# Patient Record
Sex: MALE | Race: WHITE | Employment: OTHER | URBAN - METROPOLITAN AREA
[De-identification: names, ages, dates, MRNs, and addresses within clinical notes are randomized per-mention and may not be internally consistent; named-entity substitution may affect disease eponyms.]

---

## 2019-02-05 ENCOUNTER — HOSPITAL ENCOUNTER (OUTPATIENT)
Dept: LAB | Age: 74
Discharge: HOME | End: 2019-02-05
Payer: MEDICARE

## 2019-02-05 DIAGNOSIS — Z95.1 S/P CABG (CORONARY ARTERY BYPASS GRAFT): ICD-10-CM

## 2019-02-05 LAB
ALBUMIN SERPL-MCNC: 3.3 G/DL (ref 3.2–4.6)
ALBUMIN/GLOB SERPL: 0.7 {RATIO}
ALP SERPL-CCNC: 86 U/L (ref 50–136)
ALT SERPL-CCNC: 23 U/L (ref 12–65)
ANION GAP SERPL CALC-SCNC: 8 MMOL/L
AST SERPL-CCNC: 22 U/L (ref 15–37)
BASOPHILS # BLD: 0 K/UL (ref 0–0.2)
BASOPHILS NFR BLD: 1 % (ref 0–2)
BILIRUB SERPL-MCNC: 0.7 MG/DL (ref 0.2–1.1)
BUN SERPL-MCNC: 26 MG/DL (ref 8–23)
CALCIUM SERPL-MCNC: 9.3 MG/DL (ref 8.3–10.4)
CHLORIDE SERPL-SCNC: 110 MMOL/L (ref 98–107)
CHOLEST SERPL-MCNC: 170 MG/DL
CO2 SERPL-SCNC: 22 MMOL/L (ref 21–32)
CREAT SERPL-MCNC: 1.8 MG/DL (ref 0.8–1.5)
DIFFERENTIAL METHOD BLD: ABNORMAL
EOSINOPHIL # BLD: 0.2 K/UL (ref 0–0.8)
EOSINOPHIL NFR BLD: 4 % (ref 0.5–7.8)
ERYTHROCYTE [DISTWIDTH] IN BLOOD BY AUTOMATED COUNT: 16.2 % (ref 11.9–14.6)
GLOBULIN SER CALC-MCNC: 4.9 G/DL
GLUCOSE SERPL-MCNC: 103 MG/DL (ref 65–100)
HCT VFR BLD AUTO: 43.9 % (ref 41.1–50.3)
HDLC SERPL-MCNC: 27 MG/DL (ref 40–60)
HDLC SERPL: 6.3 {RATIO}
HGB BLD-MCNC: 14.3 G/DL (ref 13.6–17.2)
IMM GRANULOCYTES # BLD AUTO: 0 K/UL (ref 0–0.5)
IMM GRANULOCYTES NFR BLD AUTO: 0 % (ref 0–5)
LDLC SERPL CALC-MCNC: 96 MG/DL
LIPID PROFILE,FLP: ABNORMAL
LYMPHOCYTES # BLD: 1.3 K/UL (ref 0.5–4.6)
LYMPHOCYTES NFR BLD: 26 % (ref 13–44)
MAGNESIUM SERPL-MCNC: 2.2 MG/DL (ref 1.8–2.4)
MCH RBC QN AUTO: 25.8 PG (ref 26.1–32.9)
MCHC RBC AUTO-ENTMCNC: 32.6 G/DL (ref 31.4–35)
MCV RBC AUTO: 79.1 FL (ref 79.6–97.8)
MONOCYTES # BLD: 0.7 K/UL (ref 0.1–1.3)
MONOCYTES NFR BLD: 13 % (ref 4–12)
NEUTS SEG # BLD: 2.9 K/UL (ref 1.7–8.2)
NEUTS SEG NFR BLD: 57 % (ref 43–78)
NRBC # BLD: 0 K/UL (ref 0–0.2)
PLATELET # BLD AUTO: 231 K/UL (ref 150–450)
PMV BLD AUTO: 9.3 FL (ref 9.4–12.3)
POTASSIUM SERPL-SCNC: 4.1 MMOL/L (ref 3.5–5.1)
PROT SERPL-MCNC: 8.2 G/DL (ref 6.3–8.2)
RBC # BLD AUTO: 5.55 M/UL (ref 4.23–5.6)
SODIUM SERPL-SCNC: 140 MMOL/L (ref 136–145)
TRIGL SERPL-MCNC: 235 MG/DL (ref 35–150)
TSH SERPL DL<=0.005 MIU/L-ACNC: 5.24 UIU/ML (ref 0.36–3.74)
VLDLC SERPL CALC-MCNC: 47 MG/DL (ref 6–23)
WBC # BLD AUTO: 5.1 K/UL (ref 4.3–11.1)

## 2019-02-05 PROCEDURE — 85025 COMPLETE CBC W/AUTO DIFF WBC: CPT

## 2019-02-05 PROCEDURE — 84443 ASSAY THYROID STIM HORMONE: CPT

## 2019-02-05 PROCEDURE — 83735 ASSAY OF MAGNESIUM: CPT

## 2019-02-05 PROCEDURE — 80053 COMPREHEN METABOLIC PANEL: CPT

## 2019-02-05 PROCEDURE — 36415 COLL VENOUS BLD VENIPUNCTURE: CPT

## 2019-02-05 PROCEDURE — 80061 LIPID PANEL: CPT

## 2019-02-13 ENCOUNTER — HOSPITAL ENCOUNTER (OUTPATIENT)
Dept: CARDIAC CATH/INVASIVE PROCEDURES | Age: 74
Discharge: HOME | End: 2019-02-14
Attending: INTERNAL MEDICINE | Admitting: INTERNAL MEDICINE
Payer: MEDICARE

## 2019-02-13 PROBLEM — I25.10 CAD (CORONARY ARTERY DISEASE): Status: ACTIVE | Noted: 2019-02-13

## 2019-02-13 LAB
ANION GAP SERPL CALC-SCNC: 9 MMOL/L (ref 7–16)
ATRIAL RATE: 55 BPM
BUN SERPL-MCNC: 30 MG/DL (ref 8–23)
CALCIUM SERPL-MCNC: 9.6 MG/DL (ref 8.3–10.4)
CALCULATED P AXIS, ECG09: 29 DEGREES
CALCULATED R AXIS, ECG10: 40 DEGREES
CALCULATED T AXIS, ECG11: 94 DEGREES
CHLORIDE SERPL-SCNC: 109 MMOL/L (ref 98–107)
CO2 SERPL-SCNC: 22 MMOL/L (ref 21–32)
CREAT SERPL-MCNC: 1.71 MG/DL (ref 0.8–1.5)
DIAGNOSIS, 93000: NORMAL
GLUCOSE BLD STRIP.AUTO-MCNC: 171 MG/DL (ref 65–100)
GLUCOSE SERPL-MCNC: 154 MG/DL (ref 65–100)
MAGNESIUM SERPL-MCNC: 2.1 MG/DL (ref 1.8–2.4)
P-R INTERVAL, ECG05: 174 MS
POTASSIUM SERPL-SCNC: 4.4 MMOL/L (ref 3.5–5.1)
Q-T INTERVAL, ECG07: 464 MS
QRS DURATION, ECG06: 128 MS
QTC CALCULATION (BEZET), ECG08: 443 MS
SODIUM SERPL-SCNC: 140 MMOL/L (ref 136–145)
VENTRICULAR RATE, ECG03: 55 BPM

## 2019-02-13 PROCEDURE — C1894 INTRO/SHEATH, NON-LASER: HCPCS

## 2019-02-13 PROCEDURE — 99153 MOD SED SAME PHYS/QHP EA: CPT

## 2019-02-13 PROCEDURE — 74011250637 HC RX REV CODE- 250/637: Performed by: NURSE PRACTITIONER

## 2019-02-13 PROCEDURE — 74011636320 HC RX REV CODE- 636/320: Performed by: INTERNAL MEDICINE

## 2019-02-13 PROCEDURE — 75710 ARTERY X-RAYS ARM/LEG: CPT

## 2019-02-13 PROCEDURE — C1769 GUIDE WIRE: HCPCS

## 2019-02-13 PROCEDURE — 77030015395 HC CATH ANGI DX SVU ANGI -C

## 2019-02-13 PROCEDURE — C1887 CATHETER, GUIDING: HCPCS

## 2019-02-13 PROCEDURE — 75716 ARTERY X-RAYS ARMS/LEGS: CPT

## 2019-02-13 PROCEDURE — 77030013794 HC KT TRNSDUC BLD EDWD -B

## 2019-02-13 PROCEDURE — 99152 MOD SED SAME PHYS/QHP 5/>YRS: CPT

## 2019-02-13 PROCEDURE — 77030002996 HC SUT SLK J&J -A

## 2019-02-13 PROCEDURE — 77030016715 HC CATH ANGI DX TMPO2 CARD -B

## 2019-02-13 PROCEDURE — 74011250636 HC RX REV CODE- 250/636

## 2019-02-13 PROCEDURE — 93005 ELECTROCARDIOGRAM TRACING: CPT | Performed by: INTERNAL MEDICINE

## 2019-02-13 PROCEDURE — 36247 INS CATH ABD/L-EXT ART 3RD: CPT

## 2019-02-13 PROCEDURE — 74011636637 HC RX REV CODE- 636/637: Performed by: INTERNAL MEDICINE

## 2019-02-13 PROCEDURE — 83735 ASSAY OF MAGNESIUM: CPT

## 2019-02-13 PROCEDURE — 82962 GLUCOSE BLOOD TEST: CPT

## 2019-02-13 PROCEDURE — 74011250636 HC RX REV CODE- 250/636: Performed by: INTERNAL MEDICINE

## 2019-02-13 PROCEDURE — 74011000258 HC RX REV CODE- 258: Performed by: INTERNAL MEDICINE

## 2019-02-13 PROCEDURE — 80048 BASIC METABOLIC PNL TOTAL CA: CPT

## 2019-02-13 RX ORDER — PREDNISONE 5 MG/1
5 TABLET ORAL DAILY
COMMUNITY
End: 2019-03-07

## 2019-02-13 RX ORDER — LIDOCAINE HYDROCHLORIDE 10 MG/ML
5-40 INJECTION INFILTRATION; PERINEURAL
Status: DISCONTINUED | OUTPATIENT
Start: 2019-02-13 | End: 2019-02-13 | Stop reason: HOSPADM

## 2019-02-13 RX ORDER — FENTANYL CITRATE 50 UG/ML
25-100 INJECTION, SOLUTION INTRAMUSCULAR; INTRAVENOUS
Status: DISCONTINUED | OUTPATIENT
Start: 2019-02-13 | End: 2019-02-13 | Stop reason: HOSPADM

## 2019-02-13 RX ORDER — LISINOPRIL 20 MG/1
40 TABLET ORAL DAILY
Status: DISCONTINUED | OUTPATIENT
Start: 2019-02-14 | End: 2019-02-14 | Stop reason: HOSPADM

## 2019-02-13 RX ORDER — LEVOTHYROXINE SODIUM 75 UG/1
75 TABLET ORAL
Status: DISCONTINUED | OUTPATIENT
Start: 2019-02-14 | End: 2019-02-14 | Stop reason: HOSPADM

## 2019-02-13 RX ORDER — NITROGLYCERIN 0.4 MG/1
0.4 TABLET SUBLINGUAL
Status: DISCONTINUED | OUTPATIENT
Start: 2019-02-13 | End: 2019-02-14 | Stop reason: HOSPADM

## 2019-02-13 RX ORDER — SODIUM CHLORIDE 0.9 % (FLUSH) 0.9 %
5-40 SYRINGE (ML) INJECTION EVERY 8 HOURS
Status: DISCONTINUED | OUTPATIENT
Start: 2019-02-13 | End: 2019-02-14 | Stop reason: HOSPADM

## 2019-02-13 RX ORDER — HYDRALAZINE HYDROCHLORIDE 50 MG/1
100 TABLET, FILM COATED ORAL DAILY
Status: DISCONTINUED | OUTPATIENT
Start: 2019-02-14 | End: 2019-02-14 | Stop reason: HOSPADM

## 2019-02-13 RX ORDER — TAMSULOSIN HYDROCHLORIDE 0.4 MG/1
0.4 CAPSULE ORAL DAILY
Status: DISCONTINUED | OUTPATIENT
Start: 2019-02-14 | End: 2019-02-14 | Stop reason: HOSPADM

## 2019-02-13 RX ORDER — SODIUM CHLORIDE 9 MG/ML
3 INJECTION, SOLUTION INTRAVENOUS ONCE
Status: COMPLETED | OUTPATIENT
Start: 2019-02-13 | End: 2019-02-13

## 2019-02-13 RX ORDER — ASPIRIN 81 MG/1
81 TABLET ORAL DAILY
Status: DISCONTINUED | OUTPATIENT
Start: 2019-02-14 | End: 2019-02-14 | Stop reason: HOSPADM

## 2019-02-13 RX ORDER — BIVALIRUDIN 250 MG/5ML
0.75 INJECTION, POWDER, LYOPHILIZED, FOR SOLUTION INTRAVENOUS ONCE
Status: COMPLETED | OUTPATIENT
Start: 2019-02-13 | End: 2019-02-13

## 2019-02-13 RX ORDER — SODIUM CHLORIDE 0.9 % (FLUSH) 0.9 %
5-40 SYRINGE (ML) INJECTION AS NEEDED
Status: DISCONTINUED | OUTPATIENT
Start: 2019-02-13 | End: 2019-02-14 | Stop reason: HOSPADM

## 2019-02-13 RX ORDER — INSULIN LISPRO 100 [IU]/ML
INJECTION, SOLUTION INTRAVENOUS; SUBCUTANEOUS
Status: DISCONTINUED | OUTPATIENT
Start: 2019-02-13 | End: 2019-02-14

## 2019-02-13 RX ORDER — GUAIFENESIN 100 MG/5ML
324 LIQUID (ML) ORAL ONCE
Status: ACTIVE | OUTPATIENT
Start: 2019-02-13 | End: 2019-02-13

## 2019-02-13 RX ORDER — HYDROMORPHONE HYDROCHLORIDE 2 MG/ML
.5-2 INJECTION, SOLUTION INTRAMUSCULAR; INTRAVENOUS; SUBCUTANEOUS ONCE
Status: COMPLETED | OUTPATIENT
Start: 2019-02-13 | End: 2019-02-13

## 2019-02-13 RX ORDER — INSULIN LISPRO 100 [IU]/ML
12 INJECTION, SOLUTION INTRAVENOUS; SUBCUTANEOUS
Status: DISCONTINUED | OUTPATIENT
Start: 2019-02-14 | End: 2019-02-14 | Stop reason: HOSPADM

## 2019-02-13 RX ORDER — ONDANSETRON 2 MG/ML
4 INJECTION INTRAMUSCULAR; INTRAVENOUS
Status: DISCONTINUED | OUTPATIENT
Start: 2019-02-13 | End: 2019-02-14 | Stop reason: HOSPADM

## 2019-02-13 RX ORDER — METOPROLOL TARTRATE 25 MG/1
25 TABLET, FILM COATED ORAL DAILY
Status: DISCONTINUED | OUTPATIENT
Start: 2019-02-14 | End: 2019-02-14 | Stop reason: HOSPADM

## 2019-02-13 RX ORDER — SODIUM CHLORIDE 9 MG/ML
1 INJECTION, SOLUTION INTRAVENOUS AS NEEDED
Status: DISPENSED | OUTPATIENT
Start: 2019-02-13 | End: 2019-02-14

## 2019-02-13 RX ORDER — MORPHINE SULFATE 2 MG/ML
2 INJECTION, SOLUTION INTRAMUSCULAR; INTRAVENOUS
Status: DISCONTINUED | OUTPATIENT
Start: 2019-02-13 | End: 2019-02-14 | Stop reason: HOSPADM

## 2019-02-13 RX ORDER — HYDRALAZINE HYDROCHLORIDE 20 MG/ML
10-20 INJECTION INTRAMUSCULAR; INTRAVENOUS
Status: COMPLETED | OUTPATIENT
Start: 2019-02-13 | End: 2019-02-13

## 2019-02-13 RX ORDER — IODIXANOL 320 MG/ML
50-300 INJECTION, SOLUTION INTRAVASCULAR ONCE
Status: COMPLETED | OUTPATIENT
Start: 2019-02-13 | End: 2019-02-13

## 2019-02-13 RX ORDER — HEPARIN SODIUM 200 [USP'U]/100ML
3 INJECTION, SOLUTION INTRAVENOUS CONTINUOUS
Status: DISCONTINUED | OUTPATIENT
Start: 2019-02-13 | End: 2019-02-13 | Stop reason: HOSPADM

## 2019-02-13 RX ORDER — MIDAZOLAM HYDROCHLORIDE 1 MG/ML
.5-5 INJECTION, SOLUTION INTRAMUSCULAR; INTRAVENOUS
Status: DISCONTINUED | OUTPATIENT
Start: 2019-02-13 | End: 2019-02-13 | Stop reason: HOSPADM

## 2019-02-13 RX ORDER — SODIUM CHLORIDE 9 MG/ML
75 INJECTION, SOLUTION INTRAVENOUS CONTINUOUS
Status: DISCONTINUED | OUTPATIENT
Start: 2019-02-13 | End: 2019-02-14 | Stop reason: HOSPADM

## 2019-02-13 RX ORDER — GUAIFENESIN 100 MG/5ML
324 LIQUID (ML) ORAL ONCE
Status: DISCONTINUED | OUTPATIENT
Start: 2019-02-13 | End: 2019-02-13 | Stop reason: HOSPADM

## 2019-02-13 RX ORDER — ROSUVASTATIN CALCIUM 5 MG/1
10 TABLET, COATED ORAL
Status: DISCONTINUED | OUTPATIENT
Start: 2019-02-13 | End: 2019-02-14 | Stop reason: HOSPADM

## 2019-02-13 RX ORDER — HYDRALAZINE HYDROCHLORIDE 20 MG/ML
20 INJECTION INTRAMUSCULAR; INTRAVENOUS
Status: DISCONTINUED | OUTPATIENT
Start: 2019-02-13 | End: 2019-02-14

## 2019-02-13 RX ADMIN — HYDRALAZINE HYDROCHLORIDE 20 MG: 20 INJECTION INTRAMUSCULAR; INTRAVENOUS at 20:17

## 2019-02-13 RX ADMIN — IODIXANOL 105 ML: 320 INJECTION, SOLUTION INTRAVASCULAR at 13:59

## 2019-02-13 RX ADMIN — HYDROMORPHONE HYDROCHLORIDE 1 MG: 2 INJECTION, SOLUTION INTRAMUSCULAR; INTRAVENOUS; SUBCUTANEOUS at 13:50

## 2019-02-13 RX ADMIN — BIVALIRUDIN 1.75 MG/KG/HR: 250 INJECTION, POWDER, LYOPHILIZED, FOR SOLUTION INTRAVENOUS at 13:02

## 2019-02-13 RX ADMIN — LIDOCAINE HYDROCHLORIDE 10 ML: 10 INJECTION, SOLUTION INFILTRATION; PERINEURAL at 12:50

## 2019-02-13 RX ADMIN — Medication 5 ML: at 23:11

## 2019-02-13 RX ADMIN — MIDAZOLAM HYDROCHLORIDE 2 MG: 1 INJECTION, SOLUTION INTRAMUSCULAR; INTRAVENOUS at 12:45

## 2019-02-13 RX ADMIN — SODIUM CHLORIDE 1 ML/KG/HR: 900 INJECTION, SOLUTION INTRAVENOUS at 10:31

## 2019-02-13 RX ADMIN — FENTANYL CITRATE 50 MCG: 50 INJECTION, SOLUTION INTRAMUSCULAR; INTRAVENOUS at 12:59

## 2019-02-13 RX ADMIN — BIVALIRUDIN 1.75 MG/KG/HR: 250 INJECTION, POWDER, LYOPHILIZED, FOR SOLUTION INTRAVENOUS at 13:37

## 2019-02-13 RX ADMIN — MIDAZOLAM HYDROCHLORIDE 2 MG: 1 INJECTION, SOLUTION INTRAMUSCULAR; INTRAVENOUS at 13:47

## 2019-02-13 RX ADMIN — ROSUVASTATIN CALCIUM 10 MG: 5 TABLET, FILM COATED ORAL at 23:09

## 2019-02-13 RX ADMIN — INSULIN LISPRO 2 UNITS: 100 INJECTION, SOLUTION INTRAVENOUS; SUBCUTANEOUS at 23:09

## 2019-02-13 RX ADMIN — HEPARIN SODIUM 3 ML/HR: 5000 INJECTION, SOLUTION INTRAVENOUS; SUBCUTANEOUS at 12:50

## 2019-02-13 RX ADMIN — SODIUM CHLORIDE 75 ML/HR: 900 INJECTION, SOLUTION INTRAVENOUS at 23:10

## 2019-02-13 RX ADMIN — SODIUM CHLORIDE 3 ML/KG/HR: 900 INJECTION, SOLUTION INTRAVENOUS at 10:31

## 2019-02-13 RX ADMIN — MIDAZOLAM HYDROCHLORIDE 2 MG: 1 INJECTION, SOLUTION INTRAMUSCULAR; INTRAVENOUS at 12:59

## 2019-02-13 RX ADMIN — BIVALIRUDIN 102 MG: 250 INJECTION, POWDER, LYOPHILIZED, FOR SOLUTION INTRAVENOUS at 13:02

## 2019-02-13 RX ADMIN — HYDROMORPHONE HYDROCHLORIDE 1 MG: 2 INJECTION, SOLUTION INTRAMUSCULAR; INTRAVENOUS; SUBCUTANEOUS at 13:26

## 2019-02-13 RX ADMIN — MIDAZOLAM HYDROCHLORIDE 2 MG: 1 INJECTION, SOLUTION INTRAMUSCULAR; INTRAVENOUS at 13:16

## 2019-02-13 RX ADMIN — FENTANYL CITRATE 50 MCG: 50 INJECTION, SOLUTION INTRAMUSCULAR; INTRAVENOUS at 13:20

## 2019-02-13 RX ADMIN — HYDRALAZINE HYDROCHLORIDE 10 MG: 20 INJECTION INTRAMUSCULAR; INTRAVENOUS at 13:04

## 2019-02-13 NOTE — PROGRESS NOTES
6FR sheath removed from right femoral artery by Miles Barreto RN at 8968. Manual pressure held for 20 minutes until hemostasis achieved. No bleeding or hematoma noted afterwards. Sterile dressing placed. Pt instructed to keep right straight and to remain flat. Pt verbalized understanding of post procedural instructions. Call bell within reach. Will continue to monitor. Hemostasis achieved at 1658.

## 2019-02-13 NOTE — PROGRESS NOTES
TRANSFER - OUT REPORT: 
 
Verbal report given to Marnie Lopez RN(name) on Ashu  being transferred to room 327(unit) for routine progression of care Report consisted of patients Situation, Background, Assessment and  
Recommendations(SBAR). Information from the following report(s) Procedure Summary was reviewed with the receiving nurse. Lines:  
Peripheral IV 02/13/19 Posterior;Right Hand (Active) Peripheral IV 02/13/19 Anterior; Left Forearm (Active) Opportunity for questions and clarification was provided. Patient transported with: 
 hospital transport

## 2019-02-13 NOTE — PROGRESS NOTES
Patient pre-assessment complete for JESSICA/LEI with Dr Jodie Velasco scheduled for 19 at 12:30pm, arrival time 10:30am. Patient verified using . Patient instructed to bring all home medications in labeled bottles on the day of procedure. NPO status reinforced. Patient informed to take a full dose aspirin 325mg  or 81 mg x 4 on the day of procedure. Patient instructed to HOLD insulin in am & take 1/2 dose tonight. Instructed they can take all other medications excluding vitamins & supplements. Patient verbalizes understanding of all instructions & denies any questions at this time.

## 2019-02-13 NOTE — PROGRESS NOTES
Patient received to [address] [place] [address] room # 9  Ambulatory from [facility]. Patient scheduled for JESSICA/LEI today with Dr Bradley Lima. Procedure reviewed & questions answered, voiced good understanding consent obtained & placed on chart. All medications and medical history reviewed. Will prep patient per orders. Patient & family updated on plan of care. The patient has a fraility score of 3-MANAGING WELL, based on ability to perform ADLs by self

## 2019-02-13 NOTE — PROCEDURES
Cath     third order angio of left SFA for CLI. Long total occlusion of the SFA. One vessel runoff. Unable to reenter at distal cap with wire and catheter.  Refer to vasc

## 2019-02-13 NOTE — PROGRESS NOTES
TRANSFER - OUT REPORT: 
 
Verbal report given to Jes Mendoza RN(name) on Ashu  being transferred to cpru(unit) for routine progression of care Report consisted of patients Situation, Background, Assessment and  
Recommendations(SBAR). Information from the following report(s) SBAR was reviewed with the receiving nurse. is allergic to gabapentin. Opportunity for questions and clarification was provided. Procedure Summary:Pt had LEI via R groin, 6 fr sheath left in place and connected to arterial line. Site covered with clear dsg. Med Administration Versed:  8 mg Fentanyl: 100 mcg Dilaudid: 2 mg Angiomax Stop Time: 3619 Visit Vitals /71 (BP 1 Location: Right arm, BP Patient Position: Supine) Pulse 61 Resp 17 Ht 6' 2\" (1.88 m) Wt 136.1 kg (300 lb) SpO2 95% BMI 38.52 kg/m² Past Medical History:  
Diagnosis Date  Arthritis  CAD (coronary artery disease)  Diabetes ([facility] [address]  Hypertension Peripheral IV 02/13/19 Posterior;Right Hand (Active) Peripheral IV 02/13/19 Anterior; Left Forearm (Active)

## 2019-02-14 VITALS
HEIGHT: 74 IN | WEIGHT: 296.7 LBS | TEMPERATURE: 97.6 F | RESPIRATION RATE: 18 BRPM | SYSTOLIC BLOOD PRESSURE: 136 MMHG | DIASTOLIC BLOOD PRESSURE: 63 MMHG | BODY MASS INDEX: 38.08 KG/M2 | OXYGEN SATURATION: 98 % | HEART RATE: 71 BPM

## 2019-02-14 PROBLEM — E78.5 DYSLIPIDEMIA: Status: ACTIVE | Noted: 2019-02-14

## 2019-02-14 PROBLEM — Z95.1 HX OF CABG: Status: ACTIVE | Noted: 2019-02-14

## 2019-02-14 PROBLEM — I73.9 PAD (PERIPHERAL ARTERY DISEASE) (HCC): Status: ACTIVE | Noted: 2019-02-14

## 2019-02-14 PROBLEM — I10 HTN (HYPERTENSION): Status: ACTIVE | Noted: 2019-02-14

## 2019-02-14 PROBLEM — E11.9 DM (DIABETES MELLITUS) (HCC): Status: ACTIVE | Noted: 2019-02-14

## 2019-02-14 LAB — GLUCOSE BLD STRIP.AUTO-MCNC: 168 MG/DL (ref 65–100)

## 2019-02-14 PROCEDURE — 74011250636 HC RX REV CODE- 250/636: Performed by: NURSE PRACTITIONER

## 2019-02-14 PROCEDURE — 82962 GLUCOSE BLOOD TEST: CPT

## 2019-02-14 PROCEDURE — 74011250637 HC RX REV CODE- 250/637: Performed by: NURSE PRACTITIONER

## 2019-02-14 RX ORDER — NITROGLYCERIN 0.4 MG/1
0.4 TABLET SUBLINGUAL
Qty: 1 BOTTLE | Refills: 5 | Status: SHIPPED | OUTPATIENT
Start: 2019-02-14

## 2019-02-14 RX ORDER — METOPROLOL TARTRATE 50 MG/1
25 TABLET ORAL DAILY
Qty: 1 TAB | Refills: 1 | Status: SHIPPED
Start: 2019-02-14 | End: 2019-03-07

## 2019-02-14 RX ORDER — HYDRALAZINE HYDROCHLORIDE 20 MG/ML
10 INJECTION INTRAMUSCULAR; INTRAVENOUS
Status: DISCONTINUED | OUTPATIENT
Start: 2019-02-14 | End: 2019-02-14 | Stop reason: HOSPADM

## 2019-02-14 RX ADMIN — HYDRALAZINE HYDROCHLORIDE 10 MG: 20 INJECTION INTRAMUSCULAR; INTRAVENOUS at 08:27

## 2019-02-14 RX ADMIN — LEVOTHYROXINE SODIUM 75 MCG: 75 TABLET ORAL at 08:21

## 2019-02-14 RX ADMIN — METOPROLOL TARTRATE 25 MG: 25 TABLET ORAL at 08:21

## 2019-02-14 RX ADMIN — ASPIRIN 81 MG: 81 TABLET, COATED ORAL at 08:21

## 2019-02-14 RX ADMIN — Medication 5 ML: at 06:14

## 2019-02-14 RX ADMIN — LISINOPRIL 40 MG: 20 TABLET ORAL at 08:21

## 2019-02-14 RX ADMIN — HYDRALAZINE HYDROCHLORIDE 100 MG: 50 TABLET, FILM COATED ORAL at 08:21

## 2019-02-14 NOTE — DISCHARGE INSTRUCTIONS
DISPOSITION: The patient is being discharged home in stable condition on a low saturated fat, low cholesterol and low salt diet. The patient is instructed to advance activities as tolerated. The patient is instructed to avoid all heavy lifting, straining, stooping or squatting for 3-5 days. The patient is instructed to watch the cath site for bleeding/oozing; if seen, the patient is instructed to apply firm pressure with a clean cloth and call [facility] at [phone]. The patient is instructed to watch for signs of infection which include: increasing area of redness, fever/hot to touch or purulent drainage at the catheterization site. The patient is instructed not to soak in a bathtub for 7-10 days, but is cleared to shower. The patient is instructed to call the office or return to the ER for immediate evaluation for any severe leg pain, color change, or temperature change in leg. Patient Education        Peripheral Arterial Disease of the Leg: Care Instructions  Your Care Instructions  Peripheral arterial disease (PAD) occurs when the blood vessels (arteries) that supply blood to the legs, belly, pelvis, arms, or neck get too narrow. This reduces blood flow to that area. The legs are affected most often. Fatty buildup (plaque) in the arteries usually is the cause of PAD. This buildup is also called \"hardening\" of the arteries. Your risk of PAD increases if you smoke or have high cholesterol, high blood pressure, diabetes, or a family history of PAD. Many people do not have symptoms. If you do have symptoms, you may have weak or tired legs, difficulty walking or balancing, or pain. If you have pain, you might feel a tight, aching, or squeezing pain in the calf, foot, thigh, or buttock that occurs during exercise.  The pain usually gets worse during exercise and goes away when you rest. If PAD gets worse, you may have symptoms of poor blood flow such as leg pain when you rest.  Medicines and lifestyle changes may help your symptoms and lower your risk of heart attack and stroke. In some cases, surgery or other treatment is needed. It is important that you follow up with your doctor. Follow-up care is a key part of your treatment and safety. Be sure to make and go to all appointments, and call your doctor if you are having problems. It's also a good idea to know your test results and keep a list of the medicines you take. How can you care for yourself at home? · Do not smoke. Smoking can make PAD worse. If you need help quitting, talk to your doctor about stop-smoking programs and medicines. These can increase your chances of quitting for good. · Take your medicines exactly as prescribed. Call your doctor if you think you are having a problem with your medicine. · If you take a blood thinner, such as aspirin, be sure to get instructions about how to take your medicine safely. Blood thinners can cause serious bleeding problems. · Ask your doctor if a cardiac rehab program is right for you. Cardiac rehab can help you make lifestyle changes. In cardiac rehab, a team of health professionals provides education and support to help you make new, healthy habits. · Eat heart-healthy foods such as fruits, vegetables, whole grains, fish, lean meats, and low-fat or nonfat dairy foods. Limit sodium, sugar, and alcohol. · If your doctor recommends it, get more exercise. Walking is a good choice. Bit by bit, increase the amount you walk every day. Try for at least 30 minutes on most days of the week. If you have symptoms when you exercise, ask your doctor about a special exercise program that may help relieve your symptoms. · Stay at a healthy weight. Lose weight if you need to. · Take good care of your feet. ? Treat cuts and scrapes on your legs right away. Poor blood flow prevents (or slows) quick healing of even small cuts or scrapes. This is even more important if you have diabetes. ?  Avoid shoes that are too tight or that rub your feet. Shoes should be comfortable and fit well. ? Avoid socks or stockings that are tight enough to leave elastic-band marks on your legs. Tight socks can make circulation problems worse. ? Keep your feet clean and moisturized to prevent drying and cracking. Place cotton or lamb's wool between your toes to prevent rubbing and to absorb moisture. ? If you have a sore on your leg or foot, keep it dry and cover it with a nonstick bandage until you see your doctor. When should you call for help? Call 911 anytime you think you may need emergency care. For example, call if:    · You have symptoms of a heart attack. These may include:  ? Chest pain or pressure, or a strange feeling in the chest.  ? Sweating. ? Shortness of breath. ? Nausea or vomiting. ? Pain, pressure, or a strange feeling in the back, neck, jaw, or upper belly or in one or both shoulders or arms. ? Lightheadedness or sudden weakness. ? A fast or irregular heartbeat.    After you call 911, the  may tell you to chew 1 adult-strength or 2 to 4 low-dose aspirin. Wait for an ambulance. Do not try to drive yourself.    · You have sudden, severe leg pain, and your leg is cool and pale.     · You have symptoms of a stroke. These may include:  ? Sudden numbness, tingling, weakness, or loss of movement in your face, arm, or leg, especially on only one side of your body. ? Sudden vision changes. ? Sudden trouble speaking. ? Sudden confusion or trouble understanding simple statements. ? Sudden problems with walking or balance. ? A sudden, severe headache that is different from past headaches.    Call your doctor now or seek immediate medical care if:    · You have leg pain that does not go away even if you rest.     · Your leg pain changes or gets worse.  For example, if you have more pain with normal activity or the same pain with decreased activity, you should call.     · You have cold or numb feet or toes.     · You have leg or foot sores that are slow to heal.     · The skin on your legs or feet changes color.     · You have an open sore on your leg or foot that is infected. Signs of infection include:  ? Increased pain, swelling, warmth, or redness. ? Red streaks leading from the sore. ? Pus draining from the sore. ? A fever.    Watch closely for changes in your health, and be sure to contact your doctor if you have any problems. Where can you learn more? Go to http://hollie-madeline.info/. Enter 056 390 63 [id number] in the search box to learn more about \"Peripheral Arterial Disease of the Leg: Care Instructions. \"  Current as of: July 22, 2018  Content Version: 11.9  © 5182-5727 [employer]. Care instructions adapted under license by [employer] (which disclaims liability or warranty for this information). If you have questions about a medical condition or this instruction, always ask your healthcare professional. [place] [address] any warranty or liability for your use of this information. Cardiac Catheterization/Angiography Discharge Instructions    *Check the puncture site frequently for swelling or bleeding. If you see any bleeding, lie down and apply pressure over the area with a clean town or washcloth. Notify your doctor for any redness, swelling, drainage or oozing from the puncture site. Notify your doctor for any fever or chills. *If the leg or arm with the puncture becomes cold, numb or painful, call [facility] at  [phone]. *Activity should be limited for the next 48 hours. Climb stairs as little as possible and avoid any stooping, bending or strenuous activity for 48 hours. No heavy lifting (anything over 10 pounds) for three days. *Do not drive for 48 hours. *You may resume your usual diet.  Drink more fluids than usual.    *Have a responsible person drive you home and stay with you for at least 24 hours after your heart catheterization/angiography. *You may remove the bandage from your Right Groin in 24 hours. You may shower in 24 hours. No tub baths, hot tubs or swimming for one week. Do not place any lotions, creams, powders, ointments over the puncture site for one week. You may place a clean band-aid over the puncture site each day for 5 days. Change this daily. Patient Education        Heart-Healthy Diet: Care Instructions  Your Care Instructions    A heart-healthy diet has lots of vegetables, fruits, nuts, beans, and whole grains, and is low in salt. It limits foods that are high in saturated fat, such as meats, cheeses, and fried foods. It may be hard to change your diet, but even small changes can lower your risk of heart attack and heart disease. Follow-up care is a key part of your treatment and safety. Be sure to make and go to all appointments, and call your doctor if you are having problems. It's also a good idea to know your test results and keep a list of the medicines you take. How can you care for yourself at home? Watch your portions  · Learn what a serving is. A \"serving\" and a \"portion\" are not always the same thing. Make sure that you are not eating larger portions than are recommended. For example, a serving of pasta is ½ cup. A serving size of meat is 2 to 3 ounces. A 3-ounce serving is about the size of a deck of cards. Measure serving sizes until you are good at [place]" them. Keep in mind that restaurants often serve portions that are 2 or 3 times the size of one serving. · To keep your energy level up and keep you from feeling hungry, eat often but in smaller portions. · Eat only the number of calories you need to stay at a healthy weight. If you need to lose weight, eat fewer calories than your body burns (through exercise and other physical activity). Eat more fruits and vegetables  · Eat a variety of fruit and vegetables every day.  Dark green, deep orange, red, or yellow fruits and vegetables are especially good for you. Examples include spinach, carrots, peaches, and berries. · Keep carrots, celery, and other veggies handy for snacks. Buy fruit that is in season and store it where you can see it so that you will be tempted to eat it. · Cook dishes that have a lot of veggies in them, such as stir-fries and soups. Limit saturated and trans fat  · Read food labels, and try to avoid saturated and trans fats. They increase your risk of heart disease. Trans fat is found in many processed foods such as cookies and crackers. · Use olive or canola oil when you cook. Try cholesterol-lowering spreads, such as Benecol or Take Control. · Bake, broil, grill, or steam foods instead of frying them. · Choose lean meats instead of high-fat meats such as hot dogs and sausages. Cut off all visible fat when you prepare meat. · Eat fish, skinless poultry, and meat alternatives such as soy products instead of high-fat meats. Soy products, such as tofu, may be especially good for your heart. · Choose low-fat or fat-free milk and dairy products. Eat fish  · Eat at least two servings of fish a week. Certain fish, such as salmon and tuna, contain omega-3 fatty acids, which may help reduce your risk of heart attack. Eat foods high in fiber  · Eat a variety of grain products every day. Include whole-grain foods that have lots of fiber and nutrients. Examples of whole-grain foods include oats, whole wheat bread, and brown rice. · Buy whole-grain breads and cereals, instead of white bread or pastries. Limit salt and sodium  · Limit how much salt and sodium you eat to help lower your blood pressure. · Taste food before you salt it. Add only a little salt when you think you need it. With time, your taste buds will adjust to less salt. · Eat fewer snack items, fast foods, and other high-salt, processed foods. Check food labels for the amount of sodium in packaged foods.   · Choose low-sodium versions of canned goods (such as soups, vegetables, and beans). Limit sugar  · Limit drinks and foods with added sugar. These include candy, desserts, and soda pop. Limit alcohol  · Limit alcohol to no more than 2 drinks a day for men and 1 drink a day for women. Too much alcohol can cause health problems. When should you call for help? Watch closely for changes in your health, and be sure to contact your doctor if:    · You would like help planning heart-healthy meals. Where can you learn more? Go to http://hollie-madeline.info/. Enter V137 in the search box to learn more about \"Heart-Healthy Diet: Care Instructions. \"  Current as of: July 22, 2018  Content Version: 11.9  © 7322-3618 [employer]. Care instructions adapted under license by [employer] (which disclaims liability or warranty for this information). If you have questions about a medical condition or this instruction, always ask your healthcare professional. [place] [address] any warranty or liability for your use of this information. DISCHARGE SUMMARY from Nurse    PATIENT INSTRUCTIONS:    After general anesthesia or intravenous sedation, for 24 hours or while taking prescription Narcotics:  · Limit your activities  · Do not drive and operate hazardous machinery  · Do not make important personal or business decisions  · Do  not drink alcoholic beverages  · If you have not urinated within 8 hours after discharge, please contact your surgeon on call.     Report the following to your surgeon:  · Excessive pain, swelling, redness or odor of or around the surgical area  · Temperature over 100.5  · Nausea and vomiting lasting longer than 4 hours or if unable to take medications  · Any signs of decreased circulation or nerve impairment to extremity: change in color, persistent  numbness, tingling, coldness or increase pain  · Any questions    What to do at Home:    *  Please give a list of your current medications to your Primary Care Provider. *  Please update this list whenever your medications are discontinued, doses are      changed, or new medications (including over-the-counter products) are added. *  Please carry medication information at all times in case of emergency situations. These are general instructions for a healthy lifestyle:    No smoking/ No tobacco products/ Avoid exposure to second hand smoke  Surgeon General's Warning:  Quitting smoking now greatly reduces serious risk to your health. Obesity, smoking, and sedentary lifestyle greatly increases your risk for illness    A healthy diet, regular physical exercise & weight monitoring are important for maintaining a healthy lifestyle    You may be retaining fluid if you have a history of heart failure or if you experience any of the following symptoms:  Weight gain of 3 pounds or more overnight or 5 pounds in a week, increased swelling in our hands or feet or shortness of breath while lying flat in bed. Please call your doctor as soon as you notice any of these symptoms; do not wait until your next office visit. Recognize signs and symptoms of STROKE:    F-face looks uneven    A-arms unable to move or move unevenly    S-speech slurred or non-existent    T-time-call 911 as soon as signs and symptoms begin-DO NOT go       Back to bed or wait to see if you get better-TIME IS BRAIN. Warning Signs of HEART ATTACK     Call 911 if you have these symptoms:   Chest discomfort. Most heart attacks involve discomfort in the center of the chest that lasts more than a few minutes, or that goes away and comes back. It can feel like uncomfortable pressure, squeezing, fullness, or pain.  Discomfort in other areas of the upper body. Symptoms can include pain or discomfort in one or both arms, the back, neck, jaw, or stomach.  Shortness of breath with or without chest discomfort.    Other signs may include breaking out in a cold sweat, nausea, or lightheadedness. Don't wait more than five minutes to call 911 - MINUTES MATTER! Fast action can save your life. Calling 911 is almost always the fastest way to get lifesaving treatment. Emergency Medical Services staff can begin treatment when they arrive -- up to an hour sooner than if someone gets to the hospital by car. The discharge information has been reviewed with the patient. The patient verbalized understanding. Discharge medications reviewed with the patient and appropriate educational materials and side effects teaching were provided.   ___________________________________________________________________________________________________________________________________

## 2019-02-14 NOTE — PROGRESS NOTES
Patient reported to this nurse during 0600 rounds that he fell out of recliner when trying to put the foot rest down. Patient states he slid out of chair to floor. No injury noted. Patient denies pain. VSS. Charge RN [employer] notified. Dr Jorge Martínez notified. Patient alert and oriented and does not want family contacted. Bed alarm in place. Will continue to monitor.

## 2019-02-14 NOTE — PROGRESS NOTES
Bedside and Verbal shift change report given to [address] [facility] [address] [facility] RN (oncoming nurse) by self Dolph Lamp nurse). Report included the following information SBAR, Kardex, MAR and Recent Results.

## 2019-02-14 NOTE — PROGRESS NOTES
Report received from [address]. Procedural findings communicated. Intra procedural  medication administration reviewed. Progression of care discussed. Patient received into [address] post JESSICA. 6FR sheath to right femoral artery connected to arterial line Routine post procedural vital signs initiated

## 2019-02-14 NOTE — PROGRESS NOTES
Discharge instructions were reviewed with patient. An opportunity was given for questions. All medications were reviewed, and information was given on the new medications - nitro. Patient verbalized understanding, and has no questions at this time. IV and telemetry monitor removed by primary RN.

## 2019-02-14 NOTE — DISCHARGE SUMMARY
[facility] Cardiology Discharge Summary     Patient ID:  Zahra Jacobs  [id number]  89 y.o.  1945    Admit date: 2/13/2019    Discharge date:  02/14/2019    Admitting Physician: Breanna Clement MD     Discharge Physician: Mildred López NP/Dr. Duane Maclachlan    Admission Diagnoses: PAD (peripheral artery disease) [facility]) [I73.9]  CAD (coronary artery disease) [I25.10]    Discharge Diagnoses:    Diagnosis    CAD (coronary artery disease)        PAD [facility]peripheral [facility]) ([facility]    HTN (hypertension)    [facility]    Dyslipidemia    Hx of CABG       Cardiology Procedures this admission:  Peripheral angiography  Consults: None    Hospital Course: Patient was seen at the office of [facility] Cardiology by Dr. Zahra Jacobs for complaints of severe claudication with non healing LLE ulcer. He was subsequently scheduled for peripheral angiography at [facility] [place] [place] on 02/13/19. Patient underwent peripheral angiography by Dr. Zahra Jacobs. Patient was found to have a long  of SFA unable to reenter at distal cap with wire and catheter. Patient will be referred to vascular surgery as an OP. The following morning patient was up feeling well without any complaints. Patient's right femoral cath site was clean, dry and intact without hematoma or bruit. Patient's labs were stable. Patient was seen and examined by Dr. Duane Maclachlan and determined stable and ready for discharge. Patient was instructed on the importance of medication compliance and outpatient follow up. The patient will follow up with [facility] Cardiology Dr. Zahra Jacobs in one week in the [facility] office. DISPOSITION: The patient is being discharged home in stable condition on a low saturated fat, low cholesterol and low salt diet. The patient is instructed to advance activities as tolerated. The patient is instructed to avoid all heavy lifting, straining, stooping or squatting for 3-5 days.  The patient is instructed to watch the cath site for bleeding/oozing; if seen, the patient is instructed to apply firm pressure with a clean cloth and call [facility] Cardiology at [phone]. The patient is instructed to watch for signs of infection which include: increasing area of redness, fever/hot to touch or purulent drainage at the catheterization site. The patient is instructed not to soak in a bathtub for 7-10 days, but is cleared to shower. The patient is instructed to call the office or return to the ER for immediate evaluation for any severe leg pain, color change, or temperature change in leg. Discharge Exam:   Visit Vitals  /80 (BP 1 Location: Right arm, BP Patient Position: Sitting)   Pulse 71   Temp 97.8 °F (36.6 °C)   Resp 20   Ht 6' 2\" (1.88 m)   Wt 134.6 kg (296 lb 11.2 oz)   SpO2 96%   BMI 38.09 kg/m²     Patient has been seen by Dr. Yasir Gtz: see his progress note for exam details.     Recent Results (from the past 24 hour(s))   METABOLIC PANEL, BASIC    Collection Time: 02/13/19 10:16 AM   Result Value Ref Range    Sodium 140 136 - 145 mmol/L    Potassium 4.4 3.5 - 5.1 mmol/L    Chloride 109 (H) 98 - 107 mmol/L    CO2 22 21 - 32 mmol/L    Anion gap 9 7 - 16 mmol/L    Glucose 154 (H) 65 - 100 mg/dL    BUN 30 (H) 8 - 23 MG/DL    Creatinine 1.71 (H) 0.8 - 1.5 MG/DL    GFR est AA 51 (L) >60 ml/min/1.73m2    GFR est non-AA 42 (L) >60 ml/min/1.73m2    Calcium 9.6 8.3 - 10.4 MG/DL   MAGNESIUM    Collection Time: 02/13/19 10:16 AM   Result Value Ref Range    Magnesium 2.1 1.8 - 2.4 mg/dL   EKG, 12 LEAD, INITIAL    Collection Time: 02/13/19 10:19 AM   Result Value Ref Range    Ventricular Rate 55 BPM    Atrial Rate 55 BPM    P-R Interval 174 ms    QRS Duration 128 ms    Q-T Interval 464 ms    QTC Calculation (Bezet) 443 ms    Calculated P Axis 29 degrees    Calculated R Axis 40 degrees    Calculated T Axis 94 degrees    Diagnosis       Sinus bradycardia  Right bundle branch block  Abnormal ECG  No previous ECGs available  Confirmed by Ibrahima Garcia ([id number]) on 2/13/2019 11:31:49 AM     GLUCOSE, POC    Collection Time: 02/13/19 10:16 PM   Result Value Ref Range    Glucose (POC) 171 (H) 65 - 100 mg/dL   GLUCOSE, POC    Collection Time: 02/14/19  7:27 AM   Result Value Ref Range    Glucose (POC) 168 (H) 65 - 100 mg/dL         Patient Instructions:     Current Discharge Medication List      START taking these medications    Details   nitroglycerin (NITROSTAT) 0.4 mg SL tablet 1 Tab by SubLINGual route every five (5) minutes as needed for Chest Pain. Up to 3 doses. Qty: 1 Bottle, Refills: 5         CONTINUE these medications which have NOT CHANGED    Details   metoprolol tartrate (LOPRESSOR) 50 mg tablet Take 0.5 Tabs by mouth daily. predniSONE (DELTASONE) 5 mg tablet Take 5 mg by mouth daily. aspirin delayed-release 81 mg tablet Take  by mouth daily. hydrALAZINE (APRESOLINE) 50 mg tablet Take 50 mg by mouth daily. ergocalciferol (ERGOCALCIFEROL) 50,000 unit capsule Take 50,000 Units by mouth every seven (7) days. levothyroxine (SYNTHROID) 75 mcg tablet Take  by mouth Daily (before breakfast). lisinopril (PRINIVIL, ZESTRIL) 40 mg tablet Take 40 mg by mouth daily. rosuvastatin (CRESTOR) 10 mg tablet Take 10 mg by mouth nightly. insulin NPH/insulin regular (NOVOLIN 70/30 U-100 INSULIN) 100 unit/mL (70-30) injection 60 Units by SubCUTAneous route two (2) times a day. ibuprofen (MOTRIN) 200 mg tablet Take  by mouth daily as needed for Pain.      tamsulosin (FLOMAX) 0.4 mg capsule Take 0.4 mg by mouth daily.              Signed:  AUGUSTINE Odom  2/14/2019  8:03 AM

## 2019-02-14 NOTE — PROGRESS NOTES
TRANSFER - IN REPORT: 
 
Verbal report received from Sheeba Rosa RN on Ирина Elise being received from [facility] for routine progression of care Report consisted of patients Situation, Background, Assessment and Recommendations(SBAR). Information from the following report(s) SBAR, Kardex, [facility] and Recent Results was reviewed with the receiving nurse. Opportunity for questions and clarification was provided. Assessment completed upon patients arrival to unit and care assumed.

## 2019-02-14 NOTE — PROCEDURES
[facility]  CARDIAC CATH    Name:  Lucho Man  MR#:  [id number]  :  1945  ACCOUNT #:  [de-identified]  DATE OF SERVICE:  2019    PROCEDURE PERFORMED:  Left lower extremity third-order angiography from right femoral  access and crossover technique. INDICATION:  Chronic severe claudication and CLI with known SFA disease. PROCEDURE IN DETAIL:  After obtaining informed consent and after failing medical  therapy, the patient was brought to the lab. Right groin was cleaned and prepped in  a sterile fashion. Access was obtained without difficulty. A 6-Nauruan sheath was  placed. Crossover access was obtained with an Omniflush catheter and a Glidewire  Advantage which allowed imaging. After imaging, a 6-Nauruan 45-cm sheath was placed,  and Glidewire Advantage along with _____ vertebral catheter, glide path angled tip  were all used to try to traverse the occluded SFA. FINDINGS:  Aortoiliac bifurcation is without any significant disease, common iliac  arteries are widely patent with no disease. Internal iliac arteries and external  iliac arteries are patent bilaterally. Left femoral artery is patent. Left profunda femoris artery is patent. The SFA is  totally occluded left proximally all the way down to [place].  Popliteal  artery is not imageable due to artificial knee. Below popliteal, there is one-vessel  runoff in what appears to be a posterior tibial.  The peroneal and anterior tibial  appear to be occluded with some faint collateral filling. Due to the patient's chronic limb ischemia, an attempt was made at crossing the CLI. We were able to traverse down just short of the distal cap, and at that point, we  were unable to advance wire or catheter any further and unable to cross the distal  cap. After multiple attempts, it was elected that the patient would be referred to  Vascular Surgery for potentially more invasive attempts at revascularization. Sheaths were removed. There were no complications.       Danny Faye MD      [email or web address]  D:  02/13/2019 14:13  T:  02/14/2019 14:35  JOB #:  [id number]

## 2019-02-14 NOTE — PROGRESS NOTES
02/14/19 0940 Oxygen Therapy O2 Sat (%) 98 % Pulse via Oximetry 67 beats per minute O2 Device Room air 98% on RA

## 2019-02-22 PROBLEM — L97.309 ANKLE ULCER (HCC): Status: ACTIVE | Noted: 2019-02-22

## 2019-03-07 ENCOUNTER — HOSPITAL ENCOUNTER (OUTPATIENT)
Dept: SURGERY | Age: 74
Discharge: HOME | End: 2019-03-07
Payer: MEDICARE

## 2019-03-07 VITALS
BODY MASS INDEX: 38.79 KG/M2 | OXYGEN SATURATION: 95 % | HEART RATE: 63 BPM | WEIGHT: 302.25 LBS | HEIGHT: 74 IN | DIASTOLIC BLOOD PRESSURE: 76 MMHG | TEMPERATURE: 97.5 F | RESPIRATION RATE: 16 BRPM | SYSTOLIC BLOOD PRESSURE: 167 MMHG

## 2019-03-07 LAB
ANION GAP SERPL CALC-SCNC: 10 MMOL/L (ref 7–16)
BUN SERPL-MCNC: 29 MG/DL (ref 8–23)
CALCIUM SERPL-MCNC: 9.6 MG/DL (ref 8.3–10.4)
CHLORIDE SERPL-SCNC: 105 MMOL/L (ref 98–107)
CO2 SERPL-SCNC: 24 MMOL/L (ref 21–32)
CREAT SERPL-MCNC: 1.65 MG/DL (ref 0.8–1.5)
ERYTHROCYTE [DISTWIDTH] IN BLOOD BY AUTOMATED COUNT: 17 % (ref 11.9–14.6)
GLUCOSE BLD STRIP.AUTO-MCNC: 277 MG/DL (ref 65–100)
GLUCOSE SERPL-MCNC: 295 MG/DL (ref 65–100)
HCT VFR BLD AUTO: 43.9 % (ref 41.1–50.3)
HGB BLD-MCNC: 13.8 G/DL (ref 13.6–17.2)
MCH RBC QN AUTO: 25.7 PG (ref 26.1–32.9)
MCHC RBC AUTO-ENTMCNC: 31.4 G/DL (ref 31.4–35)
MCV RBC AUTO: 81.6 FL (ref 79.6–97.8)
NRBC # BLD: 0 K/UL (ref 0–0.2)
PLATELET # BLD AUTO: 235 K/UL (ref 150–450)
PMV BLD AUTO: 10.1 FL (ref 9.4–12.3)
POTASSIUM SERPL-SCNC: 5 MMOL/L (ref 3.5–5.1)
RBC # BLD AUTO: 5.38 M/UL (ref 4.23–5.6)
SODIUM SERPL-SCNC: 139 MMOL/L (ref 136–145)
WBC # BLD AUTO: 6.3 K/UL (ref 4.3–11.1)

## 2019-03-07 PROCEDURE — 85027 COMPLETE CBC AUTOMATED: CPT

## 2019-03-07 PROCEDURE — 82962 GLUCOSE BLOOD TEST: CPT

## 2019-03-07 PROCEDURE — 80048 BASIC METABOLIC PNL TOTAL CA: CPT

## 2019-03-07 RX ORDER — METOPROLOL TARTRATE 25 MG/1
25 TABLET, FILM COATED ORAL 2 TIMES DAILY
COMMUNITY

## 2019-03-07 RX ORDER — PREDNISONE 10 MG/1
10 TABLET ORAL
COMMUNITY
End: 2019-04-19

## 2019-03-07 NOTE — PERIOP NOTES
Patient verified name and . Patient provided medical/health information and PTA medications to the best of their ability. TYPE  CASE: 2  Order for consent Orders found in EHR and matches case posting. Labs per surgeon:CBC,BMP, Creatinine,GFR. Results: pending  Labs per anesthesia protocol: POC glucose. Results Bs 277  EKG  :  EKG 19, cath .- records in [facility]    POC glucose 277  . Instructed Patient that if blood sugar 300 or > , surgery may be cancelled. Pt voice understanding. SN instruct pt to contact [phone] for any complications. Patient provided with and instructed on education handouts including Guide to Surgery, blood transfusions, pain management, and hand hygiene for the family and community, and [facility] brochure. Hibiclens and instructions given per hospital policy. Instructed patient to continue previous medications as prescribed prior to surgery unless otherwise directed and to take the following medications the day of surgery according to anesthesia guidelines : Aspirin,Apresoline,Flomax,Crestor,Prednisone,Lopressor, and Synthroid . Instructed patient to hold  the following medications: Ibuprofen-all other vitamins, supplements and NSAIDs. Original medication prescription bottles were visualized during patient appointment. Patient teach back successful and patient demonstrates knowledge of instruction.

## 2019-03-07 NOTE — PERIOP NOTES
Recent Results (from the past 12 hour(s))   CBC W/O DIFF    Collection Time: 03/07/19  3:10 PM   Result Value Ref Range    WBC 6.3 4.3 - 11.1 K/uL    RBC 5.38 4.23 - 5.6 M/uL    HGB 13.8 13.6 - 17.2 g/dL    HCT 43.9 41.1 - 50.3 %    MCV 81.6 79.6 - 97.8 FL    MCH 25.7 (L) 26.1 - 32.9 PG    MCHC 31.4 31.4 - 35.0 g/dL    RDW 17.0 (H) 11.9 - 14.6 %    PLATELET 505 094 - 577 K/uL    MPV 10.1 9.4 - 12.3 FL    ABSOLUTE NRBC 0.00 0.0 - 0.2 K/uL   GLUCOSE, POC    Collection Time: 03/07/19  3:15 PM   Result Value Ref Range    Glucose (POC) 277 (H) 65 - 526 mg/dL   METABOLIC PANEL, BASIC    Collection Time: 03/07/19  3:16 PM   Result Value Ref Range    Sodium 139 136 - 145 mmol/L    Potassium 5.0 3.5 - 5.1 mmol/L    Chloride 105 98 - 107 mmol/L    CO2 24 21 - 32 mmol/L    Anion gap 10 7 - 16 mmol/L    Glucose 295 (H) 65 - 100 mg/dL    BUN 29 (H) 8 - 23 MG/DL    Creatinine 1.65 (H) 0.8 - 1.5 MG/DL    GFR est AA 53 (L) >60 ml/min/1.73m2    GFR est non-AA 44 (L) >60 ml/min/1.73m2    Calcium 9.6 8.3 - 10.4 MG/DL

## 2019-03-07 NOTE — PERIOP NOTES
Dr Benigno Peterson:      Patient: Natanael Rodríguez, DOS 3.14.19    During a recent visit to the surgical preadmission testing center, the above mentioned patient was found to have a non-fasting blood glucose level of 277 mg/dL. This may indicate inadequate diabetic management and raises concerns that the patient is not medically optimized for surgery. It is our standard practice to postpone elective surgery for patients who present fasting blood glucose level >300 mg/dL on the day of their procedure. The patient has been advised of this policy and counseled on the importance of glucose control. We feel that this patient is at increased risk of cancellation; however, their blood glucose may be in acceptable range when they are NPO. Therefore, we will leave the decision to you whether to delay the surgery and refer the patient to their primary care provider or keep them as currently scheduled. Our goal is to prevent as many delays and cancellations as possible while ensuring patient safety.     Sincerely,    [facility]hel[facility]

## 2019-03-08 RX ORDER — SODIUM CHLORIDE 9 MG/ML
1 INJECTION, SOLUTION INTRAVENOUS CONTINUOUS
Status: CANCELLED | OUTPATIENT
Start: 2019-03-08 | End: 2019-03-09

## 2019-03-08 RX ORDER — SODIUM CHLORIDE 9 MG/ML
3 INJECTION, SOLUTION INTRAVENOUS ONCE
Status: CANCELLED | OUTPATIENT
Start: 2019-03-08 | End: 2019-03-08

## 2019-03-13 ENCOUNTER — ANESTHESIA EVENT (OUTPATIENT)
Dept: SURGERY | Age: 74
End: 2019-03-13
Payer: MEDICARE

## 2019-03-13 RX ORDER — CEFAZOLIN SODIUM/WATER 2 G/20 ML
2 SYRINGE (ML) INTRAVENOUS ONCE
Status: DISCONTINUED | OUTPATIENT
Start: 2019-03-13 | End: 2019-03-13 | Stop reason: DRUGHIGH

## 2019-03-14 ENCOUNTER — APPOINTMENT (OUTPATIENT)
Dept: INTERVENTIONAL RADIOLOGY/VASCULAR | Age: 74
End: 2019-03-14
Attending: SURGERY
Payer: MEDICARE

## 2019-03-14 ENCOUNTER — HOSPITAL ENCOUNTER (OUTPATIENT)
Age: 74
Setting detail: OUTPATIENT SURGERY
Discharge: HOME | End: 2019-03-14
Attending: SURGERY | Admitting: SURGERY
Payer: MEDICARE

## 2019-03-14 ENCOUNTER — ANESTHESIA (OUTPATIENT)
Dept: SURGERY | Age: 74
End: 2019-03-14
Payer: MEDICARE

## 2019-03-14 VITALS
DIASTOLIC BLOOD PRESSURE: 80 MMHG | SYSTOLIC BLOOD PRESSURE: 173 MMHG | TEMPERATURE: 97.7 F | BODY MASS INDEX: 38.8 KG/M2 | HEIGHT: 74 IN | OXYGEN SATURATION: 95 % | HEART RATE: 59 BPM | WEIGHT: 302.31 LBS | RESPIRATION RATE: 14 BRPM

## 2019-03-14 LAB
GLUCOSE BLD STRIP.AUTO-MCNC: 129 MG/DL (ref 65–100)
GLUCOSE BLD STRIP.AUTO-MCNC: 159 MG/DL (ref 65–100)

## 2019-03-14 PROCEDURE — 74011250636 HC RX REV CODE- 250/636

## 2019-03-14 PROCEDURE — 36246 INS CATH ABD/L-EXT ART 2ND: CPT

## 2019-03-14 PROCEDURE — C1760 CLOSURE DEV, VASC: HCPCS

## 2019-03-14 PROCEDURE — C1769 GUIDE WIRE: HCPCS

## 2019-03-14 PROCEDURE — 74011250636 HC RX REV CODE- 250/636: Performed by: ANESTHESIOLOGY

## 2019-03-14 PROCEDURE — 74011000250 HC RX REV CODE- 250

## 2019-03-14 PROCEDURE — C1894 INTRO/SHEATH, NON-LASER: HCPCS

## 2019-03-14 PROCEDURE — 76210000023 HC REC RM PH II 2 TO 2.5 HR: Performed by: SURGERY

## 2019-03-14 PROCEDURE — 36200 PLACE CATHETER IN AORTA: CPT

## 2019-03-14 PROCEDURE — 76060000033 HC ANESTHESIA 1 TO 1.5 HR: Performed by: SURGERY

## 2019-03-14 PROCEDURE — 77030020782 HC GWN BAIR PAWS FLX 3M -B: Performed by: NURSE ANESTHETIST, CERTIFIED REGISTERED

## 2019-03-14 PROCEDURE — C1887 CATHETER, GUIDING: HCPCS

## 2019-03-14 PROCEDURE — 77030039425 HC BLD LARYNG TRULITE DISP TELE -A: Performed by: NURSE ANESTHETIST, CERTIFIED REGISTERED

## 2019-03-14 PROCEDURE — 76010000149 HC OR TIME 1 TO 1.5 HR: Performed by: SURGERY

## 2019-03-14 PROCEDURE — 74011636320 HC RX REV CODE- 636/320: Performed by: SURGERY

## 2019-03-14 PROCEDURE — 74011250636 HC RX REV CODE- 250/636: Performed by: SURGERY

## 2019-03-14 PROCEDURE — 76210000016 HC OR PH I REC 1 TO 1.5 HR: Performed by: SURGERY

## 2019-03-14 PROCEDURE — 77030037088 HC TUBE ENDOTRACH ORAL NSL COVD-A: Performed by: NURSE ANESTHETIST, CERTIFIED REGISTERED

## 2019-03-14 PROCEDURE — 82962 GLUCOSE BLOOD TEST: CPT

## 2019-03-14 PROCEDURE — 76937 US GUIDE VASCULAR ACCESS: CPT

## 2019-03-14 RX ORDER — SODIUM CHLORIDE 0.9 % (FLUSH) 0.9 %
5-40 SYRINGE (ML) INJECTION EVERY 8 HOURS
Status: DISCONTINUED | OUTPATIENT
Start: 2019-03-14 | End: 2019-03-14 | Stop reason: HOSPADM

## 2019-03-14 RX ORDER — EPHEDRINE SULFATE 50 MG/ML
INJECTION, SOLUTION INTRAVENOUS AS NEEDED
Status: DISCONTINUED | OUTPATIENT
Start: 2019-03-14 | End: 2019-03-14 | Stop reason: HOSPADM

## 2019-03-14 RX ORDER — LIDOCAINE HYDROCHLORIDE 20 MG/ML
INJECTION, SOLUTION EPIDURAL; INFILTRATION; INTRACAUDAL; PERINEURAL AS NEEDED
Status: DISCONTINUED | OUTPATIENT
Start: 2019-03-14 | End: 2019-03-14 | Stop reason: HOSPADM

## 2019-03-14 RX ORDER — SODIUM CHLORIDE 9 MG/ML
75 INJECTION, SOLUTION INTRAVENOUS CONTINUOUS
Status: DISCONTINUED | OUTPATIENT
Start: 2019-03-14 | End: 2019-03-14 | Stop reason: HOSPADM

## 2019-03-14 RX ORDER — HYDROMORPHONE HYDROCHLORIDE 2 MG/ML
0.5 INJECTION, SOLUTION INTRAMUSCULAR; INTRAVENOUS; SUBCUTANEOUS
Status: DISCONTINUED | OUTPATIENT
Start: 2019-03-14 | End: 2019-03-14 | Stop reason: HOSPADM

## 2019-03-14 RX ORDER — LIDOCAINE HYDROCHLORIDE 10 MG/ML
INJECTION INFILTRATION; PERINEURAL AS NEEDED
Status: DISCONTINUED | OUTPATIENT
Start: 2019-03-14 | End: 2019-03-14 | Stop reason: HOSPADM

## 2019-03-14 RX ORDER — SODIUM CHLORIDE 0.9 % (FLUSH) 0.9 %
5-40 SYRINGE (ML) INJECTION AS NEEDED
Status: DISCONTINUED | OUTPATIENT
Start: 2019-03-14 | End: 2019-03-14 | Stop reason: HOSPADM

## 2019-03-14 RX ORDER — HEPARIN SODIUM 1000 [USP'U]/ML
INJECTION, SOLUTION INTRAVENOUS; SUBCUTANEOUS AS NEEDED
Status: DISCONTINUED | OUTPATIENT
Start: 2019-03-14 | End: 2019-03-14 | Stop reason: HOSPADM

## 2019-03-14 RX ORDER — OXYCODONE HYDROCHLORIDE 5 MG/1
5 TABLET ORAL
Status: DISCONTINUED | OUTPATIENT
Start: 2019-03-14 | End: 2019-03-14 | Stop reason: HOSPADM

## 2019-03-14 RX ORDER — GLYCOPYRROLATE 0.2 MG/ML
INJECTION INTRAMUSCULAR; INTRAVENOUS AS NEEDED
Status: DISCONTINUED | OUTPATIENT
Start: 2019-03-14 | End: 2019-03-14 | Stop reason: HOSPADM

## 2019-03-14 RX ORDER — ONDANSETRON 2 MG/ML
4 INJECTION INTRAMUSCULAR; INTRAVENOUS
Status: DISCONTINUED | OUTPATIENT
Start: 2019-03-14 | End: 2019-03-14 | Stop reason: HOSPADM

## 2019-03-14 RX ORDER — PROPOFOL 10 MG/ML
INJECTION, EMULSION INTRAVENOUS AS NEEDED
Status: DISCONTINUED | OUTPATIENT
Start: 2019-03-14 | End: 2019-03-14 | Stop reason: HOSPADM

## 2019-03-14 RX ORDER — ROCURONIUM BROMIDE 10 MG/ML
INJECTION, SOLUTION INTRAVENOUS AS NEEDED
Status: DISCONTINUED | OUTPATIENT
Start: 2019-03-14 | End: 2019-03-14 | Stop reason: HOSPADM

## 2019-03-14 RX ORDER — HEPARIN SODIUM 200 [USP'U]/100ML
INJECTION, SOLUTION INTRAVENOUS AS NEEDED
Status: DISCONTINUED | OUTPATIENT
Start: 2019-03-14 | End: 2019-03-14 | Stop reason: HOSPADM

## 2019-03-14 RX ORDER — SODIUM CHLORIDE 0.9 % (FLUSH) 0.9 %
5-40 SYRINGE (ML) INJECTION EVERY 8 HOURS
Status: CANCELLED | OUTPATIENT
Start: 2019-03-14

## 2019-03-14 RX ORDER — OXYCODONE AND ACETAMINOPHEN 5; 325 MG/1; MG/1
1 TABLET ORAL
Status: DISCONTINUED | OUTPATIENT
Start: 2019-03-14 | End: 2019-03-14 | Stop reason: HOSPADM

## 2019-03-14 RX ORDER — DIPHENHYDRAMINE HYDROCHLORIDE 50 MG/ML
12.5 INJECTION, SOLUTION INTRAMUSCULAR; INTRAVENOUS
Status: DISCONTINUED | OUTPATIENT
Start: 2019-03-14 | End: 2019-03-14 | Stop reason: HOSPADM

## 2019-03-14 RX ORDER — SODIUM CHLORIDE 0.9 % (FLUSH) 0.9 %
5-40 SYRINGE (ML) INJECTION AS NEEDED
Status: CANCELLED | OUTPATIENT
Start: 2019-03-14

## 2019-03-14 RX ORDER — NALOXONE HYDROCHLORIDE 0.4 MG/ML
0.1 INJECTION, SOLUTION INTRAMUSCULAR; INTRAVENOUS; SUBCUTANEOUS
Status: DISCONTINUED | OUTPATIENT
Start: 2019-03-14 | End: 2019-03-14 | Stop reason: HOSPADM

## 2019-03-14 RX ORDER — SODIUM CHLORIDE, SODIUM LACTATE, POTASSIUM CHLORIDE, CALCIUM CHLORIDE 600; 310; 30; 20 MG/100ML; MG/100ML; MG/100ML; MG/100ML
100 INJECTION, SOLUTION INTRAVENOUS CONTINUOUS
Status: DISCONTINUED | OUTPATIENT
Start: 2019-03-14 | End: 2019-03-14 | Stop reason: HOSPADM

## 2019-03-14 RX ORDER — SODIUM CHLORIDE 9 MG/ML
100 INJECTION, SOLUTION INTRAVENOUS CONTINUOUS
Status: DISCONTINUED | OUTPATIENT
Start: 2019-03-14 | End: 2019-03-14 | Stop reason: HOSPADM

## 2019-03-14 RX ORDER — SODIUM CHLORIDE, SODIUM LACTATE, POTASSIUM CHLORIDE, CALCIUM CHLORIDE 600; 310; 30; 20 MG/100ML; MG/100ML; MG/100ML; MG/100ML
75 INJECTION, SOLUTION INTRAVENOUS CONTINUOUS
Status: DISCONTINUED | OUTPATIENT
Start: 2019-03-14 | End: 2019-03-14 | Stop reason: HOSPADM

## 2019-03-14 RX ORDER — FLUMAZENIL 0.1 MG/ML
0.2 INJECTION INTRAVENOUS
Status: DISCONTINUED | OUTPATIENT
Start: 2019-03-14 | End: 2019-03-14 | Stop reason: HOSPADM

## 2019-03-14 RX ORDER — LIDOCAINE HYDROCHLORIDE 10 MG/ML
0.1 INJECTION INFILTRATION; PERINEURAL AS NEEDED
Status: DISCONTINUED | OUTPATIENT
Start: 2019-03-14 | End: 2019-03-14 | Stop reason: HOSPADM

## 2019-03-14 RX ORDER — SUCCINYLCHOLINE CHLORIDE 20 MG/ML
INJECTION INTRAMUSCULAR; INTRAVENOUS AS NEEDED
Status: DISCONTINUED | OUTPATIENT
Start: 2019-03-14 | End: 2019-03-14 | Stop reason: HOSPADM

## 2019-03-14 RX ORDER — ONDANSETRON 2 MG/ML
INJECTION INTRAMUSCULAR; INTRAVENOUS AS NEEDED
Status: DISCONTINUED | OUTPATIENT
Start: 2019-03-14 | End: 2019-03-14 | Stop reason: HOSPADM

## 2019-03-14 RX ORDER — MORPHINE SULFATE 2 MG/ML
1 INJECTION, SOLUTION INTRAMUSCULAR; INTRAVENOUS
Status: DISCONTINUED | OUTPATIENT
Start: 2019-03-14 | End: 2019-03-14 | Stop reason: HOSPADM

## 2019-03-14 RX ORDER — FENTANYL CITRATE 50 UG/ML
INJECTION, SOLUTION INTRAMUSCULAR; INTRAVENOUS AS NEEDED
Status: DISCONTINUED | OUTPATIENT
Start: 2019-03-14 | End: 2019-03-14 | Stop reason: HOSPADM

## 2019-03-14 RX ORDER — MIDAZOLAM HYDROCHLORIDE 1 MG/ML
2 INJECTION, SOLUTION INTRAMUSCULAR; INTRAVENOUS
Status: DISCONTINUED | OUTPATIENT
Start: 2019-03-14 | End: 2019-03-14 | Stop reason: HOSPADM

## 2019-03-14 RX ORDER — NEOSTIGMINE METHYLSULFATE 1 MG/ML
INJECTION INTRAVENOUS AS NEEDED
Status: DISCONTINUED | OUTPATIENT
Start: 2019-03-14 | End: 2019-03-14 | Stop reason: HOSPADM

## 2019-03-14 RX ADMIN — ROCURONIUM BROMIDE 10 MG: 10 INJECTION, SOLUTION INTRAVENOUS at 10:53

## 2019-03-14 RX ADMIN — EPHEDRINE SULFATE 5 MG: 50 INJECTION, SOLUTION INTRAVENOUS at 10:59

## 2019-03-14 RX ADMIN — EPHEDRINE SULFATE 10 MG: 50 INJECTION, SOLUTION INTRAVENOUS at 10:48

## 2019-03-14 RX ADMIN — EPHEDRINE SULFATE 10 MG: 50 INJECTION, SOLUTION INTRAVENOUS at 10:44

## 2019-03-14 RX ADMIN — EPHEDRINE SULFATE 10 MG: 50 INJECTION, SOLUTION INTRAVENOUS at 11:08

## 2019-03-14 RX ADMIN — SUCCINYLCHOLINE CHLORIDE 140 MG: 20 INJECTION INTRAMUSCULAR; INTRAVENOUS at 10:29

## 2019-03-14 RX ADMIN — NEOSTIGMINE METHYLSULFATE 3 MG: 1 INJECTION INTRAVENOUS at 11:17

## 2019-03-14 RX ADMIN — EPHEDRINE SULFATE 5 MG: 50 INJECTION, SOLUTION INTRAVENOUS at 10:43

## 2019-03-14 RX ADMIN — GLYCOPYRROLATE 0.4 MG: 0.2 INJECTION INTRAMUSCULAR; INTRAVENOUS at 11:17

## 2019-03-14 RX ADMIN — ROCURONIUM BROMIDE 5 MG: 10 INJECTION, SOLUTION INTRAVENOUS at 10:28

## 2019-03-14 RX ADMIN — EPHEDRINE SULFATE 10 MG: 50 INJECTION, SOLUTION INTRAVENOUS at 11:03

## 2019-03-14 RX ADMIN — FENTANYL CITRATE 100 MCG: 50 INJECTION, SOLUTION INTRAMUSCULAR; INTRAVENOUS at 10:28

## 2019-03-14 RX ADMIN — Medication 3 G: at 10:40

## 2019-03-14 RX ADMIN — PROPOFOL 30 MG: 10 INJECTION, EMULSION INTRAVENOUS at 10:53

## 2019-03-14 RX ADMIN — PROPOFOL 200 MG: 10 INJECTION, EMULSION INTRAVENOUS at 10:28

## 2019-03-14 RX ADMIN — EPHEDRINE SULFATE 10 MG: 50 INJECTION, SOLUTION INTRAVENOUS at 10:50

## 2019-03-14 RX ADMIN — LIDOCAINE HYDROCHLORIDE 100 MG: 20 INJECTION, SOLUTION EPIDURAL; INFILTRATION; INTRACAUDAL; PERINEURAL at 10:28

## 2019-03-14 RX ADMIN — EPHEDRINE SULFATE 10 MG: 50 INJECTION, SOLUTION INTRAVENOUS at 10:41

## 2019-03-14 RX ADMIN — SODIUM CHLORIDE, SODIUM LACTATE, POTASSIUM CHLORIDE, AND CALCIUM CHLORIDE: 600; 310; 30; 20 INJECTION, SOLUTION INTRAVENOUS at 10:19

## 2019-03-14 RX ADMIN — ONDANSETRON 4 MG: 2 INJECTION INTRAMUSCULAR; INTRAVENOUS at 10:45

## 2019-03-14 NOTE — BRIEF OP NOTE
[address]   [address] [address]  [phone] FAX: [phone]    Brief Op Note Template Note    Pre-Op Diagnosis: PAD (peripheral artery disease) ([address] [I73.9]    Post-Op Diagnosis:  PAD (peripheral artery disease) ([address] [I73.9]    Procedures: Procedure(s):  LEFT LOWER EXTREMITY ARTERIOGRAM WITH POSS INTERVENTION    Surgeon: Oliver Reddy MD    Assistants: Surgeon(s): Nasreen Lucero MD      Anesthesia:  General     Findings: Left SFA and popliteal artery occlusion posterior tibial dominant runoff to the foot    Tourniquet Time:  * No tourniquets in log *    Estimated Blood Loss:               Specimens:            Implants:  * No implants in log *    Complications: None               Signed: Oliver Reddy MD      Elements of this note have been dictated using speech recognition software. As a result, errors of speech recognition may have occurred.

## 2019-03-14 NOTE — ANESTHESIA PREPROCEDURE EVALUATION
Anesthetic History     PONV          Review of Systems / Medical History  Patient summary reviewed and pertinent labs reviewed    Pulmonary              Pertinent negatives: Smoker: Former smoker. 100 pack year.      Neuro/Psych   Within defined limits           Cardiovascular    Hypertension        Dysrhythmias (RBBB)   CAD, PAD, CABG (2003) and hyperlipidemia    Exercise tolerance: <4 METS  Comments: Denies recent CP, SOB or Palpitations    Echo 2018 showing EF 50-54%, mild AS, mild MR.   GI/Hepatic/Renal  Within defined limits              Endo/Other    Diabetes: well controlled, type 2  Hypothyroidism: well controlled  Obesity and arthritis     Other Findings            Physical Exam    Airway  Mallampati: II  TM Distance: 4 - 6 cm  Neck ROM: normal range of motion   Mouth opening: Normal     Cardiovascular          Murmur: Grade 2     Dental    Dentition: Lower partial plate and Full upper dentures     Pulmonary  Breath sounds clear to auscultation               Abdominal  GI exam deferred       Other Findings            Anesthetic Plan    ASA: 3  Anesthesia type: general          Induction: Intravenous  Anesthetic plan and risks discussed with: Patient and Spouse

## 2019-03-14 NOTE — PROGRESS NOTES
Spiritual Care visit. Initial Visit, Pre Surgery Consult. Visit and prayer before patient goes to surgery.     Visit by Flaquito Headley M.Ed., Th.B. ,Staff

## 2019-03-14 NOTE — ANESTHESIA POSTPROCEDURE EVALUATION
Procedure(s):  LEFT LOWER EXTREMITY ARTERIOGRAM WITH POSS INTERVENTION.     Anesthesia Post Evaluation      Multimodal analgesia: multimodal analgesia used between 6 hours prior to anesthesia start to PACU discharge  Patient location during evaluation: PACU  Patient participation: complete - patient participated  Level of consciousness: awake  Pain management: adequate  Airway patency: patent  Anesthetic complications: no  Cardiovascular status: acceptable  Respiratory status: spontaneous ventilation and acceptable  Hydration status: acceptable  Post anesthesia nausea and vomiting:  none      Visit Vitals  /73   Pulse 63   Temp 36.7 °C (98.1 °F)   Resp 14   Ht 6' 2\" (1.88 m)   Wt 137.1 kg (302 lb 5 oz)   SpO2 91%   BMI 38.81 kg/m²

## 2019-03-14 NOTE — OP NOTES
[facility]  OPERATIVE REPORT    Name:  Glo Bishop  MR#:  [id number]  :  1945  ACCOUNT #:  [de-identified]  DATE OF SERVICE:  2019    CLINICAL SERVICE:  Vascular Surgery. PREOPERATIVE DIAGNOSIS:  Left leg nonhealing wound. POSTOPERATIVE DIAGNOSIS:  Left leg nonhealing wound. PROCEDURES PERFORMED:  1. Ultrasound-guided access of the right common femoral artery with placement of the catheter for aortogram.  2.  Left lower extremity arteriogram.    SURGEON:  Opal Moeller. Kim Watkins MD    ASSISTANT:  None. ANESTHESIA:  .    COMPLICATIONS:  None. SPECIMENS REMOVED:  None. IMPLANTS:  .    ESTIMATED BLOOD LOSS:  None. OPERATIVE FINDINGS:  1. There was no evidence of aortoiliac disease. 2.  Bilateral hypogastric artery was tortuous but patent. 3.  Left lower extremity arteriogram showed a patent common femoral artery, profunda artery with a flush occlusion of the SFA which reconstitutes below the knee with heavily diseased popliteal artery with tibioperoneal trunk occlusion. The peroneal and anterior tibia being occluded with posterior tibia being the dominant runoff down to the ankle and plantar digits. PROCEDURE IN DETAIL:  After getting informed consent, the patient was brought to the operating room, general anesthesia was then induced. Preop antibiotics were given before skin incisions. The patient's bilateral groin was all prepped and draped in normal sterile fashion. With ultrasound-guided access, we accessed the right common femoral artery over the femoral head using micropuncture technique. We upsized to a 5-Korean sheath over 0.035 Starter wire. We did a digital traction arteriogram showing the aortoiliac system. We then pushed the catheter up and over using a Glidewire and glide catheter right to the common femoral artery. We did the digital traction. Please see the above finding. This showed a flush occlusion of the SFA.   On a lateral view was able to, as the patient had knee replacement,  above knee popliteal artery, which was patent but heavily diseased. Tibioperoneal trunk was heavily diseased with the posterior tibial artery being diseased proximally but then reconstituted with a normal size, probably about 3 mm down to the plantar digits. The peroneal was heavily diseased but patent. We then removed the catheter and with a Mynx device. The patient will be scheduled for left lower extremity bypass surgery.       MD ALVA [email or web address]  D:  03/14/2019 11:27  T:  03/14/2019 19:52  JOB #:  [id number]

## 2019-03-14 NOTE — DISCHARGE INSTRUCTIONS
Arteriogram: What to Expect at [address] [place] [address] Drive inserted a thin, flexible tube (catheter) into a blood vessel in your groin. In some cases, the catheter is placed in a blood vessel in the arm. After an arteriogram, your groin or arm may have a bruise and feel sore for a day or two. You can do light activities around the house but nothing strenuous for several days. Your doctor may give you specific instructions on when you can do your normal activities again, such as driving and going back to work. This care sheet gives you a general idea about how long it will take for you to recover. But each person recovers at a different pace. Follow the steps below to feel better as quickly as possible. How can you care for yourself at home? Activity  · Do not do strenuous exercise and do not lift, pull, or push anything heavy until your doctor says it is okay. This may be for a day or two. You can walk around the house and do light activity, such as cooking. · You may shower 24 to 48 hours after the procedure, if your doctor okays it. Pat the incision dry. Do not take a bath for 1 week, or until your doctor tells you it is okay. · If the catheter was placed in your groin, try not to walk up stairs for the first couple of days. · If your doctor recommends it, get more exercise. Walking is a good choice. Bit by bit, increase the amount you walk every day. Try for at least 30 minutes on most days of the week. Diet  · Drink plenty of fluids to help your body flush out the dye. If you have kidney, heart, or liver disease and have to limit fluids, talk with your doctor before you increase the amount of fluids you drink. · You can eat your normal diet. If your stomach is upset, try bland, low-fat foods like plain rice, broiled chicken, toast, and yogurt. Medicines  · Be safe with medicines. Read and follow all instructions on the label.   ¨ If the doctor gave you a prescription medicine for pain, take it as prescribed. ¨ If you are not taking a prescription pain medicine, ask your doctor if you can take an over-the-counter medicine. · If you take blood thinners, such as warfarin (Coumadin), clopidogrel (Plavix), or aspirin, be sure to talk to your doctor. He or she will tell you if and when to start taking those medicines again. Make sure that you understand exactly what your doctor wants you to do. · Your doctor will tell you if and when you can restart your medicines. He or she will also give you instructions about taking any new medicines. Care of the catheter site  · You will have a dressing over the cut (incision). A dressing helps the incision heal and protects it. Your doctor will tell you how to take care of this. · Put ice or a cold pack on the area for 10 to 20 minutes at a time to help with soreness or swelling. Put a thin cloth between the ice and your skin. Follow-up care is a key part of your treatment and safety. Be sure to make and go to all appointments, and call your doctor if you are having problems. It's also a good idea to know your test results and keep a list of the medicines you take. When should you call for help? Call 911 anytime you think you may need emergency care. For example, call if:  · You passed out (lost consciousness). · You have severe trouble breathing. · You have sudden chest pain and shortness of breath, or you cough up blood. Call your doctor now or seek immediate medical care if:  · You are bleeding from the area where the catheter was put in your artery. · You have a fast-growing, painful lump at the catheter site. · You have signs of infection, such as:  ¨ Increased pain, swelling, warmth, or redness. ¨ Red streaks leading from the incision. ¨ Pus draining from the incision. ¨ A fever. Watch closely for any changes in your health, and be sure to contact your doctor if:  · You don't get better as expected.   After general anesthesia or intravenous sedation, for 24 hours or while taking prescription Narcotics:  · Limit your activities  · Do not drive and operate hazardous machinery  · Do not make important personal or business decisions  · Do  not drink alcoholic beverages  · If you have not urinated within 8 hours after discharge, please contact your surgeon on call. *  Please give a list of your current medications to your Primary Care Provider. *  Please update this list whenever your medications are discontinued, doses are      changed, or new medications (including over-the-counter products) are added. *  Please carry medication information at all times in case of emergency situations. These are general instructions for a healthy lifestyle:  No smoking/ No tobacco products/ Avoid exposure to second hand smoke  Surgeon General's Warning:  Quitting smoking now greatly reduces serious risk to your health. Obesity, smoking, and sedentary lifestyle greatly increases your risk for illness  A healthy diet, regular physical exercise & weight monitoring are important for maintaining a healthy lifestyle    You may be retaining fluid if you have a history of heart failure or if you experience any of the following symptoms:  Weight gain of 3 pounds or more overnight or 5 pounds in a week, increased swelling in our hands or feet or shortness of breath while lying flat in bed. Please call your doctor as soon as you notice any of these symptoms; do not wait until your next office visit. Recognize signs and symptoms of STROKE:    F-face looks uneven    A-arms unable to move or move unevenly    S-speech slurred or non-existent    T-time-call 911 as soon as signs and symptoms begin-DO NOT go       Back to bed or wait to see if you get better-TIME IS BRAIN.

## 2019-03-21 ENCOUNTER — HOSPITAL ENCOUNTER (OUTPATIENT)
Dept: SURGERY | Age: 74
Discharge: HOME | End: 2019-03-21
Payer: MEDICARE

## 2019-03-21 ENCOUNTER — ANESTHESIA EVENT (OUTPATIENT)
Dept: SURGERY | Age: 74
DRG: 254 | End: 2019-03-21
Payer: MEDICARE

## 2019-03-21 VITALS
HEART RATE: 59 BPM | HEIGHT: 74 IN | BODY MASS INDEX: 38.31 KG/M2 | SYSTOLIC BLOOD PRESSURE: 153 MMHG | DIASTOLIC BLOOD PRESSURE: 78 MMHG | RESPIRATION RATE: 18 BRPM | TEMPERATURE: 98.3 F | OXYGEN SATURATION: 96 % | WEIGHT: 298.5 LBS

## 2019-03-21 LAB
ANION GAP SERPL CALC-SCNC: 7 MMOL/L (ref 7–16)
BASOPHILS # BLD: 0 K/UL (ref 0–0.2)
BASOPHILS NFR BLD: 1 % (ref 0–2)
BUN SERPL-MCNC: 27 MG/DL (ref 8–23)
CALCIUM SERPL-MCNC: 9.9 MG/DL (ref 8.3–10.4)
CHLORIDE SERPL-SCNC: 107 MMOL/L (ref 98–107)
CO2 SERPL-SCNC: 25 MMOL/L (ref 21–32)
CREAT SERPL-MCNC: 1.59 MG/DL (ref 0.8–1.5)
DIFFERENTIAL METHOD BLD: ABNORMAL
EOSINOPHIL # BLD: 0.1 K/UL (ref 0–0.8)
EOSINOPHIL NFR BLD: 1 % (ref 0.5–7.8)
ERYTHROCYTE [DISTWIDTH] IN BLOOD BY AUTOMATED COUNT: 16.8 % (ref 11.9–14.6)
GLUCOSE BLD STRIP.AUTO-MCNC: 97 MG/DL (ref 65–100)
GLUCOSE SERPL-MCNC: 100 MG/DL (ref 65–100)
HCT VFR BLD AUTO: 45.5 % (ref 41.1–50.3)
HGB BLD-MCNC: 14.4 G/DL (ref 13.6–17.2)
IMM GRANULOCYTES # BLD AUTO: 0 K/UL (ref 0–0.5)
IMM GRANULOCYTES NFR BLD AUTO: 0 % (ref 0–5)
LYMPHOCYTES # BLD: 1.4 K/UL (ref 0.5–4.6)
LYMPHOCYTES NFR BLD: 17 % (ref 13–44)
MCH RBC QN AUTO: 26 PG (ref 26.1–32.9)
MCHC RBC AUTO-ENTMCNC: 31.6 G/DL (ref 31.4–35)
MCV RBC AUTO: 82.3 FL (ref 79.6–97.8)
MONOCYTES # BLD: 0.7 K/UL (ref 0.1–1.3)
MONOCYTES NFR BLD: 8 % (ref 4–12)
NEUTS SEG # BLD: 5.9 K/UL (ref 1.7–8.2)
NEUTS SEG NFR BLD: 73 % (ref 43–78)
NRBC # BLD: 0 K/UL (ref 0–0.2)
PLATELET # BLD AUTO: 215 K/UL (ref 150–450)
PMV BLD AUTO: 9.7 FL (ref 9.4–12.3)
POTASSIUM SERPL-SCNC: 4.2 MMOL/L (ref 3.5–5.1)
RBC # BLD AUTO: 5.53 M/UL (ref 4.23–5.6)
SODIUM SERPL-SCNC: 139 MMOL/L (ref 136–145)
WBC # BLD AUTO: 8.2 K/UL (ref 4.3–11.1)

## 2019-03-21 PROCEDURE — 85025 COMPLETE CBC W/AUTO DIFF WBC: CPT

## 2019-03-21 PROCEDURE — 80048 BASIC METABOLIC PNL TOTAL CA: CPT

## 2019-03-21 PROCEDURE — 82962 GLUCOSE BLOOD TEST: CPT

## 2019-03-21 NOTE — PERIOP NOTES
Patient verified name and . Patient provided medical/health information and PTA medications to the best of their ability. TYPE  CASE:3 
Order for consent not found in EHR Labs per surgeon:none. Labs per anesthesia protocol: cbc,bmp,T/S DOS, SQBS 97. Results pending EKG  :  19 Dr Sharri Obregon in to see pt. Chart reviewed and pt approved for surgery. Patient provided with and instructed on education handouts including Guide to Surgery, blood transfusions, pain management, and hand hygiene for the family and community, and [facility] brochure. Hibiclens and instructions given per hospital policy. Instructed patient to continue previous medications as prescribed prior to surgery unless otherwise directed and to take the following medications the day of surgery according to anesthesia guidelines : ASA 81 mg, Levothyroxine, Metoprolol, prednisone, tamsulosin . Instructed patient to hold  the following medications: Vitamin D. Original medication prescription bottles not visualized during patient appointment. Patient teach back successful and patient demonstrates knowledge of instruction.

## 2019-03-21 NOTE — ANESTHESIA PREPROCEDURE EVALUATION
Anesthetic History PONV Review of Systems / Medical History Patient summary reviewed and pertinent labs reviewed Pulmonary Pertinent negatives: Smoker: Former smoker. 100 pack year. Neuro/Psych Within defined limits Cardiovascular Hypertension Dysrhythmias (RBBB) CAD, PAD, CABG (2003) and hyperlipidemia Exercise tolerance: <4 METS Comments: Denies recent CP, SOB or Palpitations Echo 2018 showing EF 50-54%, mild AS, mild MR.  
GI/Hepatic/Renal 
Within defined limits Endo/Other Diabetes: well controlled, type 2, using insulin Hypothyroidism: well controlled Obesity and arthritis Other Findings Physical Exam 
 
Airway Mallampati: II 
TM Distance: 4 - 6 cm Neck ROM: normal range of motion Mouth opening: Normal 
 
 Cardiovascular Murmur: Grade 2 Dental 
 
Dentition: Lower partial plate and Full upper dentures Pulmonary Breath sounds clear to auscultation Abdominal 
GI exam deferred Other Findings Anesthetic Plan ASA: 3 Anesthesia type: general 
 
Monitoring Plan: Arterial line Induction: Intravenous Anesthetic plan and risks discussed with: Patient and Spouse Long term Prednisone use - will give Hydrocortisone 100 mg

## 2019-03-21 NOTE — PERIOP NOTES
Recent Results (from the past 12 hour(s)) GLUCOSE, POC Collection Time: 03/21/19 10:53 AM  
Result Value Ref Range Glucose (POC) 97 65 - 100 mg/dL CBC WITH AUTOMATED DIFF Collection Time: 03/21/19 10:57 AM  
Result Value Ref Range WBC 8.2 4.3 - 11.1 K/uL  
 RBC 5.53 4.23 - 5.6 M/uL  
 HGB 14.4 13.6 - 17.2 g/dL HCT 45.5 41.1 - 50.3 % MCV 82.3 79.6 - 97.8 FL  
 MCH 26.0 (L) 26.1 - 32.9 PG  
 MCHC 31.6 31.4 - 35.0 g/dL  
 RDW 16.8 (H) 11.9 - 14.6 % PLATELET 838 872 - 857 K/uL MPV 9.7 9.4 - 12.3 FL ABSOLUTE NRBC 0.00 0.0 - 0.2 K/uL  
 DF AUTOMATED NEUTROPHILS 73 43 - 78 % LYMPHOCYTES 17 13 - 44 % MONOCYTES 8 4.0 - 12.0 % EOSINOPHILS 1 0.5 - 7.8 % BASOPHILS 1 0.0 - 2.0 % IMMATURE GRANULOCYTES 0 0.0 - 5.0 %  
 ABS. NEUTROPHILS 5.9 1.7 - 8.2 K/UL  
 ABS. LYMPHOCYTES 1.4 0.5 - 4.6 K/UL  
 ABS. MONOCYTES 0.7 0.1 - 1.3 K/UL  
 ABS. EOSINOPHILS 0.1 0.0 - 0.8 K/UL  
 ABS. BASOPHILS 0.0 0.0 - 0.2 K/UL  
 ABS. IMM. GRANS. 0.0 0.0 - 0.5 K/UL METABOLIC PANEL, BASIC Collection Time: 03/21/19 10:57 AM  
Result Value Ref Range Sodium 139 136 - 145 mmol/L Potassium 4.2 3.5 - 5.1 mmol/L Chloride 107 98 - 107 mmol/L  
 CO2 25 21 - 32 mmol/L Anion gap 7 7 - 16 mmol/L Glucose 100 65 - 100 mg/dL BUN 27 (H) 8 - 23 MG/DL Creatinine 1.59 (H) 0.8 - 1.5 MG/DL  
 GFR est AA 55 (L) >60 ml/min/1.73m2 GFR est non-AA 46 (L) >60 ml/min/1.73m2 Calcium 9.9 8.3 - 10.4 MG/DL Reviewed

## 2019-03-26 RX ORDER — SODIUM CHLORIDE 0.9 % (FLUSH) 0.9 %
5-40 SYRINGE (ML) INJECTION EVERY 8 HOURS
Status: CANCELLED | OUTPATIENT
Start: 2019-03-26

## 2019-03-26 RX ORDER — SODIUM CHLORIDE 0.9 % (FLUSH) 0.9 %
5-40 SYRINGE (ML) INJECTION AS NEEDED
Status: CANCELLED | OUTPATIENT
Start: 2019-03-26

## 2019-03-26 RX ORDER — CEFAZOLIN SODIUM/WATER 2 G/20 ML
2 SYRINGE (ML) INTRAVENOUS ONCE
Status: CANCELLED | OUTPATIENT
Start: 2019-03-26 | End: 2019-03-26

## 2019-03-27 ENCOUNTER — HOSPITAL ENCOUNTER (INPATIENT)
Age: 74
LOS: 3 days | Discharge: HOME | DRG: 254 | End: 2019-03-30
Attending: SURGERY | Admitting: SURGERY
Payer: MEDICARE

## 2019-03-27 ENCOUNTER — ANESTHESIA (OUTPATIENT)
Dept: SURGERY | Age: 74
DRG: 254 | End: 2019-03-27
Payer: MEDICARE

## 2019-03-27 ENCOUNTER — APPOINTMENT (OUTPATIENT)
Dept: ULTRASOUND IMAGING | Age: 74
DRG: 254 | End: 2019-03-27
Attending: SURGERY
Payer: MEDICARE

## 2019-03-27 PROBLEM — I73.9 PERIPHERAL ARTERY DISEASE (HCC): Status: ACTIVE | Noted: 2019-03-27

## 2019-03-27 LAB
ABO + RH BLD: NORMAL
ACT BLD: 131 SECS (ref 70–128)
ACT BLD: 230 SECS (ref 70–128)
ANION GAP SERPL CALC-SCNC: 8 MMOL/L (ref 7–16)
ANION GAP SERPL CALC-SCNC: 8 MMOL/L (ref 7–16)
BLOOD GROUP ANTIBODIES SERPL: NORMAL
BUN SERPL-MCNC: 31 MG/DL (ref 8–23)
BUN SERPL-MCNC: 32 MG/DL (ref 8–23)
CALCIUM SERPL-MCNC: 8.9 MG/DL (ref 8.3–10.4)
CALCIUM SERPL-MCNC: 8.9 MG/DL (ref 8.3–10.4)
CHLORIDE SERPL-SCNC: 107 MMOL/L (ref 98–107)
CHLORIDE SERPL-SCNC: 108 MMOL/L (ref 98–107)
CO2 SERPL-SCNC: 22 MMOL/L (ref 21–32)
CO2 SERPL-SCNC: 22 MMOL/L (ref 21–32)
CREAT SERPL-MCNC: 1.63 MG/DL (ref 0.8–1.5)
CREAT SERPL-MCNC: 1.67 MG/DL (ref 0.8–1.5)
ERYTHROCYTE [DISTWIDTH] IN BLOOD BY AUTOMATED COUNT: 16.8 % (ref 11.9–14.6)
ERYTHROCYTE [DISTWIDTH] IN BLOOD BY AUTOMATED COUNT: 16.9 % (ref 11.9–14.6)
EST. AVERAGE GLUCOSE BLD GHB EST-MCNC: 160 MG/DL
GLUCOSE BLD STRIP.AUTO-MCNC: 170 MG/DL (ref 65–100)
GLUCOSE BLD STRIP.AUTO-MCNC: 95 MG/DL (ref 65–100)
GLUCOSE SERPL-MCNC: 184 MG/DL (ref 65–100)
GLUCOSE SERPL-MCNC: 187 MG/DL (ref 65–100)
HBA1C MFR BLD: 7.2 % (ref 4.8–6)
HCT VFR BLD AUTO: 39.1 % (ref 41.1–50.3)
HCT VFR BLD AUTO: 40.4 % (ref 41.1–50.3)
HGB BLD-MCNC: 12.3 G/DL (ref 13.6–17.2)
HGB BLD-MCNC: 12.3 G/DL (ref 13.6–17.2)
MAGNESIUM SERPL-MCNC: 1.9 MG/DL (ref 1.8–2.4)
MCH RBC QN AUTO: 25.5 PG (ref 26.1–32.9)
MCH RBC QN AUTO: 25.8 PG (ref 26.1–32.9)
MCHC RBC AUTO-ENTMCNC: 30.4 G/DL (ref 31.4–35)
MCHC RBC AUTO-ENTMCNC: 31.5 G/DL (ref 31.4–35)
MCV RBC AUTO: 82 FL (ref 79.6–97.8)
MCV RBC AUTO: 83.6 FL (ref 79.6–97.8)
NRBC # BLD: 0 K/UL (ref 0–0.2)
NRBC # BLD: 0 K/UL (ref 0–0.2)
PLATELET # BLD AUTO: 183 K/UL (ref 150–450)
PLATELET # BLD AUTO: 186 K/UL (ref 150–450)
PMV BLD AUTO: 10 FL (ref 9.4–12.3)
PMV BLD AUTO: 9.7 FL (ref 9.4–12.3)
POTASSIUM SERPL-SCNC: 4.4 MMOL/L (ref 3.5–5.1)
POTASSIUM SERPL-SCNC: 4.5 MMOL/L (ref 3.5–5.1)
RBC # BLD AUTO: 4.77 M/UL (ref 4.23–5.6)
RBC # BLD AUTO: 4.83 M/UL (ref 4.23–5.6)
SODIUM SERPL-SCNC: 137 MMOL/L (ref 136–145)
SODIUM SERPL-SCNC: 138 MMOL/L (ref 136–145)
SPECIMEN EXP DATE BLD: NORMAL
WBC # BLD AUTO: 6.9 K/UL (ref 4.3–11.1)
WBC # BLD AUTO: 8.8 K/UL (ref 4.3–11.1)

## 2019-03-27 PROCEDURE — C1755 CATHETER, INTRASPINAL: HCPCS | Performed by: SURGERY

## 2019-03-27 PROCEDURE — 83036 HEMOGLOBIN GLYCOSYLATED A1C: CPT

## 2019-03-27 PROCEDURE — 77030010512 HC APPL CLP LIG J&J -C: Performed by: SURGERY

## 2019-03-27 PROCEDURE — 77030008467 HC STPLR SKN COVD -B: Performed by: SURGERY

## 2019-03-27 PROCEDURE — 74011250636 HC RX REV CODE- 250/636: Performed by: ANESTHESIOLOGY

## 2019-03-27 PROCEDURE — 041L0JN BYPASS LEFT FEMORAL ARTERY TO POSTERIOR TIBIAL ARTERY WITH SYNTHETIC SUBSTITUTE, OPEN APPROACH: ICD-10-PCS | Performed by: SURGERY

## 2019-03-27 PROCEDURE — 77030031639: Performed by: SURGERY

## 2019-03-27 PROCEDURE — 74011250637 HC RX REV CODE- 250/637: Performed by: ANESTHESIOLOGY

## 2019-03-27 PROCEDURE — 77030014008 HC SPNG HEMSTAT J&J -C: Performed by: SURGERY

## 2019-03-27 PROCEDURE — C1768 GRAFT, VASCULAR: HCPCS | Performed by: SURGERY

## 2019-03-27 PROCEDURE — 77030019952 HC CANSTR VAC ASST KCON -B: Performed by: SURGERY

## 2019-03-27 PROCEDURE — 74011000250 HC RX REV CODE- 250

## 2019-03-27 PROCEDURE — 77030002987 HC SUT PROL J&J -B: Performed by: SURGERY

## 2019-03-27 PROCEDURE — 82962 GLUCOSE BLOOD TEST: CPT

## 2019-03-27 PROCEDURE — 77030020788: Performed by: SURGERY

## 2019-03-27 PROCEDURE — 76060000038 HC ANESTHESIA 3.5 TO 4 HR: Performed by: SURGERY

## 2019-03-27 PROCEDURE — 77030034888 HC SUT PROL 2 J&J -B: Performed by: SURGERY

## 2019-03-27 PROCEDURE — 77030034850: Performed by: SURGERY

## 2019-03-27 PROCEDURE — 65620000000 HC RM CCU GENERAL

## 2019-03-27 PROCEDURE — 85027 COMPLETE CBC AUTOMATED: CPT

## 2019-03-27 PROCEDURE — 77030029354 HC BLD MINI RND RBSI -A: Performed by: SURGERY

## 2019-03-27 PROCEDURE — 77030010514 HC APPL CLP LIG COVD -B: Performed by: SURGERY

## 2019-03-27 PROCEDURE — 77030002996 HC SUT SLK J&J -A: Performed by: SURGERY

## 2019-03-27 PROCEDURE — 74011250636 HC RX REV CODE- 250/636

## 2019-03-27 PROCEDURE — 77030018717 HC DRSG GRNUFM KCON -B: Performed by: SURGERY

## 2019-03-27 PROCEDURE — 77030018836 HC SOL IRR NACL ICUM -A: Performed by: SURGERY

## 2019-03-27 PROCEDURE — 77010033678 HC OXYGEN DAILY

## 2019-03-27 PROCEDURE — 85347 COAGULATION TIME ACTIVATED: CPT

## 2019-03-27 PROCEDURE — 74011250636 HC RX REV CODE- 250/636: Performed by: NURSE PRACTITIONER

## 2019-03-27 PROCEDURE — 77030002933 HC SUT MCRYL J&J -A: Performed by: SURGERY

## 2019-03-27 PROCEDURE — 77030020263 HC SOL INJ SOD CL0.9% LFCR 1000ML

## 2019-03-27 PROCEDURE — 74011636637 HC RX REV CODE- 636/637: Performed by: NURSE PRACTITIONER

## 2019-03-27 PROCEDURE — 80048 BASIC METABOLIC PNL TOTAL CA: CPT

## 2019-03-27 PROCEDURE — 77030031139 HC SUT VCRL2 J&J -A: Performed by: SURGERY

## 2019-03-27 PROCEDURE — 74011250636 HC RX REV CODE- 250/636: Performed by: SURGERY

## 2019-03-27 PROCEDURE — 83735 ASSAY OF MAGNESIUM: CPT

## 2019-03-27 PROCEDURE — 76010000174 HC OR TIME 3.5 TO 4 HR INTENSV-TIER 1: Performed by: SURGERY

## 2019-03-27 PROCEDURE — 76210000016 HC OR PH I REC 1 TO 1.5 HR: Performed by: SURGERY

## 2019-03-27 PROCEDURE — 86900 BLOOD TYPING SEROLOGIC ABO: CPT

## 2019-03-27 PROCEDURE — 74011250637 HC RX REV CODE- 250/637: Performed by: NURSE PRACTITIONER

## 2019-03-27 PROCEDURE — 77030032490 HC SLV COMPR SCD KNE COVD -B

## 2019-03-27 DEVICE — GRAFT TW STRTCH RR 6MMX60X80CM -- PROPATEN: Type: IMPLANTABLE DEVICE | Site: LEG | Status: FUNCTIONAL

## 2019-03-27 RX ORDER — POTASSIUM CHLORIDE 1.5 G/1.77G
40 POWDER, FOR SOLUTION ORAL 2 TIMES DAILY
Status: COMPLETED | OUTPATIENT
Start: 2019-03-27 | End: 2019-03-28

## 2019-03-27 RX ORDER — LIDOCAINE HYDROCHLORIDE 20 MG/ML
INJECTION, SOLUTION EPIDURAL; INFILTRATION; INTRACAUDAL; PERINEURAL AS NEEDED
Status: DISCONTINUED | OUTPATIENT
Start: 2019-03-27 | End: 2019-03-27 | Stop reason: HOSPADM

## 2019-03-27 RX ORDER — HEPARIN SODIUM 5000 [USP'U]/ML
INJECTION, SOLUTION INTRAVENOUS; SUBCUTANEOUS AS NEEDED
Status: DISCONTINUED | OUTPATIENT
Start: 2019-03-27 | End: 2019-03-27 | Stop reason: HOSPADM

## 2019-03-27 RX ORDER — SODIUM CHLORIDE 0.9 % (FLUSH) 0.9 %
5-40 SYRINGE (ML) INJECTION EVERY 8 HOURS
Status: DISCONTINUED | OUTPATIENT
Start: 2019-03-27 | End: 2019-03-27 | Stop reason: HOSPADM

## 2019-03-27 RX ORDER — ONDANSETRON 2 MG/ML
INJECTION INTRAMUSCULAR; INTRAVENOUS AS NEEDED
Status: DISCONTINUED | OUTPATIENT
Start: 2019-03-27 | End: 2019-03-27 | Stop reason: HOSPADM

## 2019-03-27 RX ORDER — DEXTROSE MONOHYDRATE 25 G/50ML
25-50 INJECTION, SOLUTION INTRAVENOUS AS NEEDED
Status: DISCONTINUED | OUTPATIENT
Start: 2019-03-27 | End: 2019-03-30 | Stop reason: HOSPADM

## 2019-03-27 RX ORDER — HYDROCORTISONE SODIUM SUCCINATE 100 MG/2ML
100 INJECTION, POWDER, FOR SOLUTION INTRAMUSCULAR; INTRAVENOUS ONCE
Status: COMPLETED | OUTPATIENT
Start: 2019-03-27 | End: 2019-03-27

## 2019-03-27 RX ORDER — TAMSULOSIN HYDROCHLORIDE 0.4 MG/1
0.4 CAPSULE ORAL DAILY
Status: DISCONTINUED | OUTPATIENT
Start: 2019-03-28 | End: 2019-03-30 | Stop reason: HOSPADM

## 2019-03-27 RX ORDER — SODIUM CHLORIDE, SODIUM LACTATE, POTASSIUM CHLORIDE, CALCIUM CHLORIDE 600; 310; 30; 20 MG/100ML; MG/100ML; MG/100ML; MG/100ML
75 INJECTION, SOLUTION INTRAVENOUS CONTINUOUS
Status: DISCONTINUED | OUTPATIENT
Start: 2019-03-27 | End: 2019-03-27 | Stop reason: HOSPADM

## 2019-03-27 RX ORDER — MORPHINE SULFATE 2 MG/ML
2 INJECTION, SOLUTION INTRAMUSCULAR; INTRAVENOUS
Status: DISCONTINUED | OUTPATIENT
Start: 2019-03-27 | End: 2019-03-30 | Stop reason: HOSPADM

## 2019-03-27 RX ORDER — SODIUM CHLORIDE 0.9 % (FLUSH) 0.9 %
5-40 SYRINGE (ML) INJECTION AS NEEDED
Status: DISCONTINUED | OUTPATIENT
Start: 2019-03-27 | End: 2019-03-30 | Stop reason: HOSPADM

## 2019-03-27 RX ORDER — NEOSTIGMINE METHYLSULFATE 1 MG/ML
INJECTION INTRAVENOUS AS NEEDED
Status: DISCONTINUED | OUTPATIENT
Start: 2019-03-27 | End: 2019-03-27 | Stop reason: HOSPADM

## 2019-03-27 RX ORDER — MAGNESIUM SULFATE HEPTAHYDRATE 40 MG/ML
2 INJECTION, SOLUTION INTRAVENOUS ONCE
Status: COMPLETED | OUTPATIENT
Start: 2019-03-27 | End: 2019-03-27

## 2019-03-27 RX ORDER — HYDROMORPHONE HYDROCHLORIDE 2 MG/ML
0.5 INJECTION, SOLUTION INTRAMUSCULAR; INTRAVENOUS; SUBCUTANEOUS
Status: DISCONTINUED | OUTPATIENT
Start: 2019-03-27 | End: 2019-03-27 | Stop reason: HOSPADM

## 2019-03-27 RX ORDER — HYDRALAZINE HYDROCHLORIDE 50 MG/1
50 TABLET, FILM COATED ORAL DAILY
Status: DISCONTINUED | OUTPATIENT
Start: 2019-03-28 | End: 2019-03-30 | Stop reason: HOSPADM

## 2019-03-27 RX ORDER — PROTAMINE SULFATE 10 MG/ML
INJECTION, SOLUTION INTRAVENOUS AS NEEDED
Status: DISCONTINUED | OUTPATIENT
Start: 2019-03-27 | End: 2019-03-27 | Stop reason: HOSPADM

## 2019-03-27 RX ORDER — ROCURONIUM BROMIDE 10 MG/ML
INJECTION, SOLUTION INTRAVENOUS AS NEEDED
Status: DISCONTINUED | OUTPATIENT
Start: 2019-03-27 | End: 2019-03-27 | Stop reason: HOSPADM

## 2019-03-27 RX ORDER — ONDANSETRON 2 MG/ML
4 INJECTION INTRAMUSCULAR; INTRAVENOUS
Status: DISCONTINUED | OUTPATIENT
Start: 2019-03-27 | End: 2019-03-30 | Stop reason: HOSPADM

## 2019-03-27 RX ORDER — ASPIRIN 81 MG/1
81 TABLET ORAL DAILY
Status: DISCONTINUED | OUTPATIENT
Start: 2019-03-28 | End: 2019-03-30 | Stop reason: HOSPADM

## 2019-03-27 RX ORDER — SODIUM CHLORIDE, SODIUM LACTATE, POTASSIUM CHLORIDE, CALCIUM CHLORIDE 600; 310; 30; 20 MG/100ML; MG/100ML; MG/100ML; MG/100ML
INJECTION, SOLUTION INTRAVENOUS
Status: DISCONTINUED | OUTPATIENT
Start: 2019-03-27 | End: 2019-03-27 | Stop reason: HOSPADM

## 2019-03-27 RX ORDER — DIPHENHYDRAMINE HYDROCHLORIDE 50 MG/ML
12.5 INJECTION, SOLUTION INTRAMUSCULAR; INTRAVENOUS
Status: DISCONTINUED | OUTPATIENT
Start: 2019-03-27 | End: 2019-03-27 | Stop reason: HOSPADM

## 2019-03-27 RX ORDER — CEFAZOLIN SODIUM/WATER 2 G/20 ML
2 SYRINGE (ML) INTRAVENOUS EVERY 8 HOURS
Status: COMPLETED | OUTPATIENT
Start: 2019-03-27 | End: 2019-03-28

## 2019-03-27 RX ORDER — DEXTROSE 40 %
15 GEL (GRAM) ORAL AS NEEDED
Status: DISCONTINUED | OUTPATIENT
Start: 2019-03-27 | End: 2019-03-30 | Stop reason: HOSPADM

## 2019-03-27 RX ORDER — SODIUM CHLORIDE 0.9 % (FLUSH) 0.9 %
5-40 SYRINGE (ML) INJECTION AS NEEDED
Status: DISCONTINUED | OUTPATIENT
Start: 2019-03-27 | End: 2019-03-27 | Stop reason: HOSPADM

## 2019-03-27 RX ORDER — INSULIN LISPRO 100 [IU]/ML
INJECTION, SOLUTION INTRAVENOUS; SUBCUTANEOUS
Status: DISCONTINUED | OUTPATIENT
Start: 2019-03-27 | End: 2019-03-30 | Stop reason: HOSPADM

## 2019-03-27 RX ORDER — VECURONIUM BROMIDE FOR INJECTION 1 MG/ML
INJECTION, POWDER, LYOPHILIZED, FOR SOLUTION INTRAVENOUS AS NEEDED
Status: DISCONTINUED | OUTPATIENT
Start: 2019-03-27 | End: 2019-03-27 | Stop reason: HOSPADM

## 2019-03-27 RX ORDER — SODIUM CHLORIDE 0.9 % (FLUSH) 0.9 %
5-40 SYRINGE (ML) INJECTION EVERY 8 HOURS
Status: DISCONTINUED | OUTPATIENT
Start: 2019-03-27 | End: 2019-03-30 | Stop reason: HOSPADM

## 2019-03-27 RX ORDER — EPHEDRINE SULFATE 50 MG/ML
INJECTION, SOLUTION INTRAVENOUS AS NEEDED
Status: DISCONTINUED | OUTPATIENT
Start: 2019-03-27 | End: 2019-03-27 | Stop reason: HOSPADM

## 2019-03-27 RX ORDER — NALOXONE HYDROCHLORIDE 0.4 MG/ML
0.1 INJECTION, SOLUTION INTRAMUSCULAR; INTRAVENOUS; SUBCUTANEOUS
Status: DISCONTINUED | OUTPATIENT
Start: 2019-03-27 | End: 2019-03-27 | Stop reason: HOSPADM

## 2019-03-27 RX ORDER — FENTANYL CITRATE 50 UG/ML
INJECTION, SOLUTION INTRAMUSCULAR; INTRAVENOUS AS NEEDED
Status: DISCONTINUED | OUTPATIENT
Start: 2019-03-27 | End: 2019-03-27 | Stop reason: HOSPADM

## 2019-03-27 RX ORDER — SODIUM CHLORIDE 9 MG/ML
125 INJECTION, SOLUTION INTRAVENOUS CONTINUOUS
Status: DISCONTINUED | OUTPATIENT
Start: 2019-03-27 | End: 2019-03-28

## 2019-03-27 RX ORDER — GLYCOPYRROLATE 0.2 MG/ML
INJECTION INTRAMUSCULAR; INTRAVENOUS AS NEEDED
Status: DISCONTINUED | OUTPATIENT
Start: 2019-03-27 | End: 2019-03-27 | Stop reason: HOSPADM

## 2019-03-27 RX ORDER — OXYCODONE HYDROCHLORIDE 5 MG/1
10 TABLET ORAL
Status: DISCONTINUED | OUTPATIENT
Start: 2019-03-27 | End: 2019-03-27 | Stop reason: HOSPADM

## 2019-03-27 RX ORDER — FLUMAZENIL 0.1 MG/ML
0.2 INJECTION INTRAVENOUS AS NEEDED
Status: DISCONTINUED | OUTPATIENT
Start: 2019-03-27 | End: 2019-03-27 | Stop reason: HOSPADM

## 2019-03-27 RX ORDER — HYDROCODONE BITARTRATE AND ACETAMINOPHEN 5; 325 MG/1; MG/1
1 TABLET ORAL
Status: DISCONTINUED | OUTPATIENT
Start: 2019-03-27 | End: 2019-03-30 | Stop reason: HOSPADM

## 2019-03-27 RX ORDER — LISINOPRIL 20 MG/1
40 TABLET ORAL DAILY
Status: DISCONTINUED | OUTPATIENT
Start: 2019-03-28 | End: 2019-03-30 | Stop reason: HOSPADM

## 2019-03-27 RX ORDER — LIDOCAINE HYDROCHLORIDE 10 MG/ML
0.1 INJECTION INFILTRATION; PERINEURAL AS NEEDED
Status: DISCONTINUED | OUTPATIENT
Start: 2019-03-27 | End: 2019-03-27 | Stop reason: HOSPADM

## 2019-03-27 RX ORDER — LEVOTHYROXINE SODIUM 75 UG/1
75 TABLET ORAL
Status: DISCONTINUED | OUTPATIENT
Start: 2019-03-28 | End: 2019-03-30 | Stop reason: HOSPADM

## 2019-03-27 RX ORDER — OXYCODONE HYDROCHLORIDE 5 MG/1
5 TABLET ORAL
Status: COMPLETED | OUTPATIENT
Start: 2019-03-27 | End: 2019-03-27

## 2019-03-27 RX ORDER — HYDROCORTISONE SODIUM SUCCINATE 100 MG/2ML
50 INJECTION, POWDER, FOR SOLUTION INTRAMUSCULAR; INTRAVENOUS EVERY 8 HOURS
Status: COMPLETED | OUTPATIENT
Start: 2019-03-27 | End: 2019-03-28

## 2019-03-27 RX ORDER — PROPOFOL 10 MG/ML
INJECTION, EMULSION INTRAVENOUS AS NEEDED
Status: DISCONTINUED | OUTPATIENT
Start: 2019-03-27 | End: 2019-03-27 | Stop reason: HOSPADM

## 2019-03-27 RX ORDER — NITROGLYCERIN 0.4 MG/1
0.4 TABLET SUBLINGUAL
Status: DISCONTINUED | OUTPATIENT
Start: 2019-03-27 | End: 2019-03-30 | Stop reason: HOSPADM

## 2019-03-27 RX ORDER — METOPROLOL TARTRATE 25 MG/1
25 TABLET, FILM COATED ORAL DAILY
Status: DISCONTINUED | OUTPATIENT
Start: 2019-03-28 | End: 2019-03-30 | Stop reason: HOSPADM

## 2019-03-27 RX ORDER — HEPARIN SODIUM 1000 [USP'U]/ML
INJECTION, SOLUTION INTRAVENOUS; SUBCUTANEOUS AS NEEDED
Status: DISCONTINUED | OUTPATIENT
Start: 2019-03-27 | End: 2019-03-27 | Stop reason: HOSPADM

## 2019-03-27 RX ADMIN — VECURONIUM BROMIDE FOR INJECTION 3 MG: 1 INJECTION, POWDER, LYOPHILIZED, FOR SOLUTION INTRAVENOUS at 13:47

## 2019-03-27 RX ADMIN — PROTAMINE SULFATE 20 MG: 10 INJECTION, SOLUTION INTRAVENOUS at 14:58

## 2019-03-27 RX ADMIN — VECURONIUM BROMIDE FOR INJECTION 2 MG: 1 INJECTION, POWDER, LYOPHILIZED, FOR SOLUTION INTRAVENOUS at 13:17

## 2019-03-27 RX ADMIN — GLYCOPYRROLATE 0.4 MG: 0.2 INJECTION INTRAMUSCULAR; INTRAVENOUS at 13:02

## 2019-03-27 RX ADMIN — PROPOFOL 30 MG: 10 INJECTION, EMULSION INTRAVENOUS at 13:18

## 2019-03-27 RX ADMIN — LIDOCAINE HYDROCHLORIDE 60 MG: 20 INJECTION, SOLUTION EPIDURAL; INFILTRATION; INTRACAUDAL; PERINEURAL at 12:02

## 2019-03-27 RX ADMIN — FENTANYL CITRATE 50 MCG: 50 INJECTION, SOLUTION INTRAMUSCULAR; INTRAVENOUS at 13:18

## 2019-03-27 RX ADMIN — POTASSIUM CHLORIDE 40 MEQ: 1.5 POWDER, FOR SOLUTION ORAL at 18:37

## 2019-03-27 RX ADMIN — INSULIN LISPRO 2 UNITS: 100 INJECTION, SOLUTION INTRAVENOUS; SUBCUTANEOUS at 21:46

## 2019-03-27 RX ADMIN — EPHEDRINE SULFATE 15 MG: 50 INJECTION, SOLUTION INTRAVENOUS at 12:39

## 2019-03-27 RX ADMIN — EPHEDRINE SULFATE 15 MG: 50 INJECTION, SOLUTION INTRAVENOUS at 13:36

## 2019-03-27 RX ADMIN — PROPOFOL 20 MG: 10 INJECTION, EMULSION INTRAVENOUS at 15:27

## 2019-03-27 RX ADMIN — EPHEDRINE SULFATE 15 MG: 50 INJECTION, SOLUTION INTRAVENOUS at 12:18

## 2019-03-27 RX ADMIN — EPHEDRINE SULFATE 10 MG: 50 INJECTION, SOLUTION INTRAVENOUS at 14:22

## 2019-03-27 RX ADMIN — EPHEDRINE SULFATE 15 MG: 50 INJECTION, SOLUTION INTRAVENOUS at 12:46

## 2019-03-27 RX ADMIN — ONDANSETRON 4 MG: 2 INJECTION INTRAMUSCULAR; INTRAVENOUS at 14:59

## 2019-03-27 RX ADMIN — Medication 3 G: at 12:40

## 2019-03-27 RX ADMIN — SODIUM CHLORIDE, SODIUM LACTATE, POTASSIUM CHLORIDE, CALCIUM CHLORIDE: 600; 310; 30; 20 INJECTION, SOLUTION INTRAVENOUS at 14:16

## 2019-03-27 RX ADMIN — HYDROCORTISONE SODIUM SUCCINATE 100 MG: 100 INJECTION, POWDER, FOR SOLUTION INTRAMUSCULAR; INTRAVENOUS at 11:52

## 2019-03-27 RX ADMIN — SODIUM CHLORIDE, SODIUM LACTATE, POTASSIUM CHLORIDE, AND CALCIUM CHLORIDE 75 ML/HR: 600; 310; 30; 20 INJECTION, SOLUTION INTRAVENOUS at 11:24

## 2019-03-27 RX ADMIN — GLYCOPYRROLATE 0.3 MG: 0.2 INJECTION INTRAMUSCULAR; INTRAVENOUS at 15:35

## 2019-03-27 RX ADMIN — PROPOFOL 10 MG: 10 INJECTION, EMULSION INTRAVENOUS at 15:38

## 2019-03-27 RX ADMIN — EPHEDRINE SULFATE 10 MG: 50 INJECTION, SOLUTION INTRAVENOUS at 13:50

## 2019-03-27 RX ADMIN — PROPOFOL 30 MG: 10 INJECTION, EMULSION INTRAVENOUS at 12:03

## 2019-03-27 RX ADMIN — HEPARIN SODIUM 5000 UNITS: 1000 INJECTION, SOLUTION INTRAVENOUS; SUBCUTANEOUS at 14:31

## 2019-03-27 RX ADMIN — NEOSTIGMINE METHYLSULFATE 3 MG: 1 INJECTION INTRAVENOUS at 15:35

## 2019-03-27 RX ADMIN — EPHEDRINE SULFATE 10 MG: 50 INJECTION, SOLUTION INTRAVENOUS at 12:23

## 2019-03-27 RX ADMIN — INSULIN LISPRO 2 UNITS: 100 INJECTION, SOLUTION INTRAVENOUS; SUBCUTANEOUS at 18:36

## 2019-03-27 RX ADMIN — HEPARIN SODIUM 10000 UNITS: 1000 INJECTION, SOLUTION INTRAVENOUS; SUBCUTANEOUS at 13:45

## 2019-03-27 RX ADMIN — FENTANYL CITRATE 25 MCG: 50 INJECTION, SOLUTION INTRAMUSCULAR; INTRAVENOUS at 15:07

## 2019-03-27 RX ADMIN — OXYCODONE HYDROCHLORIDE 5 MG: 5 TABLET ORAL at 17:47

## 2019-03-27 RX ADMIN — PROPOFOL 20 MG: 10 INJECTION, EMULSION INTRAVENOUS at 15:37

## 2019-03-27 RX ADMIN — FENTANYL CITRATE 25 MCG: 50 INJECTION, SOLUTION INTRAMUSCULAR; INTRAVENOUS at 13:42

## 2019-03-27 RX ADMIN — EPHEDRINE SULFATE 5 MG: 50 INJECTION, SOLUTION INTRAVENOUS at 14:25

## 2019-03-27 RX ADMIN — Medication 2 G: at 19:28

## 2019-03-27 RX ADMIN — SODIUM CHLORIDE, SODIUM LACTATE, POTASSIUM CHLORIDE, CALCIUM CHLORIDE: 600; 310; 30; 20 INJECTION, SOLUTION INTRAVENOUS at 12:20

## 2019-03-27 RX ADMIN — HYDROCORTISONE SODIUM SUCCINATE 50 MG: 100 INJECTION, POWDER, FOR SOLUTION INTRAMUSCULAR; INTRAVENOUS at 21:46

## 2019-03-27 RX ADMIN — VECURONIUM BROMIDE FOR INJECTION 1 MG: 1 INJECTION, POWDER, LYOPHILIZED, FOR SOLUTION INTRAVENOUS at 13:18

## 2019-03-27 RX ADMIN — SODIUM CHLORIDE 125 ML/HR: 900 INJECTION, SOLUTION INTRAVENOUS at 18:34

## 2019-03-27 RX ADMIN — ROCURONIUM BROMIDE 50 MG: 10 INJECTION, SOLUTION INTRAVENOUS at 12:02

## 2019-03-27 RX ADMIN — EPHEDRINE SULFATE 10 MG: 50 INJECTION, SOLUTION INTRAVENOUS at 13:54

## 2019-03-27 RX ADMIN — VECURONIUM BROMIDE FOR INJECTION 3 MG: 1 INJECTION, POWDER, LYOPHILIZED, FOR SOLUTION INTRAVENOUS at 12:38

## 2019-03-27 RX ADMIN — Medication 10 ML: at 21:46

## 2019-03-27 RX ADMIN — PROPOFOL 140 MG: 10 INJECTION, EMULSION INTRAVENOUS at 12:02

## 2019-03-27 RX ADMIN — PROTAMINE SULFATE 30 MG: 10 INJECTION, SOLUTION INTRAVENOUS at 14:56

## 2019-03-27 RX ADMIN — EPHEDRINE SULFATE 10 MG: 50 INJECTION, SOLUTION INTRAVENOUS at 12:34

## 2019-03-27 RX ADMIN — EPHEDRINE SULFATE 15 MG: 50 INJECTION, SOLUTION INTRAVENOUS at 12:12

## 2019-03-27 RX ADMIN — FENTANYL CITRATE 100 MCG: 50 INJECTION, SOLUTION INTRAMUSCULAR; INTRAVENOUS at 12:01

## 2019-03-27 RX ADMIN — VECURONIUM BROMIDE FOR INJECTION 1 MG: 1 INJECTION, POWDER, LYOPHILIZED, FOR SOLUTION INTRAVENOUS at 14:18

## 2019-03-27 RX ADMIN — EPHEDRINE SULFATE 10 MG: 50 INJECTION, SOLUTION INTRAVENOUS at 13:21

## 2019-03-27 RX ADMIN — ONDANSETRON 4 MG: 2 INJECTION INTRAMUSCULAR; INTRAVENOUS at 19:43

## 2019-03-27 RX ADMIN — MAGNESIUM SULFATE HEPTAHYDRATE 2 G: 40 INJECTION, SOLUTION INTRAVENOUS at 18:37

## 2019-03-27 RX ADMIN — HEPARIN SODIUM 2000 UNITS: 1000 INJECTION, SOLUTION INTRAVENOUS; SUBCUTANEOUS at 13:57

## 2019-03-27 NOTE — PERIOP NOTES
TRANSFER - OUT REPORT: 
 
Verbal report given to gayathri duron  on Waqar Pastor  being transferred to [phone] for routine post - op Report consisted of patients Situation, Background, Assessment and  
Recommendations(SBAR). Information from the following report(s) SBAR, OR Summary, Intake/Output and MAR was reviewed with the receiving nurse. Lines:  
Peripheral IV 03/27/19 Right Hand (Active) Site Assessment Clean, dry, & intact 3/27/2019  4:38 PM  
Phlebitis Assessment 0 3/27/2019  4:38 PM  
Infiltration Assessment 0 3/27/2019  4:38 PM  
Dressing Status Clean, dry, & intact 3/27/2019  4:38 PM  
Dressing Type Transparent 3/27/2019  4:38 PM  
Hub Color/Line Status Green 3/27/2019  4:38 PM  
   
Peripheral IV 03/27/19 Right Wrist (Active) Site Assessment Clean, dry, & intact 3/27/2019  4:38 PM  
Phlebitis Assessment 0 3/27/2019  4:38 PM  
Infiltration Assessment 0 3/27/2019  4:38 PM  
Dressing Status Clean, dry, & intact 3/27/2019  4:38 PM  
Dressing Type Transparent 3/27/2019  4:38 PM  
Hub Color/Line Status Pink 3/27/2019  4:38 PM  
Action Taken Open ports on tubing capped 3/27/2019  4:38 PM  
   
Arterial Line 03/27/19 Left Radial artery (Active) Site Assessment Clean, dry, & intact 3/27/2019  4:38 PM  
Dressing Status Clean, dry, & intact 3/27/2019  4:38 PM  
Dressing Type Transparent 3/27/2019  4:38 PM  
Line Status Intact and in place 3/27/2019  4:38 PM  
  
 
Opportunity for questions and clarification was provided. Patient transported with: 
 O2 @ 3 liters Lara Wound vac

## 2019-03-27 NOTE — PROGRESS NOTES
Pt. Received from PACU with nurses on monitor and oxygen. Pt. Transferred to bed with sliding board. CHG bath given, gown changed, reese care competed. Left radial arterial line in place correlating with cuff pressure. Pt. Alert and oriented X 4. Wound vac to left groin. Dressing over lower left ankle. Doppler pulses to both feet, right foot cooler than left. Family at bedside. CBC and BMP drawn from art line and sent to lab. Magnesium bolus in progress.

## 2019-03-27 NOTE — ANESTHESIA PROCEDURE NOTES
Arterial Line Placement Start time: 3/27/2019 12:17 PM 
End time: 3/27/2019 12:22 PM 
Performed by: Vitor Ruffin MD 
Authorized by: Vitor Ruffin MD  
 
Pre-Procedure Indications:  Arterial pressure monitoring Preanesthetic Checklist: patient identified, risks and benefits discussed, anesthesia consent, site marked, patient being monitored, timeout performed and patient being monitored Timeout Time: 12:16 Procedure:  
Prep:  Chlorhexidine Seldinger Technique?: Yes Orientation:  Left Location:  Radial artery Catheter size:  20 G Number of attempts:  1 Cont Cardiac Output Sensor: No   
 
Assessment:  
Post-procedure:  Line secured and sterile dressing applied Patient Tolerance:  Patient tolerated the procedure well with no immediate complications

## 2019-03-27 NOTE — BRIEF OP NOTE
[address] FAX: [phone] Op Note Template Note Pre-Op Diagnosis: PAD (peripheral artery disease) ([facility] [I73.9] Post-Op Diagnosis:  PAD (peripheral artery disease) ([facility] [address] [I73.9] Procedures: Procedure(s): LEFT COMMON FEMORAL-POSTERIOR TIBIAL BYPASS Surgeon: Osmani Platt MD 
 
Assistants: Surgeon(s): Shay Bagley MD   
 
Anesthesia:  General  
 
Findings: Heavily diseased left common femoral artery, left posterior tibial artery patent by 3 mm PTFE graft bypass performed with a 6 x 80 graft palpable pedal pulses post procedure Tourniquet Time:  * No tourniquets in log * Estimated Blood Loss:       
      
Specimens:  
        
Implants:   
Implant Name Type Inv. Item Serial No.  Lot No. LRB No. Used Action GRAFT TW STRTCH RR [id number] -- PROPATEN - [id number]  GRAFT  [address] [id number] -- PROPATEN [id number]  [employer]  Left 1 Implanted Complications: None Signed: Osmani Platt MD 
   
Elements of this note have been dictated using speech recognition software. As a result, errors of speech recognition may have occurred.

## 2019-03-27 NOTE — PROGRESS NOTES
TRANSFER - IN REPORT: 
 
Verbal report received from [facility]) on Chase Lorenzo  being received from [employer]) for routine progression of care Report consisted of patients Situation, Background, Assessment and  
Recommendations(SBAR). Information from the following report(s) Procedure Summary, Intake/Output, Recent Results and Cardiac Rhythm SB was reviewed with the receiving nurse. Opportunity for questions and clarification was provided. Assessment completed upon patients arrival to unit and care assumed.

## 2019-03-27 NOTE — PROGRESS NOTES
[address], [address] [place] [address]. [address] [place] [address] FAX: [phone] VASCULAR SURGERY FLOOR PROGRESS NOTE Admit Date: 3/27/2019 POD: Day of Surgery Procedure:  Procedure(s): LEFT COMMON FEMORAL-POSTERIOR TIBIAL BYPASS Subjective:  
 
Patient has no new complaints. Objective:  
 
Vitals: 
Blood pressure 148/70, pulse (!) 50, resp. rate 18, height 6' 2\" (1.88 m), weight 297 lb (134.7 kg), SpO2 97 %. No data recorded. Intake / Output: 
No intake or output data in the 24 hours ending 03/27/19 1108 Physical Exam:   
Constitutional: he appears well-developed. No distress. HENT:  
Head: Atraumatic. Eyes: Pupils are equal, round, and reactive to light. Neck: Normal range of motion. Cardiovascular: Regular rhythm. Pulmonary/Chest: Effort normal and breath sounds normal. No respiratory distress. Abdominal: Soft. Bowel sounds are normal. he exhibits no distension. There is no tenderness. There is no guarding. No hernia. Musculoskeletal: Normal range of motion. Neurological: He is alert. CN II- XII grossly intact Vascular: left leg ulcer, no palpable pedal pulses Labs: No results for input(s): HGB, WBC, K, GLU, HGBEXT in the last 72 hours. No lab exists for component:  CREA Data Review Assessment:  
 
Patient Active Problem List  
 Diagnosis Date Noted  CAD (coronary artery disease) 02/13/2019 Priority: 1 - One  Ankle ulcer ([facility] [address] 02/22/2019  PAD (peripheral artery disease) ([facility] [address] 02/14/2019  
 HTN (hypertension) 02/14/2019  DM (diabetes mellitus) ([facility] [address] 02/14/2019  Dyslipidemia 02/14/2019  Hx of CABG 02/14/2019 Plan/Recommendations/Medical Decision Making:  
 
Plan for left common femoral to PT bypass with PTFE graft. Repeated  vein mapping today right greater saphenous vein less than 3 in thigh, left vein has been previous harvest. 
 
Pt is marked and consented Elements of this note have been dictated using speech recognition software. As a result, errors of speech recognition may have occurred.

## 2019-03-27 NOTE — ANESTHESIA POSTPROCEDURE EVALUATION
Procedure(s): LEFT COMMON FEMORAL-POSTERIOR TIBIAL BYPASS. general 
 
Anesthesia Post Evaluation Multimodal analgesia: multimodal analgesia used between 6 hours prior to anesthesia start to PACU discharge Patient location during evaluation: PACU Patient participation: complete - patient participated Level of consciousness: awake Pain management: adequate Airway patency: patent Anesthetic complications: no 
Cardiovascular status: acceptable and hemodynamically stable Respiratory status: acceptable Hydration status: acceptable Comments: Acceptable for discharge from PACU. Post anesthesia nausea and vomiting:  none Vitals Value Taken Time /62 3/27/2019  4:23 PM  
Temp 36.7 °C (98 °F) 3/27/2019  3:57 PM  
Pulse 55 3/27/2019  4:28 PM  
Resp 14 3/27/2019  3:57 PM  
SpO2 93 % 3/27/2019  4:28 PM  
Vitals shown include unvalidated device data.

## 2019-03-28 LAB
ANION GAP SERPL CALC-SCNC: 8 MMOL/L (ref 7–16)
BUN SERPL-MCNC: 31 MG/DL (ref 8–23)
CALCIUM SERPL-MCNC: 8.7 MG/DL (ref 8.3–10.4)
CHLORIDE SERPL-SCNC: 108 MMOL/L (ref 98–107)
CO2 SERPL-SCNC: 22 MMOL/L (ref 21–32)
CREAT SERPL-MCNC: 1.5 MG/DL (ref 0.8–1.5)
ERYTHROCYTE [DISTWIDTH] IN BLOOD BY AUTOMATED COUNT: 17 % (ref 11.9–14.6)
GLUCOSE BLD STRIP.AUTO-MCNC: 163 MG/DL (ref 65–100)
GLUCOSE BLD STRIP.AUTO-MCNC: 201 MG/DL (ref 65–100)
GLUCOSE BLD STRIP.AUTO-MCNC: 262 MG/DL (ref 65–100)
GLUCOSE BLD STRIP.AUTO-MCNC: 266 MG/DL (ref 65–100)
GLUCOSE BLD STRIP.AUTO-MCNC: 275 MG/DL (ref 65–100)
GLUCOSE SERPL-MCNC: 170 MG/DL (ref 65–100)
HCT VFR BLD AUTO: 38.9 % (ref 41.1–50.3)
HGB BLD-MCNC: 12.3 G/DL (ref 13.6–17.2)
MCH RBC QN AUTO: 25.7 PG (ref 26.1–32.9)
MCHC RBC AUTO-ENTMCNC: 31.6 G/DL (ref 31.4–35)
MCV RBC AUTO: 81.4 FL (ref 79.6–97.8)
NRBC # BLD: 0 K/UL (ref 0–0.2)
PLATELET # BLD AUTO: 182 K/UL (ref 150–450)
PMV BLD AUTO: 9.5 FL (ref 9.4–12.3)
POTASSIUM SERPL-SCNC: 5 MMOL/L (ref 3.5–5.1)
RBC # BLD AUTO: 4.78 M/UL (ref 4.23–5.6)
SODIUM SERPL-SCNC: 138 MMOL/L (ref 136–145)
WBC # BLD AUTO: 6.8 K/UL (ref 4.3–11.1)

## 2019-03-28 PROCEDURE — 82962 GLUCOSE BLOOD TEST: CPT

## 2019-03-28 PROCEDURE — 77030011254 HC DRSG HYDRGEL S&N -A

## 2019-03-28 PROCEDURE — 80048 BASIC METABOLIC PNL TOTAL CA: CPT

## 2019-03-28 PROCEDURE — 74011636637 HC RX REV CODE- 636/637: Performed by: SURGERY

## 2019-03-28 PROCEDURE — 65270000029 HC RM PRIVATE

## 2019-03-28 PROCEDURE — 74011250637 HC RX REV CODE- 250/637: Performed by: SURGERY

## 2019-03-28 PROCEDURE — 74011250637 HC RX REV CODE- 250/637: Performed by: NURSE PRACTITIONER

## 2019-03-28 PROCEDURE — 77030027138 HC INCENT SPIROMETER -A

## 2019-03-28 PROCEDURE — 74750000023 HC WOUND THERAPY

## 2019-03-28 PROCEDURE — 74011636637 HC RX REV CODE- 636/637: Performed by: NURSE PRACTITIONER

## 2019-03-28 PROCEDURE — 85027 COMPLETE CBC AUTOMATED: CPT

## 2019-03-28 PROCEDURE — 77010033678 HC OXYGEN DAILY

## 2019-03-28 PROCEDURE — 74011250636 HC RX REV CODE- 250/636: Performed by: SURGERY

## 2019-03-28 RX ORDER — PREDNISONE 10 MG/1
5 TABLET ORAL
Status: DISCONTINUED | OUTPATIENT
Start: 2019-03-28 | End: 2019-03-30 | Stop reason: HOSPADM

## 2019-03-28 RX ADMIN — Medication 10 ML: at 21:50

## 2019-03-28 RX ADMIN — INSULIN LISPRO 6 UNITS: 100 INJECTION, SOLUTION INTRAVENOUS; SUBCUTANEOUS at 16:30

## 2019-03-28 RX ADMIN — HYDRALAZINE HYDROCHLORIDE 50 MG: 50 TABLET, FILM COATED ORAL at 10:34

## 2019-03-28 RX ADMIN — HUMAN INSULIN 60 UNITS: 100 INJECTION, SUSPENSION SUBCUTANEOUS at 21:45

## 2019-03-28 RX ADMIN — ASPIRIN 81 MG: 81 TABLET, COATED ORAL at 10:35

## 2019-03-28 RX ADMIN — Medication 10 ML: at 14:10

## 2019-03-28 RX ADMIN — SODIUM CHLORIDE 125 ML/HR: 900 INJECTION, SOLUTION INTRAVENOUS at 02:49

## 2019-03-28 RX ADMIN — TAMSULOSIN HYDROCHLORIDE 0.4 MG: 0.4 CAPSULE ORAL at 10:34

## 2019-03-28 RX ADMIN — INSULIN LISPRO 2 UNITS: 100 INJECTION, SOLUTION INTRAVENOUS; SUBCUTANEOUS at 07:45

## 2019-03-28 RX ADMIN — LISINOPRIL 40 MG: 20 TABLET ORAL at 10:34

## 2019-03-28 RX ADMIN — POTASSIUM CHLORIDE 40 MEQ: 1.5 POWDER, FOR SOLUTION ORAL at 10:35

## 2019-03-28 RX ADMIN — LEVOTHYROXINE SODIUM 75 MCG: 75 TABLET ORAL at 07:40

## 2019-03-28 RX ADMIN — HYDROCORTISONE SODIUM SUCCINATE 50 MG: 100 INJECTION, POWDER, FOR SOLUTION INTRAMUSCULAR; INTRAVENOUS at 14:10

## 2019-03-28 RX ADMIN — HYDROCORTISONE SODIUM SUCCINATE 50 MG: 100 INJECTION, POWDER, FOR SOLUTION INTRAMUSCULAR; INTRAVENOUS at 05:22

## 2019-03-28 RX ADMIN — INSULIN LISPRO 4 UNITS: 100 INJECTION, SOLUTION INTRAVENOUS; SUBCUTANEOUS at 11:30

## 2019-03-28 RX ADMIN — Medication 10 ML: at 05:21

## 2019-03-28 RX ADMIN — Medication 2 G: at 04:29

## 2019-03-28 RX ADMIN — PREDNISONE 5 MG: 10 TABLET ORAL at 10:35

## 2019-03-28 RX ADMIN — INSULIN LISPRO 6 UNITS: 100 INJECTION, SOLUTION INTRAVENOUS; SUBCUTANEOUS at 21:45

## 2019-03-28 RX ADMIN — METOPROLOL TARTRATE 25 MG: 25 TABLET ORAL at 10:37

## 2019-03-28 NOTE — PROGRESS NOTES
TRANSFER - IN REPORT: 
 
Verbal report received from Gale Almanzar RN on [place]  being received from ICU for routine post - op Report consisted of patients Situation, Background, Assessment and  
Recommendations(SBAR). Information from the following report(s) SBAR, Kardex, OR Summary, Procedure Summary, Intake/Output, MAR and Recent Results was reviewed with the receiving nurse. Opportunity for questions and clarification was provided. Assessment completed upon patients arrival to unit and care assumed.

## 2019-03-28 NOTE — WOUND CARE
Spoke with Dr Kathy Salguero and he requested wound evaluation of left ankle wound. Patient has 2.2x2. 4x0.4 cm wound with clean pink and white base. Slight sara wound redness noted. Tender to touch per patient. He has incisional VAC to left groin from vascular surgery and surgeon reports he will remove tomorrow with possible discharge home. Solosite gel and adaptic placed. Discussed wound healing and blood flow, glucose control and outpatient wound clinic. Patient was going to [facility] clinic for prior wound that is healed on right foot. Answered all current questions. Supplies in room. Will follow up next week if he does not get discharged.

## 2019-03-28 NOTE — PROGRESS NOTES
[address], [address] [place] [address]. [address] [place] [address] FAX: [phone] VASCULAR SURGERY FLOOR PROGRESS NOTE Admit Date: 3/27/2019 POD: 1 Day Post-Op Procedure:  Procedure(s): LEFT COMMON FEMORAL-POSTERIOR TIBIAL BYPASS Subjective:  
 
Patient has no new complaints. Objective:  
 
Vitals: 
Blood pressure 155/72, pulse 62, temperature 97.6 °F (36.4 °C), resp. rate 15, height 6' 2\" (1.88 m), weight 310 lb 6.5 oz (140.8 kg), SpO2 96 %. Temp (24hrs), Av.4 °F (36.3 °C), Min:96.7 °F (35.9 °C), Max:98 °F (36.7 °C) Intake / Output: 
 
Intake/Output Summary (Last 24 hours) at 3/28/2019 0856 Last data filed at 3/28/2019 2927 Gross per 24 hour Intake 3780 ml Output 1530 ml Net 2250 ml Physical Exam:   
Constitutional: he appears well-developed. No distress. HENT:  
Head: Atraumatic. Eyes: Pupils are equal, round, and reactive to light. Neck: Normal range of motion. Cardiovascular: Regular rhythm. Pulmonary/Chest: Effort normal and breath sounds normal. No respiratory distress. Abdominal: Soft. Bowel sounds are normal. he exhibits no distension. There is no tenderness. There is no guarding. No hernia. Musculoskeletal: Normal range of motion. Neurological: He is alert. CN II- XII grossly intact Vascular: Palpable left  posterior tibial pulse Labs:  
Recent Labs  
  19 
0257 19 
1836 HGB 12.3* 12.3* WBC 6.8 8.8  
K 5.0 4.4 * 187* Data Review Assessment:  
 
Patient Active Problem List  
 Diagnosis Date Noted  CAD (coronary artery disease) 2019 Priority: 1 - One  Peripheral artery disease ([address] 2019  Ankle ulcer ([facility] [address] 2019  PAD (peripheral artery disease) ([facility] [address] 2019  
 HTN (hypertension) 2019  DM (diabetes mellitus) ([facility] [address] 2019  Dyslipidemia 2019  Hx of CABG 2019 Plan/Recommendations/Medical Decision Making: Patient status post bypass left leg well perfused 
-Transfer to the floor DC art line and Lara catheter Physical therapy up and out of bed Restart p.o. steroids Elements of this note have been dictated using speech recognition software. As a result, errors of speech recognition may have occurred.

## 2019-03-28 NOTE — PROGRESS NOTES
Doppler pulses not found in the R foot after several attempts. Germain Ackerman MD notified. No new orders received. Will continue to monitor pt closely.

## 2019-03-28 NOTE — PROGRESS NOTES
TRANSFER - OUT REPORT: 
 
Verbal report given to Jason Shah RN(name) on Michael Armendariz  being transferred to 7th floor(unit) for routine progression of care Report consisted of patients Situation, Background, Assessment and  
Recommendations(SBAR). Information from the following report(s) SBAR, Intake/Output, MAR, Recent Results, Med Rec Status and Cardiac Rhythm SB was reviewed with the receiving nurse. Lines:  
Peripheral IV 03/27/19 Right Hand (Active) Site Assessment Clean, dry, & intact 3/28/2019  7:01 AM  
Phlebitis Assessment 0 3/28/2019  7:01 AM  
Infiltration Assessment 0 3/28/2019  7:01 AM  
Dressing Status Clean, dry, & intact 3/28/2019  7:01 AM  
Dressing Type Tape;Transparent 3/28/2019  7:01 AM  
Hub Color/Line Status Green;Flushed 3/28/2019  7:01 AM  
Alcohol Cap Used No 3/28/2019  7:01 AM  
   
Peripheral IV 03/27/19 Right Wrist (Active) Site Assessment Clean, dry, & intact 3/28/2019  7:01 AM  
Phlebitis Assessment 0 3/28/2019  7:01 AM  
Infiltration Assessment 0 3/28/2019  7:01 AM  
Dressing Status Clean, dry, & intact 3/28/2019  7:01 AM  
Dressing Type Transparent;Tape 3/28/2019  7:01 AM  
Hub Color/Line Status Pink;Patent; Flushed 3/28/2019  7:01 AM  
Action Taken Open ports on tubing capped 3/27/2019  4:38 PM  
Alcohol Cap Used No 3/28/2019  7:01 AM  
  
 
Opportunity for questions and clarification was provided. Patient transported with: 
 O2 @ 3 liters

## 2019-03-28 NOTE — INTERDISCIPLINARY ROUNDS
Interdisciplinary team rounds were held 3/28/2019 with the following team members:Care Management, Nursing, Nurse Practitioner, Nutrition, Palliative Care, Pastoral Care, Pharmacy, Physician, Respiratory Therapy and Clinical Coordinator and the patient. Plan of care discussed. See clinical pathway and/or care plan for interventions and desired outcomes.

## 2019-03-28 NOTE — PROGRESS NOTES
03/28/19 1132 Dual Skin Pressure Injury Assessment Dual Skin Pressure Injury Assessment X Second Care Provider (Based on [address] [place] [address]) [place] [facility], [facility] Buttocks/Ishium  Bilateral 
(blanchable redness) Skin Integumentary Skin Integumentary (WDL) X Skin Condition/Temp Warm;Dry Skin Color Appropriate for ethnicity Skin Integrity Incision (comment); Scars (comment); Wound (add Wound LDA) (wound to LLE, Left groin, scar to Left knee) Hair Growth Present Varicosities Absent Turgor Non-tenting Pressure  Injury Documentation No Pressure Injury Noted-Pressure Ulcer Prevention Initiated Wound Prevention and Protection Methods Orientation of Wound Prevention Posterior Location of Wound Prevention Buttocks Dressing Present  Yes Dressing Status Intact Wound Offloading (Prevention Methods) Bed, pressure reduction mattress;Pillows;Repositioning;Turning

## 2019-03-28 NOTE — CONSULTS
LEAPFROG PROTOCOL NOTE    Brunilda French Sharon  3/28/2019    The patient is currently in the critical care setting managed by Dr. Dong Reyes  with PAD. The patient's chart is reviewed and the patient is discussed with the staff. Patient is currently hemodynamically stable. Patient has no needs identified for Intensivist management in the critical care setting at this time. Please notify us if can be of assistance. No charge billed to the patient. Thank you.     Angelita Villeda, NP

## 2019-03-28 NOTE — PROGRESS NOTES
Patient education on calling for assistance to get up out of the bed or to reach for things out of reach. Gripper socks, yellow arm band and deidre fall updated. Diabetes management education about medication administration, and ways to help control blood glucose.

## 2019-03-28 NOTE — OP NOTES
[facility]  OPERATIVE REPORT    Name:  Denise Toney  MR#:  [id number]  :  1945  ACCOUNT #:  [de-identified]  DATE OF SERVICE:  2019    CLINICAL SERVICE:  Vascular Surgery. PREOPERATIVE DIAGNOSIS:  Left leg nonhealing left lateral leg wound. POSTOPERATIVE DIAGNOSIS:  Left leg nonhealing left lateral leg wound. PROCEDURE PERFORMED:  Left common femoral to posterior tibial bypass with PTFE graft. SURGEON:  Milton Mercado MD    ASSISTANT:  None. ANESTHESIA:  General.    COMPLICATIONS:  None. SPECIMENS REMOVED:  None. IMPLANTS:      ESTIMATED BLOOD LOSS:  100 mL. INDICATION FOR PROCEDURE:  This is a 77-year-old male with history of peripheral vascular disease and coronary artery disease who presented to my office with worsening rest pain centered to the left lateral malleolus wound. He underwent arteriogram which showed he had a left SFA and popliteal artery occlusion with a posterior tibial artery being the dominant run off to the foot. His left saphenous vein was previously harvested for his CABG. His right saphenous vein was less than 3 mm throughout. Secondary to rest pain and nonhealing wound, he presented for a bypass. PROCEDURE IN DETAIL:  After getting informed consent, the patient was brought to the operating room and general anesthesia was induced. Preop antibiotics were given before skin incisions. The patient's left leg was then prepped and draped in normal sterile fashion. [place] was then placed. In the left lateral malleolus, wound was then prepped and then covered with an [place]oban and a Tegaderm. We started our incision in the distal leg about 5 cm above the malleolus. We dissected down through the skin, and the subcutaneous tissue and the fascia. We retracted the muscle laterally. We then evaluated the fascia and the posterior tibial artery and vein. It seemed to be measured about 3.5 mm.   We got control of the vein for approximately about 6 cm with vessel loops. We then made our incision in the left groin. Of note, the patient had significant obesity. We dissected down and we had to use self-retaining retractors for visualization of the common femoral artery. We dissected the inguinal ligament and got control of the common femoral artery as it bifurcated a little bit higher. The SFA was chronically occluded. We did he common femoral artery for about 4 cm just above the inguinal ligament. We dissected and got control with vessel loops. We made a counterincision in the leg to help with tunneling. We brought into the field a 6 x 80 PTFE graft, will be tunneled subfascial throughout the incision. Then we heparinized the patient with 10,000 units of heparin. We allowed it to circulate for 2 minutes. We got proximal control of the common femoral artery. We did 11 blade to do arteriotomy for about 3 cm. We spatulated the graft in an end-to-side anastomosis using 6-0 Prolene. Prior to tying up, we forward flushed and back flushed the artery. There was good backbleeding from the profunda. The graft had pulsatile flow to the distal end of the graft. It did require one fixed stitch in the apex of the graft, anastomosis. We then packed that area. We then turned our attention into the posterior tibial artery. We had dilated up with papaverine. Once there was good control with vessels, we used a [place] blade to do an arteriotomy on the anterior medial side for about 4 cm. We extended with Abreu scissors. We then spatulated the end of the graft into an end-to-side anastomosis using 6-0 Prolene. Prior to that, we back flushed retrograde and forward flushed antegrade. Both procedures had a good Doppler signal distal to the posterior tibia which augmented with occlusion and palpable posterior tibial signal.  We then reversed the patient with 50 mg of protamine. We held pressure for 10 minutes.   We closed the distal incision with 3-0 Prolene for the fascia and 3-0 for the subcutaneous tissue, and 4-0 Monocryl. The counter incision in the left leg was closed with 3-0 Vicryl and 4-0 Monocryl. The groin was closed with multiple 2-0 Vicryls and running 3-0 Vicryls and a staple and incisional VAC. The patient was then extubated and transferred to the PACU in stable condition.       MD ALVA Grider/[email or web address]  D:  03/27/2019 16:05  T:  03/28/2019 1:26  JOB #:  [id number]

## 2019-03-28 NOTE — PROGRESS NOTES
LEAPFROG PROTOCOL NOTE Mak Herrera 3/28/2019 The patient is currently in the critical care setting managed by Dr. Charles Hairston with L common fem-tib bypass. The patient's chart is reviewed and the patient is discussed with the staff. Patient is currently hemodynamically stable. Patient has no needs identified for Intensivist management in the critical care setting at this time. Please notify us if can be of assistance. No charge billed to the patient. Thank you.  
 
Victor Hugo Charles MD

## 2019-03-28 NOTE — PROGRESS NOTES
Received bedside report from [place], [address]. Pt in bed resting quietly - oriented to self and situation only. Dual skin assessment complete with allevyn in place. Groin incision with vacuum dressing present - c/d/i. Incisions present on L knee - dressing c/d/i. Ulcer present LLE ankle area - dressing c/d/i. Pulses palpable and present in BUE's. Doppler pulses present in LLE. Unable to palpate or doppler pulses in the right foot - MD aware with no new orders received. O2 sats in the 90s via room air. Lung sounds clear and diminished bilaterally. Abdomen intact with active bowel sounds. Lara patent and draining clear yellow urine. HR = bradycardic. All other VS stable at this time. Will continue to monitor pt.

## 2019-03-28 NOTE — PROGRESS NOTES
Physical Therapy Note: 
 
Participated in interdisciplinary rounds in ICU/CCU and chart reviewed. Patient is experiencing decrease in function from baseline. Patient would benefit from skilled acute therapy to increase independence with self care/ADLs, strength, endurance, and functional mobility. Recommend PT/OT consult when medically stable and MD agrees. Thank you for your consideration, Trish Cabrera DPT

## 2019-03-29 LAB
ANION GAP SERPL CALC-SCNC: 6 MMOL/L (ref 7–16)
BUN SERPL-MCNC: 28 MG/DL (ref 8–23)
CALCIUM SERPL-MCNC: 9.2 MG/DL (ref 8.3–10.4)
CHLORIDE SERPL-SCNC: 103 MMOL/L (ref 98–107)
CO2 SERPL-SCNC: 26 MMOL/L (ref 21–32)
CREAT SERPL-MCNC: 1.46 MG/DL (ref 0.8–1.5)
ERYTHROCYTE [DISTWIDTH] IN BLOOD BY AUTOMATED COUNT: 16.9 % (ref 11.9–14.6)
GLUCOSE BLD STRIP.AUTO-MCNC: 127 MG/DL (ref 65–100)
GLUCOSE BLD STRIP.AUTO-MCNC: 172 MG/DL (ref 65–100)
GLUCOSE BLD STRIP.AUTO-MCNC: 189 MG/DL (ref 65–100)
GLUCOSE BLD STRIP.AUTO-MCNC: 218 MG/DL (ref 65–100)
GLUCOSE SERPL-MCNC: 187 MG/DL (ref 65–100)
HCT VFR BLD AUTO: 37.1 % (ref 41.1–50.3)
HGB BLD-MCNC: 11.9 G/DL (ref 13.6–17.2)
MCH RBC QN AUTO: 26.2 PG (ref 26.1–32.9)
MCHC RBC AUTO-ENTMCNC: 32.1 G/DL (ref 31.4–35)
MCV RBC AUTO: 81.7 FL (ref 79.6–97.8)
NRBC # BLD: 0 K/UL (ref 0–0.2)
PLATELET # BLD AUTO: 188 K/UL (ref 150–450)
PMV BLD AUTO: 10.1 FL (ref 9.4–12.3)
POTASSIUM SERPL-SCNC: 4.4 MMOL/L (ref 3.5–5.1)
RBC # BLD AUTO: 4.54 M/UL (ref 4.23–5.6)
SODIUM SERPL-SCNC: 135 MMOL/L (ref 136–145)
WBC # BLD AUTO: 6.4 K/UL (ref 4.3–11.1)

## 2019-03-29 PROCEDURE — 94760 N-INVAS EAR/PLS OXIMETRY 1: CPT

## 2019-03-29 PROCEDURE — 74011250636 HC RX REV CODE- 250/636: Performed by: NURSE PRACTITIONER

## 2019-03-29 PROCEDURE — 65270000029 HC RM PRIVATE

## 2019-03-29 PROCEDURE — 82962 GLUCOSE BLOOD TEST: CPT

## 2019-03-29 PROCEDURE — 97530 THERAPEUTIC ACTIVITIES: CPT

## 2019-03-29 PROCEDURE — 80048 BASIC METABOLIC PNL TOTAL CA: CPT

## 2019-03-29 PROCEDURE — 74011636637 HC RX REV CODE- 636/637: Performed by: SURGERY

## 2019-03-29 PROCEDURE — 36415 COLL VENOUS BLD VENIPUNCTURE: CPT

## 2019-03-29 PROCEDURE — 77030020256 HC SOL INJ NACL 0.9%  500ML

## 2019-03-29 PROCEDURE — 97161 PT EVAL LOW COMPLEX 20 MIN: CPT

## 2019-03-29 PROCEDURE — 77010033678 HC OXYGEN DAILY

## 2019-03-29 PROCEDURE — 85027 COMPLETE CBC AUTOMATED: CPT

## 2019-03-29 PROCEDURE — 74011000258 HC RX REV CODE- 258: Performed by: NURSE PRACTITIONER

## 2019-03-29 PROCEDURE — 74750000023 HC WOUND THERAPY

## 2019-03-29 PROCEDURE — 74011250637 HC RX REV CODE- 250/637: Performed by: SURGERY

## 2019-03-29 PROCEDURE — 74011636637 HC RX REV CODE- 636/637: Performed by: NURSE PRACTITIONER

## 2019-03-29 RX ADMIN — ASPIRIN 81 MG: 81 TABLET, COATED ORAL at 10:10

## 2019-03-29 RX ADMIN — PIPERACILLIN AND TAZOBACTAM 3.38 G: 3; .375 INJECTION, POWDER, FOR SOLUTION INTRAVENOUS at 23:24

## 2019-03-29 RX ADMIN — PIPERACILLIN AND TAZOBACTAM 3.38 G: 3; .375 INJECTION, POWDER, FOR SOLUTION INTRAVENOUS at 16:22

## 2019-03-29 RX ADMIN — INSULIN LISPRO 2 UNITS: 100 INJECTION, SOLUTION INTRAVENOUS; SUBCUTANEOUS at 16:30

## 2019-03-29 RX ADMIN — TAMSULOSIN HYDROCHLORIDE 0.4 MG: 0.4 CAPSULE ORAL at 10:10

## 2019-03-29 RX ADMIN — INSULIN LISPRO 2 UNITS: 100 INJECTION, SOLUTION INTRAVENOUS; SUBCUTANEOUS at 12:55

## 2019-03-29 RX ADMIN — HUMAN INSULIN 60 UNITS: 100 INJECTION, SUSPENSION SUBCUTANEOUS at 21:16

## 2019-03-29 RX ADMIN — Medication 5 ML: at 05:53

## 2019-03-29 RX ADMIN — HYDROCODONE BITARTRATE AND ACETAMINOPHEN 1 TABLET: 5; 325 TABLET ORAL at 16:21

## 2019-03-29 RX ADMIN — PREDNISONE 5 MG: 10 TABLET ORAL at 10:10

## 2019-03-29 RX ADMIN — HUMAN INSULIN 60 UNITS: 100 INJECTION, SUSPENSION SUBCUTANEOUS at 10:10

## 2019-03-29 RX ADMIN — LISINOPRIL 40 MG: 20 TABLET ORAL at 10:10

## 2019-03-29 RX ADMIN — INSULIN LISPRO 4 UNITS: 100 INJECTION, SOLUTION INTRAVENOUS; SUBCUTANEOUS at 21:16

## 2019-03-29 RX ADMIN — Medication 5 ML: at 21:16

## 2019-03-29 RX ADMIN — LEVOTHYROXINE SODIUM 75 MCG: 75 TABLET ORAL at 05:53

## 2019-03-29 RX ADMIN — METOPROLOL TARTRATE 25 MG: 25 TABLET ORAL at 10:10

## 2019-03-29 RX ADMIN — HYDRALAZINE HYDROCHLORIDE 50 MG: 50 TABLET, FILM COATED ORAL at 10:10

## 2019-03-29 RX ADMIN — Medication 10 ML: at 13:45

## 2019-03-29 NOTE — PROGRESS NOTES
Critical Care Outreach Nurse Progress Report: 
 
Subjective: In to assess pt secondary to transfer from CCU 
 
MEWS Score: 1 (03/28/19 1507) Vitals:  
 03/28/19 1037 03/28/19 1131 03/28/19 1507 03/28/19 2021 BP:  163/81 165/77 166/79 Pulse: 64 64 (!) 55 60 Resp:  22 20 22 Temp:  97.6 °F (36.4 °C) 98.1 °F (36.7 °C) 97.6 °F (36.4 °C) SpO2:  93% 95% 94% Weight:      
Height:      
  
 
LAB DATA: 
 
Recent Labs  
  03/28/19 
0257 03/27/19 
1836 03/27/19 
1634  137 138  
K 5.0 4.4 4.5  
* 107 108* CO2 22 22 22 AGAP 8 8 8 * 187* 184* BUN 31* 31* 32* CREA 1.50 1.67* 1.63* GFRAA 59* 52* 54* GFRNA 49* 43* 45*  
CA 8.7 8.9 8.9 MG  --  1.9  --   
  
 
Recent Labs  
  03/28/19 
0257 03/27/19 
1836 03/27/19 
1634 WBC 6.8 8.8 6.9 HGB 12.3* 12.3* 12.3*  
HCT 38.9* 40.4* 39.1*  
 183 186 Objective: Patient alert in bed watching TV Pain Intensity 1: 0 (03/28/19 1132) Pain Location 1: Groin Pain Intervention(s) 1: Medication (see MAR) Patient Stated Pain Goal: 0 Assessment: Patient post op L fem/tib bypass. Wound vac to L groin. + doppler pulses BLE. Patient concerned about rising BGL. Patient states he has taken 60 units BID of NPH for years. Patient currently on SS Humalog. Last . MD Fannie Moise notified and orders received to change diet to Diabetic CC and add his home insulin regimen in addition to the SS dose. Patient notifed of changes and primary nurse aware of changes. Will monitor Plan: Follow per outreach protocol

## 2019-03-29 NOTE — PROGRESS NOTES
Date of Outreach Update: 
Radha Pineda was seen and assessed. MEWS Score: 1 (03/29/19 5404) Vitals:  
 03/28/19 2021 03/29/19 0001 03/29/19 0420 03/29/19 6745 BP: 166/79 164/76 152/87 160/79 Pulse: 60 61 68 60 Resp: 22 20 18 18 Temp: 97.6 °F (36.4 °C) 97.9 °F (36.6 °C) 98.3 °F (36.8 °C) 98 °F (36.7 °C) SpO2: 94% 93% 92% 93% Weight:      
Height:      
  
 
 Pain Assessment Pain Intensity 1: 0 (03/28/19 1132) Pain Location 1: Groin Pain Intervention(s) 1: Medication (see MAR) Patient Stated Pain Goal: 0 Previous Outreach assessment has been reviewed. There have been no significant clinical changes since the completion of the last dated Outreach assessment. Patient laying in bed, NP with Vascular at bedside. L foot is warm with palpable posterior tibial pulse. All dressings on L leg C/D/I, wound vac in place on left groin. Plan is for patient to work with PT today and try ambulating. Patient on room air with O2 sat of 95% and HR 64. Possible D/C later today or tomorrow. No concerns from patient at this time. Will continue to follow up per outreach protocol.  
 
Signed By:   Jack Gallagher 
  March 29, 2019 9:55 AM

## 2019-03-29 NOTE — PROGRESS NOTES
Problem: Mobility Impaired (Adult and Pediatric) Goal: *Acute Goals and Plan of Care (Insert Text) Description Discharge Goals: 1. Patient will perform bed mobility with INDEPENDENCE within 7 days. 2. Patient will transfer bed to chair with MODIFIED INDEPENDENCE within 7 days. 3. Patient will demonstrate FAIR+ DYNAMIC STANDING balance within 7 day(s). 4. Patient will ambulate 150+ using least restrictive assistive device and MODIFIED INDEPENDENCE within 7 days. 5. Patient will tolerate 25+ minutes of therapeutic activity/exercise and/or neuromuscular re-education while maintaining stable vitals to improve functional strength and activity tolerance within 7 days. 6. Patient will ascend and descend 3 steps with SUPERVISION within 7 treatment days. PHYSICAL THERAPY: Initial Assessment, Daily Note and PM 3/29/2019 INPATIENT: PT Visit Days : 1 [place] / [place] B / Product Type: Medicare /   
  
NAME/AGE/GENDER: Zaria Alva is a 72 y.o. male PRIMARY DIAGNOSIS: PAD (peripheral artery disease) ([facility] [address] [I73.9] PAD (peripheral artery disease) ([facility] [address] [I73.9] Peripheral artery disease ([facility] [address] [I73.9] <principal problem not specified> 
 <principal problem not specified> Procedure(s) (LRB): LEFT COMMON FEMORAL-POSTERIOR TIBIAL BYPASS (Left) 2 Days Post-Op ICD-10: Treatment Diagnosis:  
 Generalized Muscle Weakness (M62.81) Difficulty in walking, Not elsewhere classified (R26.2) Precaution/Allergies: 
Gabapentin ASSESSMENT:  
 
Mr. La Nena Hess is a 72year old male admitted with PAD and 2 days s/p left femoral-posterior tibial bypass. Patient seen this AM for initial physical therapy evaluation: presents supine in bed, oriented x4, and endorses 0/10 pain. Patient lives with his spouse in a single story residence with 3 steps to enter.  At baseline, patient is independent-modified independent with ADLs and ambulates without utilizing an assistive device. Today, B UE strength/ROM WFL, L LE ROM limited proximally with wound vac in place, B LE generalized weakness appreciated and 5/5 distal power, and sensation is intact to light touch L3-S1 B. Patient performed bed mobility with supervision, transfers with CGA, and ambulation x40ft with RW and CGA. Demonstrated a slow, step-through, reciprocal gait pattern with fair dynamic standing balance and mild trunk flexion. Educated patient on activity pacing, assistive device utilization, and safety. Educated patient on LE elevation. Mr. Layla Reagan presents with decreased functional mobility and balance/gait status from baseline. Recommend continued skilled PT services to address stated deficits. Will follow and progress toward stated goals during acute stay. Patient will also need a RW for safety with ambulation (medically necessary). This section established at most recent assessment PROBLEM LIST (Impairments causing functional limitations): 
Decreased Strength Decreased ADL/Functional Activities Decreased Transfer Abilities Decreased Ambulation Ability/Technique Decreased Balance INTERVENTIONS PLANNED: (Benefits and precautions of physical therapy have been discussed with the patient.) Balance Exercise Bed Mobility Family Education Gait Training Group Therapy Home Exercise Program (HEP) Neuromuscular Re-education/Strengthening Range of Motion (ROM) Therapeutic Activites Therapeutic Exercise/Strengthening Transfer Training TREATMENT PLAN: Frequency/Duration: 3 times a week for duration of hospital stay Rehabilitation Potential For Stated Goals: Good RECOMMENDED REHABILITATION/EQUIPMENT: (at time of discharge pending progress): Due to the probability of continued deficits (see above) this patient will likely need continued skilled physical therapy after discharge. Equipment:  
None at this time HISTORY:  
History of Present Injury/Illness (Reason for Referral): 
 S/p left L LE bypass Past Medical History/Comorbidities:  
Mr. Jaquan Rios  has a past medical history of Arthritis, CAD (coronary artery disease), Enlarged prostate, Former cigarette smoker, History of hepatitis (age 13), CABG, Hypercholesteremia, Hypertension, Hypothyroidism, Left leg pain, Long term (current) use of systemic steroids, PAD (peripheral artery disease) ([facility] [address] RBBB, Type 2 diabetes mellitus ([facility] [address] and Vitamin D deficiency. He also has no past medical history of Difficult intubation, Malignant hyperthermia due to anesthesia, Nausea & vomiting, Pseudocholinesterase deficiency, or Thyroid disease. Mr. Jaquan Rios  has a past surgical history that includes pr cardiac surg procedure unlist; hx heart catheterization; hx colonoscopy; hx hemorrhoidectomy; and hx knee replacement (Bilateral). Social History/Living Environment:  
Home Environment: Private residence # Steps to Enter: 3 One/Two Story Residence: One story Living Alone: No 
Support Systems: Spouse/Significant Other/Partner Patient Expects to be Discharged to[de-identified] Private residence Current DME Used/Available at Home: Other (comment) Prior Level of Function/Work/Activity: 
Independent, lives with spouse Number of Personal Factors/Comorbidities that affect the Plan of Care: 0: LOW COMPLEXITY EXAMINATION:  
Most Recent Physical Functioning:  
Gross Assessment: 
AROM: Generally decreased, functional(L LE) Strength: Generally decreased, functional 
Coordination: Within functional limits Sensation: Intact(Light touch B LE) Posture: 
Posture (WDL): Exceptions to [facility] Posture Assessment: Trunk flexion Balance: 
Sitting: Intact Standing: Impaired Standing - Static: Fair Standing - Dynamic : Fair Bed Mobility: 
Supine to Sit: Stand-by assistance Scooting: Stand-by assistance Wheelchair Mobility: 
  
Transfers: 
Sit to Stand: Contact guard assistance Stand to Sit: Contact guard assistance Bed to Chair: Contact guard assistance Interventions: Safety awareness training; Tactile cues; Verbal cues Gait: 
  
Base of Support: Center of gravity altered Speed/Genna: Slow Step Length: Right shortened;Left shortened Distance (ft): 40 Feet (ft) Assistive Device: Walker, rolling Ambulation - Level of Assistance: Contact guard assistance;Stand-by assistance Interventions: Safety awareness training; Tactile cues; Verbal cues Body Structures Involved: Muscles Body Functions Affected: 
Neuromusculoskeletal 
Movement Related Activities and Participation Affected: Mobility Self Care Number of elements that affect the Plan of Care: 4+: HIGH COMPLEXITY CLINICAL PRESENTATION:  
Presentation: Stable and uncomplicated: LOW COMPLEXITY CLINICAL DECISION MAKIN[address] [place] [address] AM-PAC? ?6 Clicks? Basic Mobility Inpatient Short Form How much difficulty does the patient currently have. .. Unable A Lot A Little None 1. Turning over in bed (including adjusting bedclothes, sheets and blankets)? ? 1   ? 2   ? 3   ? 4  
2. Sitting down on and standing up from a chair with arms ( e.g., wheelchair, bedside commode, etc.)   ? 1   ? 2   ? 3   ? 4  
3. Moving from lying on back to sitting on the side of the bed?   ? 1   ? 2   ? 3   ? 4 How much help from another person does the patient currently need. .. Total A Lot A Little None 4. Moving to and from a bed to a chair (including a wheelchair)? ? 1   ? 2   ? 3   ? 4  
5. Need to walk in hospital room? ? 1   ? 2   ? 3   ? 4  
6. Climbing 3-5 steps with a railing? ? 1   ? 2   ? 3   ? 4  
© , Trustees of [address] [place] [address], under license to [employer]. All rights reserved Score:  Initial: 20 Most Recent: 20 (Date: 3/29/19) Interpretation of Tool:  Represents activities that are increasingly more difficult (i.e. Bed mobility, Transfers, Gait). Medical Necessity:    
Patient demonstrates good 
 rehab potential due to higher previous functional level. Reason for Services/Other Comments: 
Patient continues to require skilled intervention due to decreased functional mobility and balance/gait status from baseline. .  
Use of outcome tool(s) and clinical judgement create a POC that gives a: Clear prediction of patient's progress: LOW COMPLEXITY  
  
 
 
 
TREATMENT:  
(In addition to Assessment/Re-Assessment sessions the following treatments were rendered) Pre-treatment Symptoms/Complaints:   
Pain: Initial:  
Pain Intensity 1: 0  Post Session:  0/10 Initial Evaluation (15 Minutes) Therapeutic Activity: (    25 Minutes): Therapeutic activities including bed mobility, transfer training, balance training, safety awareness training, ambulation on level ground x40ft, and patient education  to improve mobility and balance. Required minimal Safety awareness training; Tactile cues; Verbal cues to promote static and dynamic balance in standing. Braces/Orthotics/Lines/Etc: Wound Vac L LE  
O2 Device: Nasal cannula Treatment/Session Assessment:   
Response to Treatment:  See above. Interdisciplinary Collaboration:  
Physical Therapist 
Registered Nurse Certified Nursing Assistant/Patient Care Technician After treatment position/precautions:  
Up in chair Bed alarm/tab alert on Bed/Chair-wheels locked Bed in low position Call light within reach RN notified Call light within reach; chair alarm activated; educated patient on falls precautions and to call for assistance. Compliance with Program/Exercises: Will assess as treatment progresses Recommendations/Intent for next treatment session: \"Next visit will focus on advancements to more challenging activities and reduction in assistance provided\". Total Treatment Duration: PT Patient Time In/Time Out Time In: 1310 Time Out: 1350 Romina Lorenzo DPT

## 2019-03-29 NOTE — PROGRESS NOTES
Spoke with patient regarding discharge planning. Alert and oriented x 4 Lives with wife in own home with 2/4 step entry Son lives next door and provides support and assistance if needed. Prior to admission independent with ADL's and driving. Ambulates without device. PCP- Dr. Grisel Lloyd and last seen 3 months ago DME- walker, cane, walk in shower with built in shower bench Denies any problems affording Rx Has had previous [facility] services with [facility] but is open to any agency referral 
[facility] care. Dr. Lina Perez. Patient is a Vet and does not currently use any of the benefits. PT eval pending. Patient discussed with CM his wish to have [facility] services at time of discharge for PT and RN wound care. Cm will continue to follow. Care Management Interventions PCP Verified by CM: Yes Mode of Transport at Discharge: Other (see comment)(family) Transition of Care Consult (CM Consult): Discharge Planning Physical Therapy Consult: Yes Current Support Network: Lives with Spouse, Own Home Confirm Follow Up Transport: Family Plan discussed with Pt/Family/Caregiver: Yes Freedom of Choice Offered: Yes Discharge Location Discharge Placement: Home with home health

## 2019-03-29 NOTE — PROGRESS NOTES
Norco and Augmentin prescription left in patient's chart along with Xarelto samples. Discharge nurse aware.

## 2019-03-29 NOTE — PROGRESS NOTES
Physical Therapy Note: PT evaluation attempted; student RNs at bedside. Will re-attempt. Thank you, Rakesh Jeffries, PT, DPT 
3/29/19

## 2019-03-29 NOTE — PROGRESS NOTES
Date of Outreach Update: 
Paulino Gamboa was seen and assessed. MEWS Score: 1 (03/29/19 3039) Vitals:  
 03/29/19 0001 03/29/19 0420 03/29/19 0655 03/29/19 1144 BP: 164/76 152/87 160/79 150/77 Pulse: 61 68 60 (!) 59 Resp: 20 18 18 18 Temp: 97.9 °F (36.6 °C) 98.3 °F (36.8 °C) 98 °F (36.7 °C) 98.4 °F (36.9 °C) SpO2: 93% 92% 93% 94% Weight:      
Height:      
  
 
 Pain Assessment Pain Intensity 1: 0 (03/29/19 1310) Pain Location 1: Groin Pain Intervention(s) 1: Medication (see MAR) Patient Stated Pain Goal: 0 Previous Outreach assessment has been reviewed. There have been no significant clinical changes since the completion of the last dated Outreach assessment. Patient worked with PT and is now up to chair. All VSS. No concerns at this time. Will continue to follow up per outreach protocol.  
 
Signed By:   Silvano Higginbotham 
  March 29, 2019 2:57 PM

## 2019-03-29 NOTE — PROGRESS NOTES
[place] [address] [address], [address] [facility]. [address] [place] [address] FAX: [phone] VASCULAR SURGERY FLOOR PROGRESS NOTE Admit Date: 3/27/2019 POD: 2 Day Post-Op Procedure:  Procedure(s): LEFT COMMON FEMORAL-POSTERIOR TIBIAL BYPASS Subjective:  
 
Patient has no new complaints. Sitting up in bed. Would like to get up and ambulate to the bathroom Objective:  
 
Vitals: 
Blood pressure 160/79, pulse 60, temperature 98 °F (36.7 °C), resp. rate 18, height 6' 2\" (1.88 m), weight 310 lb 6.5 oz (140.8 kg), SpO2 93 %. Temp (24hrs), Av.9 °F (36.6 °C), Min:97.6 °F (36.4 °C), Max:98.3 °F (36.8 °C) Intake / Output: 
 
Intake/Output Summary (Last 24 hours) at 3/29/2019 9603 Last data filed at 3/29/2019 8168 Gross per 24 hour Intake 945 ml Output 1900 ml Net -955 ml Physical Exam:   
Constitutional: he appears well-developed. No distress. HENT:  
Head: Atraumatic. Eyes: Pupils are equal, round, and reactive to light. Neck: Normal range of motion. Cardiovascular: Regular rhythm. Pulmonary/Chest: Effort normal and breath sounds normal. No respiratory distress. Abdominal: Soft. Bowel sounds are normal. he exhibits no distension. There is no tenderness. There is no guarding. No hernia. Musculoskeletal: Normal range of motion. Neurological: He is alert. CN II- XII grossly intact Vascular: Palpable left posterior tibial pulse. Ulcer to left lateral ankle with granulation tissue. Minimal amount of slough. Wound vac to left groin incision. Labs:  
Recent Labs  
  19 
0310 19 
0257 HGB 11.9* 12.3* WBC 6.4 6.8 K 4.4 5.0  
* 170* Data Review Assessment:  
 
Patient Active Problem List  
 Diagnosis Date Noted  CAD (coronary artery disease) 2019 Priority: 1 - One  Peripheral artery disease ([facility] [address] 2019  Ankle ulcer ([address] 2019  PAD (peripheral artery disease) ([address] 2019  HTN (hypertension) 02/14/2019  DM (diabetes mellitus) ([facility] [address] 02/14/2019  Dyslipidemia 02/14/2019  Hx of CABG 02/14/2019 Plan/Recommendations/Medical Decision Making:  
 
Up and out of bed- participate in PT. Potential d/c later this afternoon after PT eval.  
Continue wound care to left ankle. IF pt does d/c today will likely need HH and home PT. Elements of this note have been dictated using speech recognition software. As a result, errors of speech recognition may have occurred.

## 2019-03-29 NOTE — PROGRESS NOTES
Patient education on falls risk and calling for help if needing assistance, educated on blood sugar and blood sugar checks and medications given, also educated on risk of pressure injury and turning properly.

## 2019-03-29 NOTE — PROGRESS NOTES
Order and patient information faxed to [facility] for RN/PT disciplines upon discharge home. Order and patient information faxed to [facility]  for bariatric RW to be delivered to room prior to discharge. CM will continue to follow. Care Management Interventions PCP Verified by CM: Yes Mode of Transport at Discharge: Other (see comment)(family) Transition of Care Consult (CM Consult): Discharge Planning Physical Therapy Consult: Yes Current Support Network: Lives with Spouse, Own Home Confirm Follow Up Transport: Family Plan discussed with Pt/Family/Caregiver: Yes Freedom of Choice Offered: Yes Discharge Location Discharge Placement: Home with home health

## 2019-03-30 VITALS
WEIGHT: 310.41 LBS | HEIGHT: 74 IN | DIASTOLIC BLOOD PRESSURE: 79 MMHG | SYSTOLIC BLOOD PRESSURE: 152 MMHG | OXYGEN SATURATION: 93 % | HEART RATE: 58 BPM | BODY MASS INDEX: 39.84 KG/M2 | TEMPERATURE: 97.8 F | RESPIRATION RATE: 18 BRPM

## 2019-03-30 LAB
GLUCOSE BLD STRIP.AUTO-MCNC: 148 MG/DL (ref 65–100)
GLUCOSE BLD STRIP.AUTO-MCNC: 192 MG/DL (ref 65–100)

## 2019-03-30 PROCEDURE — 74011000258 HC RX REV CODE- 258: Performed by: NURSE PRACTITIONER

## 2019-03-30 PROCEDURE — 74011636637 HC RX REV CODE- 636/637: Performed by: NURSE PRACTITIONER

## 2019-03-30 PROCEDURE — 74011250636 HC RX REV CODE- 250/636: Performed by: NURSE PRACTITIONER

## 2019-03-30 PROCEDURE — 74011250637 HC RX REV CODE- 250/637: Performed by: SURGERY

## 2019-03-30 PROCEDURE — 82962 GLUCOSE BLOOD TEST: CPT

## 2019-03-30 PROCEDURE — 74011636637 HC RX REV CODE- 636/637: Performed by: SURGERY

## 2019-03-30 RX ADMIN — TAMSULOSIN HYDROCHLORIDE 0.4 MG: 0.4 CAPSULE ORAL at 08:31

## 2019-03-30 RX ADMIN — HYDRALAZINE HYDROCHLORIDE 50 MG: 50 TABLET, FILM COATED ORAL at 08:31

## 2019-03-30 RX ADMIN — HUMAN INSULIN 60 UNITS: 100 INJECTION, SUSPENSION SUBCUTANEOUS at 08:32

## 2019-03-30 RX ADMIN — PIPERACILLIN AND TAZOBACTAM 3.38 G: 3; .375 INJECTION, POWDER, FOR SOLUTION INTRAVENOUS at 05:06

## 2019-03-30 RX ADMIN — INSULIN LISPRO 2 UNITS: 100 INJECTION, SOLUTION INTRAVENOUS; SUBCUTANEOUS at 12:31

## 2019-03-30 RX ADMIN — ASPIRIN 81 MG: 81 TABLET, COATED ORAL at 08:31

## 2019-03-30 RX ADMIN — METOPROLOL TARTRATE 25 MG: 25 TABLET ORAL at 08:31

## 2019-03-30 RX ADMIN — LISINOPRIL 40 MG: 20 TABLET ORAL at 08:31

## 2019-03-30 RX ADMIN — PREDNISONE 5 MG: 10 TABLET ORAL at 08:31

## 2019-03-30 RX ADMIN — LEVOTHYROXINE SODIUM 75 MCG: 75 TABLET ORAL at 05:06

## 2019-03-30 NOTE — PROGRESS NOTES
[address], [address] [place] [address]. [address] [place] [address] FAX: [phone] VASCULAR SURGERY FLOOR PROGRESS NOTE Admit Date: 3/27/2019 POD: 3 Days Post-Op Procedure:  Procedure(s): LEFT COMMON FEMORAL-POSTERIOR TIBIAL BYPASS Subjective:  
 
Patient has no new complaints. Objective:  
 
Vitals: 
Blood pressure (!) 176/95, pulse 63, temperature 98.2 °F (36.8 °C), resp. rate 18, height 6' 2\" (1.88 m), weight 310 lb 6.5 oz (140.8 kg), SpO2 92 %. Temp (24hrs), Av.3 °F (36.8 °C), Min:97.8 °F (36.6 °C), Max:99 °F (37.2 °C) Intake / Output: 
 
Intake/Output Summary (Last 24 hours) at 3/30/2019 9115 Last data filed at 3/30/2019 8224 Gross per 24 hour Intake  Output 1250 ml Net -1250 ml Physical Exam:   
Constitutional: he appears well-developed. No distress. HENT:  
Head: Atraumatic. Eyes: Pupils are equal, round, and reactive to light. Neck: Normal range of motion. Cardiovascular: Regular rhythm. Pulmonary/Chest: Effort normal and breath sounds normal. No respiratory distress. Abdominal: Soft. Bowel sounds are normal. he exhibits no distension. There is no tenderness. There is no guarding. No hernia. Musculoskeletal: Normal range of motion. Neurological: He is alert. CN II- XII grossly intact Vascular: Left groin staples intact, palpable pedal pulses Labs:  
Recent Labs  
  19 
0310 19 
0257 HGB 11.9* 12.3* WBC 6.4 6.8 K 4.4 5.0  
* 170* Data Review Assessment:  
 
Patient Active Problem List  
 Diagnosis Date Noted  CAD (coronary artery disease) 2019 Priority: 1 - One  Peripheral artery disease ([address] 2019  Ankle ulcer ([facility] [address] 2019  PAD (peripheral artery disease) ([facility] [address] 2019  
 HTN (hypertension) 2019  DM (diabetes mellitus) ([address] 2019  Dyslipidemia 2019  Hx of CABG 2019 Plan/Recommendations/Medical Decision Making: Plan to discharge home today follow-up in 2 weeks 
-Patient instructed to keep dry dressing in left groin at all times 
-With history of left heel ulcer will continue twice a day wound care and will start patient on p.o. antibiotics just for prophylactic as he has a prosthetic graft in place 
-We will also start patient on Xarelto anticoagulation to increase the patency of the distal bypass graft Elements of this note have been dictated using speech recognition software. As a result, errors of speech recognition may have occurred.

## 2019-03-30 NOTE — DISCHARGE INSTRUCTIONS
Discharge home follow-up in 2 weeks continue twice a day dressing changes to the left ulcer area. Instructed to not get the distal leg incision wet until Monday. Patient has no activity limitations. Continue to keep left groin dressing and at all times. Femoral-Tibial Bypass Surgery: What to Expect at Home  Your Recovery    Femoral-tibial bypass is surgery to bypass diseased blood vessels in the lower leg or foot. You will have some pain from the cuts (incisions) the doctor made. The pain usually gets better after about 1 week. Your doctor will give you pain medicine. You can expect your leg to be swollen at first. This is a normal part of recovery and may last 2 or 3 months. You may need to stay in the hospital for 3 to 5 days. You will need to take it easy for 2 to 6 weeks at home. It may take 6 to 12 weeks to fully recover. You will need to have regular checkups with your doctor to make sure the graft is working. This care sheet gives you a general idea about how long it will take for you to recover. But each person recovers at a different pace. Follow the steps below to get better as quickly as possible. How can you care for yourself at home? Activity    · Rest when you feel tired. Getting enough sleep will help you recover.     · Try to walk each day or as often as your doctor tells you. Start by walking a little more than you did the day before. Bit by bit, increase the amount you walk. Walking boosts blood flow and helps prevent pneumonia and constipation.     · Avoid strenuous activities, such as bicycle riding, jogging, weight lifting, or aerobic exercise. Your doctor will tell you when it's okay to do strenuous activity.     · Ask your doctor when you can drive again.     · You will probably need to take 2 to 6 weeks off from work. It depends on the type of work you do and how you feel.     · You may shower, if your doctor okays it.  Do not take a bath for the first 2 weeks, or until your doctor tells you it is okay. Diet    · You can eat your normal diet. If your stomach is upset, try bland, low-fat foods like plain rice, broiled chicken, toast, and yogurt.     · Drink plenty of fluids (unless your doctor tells you not to).     · You may notice that your bowel movements are not regular right after your surgery. This is common. You may want to take a fiber supplement every day. If you have not had a bowel movement after a couple of days, ask your doctor about taking a mild laxative. Medicines    · Your doctor will tell you if and when you can restart your medicines. He or she will also give you instructions about taking any new medicines.     · If you take blood thinners, such as warfarin (Coumadin), clopidogrel (Plavix), or aspirin, be sure to talk to your doctor. He or she will tell you if and when to start taking those medicines again. Make sure that you understand exactly what your doctor wants you to do.     · Take your medicines exactly as prescribed. Call your doctor if you think you are having a problem with your medicine.     · Your doctor may prescribe a blood thinner, such as aspirin, when you go home. This helps prevent blood clots.     · Be safe with medicines. Take pain medicines exactly as directed. ? If the doctor gave you a prescription medicine for pain, take it as prescribed. ? If you are not taking a prescription pain medicine, ask your doctor if you can take an over-the-counter medicine.     · If you think your pain medicine is making you sick to your stomach:  ? Take your medicine after meals (unless your doctor has told you not to). ? Ask your doctor for a different pain medicine.     · If your doctor prescribed antibiotics, take them as directed. Do not stop taking them just because you feel better. You need to take the full course of antibiotics.    Incision care    · If you have bandages on the incisions, follow your doctor's instructions about changing them.     · If you have strips of tape on the cut (incision) the doctor made, leave the tape on for a week or until it falls off.     · Wash the area daily with water and pat it dry. Don't use hydrogen peroxide or alcohol, which can slow healing. You may cover the area with a gauze bandage if it weeps or rubs against clothing. Change the bandage every day.    Elevation    · Prop up your leg on a pillow anytime you sit or lie down during the next 3 days. Try to keep it above the level of your heart. This will help reduce swelling. Follow-up care is a key part of your treatment and safety. Be sure to make and go to all appointments, and call your doctor if you are having problems. It's also a good idea to know your test results and keep a list of the medicines you take. When should you call for help? Call 911 anytime you think you may need emergency care. For example, call if:    · You passed out (lost consciousness).     · You have trouble breathing.    Call your doctor now or seek immediate medical care if:    · You have severe pain in your leg, or it becomes cold, pale, blue, tingly, or numb.     · You have pain that does not get better after you take pain medicine.     · You have loose stitches, or your incisions come open.     · You are bleeding a lot from the incisions.     · You have signs of infection, such as:  ? Increased pain, swelling, warmth, or redness. ? Red streaks leading from the incision. ? Pus draining from the incision. ? A fever.     · You are sick to your stomach or cannot keep fluids down.    Watch closely for any changes in your health, and be sure to contact your doctor if:    · You do not get better as expected. Where can you learn more? Go to http://hollie-madeline.info/. Enter L935 in the search box to learn more about \"Femoral-Tibial Bypass Surgery: What to Expect at Home. \"  Current as of: July 22, 2018  Content Version: 11.9  © 8088-5533 [employer]. Care instructions adapted under license by [employer] (which disclaims liability or warranty for this information). If you have questions about a medical condition or this instruction, always ask your healthcare professional. [address] any warranty or liability for your use of this information. DISCHARGE SUMMARY from Nurse    PATIENT INSTRUCTIONS:    After general anesthesia or intravenous sedation, for 24 hours or while taking prescription Narcotics:  · Limit your activities  · Do not drive and operate hazardous machinery  · Do not make important personal or business decisions  · Do  not drink alcoholic beverages  · If you have not urinated within 8 hours after discharge, please contact your surgeon on call. Report the following to your surgeon:  · Excessive pain, swelling, redness or odor of or around the surgical area  · Temperature over 100.5  · Nausea and vomiting lasting longer than 4 hours or if unable to take medications  · Any signs of decreased circulation or nerve impairment to extremity: change in color, persistent  numbness, tingling, coldness or increase pain  · Any questions    What to do at Home:  Recommended activity: Activity as tolerated,     If you experience any of the following symptoms fever greater then 100.5, pain unrelieved by medication, increase in shortness of breath, please follow up with primary care doctor. *  Please give a list of your current medications to your Primary Care Provider. *  Please update this list whenever your medications are discontinued, doses are      changed, or new medications (including over-the-counter products) are added. *  Please carry medication information at all times in case of emergency situations.     These are general instructions for a healthy lifestyle:    No smoking/ No tobacco products/ Avoid exposure to second hand smoke  Surgeon General's Warning:  Quitting smoking now greatly reduces serious risk to your health. Obesity, smoking, and sedentary lifestyle greatly increases your risk for illness    A healthy diet, regular physical exercise & weight monitoring are important for maintaining a healthy lifestyle    You may be retaining fluid if you have a history of heart failure or if you experience any of the following symptoms:  Weight gain of 3 pounds or more overnight or 5 pounds in a week, increased swelling in our hands or feet or shortness of breath while lying flat in bed. Please call your doctor as soon as you notice any of these symptoms; do not wait until your next office visit. Recognize signs and symptoms of STROKE:    F-face looks uneven    A-arms unable to move or move unevenly    S-speech slurred or non-existent    T-time-call 911 as soon as signs and symptoms begin-DO NOT go       Back to bed or wait to see if you get better-TIME IS BRAIN. Warning Signs of HEART ATTACK     Call 911 if you have these symptoms:   Chest discomfort. Most heart attacks involve discomfort in the center of the chest that lasts more than a few minutes, or that goes away and comes back. It can feel like uncomfortable pressure, squeezing, fullness, or pain.  Discomfort in other areas of the upper body. Symptoms can include pain or discomfort in one or both arms, the back, neck, jaw, or stomach.  Shortness of breath with or without chest discomfort.  Other signs may include breaking out in a cold sweat, nausea, or lightheadedness. Don't wait more than five minutes to call 911 - MINUTES MATTER! Fast action can save your life. Calling 911 is almost always the fastest way to get lifesaving treatment. Emergency Medical Services staff can begin treatment when they arrive -- up to an hour sooner than if someone gets to the hospital by car. The discharge information has been reviewed with the patient. The patient verbalized understanding.   Discharge medications reviewed with the patient and appropriate educational materials and side effects teaching were provided.   ___________________________________________________________________________________________________________________________________

## 2019-03-30 NOTE — PROGRESS NOTES
Gave discharge instructions to the pt. The pt voices a clear understanding, IV's removed x 2 pt voices a clear understanding. Pt awaiting on wheelchair to be transported to the  area. [facility] desk notified.

## 2019-03-30 NOTE — DISCHARGE SUMMARY
[facility]   [address]  [phone] FAX: [phone]          Physician Discharge Summary     Patient: Inés Otero MRN: [id number]  SSN: [id number]    YOB: 1945  Age: 72 y.o. Sex: male       Admit Date: 3/27/2019    Discharge Date: 3/30/2019      Admitting Physician: Naomi Cifuentes MD     Discharge Physician: Naomi Cifuentes MD    Admission Diagnoses: PAD (peripheral artery disease) ([facility].) [I73.9];[facility]peripheral artery disease) ([facility] [I73.9]; Peripheral artery disease ([facility] [I73.9]    Discharge Diagnoses:   Problem List as of 3/30/2019 Date Reviewed: 2/26/2019          Codes Class Noted - Resolved    CAD (coronary artery disease) ICD-10-CM: I25.10  ICD-9-CM: 414.00  2/13/2019 - Present        Peripheral artery disease ([facility] ICD-10-CM: I73.9  ICD-9-CM: 443.9  3/27/2019 - Present        Ankle ulcer ([facility] ICD-10-CM: L97.309  ICD-9-CM: 707.13  2/22/2019 - Present        PAD (peripheral artery disease) ([facility] ICD-10-CM: I73.9  ICD-9-CM: 443.9  2/14/2019 - Present        HTN (hypertension) ICD-10-CM: I10  ICD-9-CM: 401.9  2/14/2019 - Present        DM (diabetes mellitus) ([facility] ICD-10-CM: E11.9  ICD-9-CM: 250.00  2/14/2019 - Present        Dyslipidemia ICD-10-CM: E78.5  ICD-9-CM: 272.4  2/14/2019 - Present        Hx of CABG ICD-10-CM: Z95.1  ICD-9-CM: V45.81  2/14/2019 - Present               Procedures for this admission: Procedure(s):  LEFT COMMON [address] [place]    Discharged Condition: stable    Hospital Course: Uncomplicated    Consults:     Significant Diagnostic Studies:     Treatments: Left common femoral to posterior tibial bypass    Discharge Exam: Palpable pedal pulses    Disposition: Discharge home follow-up in 2 weeks continue twice a day dressing changes to the left ulcer area. Instructed to not get the distal leg incision wet until Monday. Patient has no activity limitations.   Continue to keep left groin dressing and at all times. Patient Instructions:   Current Discharge Medication List      START taking these medications    Details   amoxicillin-clavulanate (AUGMENTIN) 875-125 mg per tablet Take 1 Tab by mouth every twelve (12) hours for 14 days. Indications: skin infection  Qty: 28 Tab, Refills: 0      HYDROcodone-acetaminophen (NORCO) 5-325 mg per tablet Take 1 Tab by mouth every six (6) hours as needed for Pain for up to 3 days. Max Daily Amount: 4 Tabs. Indications: Pain  Qty: 30 Tab, Refills: 0    Associated Diagnoses: Pain of left lower extremity         CONTINUE these medications which have NOT CHANGED    Details   metoprolol tartrate (LOPRESSOR) 25 mg tablet Take 25 mg by mouth daily. predniSONE (DELTASONE) 10 mg tablet Take 10 mg by mouth daily (with breakfast). aspirin delayed-release 81 mg tablet Take  by mouth daily. tamsulosin (FLOMAX) 0.4 mg capsule Take 0.4 mg by mouth daily. levothyroxine (SYNTHROID) 75 mcg tablet Take  by mouth Daily (before breakfast). insulin NPH/insulin regular (NOVOLIN 70/30 U-100 INSULIN) 100 unit/mL (70-30) injection 60 Units by SubCUTAneous route two (2) times a day. rivaroxaban (XARELTO) 2.5 mg tablet Take 2.5 mg by mouth two (2) times daily (with meals). Indications: samples given for 4 weeks lot#[id number] exp 9/21      nitroglycerin (NITROSTAT) 0.4 mg SL tablet 1 Tab by SubLINGual route every five (5) minutes as needed for Chest Pain. Up to 3 doses. Qty: 1 Bottle, Refills: 5      hydrALAZINE (APRESOLINE) 50 mg tablet Take 50 mg by mouth daily. ergocalciferol (ERGOCALCIFEROL) 50,000 unit capsule Take 50,000 Units by mouth every seven (7) days. Pt takes on Saturday      lisinopril (PRINIVIL, ZESTRIL) 40 mg tablet Take 40 mg by mouth daily. rosuvastatin (CRESTOR) 10 mg tablet Take 10 mg by mouth daily.              Reference discharge instructions as provided by nursing for diet, labs, medications, activity, wound care and any outpatient referrals. No orders of the defined types were placed in this encounter.        Signed:  Jn Hansen MD  3/30/2019  8:08 AM

## 2019-04-12 PROBLEM — E66.01 SEVERE OBESITY (HCC): Status: ACTIVE | Noted: 2019-04-12

## 2019-04-19 PROBLEM — Z95.828 S/P BYPASS GRAFT OF EXTREMITY: Status: ACTIVE | Noted: 2019-04-19

## 2019-05-06 ENCOUNTER — HOME HEALTH ADMISSION (OUTPATIENT)
Dept: HOME HEALTH SERVICES | Facility: HOME HEALTH | Age: 74
End: 2019-05-06

## 2019-06-05 PROBLEM — B37.9 YEAST INFECTION: Status: ACTIVE | Noted: 2019-06-05

## 2019-10-15 PROBLEM — T82.858A BYPASS GRAFT STENOSIS (HCC): Status: ACTIVE | Noted: 2019-10-15

## 2019-10-17 ENCOUNTER — HOSPITAL ENCOUNTER (OUTPATIENT)
Dept: SURGERY | Age: 74
Discharge: HOME | End: 2019-10-17
Payer: MEDICARE

## 2019-10-17 VITALS
HEIGHT: 74 IN | SYSTOLIC BLOOD PRESSURE: 186 MMHG | BODY MASS INDEX: 39.06 KG/M2 | HEART RATE: 65 BPM | RESPIRATION RATE: 18 BRPM | OXYGEN SATURATION: 98 % | TEMPERATURE: 97.7 F | WEIGHT: 304.4 LBS | DIASTOLIC BLOOD PRESSURE: 91 MMHG

## 2019-10-17 LAB
ANION GAP SERPL CALC-SCNC: 7 MMOL/L (ref 7–16)
BASOPHILS # BLD: 0 K/UL (ref 0–0.2)
BASOPHILS NFR BLD: 1 % (ref 0–2)
BUN SERPL-MCNC: 22 MG/DL (ref 8–23)
CALCIUM SERPL-MCNC: 9.5 MG/DL (ref 8.3–10.4)
CHLORIDE SERPL-SCNC: 109 MMOL/L (ref 98–107)
CO2 SERPL-SCNC: 22 MMOL/L (ref 21–32)
CREAT SERPL-MCNC: 1.52 MG/DL (ref 0.8–1.5)
DIFFERENTIAL METHOD BLD: ABNORMAL
EOSINOPHIL # BLD: 0.1 K/UL (ref 0–0.8)
EOSINOPHIL NFR BLD: 3 % (ref 0.5–7.8)
ERYTHROCYTE [DISTWIDTH] IN BLOOD BY AUTOMATED COUNT: 18.7 % (ref 11.9–14.6)
GLUCOSE BLD STRIP.AUTO-MCNC: 86 MG/DL (ref 65–100)
GLUCOSE SERPL-MCNC: 85 MG/DL (ref 65–100)
HCT VFR BLD AUTO: 45.8 % (ref 41.1–50.3)
HGB BLD-MCNC: 14.6 G/DL (ref 13.6–17.2)
IMM GRANULOCYTES # BLD AUTO: 0 K/UL (ref 0–0.5)
IMM GRANULOCYTES NFR BLD AUTO: 0 % (ref 0–5)
LYMPHOCYTES # BLD: 1.5 K/UL (ref 0.5–4.6)
LYMPHOCYTES NFR BLD: 32 % (ref 13–44)
MCH RBC QN AUTO: 25.3 PG (ref 26.1–32.9)
MCHC RBC AUTO-ENTMCNC: 31.9 G/DL (ref 31.4–35)
MCV RBC AUTO: 79.4 FL (ref 79.6–97.8)
MONOCYTES # BLD: 0.7 K/UL (ref 0.1–1.3)
MONOCYTES NFR BLD: 15 % (ref 4–12)
NEUTS SEG # BLD: 2.3 K/UL (ref 1.7–8.2)
NEUTS SEG NFR BLD: 49 % (ref 43–78)
NRBC # BLD: 0 K/UL (ref 0–0.2)
PLATELET # BLD AUTO: 209 K/UL (ref 150–450)
PMV BLD AUTO: 9.9 FL (ref 9.4–12.3)
POTASSIUM SERPL-SCNC: 4.2 MMOL/L (ref 3.5–5.1)
RBC # BLD AUTO: 5.77 M/UL (ref 4.23–5.6)
SODIUM SERPL-SCNC: 138 MMOL/L (ref 136–145)
WBC # BLD AUTO: 4.6 K/UL (ref 4.3–11.1)

## 2019-10-17 PROCEDURE — 85025 COMPLETE CBC W/AUTO DIFF WBC: CPT

## 2019-10-17 PROCEDURE — 82962 GLUCOSE BLOOD TEST: CPT

## 2019-10-17 PROCEDURE — 80048 BASIC METABOLIC PNL TOTAL CA: CPT

## 2019-10-17 RX ORDER — IBUPROFEN 200 MG
TABLET ORAL
COMMUNITY
End: 2019-11-05

## 2019-10-17 NOTE — PERIOP NOTES
Recent Results (from the past 12 hour(s))   CBC WITH AUTOMATED DIFF    Collection Time: 10/17/19 11:03 AM   Result Value Ref Range    WBC 4.6 4.3 - 11.1 K/uL    RBC 5.77 (H) 4.23 - 5.6 M/uL    HGB 14.6 13.6 - 17.2 g/dL    HCT 45.8 41.1 - 50.3 %    MCV 79.4 (L) 79.6 - 97.8 FL    MCH 25.3 (L) 26.1 - 32.9 PG    MCHC 31.9 31.4 - 35.0 g/dL    RDW 18.7 (H) 11.9 - 14.6 %    PLATELET 610 800 - 448 K/uL    MPV 9.9 9.4 - 12.3 FL    ABSOLUTE NRBC 0.00 0.0 - 0.2 K/uL    DF AUTOMATED      NEUTROPHILS 49 43 - 78 %    LYMPHOCYTES 32 13 - 44 %    MONOCYTES 15 (H) 4.0 - 12.0 %    EOSINOPHILS 3 0.5 - 7.8 %    BASOPHILS 1 0.0 - 2.0 %    IMMATURE GRANULOCYTES 0 0.0 - 5.0 %    ABS. NEUTROPHILS 2.3 1.7 - 8.2 K/UL    ABS. LYMPHOCYTES 1.5 0.5 - 4.6 K/UL    ABS. MONOCYTES 0.7 0.1 - 1.3 K/UL    ABS. EOSINOPHILS 0.1 0.0 - 0.8 K/UL    ABS. BASOPHILS 0.0 0.0 - 0.2 K/UL    ABS. IMM. GRANS. 0.0 0.0 - 0.5 K/UL   METABOLIC PANEL, BASIC    Collection Time: 10/17/19 11:03 AM   Result Value Ref Range    Sodium 138 136 - 145 mmol/L    Potassium 4.2 3.5 - 5.1 mmol/L    Chloride 109 (H) 98 - 107 mmol/L    CO2 22 21 - 32 mmol/L    Anion gap 7 7 - 16 mmol/L    Glucose 85 65 - 100 mg/dL    BUN 22 8 - 23 MG/DL    Creatinine 1.52 (H) 0.8 - 1.5 MG/DL    GFR est AA 58 (L) >60 ml/min/1.73m2    GFR est non-AA 48 (L) >60 ml/min/1.73m2    Calcium 9.5 8.3 - 10.4 MG/DL   GLUCOSE, POC    Collection Time: 10/17/19 11:09 AM   Result Value Ref Range    Glucose (POC) 86 65 - 100 mg/dL     Labs reviewed and WDL of anesthesia protocol. No further action required.

## 2019-10-17 NOTE — PERIOP NOTES
Called patient to tell him to add hydralazine to medications to take morning of procedure. Patient and wife both on phone call and verbalize understanding that he should take this morning of procedure and disregard previous instructions to not take this medicine morning of procedure. PAT phone number has been given and they are encouraged to call PAT with any questions that arise. They both verbalize they understand the medication instructions and have no questions at this time.

## 2019-10-17 NOTE — PERIOP NOTES
Patient confirms name and . Order to obtain consent  found in EHR and  matches case posting. Type 2 surgery, PAT walk-in assessment complete. Labs per surgeon: CBC and BMP  Labs per anesthesia protocol: none required   EK2019 [facility] WDL  Glucose: 86    Rx bottles visualized today. Hibiclens and instructions given per hospital policy. Patient provided with and instructed on educational handouts including Guide to Surgery, Pain Management, Hand Hygiene, Blood Transfusion Education, and [place] [facility]est[facility] Brochure. Patient answered medical/surgical history questions at their best of ability. All prior to admission medications documented in [facility]. Patient instructed to continue previous medications as prescribed prior to surgery and to take the following medications the day of surgery according to anesthesia guidelines with a small sip of water: aspirin 81mg, Synthroid, metoprolol, hydralazine, Flomax  Insulin Instructions:   Morning and evening of 10/23: Take 48 units (80% of usual dose)   Morning of procedure 10/24: Take 30 units (50% of usual dose) if blood glucose is >120. Hold if <120. Patient instructed to hold all vitamins 7 days prior to surgery and NSAIDS 5 days prior to surgery, patient verbalized understanding. Medications to be held: Xarelto- 2 days per surgeon (Dr. Edu Rankin is prescriber of this medication per MD note 3/30/2019). Dr. Fiona Solis notified patient was instructed to hold Xarelto only 2 days by surgeon. He states,in this case, this is acceptable for anesthesia. No further instruction given. Patient is instructed to hold Ibuprofen- 5 days and Ergocalciferol- 7 days    Patient verbalizes understanding the following medications should not be taken morning of surgery: lisinopril, nystatin powder, and that failure to follow these instructions may result in surgery being cancelled/ rescheduled. He is instructed to being NTG with him to hospital DOS. He denies any CP and states he has not had to take NTG since before his open heart surgery. Patient instructed on the following:  Arrive at [facility], time of arrival to be called the day before by 1700  NPO after midnight including gum, mints, and ice chips. Responsible adult must drive patient to the hospital, stay during surgery, and patient will require supervision 24 hours after anesthesia. Use antibacterial soap and hibiclens in shower the night before surgery and on the morning of surgery. Leave all valuables (money and jewelry) at home but bring insurance card and ID on       DOS. Do not wear make-up, nail polish, lotions, cologne, perfumes, powders, or oil on skin. Patient teach back successful and patient demonstrates knowledge of instruction.

## 2019-10-23 ENCOUNTER — ANESTHESIA EVENT (OUTPATIENT)
Dept: SURGERY | Age: 74
End: 2019-10-23
Payer: MEDICARE

## 2019-10-23 RX ORDER — CEFAZOLIN SODIUM/WATER 2 G/20 ML
2 SYRINGE (ML) INTRAVENOUS ONCE
Status: DISCONTINUED | OUTPATIENT
Start: 2019-10-23 | End: 2019-10-23 | Stop reason: DRUGHIGH

## 2019-10-24 ENCOUNTER — ANESTHESIA (OUTPATIENT)
Dept: SURGERY | Age: 74
End: 2019-10-24
Payer: MEDICARE

## 2019-10-24 ENCOUNTER — HOSPITAL ENCOUNTER (OUTPATIENT)
Age: 74
Setting detail: OUTPATIENT SURGERY
Discharge: HOME | End: 2019-10-24
Attending: SURGERY | Admitting: SURGERY
Payer: MEDICARE

## 2019-10-24 ENCOUNTER — APPOINTMENT (OUTPATIENT)
Dept: INTERVENTIONAL RADIOLOGY/VASCULAR | Age: 74
End: 2019-10-24
Attending: SURGERY
Payer: MEDICARE

## 2019-10-24 VITALS
RESPIRATION RATE: 16 BRPM | BODY MASS INDEX: 38.45 KG/M2 | OXYGEN SATURATION: 97 % | SYSTOLIC BLOOD PRESSURE: 167 MMHG | HEART RATE: 58 BPM | DIASTOLIC BLOOD PRESSURE: 72 MMHG | WEIGHT: 299.6 LBS | TEMPERATURE: 97.9 F | HEIGHT: 74 IN

## 2019-10-24 LAB
ATRIAL RATE: 53 BPM
CALCULATED P AXIS, ECG09: 57 DEGREES
CALCULATED R AXIS, ECG10: 24 DEGREES
CALCULATED T AXIS, ECG11: 77 DEGREES
CREAT BLD-MCNC: 1.7 MG/DL (ref 0.8–1.5)
DIAGNOSIS, 93000: NORMAL
GLUCOSE BLD STRIP.AUTO-MCNC: 132 MG/DL (ref 65–100)
P-R INTERVAL, ECG05: 180 MS
Q-T INTERVAL, ECG07: 518 MS
QRS DURATION, ECG06: 162 MS
QTC CALCULATION (BEZET), ECG08: 486 MS
VENTRICULAR RATE, ECG03: 53 BPM

## 2019-10-24 PROCEDURE — 74011250636 HC RX REV CODE- 250/636: Performed by: NURSE ANESTHETIST, CERTIFIED REGISTERED

## 2019-10-24 PROCEDURE — 74011250636 HC RX REV CODE- 250/636: Performed by: ANESTHESIOLOGY

## 2019-10-24 PROCEDURE — 74011250636 HC RX REV CODE- 250/636: Performed by: SURGERY

## 2019-10-24 PROCEDURE — 76060000034 HC ANESTHESIA 1.5 TO 2 HR: Performed by: SURGERY

## 2019-10-24 PROCEDURE — C1760 CLOSURE DEV, VASC: HCPCS

## 2019-10-24 PROCEDURE — C1887 CATHETER, GUIDING: HCPCS

## 2019-10-24 PROCEDURE — 74011000250 HC RX REV CODE- 250: Performed by: SURGERY

## 2019-10-24 PROCEDURE — C1894 INTRO/SHEATH, NON-LASER: HCPCS

## 2019-10-24 PROCEDURE — C1769 GUIDE WIRE: HCPCS

## 2019-10-24 PROCEDURE — 77030010509 HC AIRWY LMA MSK TELE -A: Performed by: ANESTHESIOLOGY

## 2019-10-24 PROCEDURE — 82565 ASSAY OF CREATININE: CPT

## 2019-10-24 PROCEDURE — 76210000022 HC REC RM PH II 1.5 TO 2 HR: Performed by: SURGERY

## 2019-10-24 PROCEDURE — 93005 ELECTROCARDIOGRAM TRACING: CPT | Performed by: ANESTHESIOLOGY

## 2019-10-24 PROCEDURE — 82962 GLUCOSE BLOOD TEST: CPT

## 2019-10-24 PROCEDURE — C1874 STENT, COATED/COV W/DEL SYS: HCPCS

## 2019-10-24 PROCEDURE — 74011000250 HC RX REV CODE- 250: Performed by: NURSE ANESTHETIST, CERTIFIED REGISTERED

## 2019-10-24 PROCEDURE — 76937 US GUIDE VASCULAR ACCESS: CPT

## 2019-10-24 PROCEDURE — C1725 CATH, TRANSLUMIN NON-LASER: HCPCS

## 2019-10-24 PROCEDURE — 74011636320 HC RX REV CODE- 636/320: Performed by: SURGERY

## 2019-10-24 PROCEDURE — 77030013519 HC DEV INFL BASIX MRTM -B

## 2019-10-24 PROCEDURE — 76210000006 HC OR PH I REC 0.5 TO 1 HR: Performed by: SURGERY

## 2019-10-24 PROCEDURE — 74011000250 HC RX REV CODE- 250: Performed by: ANESTHESIOLOGY

## 2019-10-24 PROCEDURE — 76010000153 HC OR TIME 1.5 TO 2 HR: Performed by: SURGERY

## 2019-10-24 DEVICE — XIENCE ALPINE EVEROLIMUS ELUTING CORONARY STENT SYSTEM 4.00 MM X 33 MM / OVER-THE-WIRE
Type: IMPLANTABLE DEVICE | Site: LEG | Status: FUNCTIONAL
Brand: XIENCE ALPINE

## 2019-10-24 RX ORDER — PROPOFOL 10 MG/ML
INJECTION, EMULSION INTRAVENOUS AS NEEDED
Status: DISCONTINUED | OUTPATIENT
Start: 2019-10-24 | End: 2019-10-24 | Stop reason: HOSPADM

## 2019-10-24 RX ORDER — LIDOCAINE HYDROCHLORIDE 20 MG/ML
INJECTION, SOLUTION EPIDURAL; INFILTRATION; INTRACAUDAL; PERINEURAL AS NEEDED
Status: DISCONTINUED | OUTPATIENT
Start: 2019-10-24 | End: 2019-10-24 | Stop reason: HOSPADM

## 2019-10-24 RX ORDER — OXYCODONE HYDROCHLORIDE 5 MG/1
5 TABLET ORAL
Status: DISCONTINUED | OUTPATIENT
Start: 2019-10-24 | End: 2019-10-24 | Stop reason: HOSPADM

## 2019-10-24 RX ORDER — EPHEDRINE SULFATE/0.9% NACL/PF 50 MG/5 ML
SYRINGE (ML) INTRAVENOUS AS NEEDED
Status: DISCONTINUED | OUTPATIENT
Start: 2019-10-24 | End: 2019-10-24 | Stop reason: HOSPADM

## 2019-10-24 RX ORDER — SODIUM CHLORIDE 9 MG/ML
3 INJECTION, SOLUTION INTRAVENOUS ONCE
Status: DISCONTINUED | OUTPATIENT
Start: 2019-10-24 | End: 2019-10-24 | Stop reason: HOSPADM

## 2019-10-24 RX ORDER — SODIUM CHLORIDE 0.9 % (FLUSH) 0.9 %
5-40 SYRINGE (ML) INJECTION EVERY 8 HOURS
Status: DISCONTINUED | OUTPATIENT
Start: 2019-10-24 | End: 2019-10-24 | Stop reason: HOSPADM

## 2019-10-24 RX ORDER — LIDOCAINE HYDROCHLORIDE 10 MG/ML
INJECTION INFILTRATION; PERINEURAL AS NEEDED
Status: DISCONTINUED | OUTPATIENT
Start: 2019-10-24 | End: 2019-10-24 | Stop reason: HOSPADM

## 2019-10-24 RX ORDER — MIDAZOLAM HYDROCHLORIDE 1 MG/ML
2 INJECTION, SOLUTION INTRAMUSCULAR; INTRAVENOUS
Status: DISCONTINUED | OUTPATIENT
Start: 2019-10-24 | End: 2019-10-24 | Stop reason: HOSPADM

## 2019-10-24 RX ORDER — SODIUM CHLORIDE 9 MG/ML
1 INJECTION, SOLUTION INTRAVENOUS AS NEEDED
Status: DISCONTINUED | OUTPATIENT
Start: 2019-10-24 | End: 2019-10-24 | Stop reason: HOSPADM

## 2019-10-24 RX ORDER — HYDROCORTISONE SODIUM SUCCINATE 100 MG/2ML
100 INJECTION, POWDER, FOR SOLUTION INTRAMUSCULAR; INTRAVENOUS ONCE
Status: DISCONTINUED | OUTPATIENT
Start: 2019-10-24 | End: 2019-10-24 | Stop reason: HOSPADM

## 2019-10-24 RX ORDER — OXYCODONE AND ACETAMINOPHEN 5; 325 MG/1; MG/1
1 TABLET ORAL
Status: DISCONTINUED | OUTPATIENT
Start: 2019-10-24 | End: 2019-10-24 | Stop reason: HOSPADM

## 2019-10-24 RX ORDER — HYDROMORPHONE HYDROCHLORIDE 2 MG/ML
0.5 INJECTION, SOLUTION INTRAMUSCULAR; INTRAVENOUS; SUBCUTANEOUS
Status: DISCONTINUED | OUTPATIENT
Start: 2019-10-24 | End: 2019-10-24 | Stop reason: SDUPTHER

## 2019-10-24 RX ORDER — HYDROCORTISONE SODIUM SUCCINATE 100 MG/2ML
100 INJECTION, POWDER, FOR SOLUTION INTRAMUSCULAR; INTRAVENOUS ONCE
Status: COMPLETED | OUTPATIENT
Start: 2019-10-24 | End: 2019-10-24

## 2019-10-24 RX ORDER — SODIUM CHLORIDE, SODIUM LACTATE, POTASSIUM CHLORIDE, CALCIUM CHLORIDE 600; 310; 30; 20 MG/100ML; MG/100ML; MG/100ML; MG/100ML
100 INJECTION, SOLUTION INTRAVENOUS CONTINUOUS
Status: DISCONTINUED | OUTPATIENT
Start: 2019-10-24 | End: 2019-10-24 | Stop reason: HOSPADM

## 2019-10-24 RX ORDER — NALOXONE HYDROCHLORIDE 0.4 MG/ML
0.1 INJECTION, SOLUTION INTRAMUSCULAR; INTRAVENOUS; SUBCUTANEOUS AS NEEDED
Status: DISCONTINUED | OUTPATIENT
Start: 2019-10-24 | End: 2019-10-24 | Stop reason: HOSPADM

## 2019-10-24 RX ORDER — ONDANSETRON 2 MG/ML
INJECTION INTRAMUSCULAR; INTRAVENOUS AS NEEDED
Status: DISCONTINUED | OUTPATIENT
Start: 2019-10-24 | End: 2019-10-24 | Stop reason: HOSPADM

## 2019-10-24 RX ORDER — FENTANYL CITRATE 50 UG/ML
INJECTION, SOLUTION INTRAMUSCULAR; INTRAVENOUS AS NEEDED
Status: DISCONTINUED | OUTPATIENT
Start: 2019-10-24 | End: 2019-10-24 | Stop reason: HOSPADM

## 2019-10-24 RX ORDER — HEPARIN SODIUM 10000 [USP'U]/ML
INJECTION, SOLUTION INTRAVENOUS; SUBCUTANEOUS AS NEEDED
Status: DISCONTINUED | OUTPATIENT
Start: 2019-10-24 | End: 2019-10-24 | Stop reason: HOSPADM

## 2019-10-24 RX ORDER — SODIUM CHLORIDE, SODIUM LACTATE, POTASSIUM CHLORIDE, CALCIUM CHLORIDE 600; 310; 30; 20 MG/100ML; MG/100ML; MG/100ML; MG/100ML
INJECTION, SOLUTION INTRAVENOUS
Status: DISCONTINUED | OUTPATIENT
Start: 2019-10-24 | End: 2019-10-24 | Stop reason: HOSPADM

## 2019-10-24 RX ORDER — ONDANSETRON 2 MG/ML
4 INJECTION INTRAMUSCULAR; INTRAVENOUS
Status: DISCONTINUED | OUTPATIENT
Start: 2019-10-24 | End: 2019-10-24 | Stop reason: HOSPADM

## 2019-10-24 RX ORDER — OXYCODONE HYDROCHLORIDE 5 MG/1
10 TABLET ORAL
Status: DISCONTINUED | OUTPATIENT
Start: 2019-10-24 | End: 2019-10-24 | Stop reason: SDUPTHER

## 2019-10-24 RX ORDER — HEPARIN SODIUM 200 [USP'U]/100ML
INJECTION, SOLUTION INTRAVENOUS
Status: COMPLETED | OUTPATIENT
Start: 2019-10-24 | End: 2019-10-24

## 2019-10-24 RX ORDER — FENTANYL CITRATE 50 UG/ML
100 INJECTION, SOLUTION INTRAMUSCULAR; INTRAVENOUS ONCE
Status: DISCONTINUED | OUTPATIENT
Start: 2019-10-24 | End: 2019-10-24 | Stop reason: HOSPADM

## 2019-10-24 RX ORDER — ONDANSETRON 2 MG/ML
4 INJECTION INTRAMUSCULAR; INTRAVENOUS ONCE
Status: DISCONTINUED | OUTPATIENT
Start: 2019-10-24 | End: 2019-10-24 | Stop reason: HOSPADM

## 2019-10-24 RX ORDER — DIPHENHYDRAMINE HYDROCHLORIDE 50 MG/ML
12.5 INJECTION, SOLUTION INTRAMUSCULAR; INTRAVENOUS
Status: DISCONTINUED | OUTPATIENT
Start: 2019-10-24 | End: 2019-10-24 | Stop reason: HOSPADM

## 2019-10-24 RX ORDER — MORPHINE SULFATE 10 MG/ML
1 INJECTION, SOLUTION INTRAMUSCULAR; INTRAVENOUS
Status: DISCONTINUED | OUTPATIENT
Start: 2019-10-24 | End: 2019-10-24 | Stop reason: HOSPADM

## 2019-10-24 RX ORDER — MIDAZOLAM HYDROCHLORIDE 1 MG/ML
2 INJECTION, SOLUTION INTRAMUSCULAR; INTRAVENOUS ONCE
Status: DISCONTINUED | OUTPATIENT
Start: 2019-10-24 | End: 2019-10-24 | Stop reason: HOSPADM

## 2019-10-24 RX ORDER — ALBUTEROL SULFATE 0.83 MG/ML
2.5 SOLUTION RESPIRATORY (INHALATION) AS NEEDED
Status: DISCONTINUED | OUTPATIENT
Start: 2019-10-24 | End: 2019-10-24 | Stop reason: HOSPADM

## 2019-10-24 RX ORDER — SODIUM CHLORIDE 0.9 % (FLUSH) 0.9 %
5-40 SYRINGE (ML) INJECTION AS NEEDED
Status: DISCONTINUED | OUTPATIENT
Start: 2019-10-24 | End: 2019-10-24 | Stop reason: HOSPADM

## 2019-10-24 RX ORDER — EPHEDRINE SULFATE 50 MG/ML
INJECTION, SOLUTION INTRAVENOUS AS NEEDED
Status: DISCONTINUED | OUTPATIENT
Start: 2019-10-24 | End: 2019-10-24 | Stop reason: HOSPADM

## 2019-10-24 RX ORDER — PROTAMINE SULFATE 10 MG/ML
INJECTION, SOLUTION INTRAVENOUS AS NEEDED
Status: DISCONTINUED | OUTPATIENT
Start: 2019-10-24 | End: 2019-10-24 | Stop reason: HOSPADM

## 2019-10-24 RX ORDER — LIDOCAINE HYDROCHLORIDE 10 MG/ML
0.1 INJECTION INFILTRATION; PERINEURAL AS NEEDED
Status: DISCONTINUED | OUTPATIENT
Start: 2019-10-24 | End: 2019-10-24 | Stop reason: HOSPADM

## 2019-10-24 RX ADMIN — PROPOFOL 180 MG: 10 INJECTION, EMULSION INTRAVENOUS at 11:14

## 2019-10-24 RX ADMIN — Medication 5 MG: at 11:38

## 2019-10-24 RX ADMIN — EPHEDRINE SULFATE 5 MG: 50 INJECTION, SOLUTION INTRAVENOUS at 11:45

## 2019-10-24 RX ADMIN — FENTANYL CITRATE 12.5 MCG: 50 INJECTION INTRAMUSCULAR; INTRAVENOUS at 11:24

## 2019-10-24 RX ADMIN — FENTANYL CITRATE 25 MCG: 50 INJECTION INTRAMUSCULAR; INTRAVENOUS at 12:24

## 2019-10-24 RX ADMIN — HEPARIN SODIUM 7000 UNITS: 10000 INJECTION, SOLUTION INTRAVENOUS; SUBCUTANEOUS at 11:45

## 2019-10-24 RX ADMIN — Medication 5 MG: at 11:26

## 2019-10-24 RX ADMIN — FENTANYL CITRATE 25 MCG: 50 INJECTION INTRAMUSCULAR; INTRAVENOUS at 11:20

## 2019-10-24 RX ADMIN — LIDOCAINE HYDROCHLORIDE 60 MG: 20 INJECTION, SOLUTION EPIDURAL; INFILTRATION; INTRACAUDAL; PERINEURAL at 11:14

## 2019-10-24 RX ADMIN — CEFAZOLIN 3 G: 1 INJECTION, POWDER, FOR SOLUTION INTRAVENOUS at 11:24

## 2019-10-24 RX ADMIN — PROTAMINE SULFATE 5 MG: 10 INJECTION, SOLUTION INTRAVENOUS at 12:39

## 2019-10-24 RX ADMIN — ALBUTEROL SULFATE 2.5 MG: 2.5 SOLUTION RESPIRATORY (INHALATION) at 13:35

## 2019-10-24 RX ADMIN — HYDROCORTISONE SODIUM SUCCINATE 100 MG: 100 INJECTION, POWDER, FOR SOLUTION INTRAMUSCULAR; INTRAVENOUS at 09:59

## 2019-10-24 RX ADMIN — FENTANYL CITRATE 25 MCG: 50 INJECTION INTRAMUSCULAR; INTRAVENOUS at 12:06

## 2019-10-24 RX ADMIN — SODIUM CHLORIDE 1 ML/KG/HR: 900 INJECTION, SOLUTION INTRAVENOUS at 09:50

## 2019-10-24 RX ADMIN — SODIUM CHLORIDE, SODIUM LACTATE, POTASSIUM CHLORIDE, AND CALCIUM CHLORIDE: 600; 310; 30; 20 INJECTION, SOLUTION INTRAVENOUS at 11:09

## 2019-10-24 RX ADMIN — FENTANYL CITRATE 12.5 MCG: 50 INJECTION INTRAMUSCULAR; INTRAVENOUS at 12:04

## 2019-10-24 RX ADMIN — PROTAMINE SULFATE 25 MG: 10 INJECTION, SOLUTION INTRAVENOUS at 12:40

## 2019-10-24 RX ADMIN — ONDANSETRON 4 MG: 2 INJECTION INTRAMUSCULAR; INTRAVENOUS at 11:24

## 2019-10-24 NOTE — PERIOP NOTES
Pt rubbing the left side of his chest, asked if he was itching pt stated \"my heart hurts\" I asked are you having chest pain, he again stated \"no my heart hurts\". I asked if it hurt prior to the procedure and he said no. Called Dr. Quintin Nissen who ordered and EKG and called EKG tech for STAT ekg.

## 2019-10-24 NOTE — ANESTHESIA POSTPROCEDURE EVALUATION
Procedure(s):  LEFT LOWER EXTREMITY ARTERIOGRAM / TIBIAL STENT WITH PTA , FEMORAL PTA HYDRATION 3 HRS PRIOR AND POST.    general    Anesthesia Post Evaluation      Multimodal analgesia: multimodal analgesia used between 6 hours prior to anesthesia start to PACU discharge  Patient location during evaluation: PACU  Patient participation: complete - patient participated  Level of consciousness: awake and awake and alert  Pain management: adequate  Airway patency: patent  Anesthetic complications: no  Cardiovascular status: acceptable  Respiratory status: acceptable  Hydration status: acceptable  Post anesthesia nausea and vomiting:  controlled      Vitals Value Taken Time   /85 10/24/2019  1:57 PM   Temp 36.1 °C (96.9 °F) 10/24/2019 12:59 PM   Pulse 56 10/24/2019  2:09 PM   Resp 26 10/24/2019  2:09 PM   SpO2 96 % 10/24/2019  2:09 PM   Vitals shown include unvalidated device data.

## 2019-10-24 NOTE — ANESTHESIA PREPROCEDURE EVALUATION
Anesthetic History     PONV          Review of Systems / Medical History  Patient summary reviewed and pertinent labs reviewed    Pulmonary              Pertinent negatives: Smoker: Former smoker. 100 pack year.      Neuro/Psych   Within defined limits           Cardiovascular    Hypertension        Dysrhythmias (RBBB)   CAD, PAD, CABG (2003) and hyperlipidemia    Exercise tolerance: <4 METS  Comments: Denies recent CP, SOB or Palpitations    Echo 2018 showing EF 50-54%, mild AS, mild MR.   GI/Hepatic/Renal  Within defined limits              Endo/Other    Diabetes: well controlled, type 2, using insulin  Hypothyroidism: well controlled  Obesity and arthritis     Other Findings              Physical Exam    Airway  Mallampati: II  TM Distance: 4 - 6 cm  Neck ROM: normal range of motion   Mouth opening: Normal     Cardiovascular          Murmur: Grade 2     Dental    Dentition: Lower partial plate and Full upper dentures     Pulmonary  Breath sounds clear to auscultation               Abdominal  GI exam deferred       Other Findings            Anesthetic Plan    ASA: 3  Anesthesia type: general          Induction: Intravenous  Anesthetic plan and risks discussed with: Patient and Spouse      Long term Prednisone use - will give Hydrocortisone 100 mg.  lma

## 2019-10-24 NOTE — BRIEF OP NOTE
[facility]   [address] [facility] FAX: [phone]    Brief Op Note Template Note    Pre-Op Diagnosis: PVD (peripheral vascular disease) ([facility] [I73.9    Post-Op Diagnosis:  PVD (peripheral vascular disease) ([facility] [I73.9    Procedures: Procedure(s):  LEFT LOWER EXTREMITY ARTERIOGRAM / TIBIAL STENT WITH PTA , FEMORAL PTA HYDRATION 3 HRS PRIOR AND POST    Surgeon: Sara Newton MD    Assistants: Surgeon(s): Andria Bernheim, MD      Anesthesia:  General     Findings: Proximal distal stenosis, distal stenosis balloon angioplasty ruptured a complicated repair with 4 x 33 stent PT patent distally    Tourniquet Time:  * No tourniquets in log *    Estimated Blood Loss:               Specimens:            Implants:    Implant Name Type Inv. Item Serial No.  Lot No. LRB No. Used Action   STENT COR FLACA [id number] OTW -- Alva Filippo - [id number] Stent STENT [employer] FLACA [id number] OTW -- XIENCE ALPINE  [employer] [id number] Left 1 Implanted       Complications: None               Signed: Sara Newton MD      Elements of this note have been dictated using speech recognition software. As a result, errors of speech recognition may have occurred.

## 2019-10-24 NOTE — DISCHARGE INSTRUCTIONS
Arteriogram: What to Expect at [address] [place] [address] Drive inserted a thin, flexible tube (catheter) into a blood vessel in your groin. In some cases, the catheter is placed in a blood vessel in the arm. After an arteriogram, your groin or arm may have a bruise and feel sore for a day or two. You can do light activities around the house but nothing strenuous for several days. Your doctor may give you specific instructions on when you can do your normal activities again, such as driving and going back to work. This care sheet gives you a general idea about how long it will take for you to recover. But each person recovers at a different pace. Follow the steps below to feel better as quickly as possible. How can you care for yourself at home? Activity  · Do not do strenuous exercise and do not lift, pull, or push anything heavy until your doctor says it is okay. This may be for a day or two. You can walk around the house and do light activity, such as cooking. · You may shower 24 to 48 hours after the procedure, if your doctor okays it. Pat the incision dry. Do not take a bath for 1 week, or until your doctor tells you it is okay. · If the catheter was placed in your groin, try not to walk up stairs for the first couple of days. · If your doctor recommends it, get more exercise. Walking is a good choice. Bit by bit, increase the amount you walk every day. Try for at least 30 minutes on most days of the week. Diet  · Drink plenty of fluids to help your body flush out the dye. If you have kidney, heart, or liver disease and have to limit fluids, talk with your doctor before you increase the amount of fluids you drink. · You can eat your normal diet. If your stomach is upset, try bland, low-fat foods like plain rice, broiled chicken, toast, and yogurt. Medicines  · Be safe with medicines. Read and follow all instructions on the label.   ¨ If the doctor gave you a prescription medicine for pain, take it as prescribed. ¨ If you are not taking a prescription pain medicine, ask your doctor if you can take an over-the-counter medicine. · If you take blood thinners, such as warfarin (Coumadin), clopidogrel (Plavix), or aspirin, be sure to talk to your doctor. He or she will tell you if and when to start taking those medicines again. Make sure that you understand exactly what your doctor wants you to do. · Your doctor will tell you if and when you can restart your medicines. He or she will also give you instructions about taking any new medicines. Care of the catheter site  · You will have a dressing over the cut (incision). A dressing helps the incision heal and protects it. Your doctor will tell you how to take care of this. · Put ice or a cold pack on the area for 10 to 20 minutes at a time to help with soreness or swelling. Put a thin cloth between the ice and your skin. Follow-up care is a key part of your treatment and safety. Be sure to make and go to all appointments, and call your doctor if you are having problems. It's also a good idea to know your test results and keep a list of the medicines you take. When should you call for help? Call 911 anytime you think you may need emergency care. For example, call if:  · You passed out (lost consciousness). · You have severe trouble breathing. · You have sudden chest pain and shortness of breath, or you cough up blood. Call your doctor now or seek immediate medical care if:  · You are bleeding from the area where the catheter was put in your artery. · You have a fast-growing, painful lump at the catheter site. · You have signs of infection, such as:  ¨ Increased pain, swelling, warmth, or redness. ¨ Red streaks leading from the incision. ¨ Pus draining from the incision. ¨ A fever. Watch closely for any changes in your health, and be sure to contact your doctor if:  · You don't get better as expected.     After general anesthesia or intravenous sedation, for 24 hours or while taking prescription Narcotics:  · Limit your activities  · A responsible adult needs to be with you for the next 24 hours  · Do not drive and operate hazardous machinery  · Do not make important personal or business decisions  · Do  not drink alcoholic beverages  · If you have not urinated within 8 hours after discharge, please contact your surgeon on call. · If you have sleep apnea and have a CPAP machine, please use it for all naps and sleeping. · Please use caution when taking narcotics and any of your home medications that may cause drowsiness. *  Please give a list of your current medications to your Primary Care Provider. *  Please update this list whenever your medications are discontinued, doses are      changed, or new medications (including over-the-counter products) are added. *  Please carry medication information at all times in case of emergency situations. These are general instructions for a healthy lifestyle:  No smoking/ No tobacco products/ Avoid exposure to second hand smoke  Surgeon General's Warning:  Quitting smoking now greatly reduces serious risk to your health. Obesity, smoking, and sedentary lifestyle greatly increases your risk for illness  A healthy diet, regular physical exercise & weight monitoring are important for maintaining a healthy lifestyle    You may be retaining fluid if you have a history of heart failure or if you experience any of the following symptoms:  Weight gain of 3 pounds or more overnight or 5 pounds in a week, increased swelling in our hands or feet or shortness of breath while lying flat in bed. Please call your doctor as soon as you notice any of these symptoms; do not wait until your next office visit.

## 2019-10-25 NOTE — OP NOTES
[facility]  OPERATIVE REPORT    Name:  Ishan Berry  MR#:  [id number]  :  1945  ACCOUNT #:  [de-identified]  DATE OF SERVICE:  10/24/2019    CLINICAL SERVICE:  Vascular Surgery. PREOPERATIVE DIAGNOSIS:  Proximal and distal anastomosis stenosis. POSTOPERATIVE DIAGNOSIS:  Proximal and distal anastomosis stenosis. PROCEDURES PERFORMED:  1. Ultrasound-guided access of the right common femoral artery, placement of catheter to the aorta for aortogram.  2.  Left lower extremity arteriogram with balloon angioplasty and stenting of the distal outflow with a 4 x 33 balloon. SURGEON:  Carol Griffith. Romie Ovalles MD    ASSISTANT:      ANESTHESIA:      COMPLICATIONS:  Distal anastomosis dissected and repaired with a balloon stent. SPECIMENS REMOVED:  .    IMPLANTS:      ESTIMATED BLOOD LOSS:      INDICATIONS FOR PROCEDURE:  This is a 45-year-old male with history of peripheral vascular disease status post left common femoral to posterior tibial bypass with PTFE graft placement for nonhealing wound. He presented to my office with ultrasound findings of a high-grade stenosis. He was consented for a balloon procedure as the distal outflow and proximal was greater than 80%. PROCEDURE IN DETAIL:  After getting informed consent, the patient was brought to the operating room. Anesthesia was then induced. Preop antibiotics were given before skin incision. The patient's bilateral groins were all prepped and draped in normal sterile fashion. Ultrasound-guided access then accessed the right common femoral artery over the femoral head using micropuncture technique. We upsized to a 5-Syriac sheath over a 0.035 starter wire. We then put the Glide catheter, was placed up-and-over to contralateral limb.   digital subtraction arteriogram which showed the graft proximally had high-grade stenosis. The distal graft of the posterior tibial artery was high-grade with a small vessel measuring about 3-mm.   We elected to proceed endovascularly. We then upsized to a 6-Czech sheath over an inflow wire. We then got our catheter and did digital subtraction of the distal anastomosis. We were then able to get our catheter down to the distal part of the graft. On magnum view, we saw the high-grade stenosis. We then with an 0.018 wire and a Quick-Cross catheter, was able to get through the heavily-diseased area down to distal PT. We confirmed we were intraluminal with the catheter injection. The patient was then heparinized with 7,000 units of heparin. We then first ballooned with a 4 x 2 cutting balloon to a nominal pressure which was 8 mmHg. We removed  and we feared there was some extravasation. We then kept the wire in place and then brought in a 4 x 50 balloon. We balloonplastied that area to nominal pressure. We held it back but could still see there was some dissection that was not filling distally. We then brought into the field a 4 x 33 balloon expandable stent which was the only balloon that we had in either in the IR or the cath lab and then placed it distal over the distal anastomosis. We did digital subtraction showing that the stent did fill and the PT artery did fill. We then turned our attention to the proximal anastomosis. We did a steep Palauan projection. We ballooned it with 5 x 2 balloon. Completion arteriogram showed both stent proximally and distally were open and PT fill. The patient had good Doppler signal at the posterior tibia after the procedure. We then removed the sheath and wire and closed with a Mynx device. The patient was extubated and returned to the PACU in a stable condition.       MD ALVA [email or web address]  D:  10/24/2019 13:11  T:  10/24/2019 22:10  JOB #:  [id number]

## 2019-10-31 ENCOUNTER — HOSPITAL ENCOUNTER (INPATIENT)
Age: 74
LOS: 4 days | Discharge: NURSING, REHAB OR LONG-TERM CARE | DRG: 241 | End: 2019-11-05
Attending: EMERGENCY MEDICINE | Admitting: HOSPITALIST
Payer: MEDICARE

## 2019-10-31 DIAGNOSIS — I99.8 ISCHEMIC LEG PAIN: ICD-10-CM

## 2019-10-31 DIAGNOSIS — I70.322 REST PAIN OF LEFT LOWER EXTREMITY CONCURRENT WITH AND DUE TO ATHEROSCLEROSIS OF BYPASS GRAFT (HCC): Primary | ICD-10-CM

## 2019-10-31 DIAGNOSIS — M79.606 ISCHEMIC LEG PAIN: ICD-10-CM

## 2019-10-31 LAB
ALBUMIN SERPL-MCNC: 2.6 G/DL (ref 3.2–4.6)
ALBUMIN/GLOB SERPL: 0.4 {RATIO} (ref 1.2–3.5)
ALP SERPL-CCNC: 71 U/L (ref 50–136)
ALT SERPL-CCNC: 23 U/L (ref 12–65)
ANION GAP SERPL CALC-SCNC: 7 MMOL/L (ref 7–16)
APTT PPP: 37.5 SEC (ref 24.7–39.8)
AST SERPL-CCNC: 91 U/L (ref 15–37)
BASOPHILS # BLD: 0 K/UL (ref 0–0.2)
BASOPHILS NFR BLD: 0 % (ref 0–2)
BILIRUB SERPL-MCNC: 1.6 MG/DL (ref 0.2–1.1)
BUN SERPL-MCNC: 30 MG/DL (ref 8–23)
CALCIUM SERPL-MCNC: 9.3 MG/DL (ref 8.3–10.4)
CHLORIDE SERPL-SCNC: 103 MMOL/L (ref 98–107)
CO2 SERPL-SCNC: 22 MMOL/L (ref 21–32)
CREAT SERPL-MCNC: 1.79 MG/DL (ref 0.8–1.5)
DIFFERENTIAL METHOD BLD: ABNORMAL
EOSINOPHIL # BLD: 0 K/UL (ref 0–0.8)
EOSINOPHIL NFR BLD: 0 % (ref 0.5–7.8)
ERYTHROCYTE [DISTWIDTH] IN BLOOD BY AUTOMATED COUNT: 17.4 % (ref 11.9–14.6)
GLOBULIN SER CALC-MCNC: 6.1 G/DL (ref 2.3–3.5)
GLUCOSE SERPL-MCNC: 176 MG/DL (ref 65–100)
HCT VFR BLD AUTO: 39.3 % (ref 41.1–50.3)
HGB BLD-MCNC: 12.8 G/DL (ref 13.6–17.2)
IMM GRANULOCYTES # BLD AUTO: 0.1 K/UL (ref 0–0.5)
IMM GRANULOCYTES NFR BLD AUTO: 1 % (ref 0–5)
INR PPP: 1.3
LACTATE BLD-SCNC: 1.54 MMOL/L (ref 0.5–1.9)
LYMPHOCYTES # BLD: 1.1 K/UL (ref 0.5–4.6)
LYMPHOCYTES NFR BLD: 10 % (ref 13–44)
MCH RBC QN AUTO: 25.9 PG (ref 26.1–32.9)
MCHC RBC AUTO-ENTMCNC: 32.6 G/DL (ref 31.4–35)
MCV RBC AUTO: 79.6 FL (ref 79.6–97.8)
MONOCYTES # BLD: 1.3 K/UL (ref 0.1–1.3)
MONOCYTES NFR BLD: 12 % (ref 4–12)
NEUTS SEG # BLD: 8.1 K/UL (ref 1.7–8.2)
NEUTS SEG NFR BLD: 77 % (ref 43–78)
NRBC # BLD: 0 K/UL (ref 0–0.2)
PLATELET # BLD AUTO: 266 K/UL (ref 150–450)
PLATELET COMMENTS,PCOM: ADEQUATE
PMV BLD AUTO: 9.9 FL (ref 9.4–12.3)
POTASSIUM SERPL-SCNC: 4 MMOL/L (ref 3.5–5.1)
POTASSIUM SERPL-SCNC: 6.8 MMOL/L (ref 3.5–5.1)
PROT SERPL-MCNC: 8.7 G/DL (ref 6.3–8.2)
PROTHROMBIN TIME: 16.6 SEC (ref 11.7–14.5)
RBC # BLD AUTO: 4.94 M/UL (ref 4.23–5.6)
RBC MORPH BLD: ABNORMAL
SODIUM SERPL-SCNC: 132 MMOL/L (ref 136–145)
WBC # BLD AUTO: 10.6 K/UL (ref 4.3–11.1)
WBC MORPH BLD: ABNORMAL

## 2019-10-31 PROCEDURE — 86900 BLOOD TYPING SEROLOGIC ABO: CPT

## 2019-10-31 PROCEDURE — 96374 THER/PROPH/DIAG INJ IV PUSH: CPT | Performed by: EMERGENCY MEDICINE

## 2019-10-31 PROCEDURE — 85025 COMPLETE CBC W/AUTO DIFF WBC: CPT

## 2019-10-31 PROCEDURE — 85610 PROTHROMBIN TIME: CPT

## 2019-10-31 PROCEDURE — 96361 HYDRATE IV INFUSION ADD-ON: CPT | Performed by: EMERGENCY MEDICINE

## 2019-10-31 PROCEDURE — 74011250636 HC RX REV CODE- 250/636: Performed by: EMERGENCY MEDICINE

## 2019-10-31 PROCEDURE — 96375 TX/PRO/DX INJ NEW DRUG ADDON: CPT | Performed by: EMERGENCY MEDICINE

## 2019-10-31 PROCEDURE — 99284 EMERGENCY DEPT VISIT MOD MDM: CPT | Performed by: EMERGENCY MEDICINE

## 2019-10-31 PROCEDURE — 83605 ASSAY OF LACTIC ACID: CPT

## 2019-10-31 PROCEDURE — 80053 COMPREHEN METABOLIC PANEL: CPT

## 2019-10-31 PROCEDURE — 84132 ASSAY OF SERUM POTASSIUM: CPT

## 2019-10-31 PROCEDURE — 85730 THROMBOPLASTIN TIME PARTIAL: CPT

## 2019-10-31 RX ORDER — ONDANSETRON 2 MG/ML
4 INJECTION INTRAMUSCULAR; INTRAVENOUS
Status: COMPLETED | OUTPATIENT
Start: 2019-10-31 | End: 2019-10-31

## 2019-10-31 RX ORDER — SODIUM CHLORIDE 9 MG/ML
75 INJECTION, SOLUTION INTRAVENOUS CONTINUOUS
Status: DISCONTINUED | OUTPATIENT
Start: 2019-10-31 | End: 2019-11-03

## 2019-10-31 RX ORDER — HYDROMORPHONE HYDROCHLORIDE 1 MG/ML
1 INJECTION, SOLUTION INTRAMUSCULAR; INTRAVENOUS; SUBCUTANEOUS ONCE
Status: COMPLETED | OUTPATIENT
Start: 2019-10-31 | End: 2019-10-31

## 2019-10-31 RX ADMIN — HYDROMORPHONE HYDROCHLORIDE 1 MG: 1 INJECTION, SOLUTION INTRAMUSCULAR; INTRAVENOUS; SUBCUTANEOUS at 22:57

## 2019-10-31 RX ADMIN — SODIUM CHLORIDE 150 ML/HR: 900 INJECTION, SOLUTION INTRAVENOUS at 22:57

## 2019-10-31 RX ADMIN — ONDANSETRON 4 MG: 2 INJECTION INTRAMUSCULAR; INTRAVENOUS at 22:57

## 2019-11-01 ENCOUNTER — APPOINTMENT (OUTPATIENT)
Dept: GENERAL RADIOLOGY | Age: 74
DRG: 241 | End: 2019-11-01
Attending: SURGERY
Payer: MEDICARE

## 2019-11-01 ENCOUNTER — ANESTHESIA (OUTPATIENT)
Dept: SURGERY | Age: 74
DRG: 241 | End: 2019-11-01
Payer: MEDICARE

## 2019-11-01 ENCOUNTER — ANESTHESIA EVENT (OUTPATIENT)
Dept: SURGERY | Age: 74
DRG: 241 | End: 2019-11-01
Payer: MEDICARE

## 2019-11-01 PROBLEM — I99.8 ISCHEMIC LEG PAIN: Status: ACTIVE | Noted: 2019-11-01

## 2019-11-01 PROBLEM — M79.606 ISCHEMIC LEG PAIN: Status: ACTIVE | Noted: 2019-11-01

## 2019-11-01 PROBLEM — E03.9 HYPOTHYROIDISM: Status: ACTIVE | Noted: 2019-11-01

## 2019-11-01 LAB
ABO + RH BLD: NORMAL
ANION GAP SERPL CALC-SCNC: 7 MMOL/L (ref 7–16)
BASOPHILS # BLD: 0 K/UL (ref 0–0.2)
BASOPHILS NFR BLD: 0 % (ref 0–2)
BLOOD GROUP ANTIBODIES SERPL: NORMAL
BUN SERPL-MCNC: 30 MG/DL (ref 8–23)
CALCIUM SERPL-MCNC: 9.4 MG/DL (ref 8.3–10.4)
CHLORIDE SERPL-SCNC: 105 MMOL/L (ref 98–107)
CO2 SERPL-SCNC: 26 MMOL/L (ref 21–32)
CREAT SERPL-MCNC: 1.6 MG/DL (ref 0.8–1.5)
DIFFERENTIAL METHOD BLD: ABNORMAL
EOSINOPHIL # BLD: 0 K/UL (ref 0–0.8)
EOSINOPHIL NFR BLD: 0 % (ref 0.5–7.8)
ERYTHROCYTE [DISTWIDTH] IN BLOOD BY AUTOMATED COUNT: 17 % (ref 11.9–14.6)
GLUCOSE BLD STRIP.AUTO-MCNC: 143 MG/DL (ref 65–100)
GLUCOSE BLD STRIP.AUTO-MCNC: 151 MG/DL (ref 65–100)
GLUCOSE BLD STRIP.AUTO-MCNC: 173 MG/DL (ref 65–100)
GLUCOSE BLD STRIP.AUTO-MCNC: 205 MG/DL (ref 65–100)
GLUCOSE BLD STRIP.AUTO-MCNC: 263 MG/DL (ref 65–100)
GLUCOSE BLD STRIP.AUTO-MCNC: 300 MG/DL (ref 65–100)
GLUCOSE SERPL-MCNC: 143 MG/DL (ref 65–100)
HCT VFR BLD AUTO: 38.6 % (ref 41.1–50.3)
HGB BLD-MCNC: 12.3 G/DL (ref 13.6–17.2)
IMM GRANULOCYTES # BLD AUTO: 0.1 K/UL (ref 0–0.5)
IMM GRANULOCYTES NFR BLD AUTO: 1 % (ref 0–5)
LYMPHOCYTES # BLD: 0.9 K/UL (ref 0.5–4.6)
LYMPHOCYTES NFR BLD: 11 % (ref 13–44)
MCH RBC QN AUTO: 25.9 PG (ref 26.1–32.9)
MCHC RBC AUTO-ENTMCNC: 31.9 G/DL (ref 31.4–35)
MCV RBC AUTO: 81.4 FL (ref 79.6–97.8)
MONOCYTES # BLD: 1.2 K/UL (ref 0.1–1.3)
MONOCYTES NFR BLD: 14 % (ref 4–12)
NEUTS SEG # BLD: 6.3 K/UL (ref 1.7–8.2)
NEUTS SEG NFR BLD: 74 % (ref 43–78)
NRBC # BLD: 0 K/UL (ref 0–0.2)
PLATELET # BLD AUTO: 243 K/UL (ref 150–450)
PMV BLD AUTO: 9.9 FL (ref 9.4–12.3)
POTASSIUM SERPL-SCNC: 4 MMOL/L (ref 3.5–5.1)
RBC # BLD AUTO: 4.74 M/UL (ref 4.23–5.6)
SODIUM SERPL-SCNC: 138 MMOL/L (ref 136–145)
SPECIMEN EXP DATE BLD: NORMAL
WBC # BLD AUTO: 8.6 K/UL (ref 4.3–11.1)

## 2019-11-01 PROCEDURE — 74011000302 HC RX REV CODE- 302: Performed by: HOSPITALIST

## 2019-11-01 PROCEDURE — 0Y6D0Z3 DETACHMENT AT LEFT UPPER LEG, LOW, OPEN APPROACH: ICD-10-PCS | Performed by: SURGERY

## 2019-11-01 PROCEDURE — 77030037088 HC TUBE ENDOTRACH ORAL NSL COVD-A: Performed by: ANESTHESIOLOGY

## 2019-11-01 PROCEDURE — 77030003028 HC SUT VCRL J&J -A: Performed by: SURGERY

## 2019-11-01 PROCEDURE — 77030003602 HC NDL NRV BLK BBMI -B: Performed by: ANESTHESIOLOGY

## 2019-11-01 PROCEDURE — 74011250636 HC RX REV CODE- 250/636: Performed by: ANESTHESIOLOGY

## 2019-11-01 PROCEDURE — 74011250636 HC RX REV CODE- 250/636: Performed by: HOSPITALIST

## 2019-11-01 PROCEDURE — 77030006835 HC BLD SAW SAG STRY -B: Performed by: SURGERY

## 2019-11-01 PROCEDURE — 74011250637 HC RX REV CODE- 250/637: Performed by: HOSPITALIST

## 2019-11-01 PROCEDURE — 76010010054 HC POST OP PAIN BLOCK: Performed by: SURGERY

## 2019-11-01 PROCEDURE — 74011000250 HC RX REV CODE- 250: Performed by: NURSE ANESTHETIST, CERTIFIED REGISTERED

## 2019-11-01 PROCEDURE — 77010033678 HC OXYGEN DAILY

## 2019-11-01 PROCEDURE — 76942 ECHO GUIDE FOR BIOPSY: CPT | Performed by: SURGERY

## 2019-11-01 PROCEDURE — 76210000006 HC OR PH I REC 0.5 TO 1 HR: Performed by: SURGERY

## 2019-11-01 PROCEDURE — 77030002996 HC SUT SLK J&J -A: Performed by: SURGERY

## 2019-11-01 PROCEDURE — 77030008467 HC STPLR SKN COVD -B: Performed by: SURGERY

## 2019-11-01 PROCEDURE — 73552 X-RAY EXAM OF FEMUR 2/>: CPT

## 2019-11-01 PROCEDURE — 88307 TISSUE EXAM BY PATHOLOGIST: CPT

## 2019-11-01 PROCEDURE — 77030031139 HC SUT VCRL2 J&J -A: Performed by: SURGERY

## 2019-11-01 PROCEDURE — 80048 BASIC METABOLIC PNL TOTAL CA: CPT

## 2019-11-01 PROCEDURE — 65270000029 HC RM PRIVATE

## 2019-11-01 PROCEDURE — 77030039425 HC BLD LARYNG TRULITE DISP TELE -A: Performed by: ANESTHESIOLOGY

## 2019-11-01 PROCEDURE — 85025 COMPLETE CBC W/AUTO DIFF WBC: CPT

## 2019-11-01 PROCEDURE — 74011250636 HC RX REV CODE- 250/636: Performed by: NURSE ANESTHETIST, CERTIFIED REGISTERED

## 2019-11-01 PROCEDURE — 74011636637 HC RX REV CODE- 636/637: Performed by: HOSPITALIST

## 2019-11-01 PROCEDURE — 76060000033 HC ANESTHESIA 1 TO 1.5 HR: Performed by: SURGERY

## 2019-11-01 PROCEDURE — 82962 GLUCOSE BLOOD TEST: CPT

## 2019-11-01 PROCEDURE — 94760 N-INVAS EAR/PLS OXIMETRY 1: CPT

## 2019-11-01 PROCEDURE — 76010000161 HC OR TIME 1 TO 1.5 HR INTENSV-TIER 1: Performed by: SURGERY

## 2019-11-01 PROCEDURE — 74011250636 HC RX REV CODE- 250/636: Performed by: SURGERY

## 2019-11-01 PROCEDURE — 86580 TB INTRADERMAL TEST: CPT | Performed by: HOSPITALIST

## 2019-11-01 RX ORDER — ASPIRIN 81 MG/1
81 TABLET ORAL DAILY
Status: DISCONTINUED | OUTPATIENT
Start: 2019-11-01 | End: 2019-11-05 | Stop reason: HOSPADM

## 2019-11-01 RX ORDER — OXYCODONE HYDROCHLORIDE 5 MG/1
5 TABLET ORAL
Status: CANCELLED | OUTPATIENT
Start: 2019-11-01 | End: 2019-11-02

## 2019-11-01 RX ORDER — FLUMAZENIL 0.1 MG/ML
0.2 INJECTION INTRAVENOUS AS NEEDED
Status: CANCELLED | OUTPATIENT
Start: 2019-11-01

## 2019-11-01 RX ORDER — NEOSTIGMINE METHYLSULFATE 1 MG/ML
INJECTION, SOLUTION INTRAVENOUS AS NEEDED
Status: DISCONTINUED | OUTPATIENT
Start: 2019-11-01 | End: 2019-11-01 | Stop reason: HOSPADM

## 2019-11-01 RX ORDER — LIDOCAINE HYDROCHLORIDE 20 MG/ML
INJECTION, SOLUTION EPIDURAL; INFILTRATION; INTRACAUDAL; PERINEURAL AS NEEDED
Status: DISCONTINUED | OUTPATIENT
Start: 2019-11-01 | End: 2019-11-01 | Stop reason: HOSPADM

## 2019-11-01 RX ORDER — LEVOTHYROXINE SODIUM 75 UG/1
75 TABLET ORAL
Status: DISCONTINUED | OUTPATIENT
Start: 2019-11-01 | End: 2019-11-05 | Stop reason: HOSPADM

## 2019-11-01 RX ORDER — HYDROMORPHONE HYDROCHLORIDE 2 MG/ML
0.5 INJECTION, SOLUTION INTRAMUSCULAR; INTRAVENOUS; SUBCUTANEOUS
Status: CANCELLED | OUTPATIENT
Start: 2019-11-01

## 2019-11-01 RX ORDER — SODIUM CHLORIDE 0.9 % (FLUSH) 0.9 %
5-40 SYRINGE (ML) INJECTION EVERY 8 HOURS
Status: DISCONTINUED | OUTPATIENT
Start: 2019-11-01 | End: 2019-11-05 | Stop reason: HOSPADM

## 2019-11-01 RX ORDER — SODIUM CHLORIDE, SODIUM LACTATE, POTASSIUM CHLORIDE, CALCIUM CHLORIDE 600; 310; 30; 20 MG/100ML; MG/100ML; MG/100ML; MG/100ML
75 INJECTION, SOLUTION INTRAVENOUS CONTINUOUS
Status: DISCONTINUED | OUTPATIENT
Start: 2019-11-01 | End: 2019-11-01 | Stop reason: HOSPADM

## 2019-11-01 RX ORDER — NALOXONE HYDROCHLORIDE 0.4 MG/ML
0.4 INJECTION, SOLUTION INTRAMUSCULAR; INTRAVENOUS; SUBCUTANEOUS AS NEEDED
Status: DISCONTINUED | OUTPATIENT
Start: 2019-11-01 | End: 2019-11-05 | Stop reason: HOSPADM

## 2019-11-01 RX ORDER — SODIUM CHLORIDE, SODIUM LACTATE, POTASSIUM CHLORIDE, CALCIUM CHLORIDE 600; 310; 30; 20 MG/100ML; MG/100ML; MG/100ML; MG/100ML
75 INJECTION, SOLUTION INTRAVENOUS CONTINUOUS
Status: CANCELLED | OUTPATIENT
Start: 2019-11-01

## 2019-11-01 RX ORDER — ACETAMINOPHEN 325 MG/1
650 TABLET ORAL
Status: DISCONTINUED | OUTPATIENT
Start: 2019-11-01 | End: 2019-11-05 | Stop reason: HOSPADM

## 2019-11-01 RX ORDER — FENTANYL CITRATE 50 UG/ML
INJECTION, SOLUTION INTRAMUSCULAR; INTRAVENOUS AS NEEDED
Status: DISCONTINUED | OUTPATIENT
Start: 2019-11-01 | End: 2019-11-01 | Stop reason: HOSPADM

## 2019-11-01 RX ORDER — SODIUM CHLORIDE 0.9 % (FLUSH) 0.9 %
5-40 SYRINGE (ML) INJECTION AS NEEDED
Status: DISCONTINUED | OUTPATIENT
Start: 2019-11-01 | End: 2019-11-05 | Stop reason: HOSPADM

## 2019-11-01 RX ORDER — CEFAZOLIN SODIUM/WATER 2 G/20 ML
2 SYRINGE (ML) INTRAVENOUS EVERY 8 HOURS
Status: DISCONTINUED | OUTPATIENT
Start: 2019-11-01 | End: 2019-11-01 | Stop reason: DRUGHIGH

## 2019-11-01 RX ORDER — BISACODYL 5 MG
5 TABLET, DELAYED RELEASE (ENTERIC COATED) ORAL DAILY PRN
Status: DISCONTINUED | OUTPATIENT
Start: 2019-11-01 | End: 2019-11-05 | Stop reason: HOSPADM

## 2019-11-01 RX ORDER — PROPOFOL 10 MG/ML
INJECTION, EMULSION INTRAVENOUS AS NEEDED
Status: DISCONTINUED | OUTPATIENT
Start: 2019-11-01 | End: 2019-11-01 | Stop reason: HOSPADM

## 2019-11-01 RX ORDER — NITROGLYCERIN 0.4 MG/1
0.4 TABLET SUBLINGUAL
Status: DISCONTINUED | OUTPATIENT
Start: 2019-11-01 | End: 2019-11-05 | Stop reason: HOSPADM

## 2019-11-01 RX ORDER — LISINOPRIL 20 MG/1
40 TABLET ORAL DAILY
Status: DISCONTINUED | OUTPATIENT
Start: 2019-11-01 | End: 2019-11-05 | Stop reason: HOSPADM

## 2019-11-01 RX ORDER — GLYCOPYRROLATE 0.2 MG/ML
INJECTION INTRAMUSCULAR; INTRAVENOUS AS NEEDED
Status: DISCONTINUED | OUTPATIENT
Start: 2019-11-01 | End: 2019-11-01 | Stop reason: HOSPADM

## 2019-11-01 RX ORDER — EPHEDRINE SULFATE/0.9% NACL/PF 50 MG/5 ML
SYRINGE (ML) INTRAVENOUS AS NEEDED
Status: DISCONTINUED | OUTPATIENT
Start: 2019-11-01 | End: 2019-11-01 | Stop reason: HOSPADM

## 2019-11-01 RX ORDER — LIDOCAINE HYDROCHLORIDE 10 MG/ML
0.1 INJECTION INFILTRATION; PERINEURAL AS NEEDED
Status: DISCONTINUED | OUTPATIENT
Start: 2019-11-01 | End: 2019-11-01 | Stop reason: HOSPADM

## 2019-11-01 RX ORDER — FENTANYL CITRATE 50 UG/ML
100 INJECTION, SOLUTION INTRAMUSCULAR; INTRAVENOUS ONCE
Status: COMPLETED | OUTPATIENT
Start: 2019-11-01 | End: 2019-11-01

## 2019-11-01 RX ORDER — HEPARIN SODIUM 5000 [USP'U]/ML
5000 INJECTION, SOLUTION INTRAVENOUS; SUBCUTANEOUS EVERY 8 HOURS
Status: DISCONTINUED | OUTPATIENT
Start: 2019-11-01 | End: 2019-11-05 | Stop reason: HOSPADM

## 2019-11-01 RX ORDER — ONDANSETRON 2 MG/ML
4 INJECTION INTRAMUSCULAR; INTRAVENOUS
Status: DISCONTINUED | OUTPATIENT
Start: 2019-11-01 | End: 2019-11-05 | Stop reason: HOSPADM

## 2019-11-01 RX ORDER — TAMSULOSIN HYDROCHLORIDE 0.4 MG/1
0.4 CAPSULE ORAL DAILY
Status: DISCONTINUED | OUTPATIENT
Start: 2019-11-01 | End: 2019-11-05 | Stop reason: HOSPADM

## 2019-11-01 RX ORDER — DEXTROSE 50 % IN WATER (D50W) INTRAVENOUS SYRINGE
25-50 AS NEEDED
Status: DISCONTINUED | OUTPATIENT
Start: 2019-11-01 | End: 2019-11-05 | Stop reason: HOSPADM

## 2019-11-01 RX ORDER — DEXTROSE 40 %
15 GEL (GRAM) ORAL AS NEEDED
Status: DISCONTINUED | OUTPATIENT
Start: 2019-11-01 | End: 2019-11-05 | Stop reason: HOSPADM

## 2019-11-01 RX ORDER — ONDANSETRON 2 MG/ML
INJECTION INTRAMUSCULAR; INTRAVENOUS AS NEEDED
Status: DISCONTINUED | OUTPATIENT
Start: 2019-11-01 | End: 2019-11-01 | Stop reason: HOSPADM

## 2019-11-01 RX ORDER — HYDRALAZINE HYDROCHLORIDE 50 MG/1
50 TABLET, FILM COATED ORAL 2 TIMES DAILY
Status: DISCONTINUED | OUTPATIENT
Start: 2019-11-01 | End: 2019-11-05

## 2019-11-01 RX ORDER — MIDAZOLAM HYDROCHLORIDE 1 MG/ML
2 INJECTION, SOLUTION INTRAMUSCULAR; INTRAVENOUS ONCE
Status: DISCONTINUED | OUTPATIENT
Start: 2019-11-01 | End: 2019-11-01 | Stop reason: HOSPADM

## 2019-11-01 RX ORDER — OXYCODONE HYDROCHLORIDE 5 MG/1
10 TABLET ORAL
Status: CANCELLED | OUTPATIENT
Start: 2019-11-01 | End: 2019-11-02

## 2019-11-01 RX ORDER — METOPROLOL TARTRATE 25 MG/1
25 TABLET, FILM COATED ORAL 2 TIMES DAILY
Status: DISCONTINUED | OUTPATIENT
Start: 2019-11-01 | End: 2019-11-05 | Stop reason: HOSPADM

## 2019-11-01 RX ORDER — HYDROMORPHONE HYDROCHLORIDE 1 MG/ML
1 INJECTION, SOLUTION INTRAMUSCULAR; INTRAVENOUS; SUBCUTANEOUS
Status: DISCONTINUED | OUTPATIENT
Start: 2019-11-01 | End: 2019-11-05 | Stop reason: HOSPADM

## 2019-11-01 RX ORDER — NYSTATIN 100000 [USP'U]/G
POWDER TOPICAL 2 TIMES DAILY
Status: DISCONTINUED | OUTPATIENT
Start: 2019-11-01 | End: 2019-11-05 | Stop reason: HOSPADM

## 2019-11-01 RX ORDER — DIPHENHYDRAMINE HYDROCHLORIDE 50 MG/ML
12.5 INJECTION, SOLUTION INTRAMUSCULAR; INTRAVENOUS
Status: DISCONTINUED | OUTPATIENT
Start: 2019-11-01 | End: 2019-11-05 | Stop reason: HOSPADM

## 2019-11-01 RX ORDER — ROCURONIUM BROMIDE 10 MG/ML
INJECTION, SOLUTION INTRAVENOUS AS NEEDED
Status: DISCONTINUED | OUTPATIENT
Start: 2019-11-01 | End: 2019-11-01 | Stop reason: HOSPADM

## 2019-11-01 RX ORDER — MIDAZOLAM HYDROCHLORIDE 1 MG/ML
2 INJECTION, SOLUTION INTRAMUSCULAR; INTRAVENOUS
Status: COMPLETED | OUTPATIENT
Start: 2019-11-01 | End: 2019-11-01

## 2019-11-01 RX ORDER — NALOXONE HYDROCHLORIDE 0.4 MG/ML
0.1 INJECTION, SOLUTION INTRAMUSCULAR; INTRAVENOUS; SUBCUTANEOUS
Status: CANCELLED | OUTPATIENT
Start: 2019-11-01

## 2019-11-01 RX ORDER — INSULIN LISPRO 100 [IU]/ML
INJECTION, SOLUTION INTRAVENOUS; SUBCUTANEOUS
Status: DISCONTINUED | OUTPATIENT
Start: 2019-11-01 | End: 2019-11-05 | Stop reason: HOSPADM

## 2019-11-01 RX ORDER — DIPHENHYDRAMINE HYDROCHLORIDE 50 MG/ML
12.5 INJECTION, SOLUTION INTRAMUSCULAR; INTRAVENOUS
Status: CANCELLED | OUTPATIENT
Start: 2019-11-01

## 2019-11-01 RX ORDER — OXYCODONE AND ACETAMINOPHEN 5; 325 MG/1; MG/1
2 TABLET ORAL
Status: DISCONTINUED | OUTPATIENT
Start: 2019-11-01 | End: 2019-11-05 | Stop reason: HOSPADM

## 2019-11-01 RX ORDER — DEXAMETHASONE SODIUM PHOSPHATE 4 MG/ML
INJECTION, SOLUTION INTRA-ARTICULAR; INTRALESIONAL; INTRAMUSCULAR; INTRAVENOUS; SOFT TISSUE
Status: COMPLETED | OUTPATIENT
Start: 2019-11-01 | End: 2019-11-01

## 2019-11-01 RX ADMIN — DEXAMETHASONE SODIUM PHOSPHATE 4 MG: 4 INJECTION, SOLUTION INTRAMUSCULAR; INTRAVENOUS at 08:53

## 2019-11-01 RX ADMIN — FENTANYL CITRATE 100 MCG: 50 INJECTION INTRAMUSCULAR; INTRAVENOUS at 10:51

## 2019-11-01 RX ADMIN — ROPIVACAINE HYDROCHLORIDE 30 ML: 5 INJECTION, SOLUTION EPIDURAL; INFILTRATION; PERINEURAL at 08:53

## 2019-11-01 RX ADMIN — Medication 10 MG: at 11:13

## 2019-11-01 RX ADMIN — INSULIN LISPRO 2 UNITS: 100 INJECTION, SOLUTION INTRAVENOUS; SUBCUTANEOUS at 12:41

## 2019-11-01 RX ADMIN — INSULIN LISPRO 8 UNITS: 100 INJECTION, SOLUTION INTRAVENOUS; SUBCUTANEOUS at 23:03

## 2019-11-01 RX ADMIN — ASPIRIN 81 MG: 81 TABLET, COATED ORAL at 07:22

## 2019-11-01 RX ADMIN — HYDRALAZINE HYDROCHLORIDE 50 MG: 50 TABLET, FILM COATED ORAL at 07:22

## 2019-11-01 RX ADMIN — Medication 10 ML: at 16:26

## 2019-11-01 RX ADMIN — ROCURONIUM BROMIDE 45 MG: 10 INJECTION, SOLUTION INTRAVENOUS at 10:51

## 2019-11-01 RX ADMIN — LIDOCAINE HYDROCHLORIDE 80 MG: 20 INJECTION, SOLUTION EPIDURAL; INFILTRATION; INTRACAUDAL; PERINEURAL at 10:51

## 2019-11-01 RX ADMIN — HEPARIN SODIUM 5000 UNITS: 5000 INJECTION INTRAVENOUS; SUBCUTANEOUS at 21:21

## 2019-11-01 RX ADMIN — FENTANYL CITRATE 50 MCG: 50 INJECTION, SOLUTION INTRAMUSCULAR; INTRAVENOUS at 08:50

## 2019-11-01 RX ADMIN — HYDROMORPHONE HYDROCHLORIDE 1 MG: 1 INJECTION, SOLUTION INTRAMUSCULAR; INTRAVENOUS; SUBCUTANEOUS at 19:51

## 2019-11-01 RX ADMIN — HEPARIN SODIUM 5000 UNITS: 5000 INJECTION INTRAVENOUS; SUBCUTANEOUS at 04:56

## 2019-11-01 RX ADMIN — METOPROLOL TARTRATE 25 MG: 25 TABLET ORAL at 16:22

## 2019-11-01 RX ADMIN — CEFAZOLIN 3 G: 1 INJECTION, POWDER, FOR SOLUTION INTRAVENOUS at 10:56

## 2019-11-01 RX ADMIN — Medication 5 ML: at 06:05

## 2019-11-01 RX ADMIN — Medication 10 MG: at 11:30

## 2019-11-01 RX ADMIN — Medication 10 ML: at 21:22

## 2019-11-01 RX ADMIN — HYDRALAZINE HYDROCHLORIDE 50 MG: 50 TABLET, FILM COATED ORAL at 16:22

## 2019-11-01 RX ADMIN — LEVOTHYROXINE SODIUM 75 MCG: 75 TABLET ORAL at 06:06

## 2019-11-01 RX ADMIN — Medication 4 MG: at 11:46

## 2019-11-01 RX ADMIN — NYSTATIN: 100000 POWDER TOPICAL at 16:22

## 2019-11-01 RX ADMIN — PROPOFOL 150 MG: 10 INJECTION, EMULSION INTRAVENOUS at 10:51

## 2019-11-01 RX ADMIN — ONDANSETRON 4 MG: 2 INJECTION INTRAMUSCULAR; INTRAVENOUS at 10:58

## 2019-11-01 RX ADMIN — HYDROMORPHONE HYDROCHLORIDE 1 MG: 1 INJECTION, SOLUTION INTRAMUSCULAR; INTRAVENOUS; SUBCUTANEOUS at 23:04

## 2019-11-01 RX ADMIN — SODIUM CHLORIDE 150 ML/HR: 900 INJECTION, SOLUTION INTRAVENOUS at 02:54

## 2019-11-01 RX ADMIN — INSULIN LISPRO 4 UNITS: 100 INJECTION, SOLUTION INTRAVENOUS; SUBCUTANEOUS at 16:25

## 2019-11-01 RX ADMIN — Medication 10 MG: at 10:57

## 2019-11-01 RX ADMIN — SODIUM CHLORIDE 150 ML/HR: 900 INJECTION, SOLUTION INTRAVENOUS at 23:04

## 2019-11-01 RX ADMIN — SODIUM CHLORIDE, SODIUM LACTATE, POTASSIUM CHLORIDE, AND CALCIUM CHLORIDE 75 ML/HR: 600; 310; 30; 20 INJECTION, SOLUTION INTRAVENOUS at 08:51

## 2019-11-01 RX ADMIN — METOPROLOL TARTRATE 25 MG: 25 TABLET ORAL at 07:21

## 2019-11-01 RX ADMIN — TUBERCULIN PURIFIED PROTEIN DERIVATIVE 5 UNITS: 5 INJECTION, SOLUTION INTRADERMAL at 02:40

## 2019-11-01 RX ADMIN — TAMSULOSIN HYDROCHLORIDE 0.4 MG: 0.4 CAPSULE ORAL at 07:21

## 2019-11-01 RX ADMIN — MIDAZOLAM 1 MG: 1 INJECTION INTRAMUSCULAR; INTRAVENOUS at 08:49

## 2019-11-01 RX ADMIN — HYDROMORPHONE HYDROCHLORIDE 1 MG: 1 INJECTION, SOLUTION INTRAMUSCULAR; INTRAVENOUS; SUBCUTANEOUS at 02:53

## 2019-11-01 RX ADMIN — GLYCOPYRROLATE 0.6 MG: 0.2 INJECTION, SOLUTION INTRAMUSCULAR; INTRAVENOUS at 11:46

## 2019-11-01 RX ADMIN — INSULIN HUMAN 42 UNITS: 100 INJECTION, SUSPENSION SUBCUTANEOUS at 16:25

## 2019-11-01 RX ADMIN — HYDROMORPHONE HYDROCHLORIDE 1 MG: 1 INJECTION, SOLUTION INTRAMUSCULAR; INTRAVENOUS; SUBCUTANEOUS at 16:49

## 2019-11-01 NOTE — H&P
Hospitalist H&P/Consult Note     Admit Date:  10/31/2019 10:34 PM   Name:  Elsa Monahan   Age:  69 y.o.  :  1945   MRN:  [id number]   PCP:  Kavitha Melissa MD  Treatment Team: Attending Provider: Brisa Coreas MD; Primary Nurse: Anila Sierra    HPI:   Patient is a 68 y/o male with severe LLE ishemia due to PAD. Pmh positive for PAD, diabetes, CAD, CABG,  HTN, obesity. He recently underwent a LLE arteriogram with tibial stent with PTA and femoral PTA on  by Vascular Surgery Dr Dick Hamilton. He was seen in follow-up on 10/29 for severe LLE pain. It was the opinion that the only options would be to have a left above knee amputation. He now comes to the ED for intractable, 10/10 pain with evidence of ischemia. He and his wife had been thinking whether or not to proceed with the procedure. Hospitalist Service called for admission, provide adequate pain relief. Vascular Surgery to see patient in am.     10 systems reviewed and negative except as noted in HPI.   Past Medical History:   Diagnosis Date    Arthritis     hands, knees, elbows, shoulders    CAD (coronary artery disease)     [facility]- [facility]G ~ after [facility])    Enlarged prostate     Former cigarette smoker     History of hepatitis age 13    treated with no futher complications    Hx of CABG     Hypercholesteremia     Hypertension     managed well with meds    Hypothyroidism     managed well with Synthroid    Hypothyroidism 2019    Left leg pain     Long term (current) use of systemic steroids     Prednisone    PAD (peripheral artery disease) (HCC)     RBBB     Type 2 diabetes mellitus (HCC)     checks QD, insulin 70/30- normal , hyposymptoms at 70; A1C 7.2 (3-27-19 [facility])    Vitamin D deficiency     replace with PO vitamin D      Past Surgical History:   Procedure Laterality Date    CARDIAC SURG PROCEDURE UNLIST      cabg boaz    HX COLONOSCOPY  HX HEART CATHETERIZATION      HX HEMORRHOIDECTOMY      HX KNEE REPLACEMENT Bilateral     VASCULAR SURGERY PROCEDURE UNLIST  2019    left common femoral to posterior tibial bypass with PTFE graft      Prior to Admission Medications   Prescriptions Last Dose Informant Patient Reported? Taking?   aspirin delayed-release 81 mg tablet   Yes No   Sig: Take 81 mg by mouth daily. Take morning of procedure. ergocalciferol (ERGOCALCIFEROL) 50,000 unit capsule   Yes No   Sig: Take 50,000 Units by mouth every seven (7) days. Pt takes on Saturday  Hold seven days for procedure. hydrALAZINE (APRESOLINE) 50 mg tablet   Yes No   Sig: Take 50 mg by mouth two (2) times a day. Take morning of procedure. ibuprofen (MOTRIN) 200 mg tablet   Yes No   Sig: Take  by mouth every six (6) hours as needed for Pain. Hold 5 days for procedure. insulin NPH/insulin regular (NOVOLIN 70/30 U-100 INSULIN) 100 unit/mL (70-30) injection   Yes No   Si Units by SubCUTAneous route two (2) times a day. Morning and evening of 10/23: Take 48 units (80% of usual dose)  Morning of procedure 10/24: Take 30 units (50% of usual dose) if blood glucose is >120. Hold if <120. levothyroxine (SYNTHROID) 75 mcg tablet   Yes No   Sig: Take 75 mcg by mouth Daily (before breakfast). Take morning of procedure. lisinopril (PRINIVIL, ZESTRIL) 40 mg tablet   Yes No   Sig: Take 40 mg by mouth daily. Do not take morning of procedure. metoprolol tartrate (LOPRESSOR) 25 mg tablet   Yes No   Sig: Take 25 mg by mouth two (2) times a day. Take morning of procedure. nitroglycerin (NITROSTAT) 0.4 mg SL tablet   No No   Si Tab by SubLINGual route every five (5) minutes as needed for Chest Pain. Up to 3 doses. Patient taking differently: 0.4 mg by SubLINGual route every five (5) minutes as needed for Chest Pain. Up to 3 doses.   Bring to hospital DOS   oxyCODONE-acetaminophen (PERCOCET) 5-325 mg per tablet   No No   Sig: Take 2 Tabs by mouth every four (4) hours as needed for Pain for up to 5 days. Max Daily Amount: 12 Tabs. rivaroxaban (XARELTO) 2.5 mg tablet   Yes No   Sig: Take 2.5 mg by mouth two (2) times daily (with meals). Per surgeon to hold 2 days PTS  Indications: 10/15/19 4wks samples given lot#[id number] EXP 8/2021   tamsulosin (FLOMAX) 0.4 mg capsule   Yes No   Sig: Take 0.4 mg by mouth daily. Take morning of procedure. Facility-Administered Medications: None     Allergies   Allergen Reactions    Gabapentin Other (comments)     Severe pain in legs    Rosuvastatin Rash      Social History     Tobacco Use    Smoking status: Former Smoker     Packs/day: 2.00     Years: 50.00     Pack years: 100.00     Last attempt to quit: 12/26/2008     Years since quitting: 10.8    Smokeless tobacco: Never Used    Tobacco comment: cigar   Substance Use Topics    Alcohol use: No     Frequency: Never      Family History   Problem Relation Age of Onset    No Known Problems Mother     Cancer Father         Lung CA    No Known Problems Sister         There is no immunization history on file for this patient. Objective:     Patient Vitals for the past 24 hrs:   Temp Pulse Resp BP SpO2   10/31/19 2227 98.8 °F (37.1 °C) 94 18 136/79 93 %     Oxygen Therapy  O2 Sat (%): 93 % (10/31/19 2227)  O2 Device: Room air (10/31/19 2227)  No intake or output data in the 24 hours ending 11/01/19 0024    Physical Exam:  General:    Morbidly obese. Alert. Eyes:   Normal sclera. Extraocular movements intact. ENT:  Normocephalic, atraumatic. Moist mucous membranes  CV:   RRR. No murmur, rub, or gallop. Lungs:  CTAB. No wheezing, rhonchi, or rales. Abdomen: Soft, nontender, nondistended. Bowel sounds normal.   Extremities: Edema LLE, cold no palpable foot pulses  Toes blue and ischemic  Neurologic: CN II-XII grossly intact. Sensation intact. Skin:     No rashes or jaundice. No wounds. Psych:  Normal mood and affect.     I reviewed the labs, imaging, EKGs, telemetry, and other studies done this admission. Data Review:   Recent Results (from the past 24 hour(s))   CBC WITH AUTOMATED DIFF    Collection Time: 10/31/19 10:30 PM   Result Value Ref Range    WBC 10.6 4.3 - 11.1 K/uL    RBC 4.94 4.23 - 5.6 M/uL    HGB 12.8 (L) 13.6 - 17.2 g/dL    HCT 39.3 (L) 41.1 - 50.3 %    MCV 79.6 79.6 - 97.8 FL    MCH 25.9 (L) 26.1 - 32.9 PG    MCHC 32.6 31.4 - 35.0 g/dL    RDW 17.4 (H) 11.9 - 14.6 %    PLATELET 353 426 - 096 K/uL    MPV 9.9 9.4 - 12.3 FL    ABSOLUTE NRBC 0.00 0.0 - 0.2 K/uL    NEUTROPHILS 77 43 - 78 %    LYMPHOCYTES 10 (L) 13 - 44 %    MONOCYTES 12 4.0 - 12.0 %    EOSINOPHILS 0 (L) 0.5 - 7.8 %    BASOPHILS 0 0.0 - 2.0 %    IMMATURE GRANULOCYTES 1 0.0 - 5.0 %    ABS. NEUTROPHILS 8.1 1.7 - 8.2 K/UL    ABS. LYMPHOCYTES 1.1 0.5 - 4.6 K/UL    ABS. MONOCYTES 1.3 0.1 - 1.3 K/UL    ABS. EOSINOPHILS 0.0 0.0 - 0.8 K/UL    ABS. BASOPHILS 0.0 0.0 - 0.2 K/UL    ABS. IMM. GRANS. 0.1 0.0 - 0.5 K/UL    RBC COMMENTS NORMOCYTIC/NORMOCHROMIC      WBC COMMENTS Result Confirmed By Smear      PLATELET COMMENTS ADEQUATE      DF AUTOMATED     METABOLIC PANEL, COMPREHENSIVE    Collection Time: 10/31/19 10:30 PM   Result Value Ref Range    Sodium 132 (L) 136 - 145 mmol/L    Potassium 6.8 (HH) 3.5 - 5.1 mmol/L    Chloride 103 98 - 107 mmol/L    CO2 22 21 - 32 mmol/L    Anion gap 7 7 - 16 mmol/L    Glucose 176 (H) 65 - 100 mg/dL    BUN 30 (H) 8 - 23 MG/DL    Creatinine 1.79 (H) 0.8 - 1.5 MG/DL    GFR est AA 48 (L) >60 ml/min/1.73m2    GFR est non-AA 40 (L) >60 ml/min/1.73m2    Calcium 9.3 8.3 - 10.4 MG/DL    Bilirubin, total 1.6 (H) 0.2 - 1.1 MG/DL    ALT (SGPT) 23 12 - 65 U/L    AST (SGOT) 91 (H) 15 - 37 U/L    Alk.  phosphatase 71 50 - 136 U/L    Protein, total 8.7 (H) 6.3 - 8.2 g/dL    Albumin 2.6 (L) 3.2 - 4.6 g/dL    Globulin 6.1 (H) 2.3 - 3.5 g/dL    A-G Ratio 0.4 (L) 1.2 - 3.5     PROTHROMBIN TIME + INR    Collection Time: 10/31/19 10:30 PM   Result Value Ref Range    Prothrombin time 16.6 (H) 11.7 - 14.5 sec    INR 1.3     PTT    Collection Time: 10/31/19 10:30 PM   Result Value Ref Range    aPTT 37.5 24.7 - 39.8 SEC   POC LACTIC ACID    Collection Time: 10/31/19 10:52 PM   Result Value Ref Range    Lactic Acid (POC) 1.54 0.5 - 1.9 mmol/L   TYPE & SCREEN    Collection Time: 10/31/19 11:11 PM   Result Value Ref Range    Crossmatch Expiration 11/03/2019     ABO/Rh(D) [place] POSITIVE     Antibody screen NEG    POTASSIUM    Collection Time: 10/31/19 11:11 PM   Result Value Ref Range    Potassium 4.0 3.5 - 5.1 mmol/L       Imaging Studies:  CXR Results  (Last 48 hours)    None        CT Results  (Last 48 hours)    None          Assessment and Plan:     Hospital Problems as of 11/1/2019 Date Reviewed: 10/15/2019          Codes Class Noted - Resolved POA    * (Principal) Ischemic leg pain ICD-10-CM: I99.8  ICD-9-CM: 459.9  11/1/2019 - Present Yes        CAD (coronary artery disease) ICD-10-CM: I25.10  ICD-9-CM: 414.00  2/13/2019 - Present Yes        Peripheral artery disease ([facility] ICD-10-CM: I73.9  ICD-9-CM: 443.9  3/27/2019 - Present Yes        Hypothyroidism ICD-10-CM: E03.9  ICD-9-CM: 244.9  11/1/2019 - Present Yes    Overview Signed 11/1/2019 12:10 AM by Osvaldo Gayle MD     managed well with Synthroid             S/P bypass graft of extremity ICD-10-CM: Z95.828  ICD-9-CM: V45.89  4/19/2019 - Present Yes    Overview Signed 4/19/2019  9:38 AM by Zane Kathleen NP     3/27/19 (Dr. Morton Monday) Left common femoral to posterior tibial bypass with PTFE graft                 Severe obesity ([facility] ICD-10-CM: E66.01  ICD-9-CM: 278.01  4/12/2019 - Present Yes        HTN (hypertension) ICD-10-CM: I10  ICD-9-CM: 401.9  2/14/2019 - Present Yes        DM (diabetes mellitus) ([facility] ICD-10-CM: E11.9  ICD-9-CM: 250.00  2/14/2019 - Present Yes        Hx of CABG ICD-10-CM: Z95.1  ICD-9-CM: V45.81  2/14/2019 - Present Yes              PLAN:  · Admit to surgical floor  · He has intractable pain from an ischemic LLE  · NPO after midnight in case amputation planned  · Provide IV pain management  · Sliding scale insulin coverage  · Hold his Xarelto  · Incentive spirometry  · Place PPD in case he will need STR  · Consult Vascular Surgery ( Dr Chirag Tripp) in am        Estimated LOS:  Greater than 2 midnights    Signed:  Lelo Shen MD

## 2019-11-01 NOTE — ANESTHESIA PREPROCEDURE EVALUATION
Anesthetic History     PONV          Review of Systems / Medical History  Patient summary reviewed and pertinent labs reviewed    Pulmonary              Pertinent negatives: Smoker: Former smoker. 100 pack year. Neuro/Psych   Within defined limits           Cardiovascular    Hypertension        Dysrhythmias (RBBB)   CAD, PAD, CABG (2003) and hyperlipidemia    Exercise tolerance: <4 METS  Comments: Denies recent CP, SOB or Palpitations    Echo 2018 showing EF 50-54%, mild AS, mild MR.   GI/Hepatic/Renal         Renal disease (Cr 1.8 (increase from baseline 1.5)):  CRI       Endo/Other    Diabetes: well controlled, type 2, using insulin  Hypothyroidism: well controlled  Obesity and arthritis     Other Findings            Physical Exam    Airway  Mallampati: II  TM Distance: 4 - 6 cm  Neck ROM: normal range of motion   Mouth opening: Normal     Cardiovascular          Murmur: Grade 2     Dental    Dentition: Lower partial plate and Full upper dentures     Pulmonary  Breath sounds clear to auscultation               Abdominal  GI exam deferred       Other Findings            Anesthetic Plan    ASA: 3  Anesthesia type: general      Post-op pain plan if not by surgeon: peripheral nerve block single    Induction: Intravenous  Anesthetic plan and risks discussed with: Patient      Xarelto until 10/31

## 2019-11-01 NOTE — ANESTHESIA PROCEDURE NOTES
Peripheral Block    Start time: 11/1/2019 8:49 AM  End time: 11/1/2019 8:53 AM  Performed by: Mary Castellanos MD  Authorized by: Mary Castellanos MD       Pre-procedure:    Indications: at surgeon's request and post-op pain management    Preanesthetic Checklist: patient identified, risks and benefits discussed, site marked, timeout performed, anesthesia consent given and patient being monitored    Timeout Time: 08:48          Block Type:   Block Type:  Femoral single shot  Laterality:  Left  Monitoring:  Standard ASA monitoring, continuous pulse ox, frequent vital sign checks, heart rate, responsive to questions and oxygen  Injection Technique:  Single shot  Procedures: ultrasound guided and nerve stimulator    Patient Position: supine  Location:  Upper thigh  Needle Type:  Stimuplex  Needle Gauge:  22 G  Needle Localization:  Nerve stimulator and ultrasound guidance  Motor Response: minimal motor response >0.4 mA      Assessment:  Number of attempts:  1  Injection Assessment:  Incremental injection every 5 mL, no paresthesia, ultrasound image on chart, local visualized surrounding nerve on ultrasound, negative aspiration for blood and no intravascular symptoms  Patient tolerance:  Patient tolerated the procedure well with no immediate complications

## 2019-11-01 NOTE — BRIEF OP NOTE
[address]   [address] FAX: [phone]    Brief Op Note Template Note    Pre-Op Diagnosis: LEFT ISCHEMIC LEG    Post-Op Diagnosis:  LEFT ISCHEMIC LEG    Procedures: Procedure(s):  LEFT AMPUTATION KNEE(AKA)    Surgeon: Charanjit Hendrix MD    Assistants: Surgeon(s): Koko Chinchilla MD      Anesthesia:  General     Findings: Healthy tissue occluded graft    Tourniquet Time:  * No tourniquets in log *    Estimated Blood Loss:               Specimens:          Implants:  * No implants in log *    Complications: None               Signed: Charanjit Hendrix MD      Elements of this note have been dictated using speech recognition software. As a result, errors of speech recognition may have occurred.

## 2019-11-01 NOTE — PROGRESS NOTES
[address]   [address] [facility] FAX: [phone]         VASCULAR SURGERY FLOOR PROGRESS NOTE    Admit Date: 10/31/2019  POD: * No surgery date entered *    Procedure:  Procedure(s):  LEFT AMPUTATION KNEE(AKA)    Subjective:     Patient has no new complaints. Objective:     Vitals:  Blood pressure 148/80, pulse 66, temperature 98.2 °F (36.8 °C), resp. rate 18, height 6' 2\" (1.88 m), weight 300 lb (136.1 kg), SpO2 95 %. Temp (24hrs), Av.4 °F (36.9 °C), Min:98.1 °F (36.7 °C), Max:98.8 °F (37.1 °C)      Intake / Output:    Intake/Output Summary (Last 24 hours) at 2019 0733  Last data filed at 2019 0724  Gross per 24 hour   Intake --   Output 420 ml   Net -420 ml       Physical Exam:    Constitutional: he appears well-developed. No distress. HENT:   Head: Atraumatic. Eyes: Pupils are equal, round, and reactive to light. Neck: Normal range of motion. Cardiovascular: Regular rhythm. Pulmonary/Chest: Effort normal and breath sounds normal. No respiratory distress. Abdominal: Soft. Bowel sounds are normal. he exhibits no distension. There is no tenderness. There is no guarding. No hernia. Musculoskeletal: Normal range of motion. Neurological: He is alert.  CN II- XII grossly intact  Vascular: Ischemic left leg    Labs:   Recent Labs     10/31/19  2311 10/31/19  2230   HGB  --  12.8*   WBC  --  10.6   K 4.0 6.8*   GLU  --  176*       Data Review     Assessment:     Patient Active Problem List    Diagnosis Date Noted    CAD (coronary artery disease) 2019     Priority: 1 - One    Ischemic leg pain 2019    Hypothyroidism 2019    Bypass graft stenosis ([address] 10/15/2019    Yeast infection 2019    S/P bypass graft of extremity 2019    Severe obesity ([address] 2019    Peripheral artery disease ([address] 2019    Ankle ulcer ([address] 2019    PAD [facility]peripheral artery disease) ([facility] 2019    HTN (hypertension) 02/14/2019    DM (diabetes mellitus) ([facility] 02/14/2019    Dyslipidemia 02/14/2019    Hx of CABG 02/14/2019       Plan/Recommendations/Medical Decision Making:     History with lower extremity disease status post occluded left lower extremity bypass  -We will schedule today for left above-knee amputation patient been marked and consented  -We will get x-ray of left femur and preop area to rule out any rods    Elements of this note have been dictated using speech recognition software. As a result, errors of speech recognition may have occurred.

## 2019-11-01 NOTE — PERIOP NOTES
TRANSFER - OUT REPORT:    Verbal report given to Isai Swain RN on Melva Vasques  being transferred to [address] [place] [address] for routine post - op       Report consisted of patients Situation, Background, Assessment and   Recommendations(SBAR). Information from the following report(s) SBAR, Kardex, OR Summary, Intake/Output, MAR and Cardiac Rhythm NSR was reviewed with the receiving nurse. Lines:   Peripheral IV 11/01/19 Right Hand (Active)   Site Assessment Clean, dry, & intact 11/1/2019 12:37 AM   Phlebitis Assessment 0 11/1/2019 12:37 AM   Infiltration Assessment 0 11/1/2019 12:37 AM   Dressing Status Clean, dry, & intact 11/1/2019 12:37 AM   Dressing Type 4 X 4 11/1/2019 12:37 AM   Hub Color/Line Status Green 11/1/2019 12:37 AM   Alcohol Cap Used Yes 11/1/2019 12:37 AM       Peripheral IV 11/01/19 Left Hand (Active)   Site Assessment Clean, dry, & intact 11/1/2019 12:45 PM   Phlebitis Assessment 0 11/1/2019 12:45 PM   Infiltration Assessment 0 11/1/2019 12:45 PM   Dressing Status Clean, dry, & intact 11/1/2019 12:45 PM   Dressing Type Transparent 11/1/2019 12:45 PM   Hub Color/Line Status Green; Infusing;Patent 11/1/2019 12:45 PM        Opportunity for questions and clarification was provided. Patient transported with:   O2 @ 4 liters    VTE prophylaxis orders have been written for Melva Vasques. Patient and family given floor number and nurses name. Family updated re: pt status after security code verified.

## 2019-11-01 NOTE — ANESTHESIA POSTPROCEDURE EVALUATION
Procedure(s):  LEFT AMPUTATION KNEE(AKA). general    Anesthesia Post Evaluation      Multimodal analgesia: multimodal analgesia used between 6 hours prior to anesthesia start to PACU discharge  Patient location during evaluation: PACU  Patient participation: complete - patient participated  Level of consciousness: awake  Pain management: adequate  Airway patency: patent  Anesthetic complications: no  Cardiovascular status: acceptable and hemodynamically stable  Respiratory status: acceptable  Hydration status: acceptable  Comments: Acceptable for discharge from PACU. Post anesthesia nausea and vomiting:  none      Vitals Value Taken Time   /70 11/1/2019  1:14 PM   Temp 36.6 °C (97.8 °F) 11/1/2019 12:45 PM   Pulse 53 11/1/2019  1:17 PM   Resp 18 11/1/2019 12:45 PM   SpO2 99 % 11/1/2019  1:17 PM   Vitals shown include unvalidated device data.

## 2019-11-01 NOTE — PROGRESS NOTES
TRANSFER - IN REPORT:    Verbal report received from [address] [place] [address] Road, RN on [facility]  being received from [facility] for routine progression of care      Report consisted of patients Situation, Background, Assessment and   Recommendations(SBAR). Information from the following report(s) SBAR was reviewed with the receiving nurse. Opportunity for questions and clarification was provided. Assessment completed upon patients arrival to unit and care assumed.

## 2019-11-01 NOTE — ED PROVIDER NOTES
Patient presents with severe and intractable left lower extremity pain from ischemia. Patient has a long-standing history of peripheral vascular disease and had a balloon procedure and stenting attempted on the 24th of this month by Dr. El Bui. He was seen in the office 2 days ago and according to office notes was recommended for above-the-knee amputation. Patient has been using oxycodone for pain management but currently reports his pain is 10/10 in intensity and also has reported some chills and possible fever at home. The history is provided by the patient, the spouse and medical records. Leg Pain    This is a chronic problem. The current episode started more than 1 week ago. The problem occurs constantly. The problem has been rapidly worsening. The pain is present in the left lower leg. The pain is at a severity of 10/10. The pain is severe. Pertinent negatives include no numbness, full range of motion, no stiffness, no tingling, no itching, no back pain and no neck pain. The symptoms are aggravated by palpation, contact and movement. Treatments tried: narcotics.         Past Medical History:   Diagnosis Date    Arthritis     hands, knees, elbows, shoulders    CAD (coronary artery disease)     [facility]2007 after [facility])    Enlarged prostate     Former cigarette smoker     History of hepatitis age 13    treated with no futher complications    Hx of CABG     Hypercholesteremia     Hypertension     managed well with meds    Hypothyroidism     managed well with Synthroid    Left leg pain     Long term (current) use of systemic steroids     Prednisone    PAD (peripheral artery disease) (HCC)     RBBB     Type 2 diabetes mellitus (HCC)     checks QD, insulin 70/30- normal , hyposymptoms at 70; A1C 7.2 (3-27-19 [facility])    Vitamin D deficiency     replace with PO vitamin D       Past Surgical History:   Procedure Laterality Date    CARDIAC SURG PROCEDURE UNLIST      cabg boaz    HX COLONOSCOPY      HX HEART CATHETERIZATION      HX HEMORRHOIDECTOMY      HX KNEE REPLACEMENT Bilateral     VASCULAR SURGERY PROCEDURE UNLIST  03/27/2019    left common femoral to posterior tibial bypass with PTFE graft         Family History:   Problem Relation Age of Onset    No Known Problems Mother     Cancer Father         Lung CA    No Known Problems Sister        Social History     Socioeconomic History    Marital status:      Spouse name: Not on file    Number of children: Not on file    Years of education: Not on file    Highest education level: Not on file   Occupational History    Not on file   Social Needs    Financial resource strain: Not on file    Food insecurity:     Worry: Not on file     Inability: Not on file    Transportation needs:     Medical: Not on file     Non-medical: Not on file   Tobacco Use    Smoking status: Former Smoker     Packs/day: 2.00     Years: 50.00     Pack years: 100.00     Last attempt to quit: 12/26/2008     Years since quitting: 10.8    Smokeless tobacco: Never Used    Tobacco comment: cigar   Substance and Sexual Activity    Alcohol use: No     Frequency: Never    Drug use: No    Sexual activity: Not on file   Lifestyle    Physical activity:     Days per week: Not on file     Minutes per session: Not on file    Stress: Not on file   Relationships    Social connections:     Talks on phone: Not on file     Gets together: Not on file     Attends [employer] service: Not on file     Active member of club or organization: Not on file     Attends meetings of clubs or organizations: Not on file     Relationship status: Not on file    Intimate partner violence:     Fear of current or ex partner: Not on file     Emotionally abused: Not on file     Physically abused: Not on file     Forced sexual activity: Not on file   Other Topics Concern    Not on file   Social History Narrative    Not on file ALLERGIES: Gabapentin and Rosuvastatin    Review of Systems   Musculoskeletal: Negative for back pain, neck pain and stiffness. Skin: Negative for itching. Neurological: Negative for tingling and numbness. All other systems reviewed and are negative. Vitals:    10/31/19 2227   BP: 136/79   Pulse: 94   Resp: 18   Temp: 98.8 °F (37.1 °C)   SpO2: 93%   Weight: 136.1 kg (300 lb)   Height: 6' 2\" (1.88 m)            Physical Exam   Constitutional: He is oriented to person, place, and time. He appears well-developed and well-nourished. No distress. HENT:   Head: Normocephalic and atraumatic. Eyes: Pupils are equal, round, and reactive to light. EOM are normal.   Neck: Normal range of motion. Neck supple. Cardiovascular: Normal rate and regular rhythm. Pulmonary/Chest: Effort normal and breath sounds normal.   Abdominal: Soft. Bowel sounds are normal.   Musculoskeletal: Normal range of motion. He exhibits edema and tenderness. Left lower extremity is cold to the touch from the mid leg distally. There is discoloration with some mild blanching erythema as well as cyanosis to the left great toe. No pulses are obtained in the foot with Doppler there is a Doppler pulse in the left popliteal fossa. Neurological: He is alert and oriented to person, place, and time. Skin: Skin is warm and dry. Capillary refill takes less than 2 seconds. He is not diaphoretic. Psychiatric: He has a normal mood and affect. His behavior is normal.   Nursing note and vitals reviewed.        MDM  Number of Diagnoses or Management Options     Amount and/or Complexity of Data Reviewed  Clinical lab tests: ordered and reviewed  Independent visualization of images, tracings, or specimens: yes    Risk of Complications, Morbidity, and/or Mortality  Presenting problems: high  Diagnostic procedures: moderate  Management options: moderate    Patient Progress  Patient progress: stable         Procedures

## 2019-11-01 NOTE — PERIOP NOTES
TRANSFER - IN REPORT:    Verbal report received from Tracy Maloney RN on Ramy Oar  being received from [phone] for routine progression of care      Report consisted of patients Situation, Background, Assessment and   Recommendations(SBAR). Information from the following report(s) SBAR and Kardex was reviewed with the receiving nurse. Opportunity for questions and clarification was provided. Assessment to be completed upon patients arrival to unit and care to be assumed.

## 2019-11-01 NOTE — PROGRESS NOTES
Patient arrived from [facility] via transport. Patient is alert and orientated with no distress noted. IV intact and patent with no s/s of infection noted. Respirations even and unlabored with heart rate regular. Patient with left AKA. Dressing c/d/i. Patient has no c/o pain at this time. Family at bedside for support. Call light within reach and patient instructed to call if assistance is needed. Will continue to monitor.

## 2019-11-01 NOTE — ED TRIAGE NOTES
Pt states his left leg has been operated on numerous times the last time being on the 24th of this month. States he has had blockages in his veins and arteries. Pt states the pain has come back today. States he fell out of the bed today and 911 had to get him up. States he is having a hard time standing. Pt states he put in for a call for Dr. Homa Barrera today but has not received a call yet.

## 2019-11-01 NOTE — ED NOTES
TRANSFER - OUT REPORT:    Verbal report given to 8th floor nurse(name) on Sarah Devlin  being transferred to 817(unit) for routine progression of care       Report consisted of patients Situation, Background, Assessment and   Recommendations(SBAR). Information from the following report(s) SBAR and ED Summary was reviewed with the receiving nurse. Lines:   Peripheral IV 11/01/19 Right Hand (Active)   Site Assessment Clean, dry, & intact 11/1/2019 12:37 AM   Phlebitis Assessment 0 11/1/2019 12:37 AM   Infiltration Assessment 0 11/1/2019 12:37 AM   Dressing Status Clean, dry, & intact 11/1/2019 12:37 AM   Dressing Type 4 X 4 11/1/2019 12:37 AM   Hub Color/Line Status Green 11/1/2019 12:37 AM   Alcohol Cap Used Yes 11/1/2019 12:37 AM        Opportunity for questions and clarification was provided.       Patient transported with:   O2 @ 2 liters

## 2019-11-01 NOTE — PROGRESS NOTES
Hibiclens bath given and medications given per [facility]. TRANSFER - OUT REPORT:    Verbal report given to [facility] on Twyla Sunshine  being transferred to [facility] for routine progression of care       Report consisted of patients Situation, Background, Assessment and   Recommendations(SBAR). Information from the following report(s) SBAR was reviewed with the receiving nurse. Lines:   Peripheral IV 11/01/19 Right Hand (Active)   Site Assessment Clean, dry, & intact 11/1/2019 12:37 AM   Phlebitis Assessment 0 11/1/2019 12:37 AM   Infiltration Assessment 0 11/1/2019 12:37 AM   Dressing Status Clean, dry, & intact 11/1/2019 12:37 AM   Dressing Type 4 X 4 11/1/2019 12:37 AM   Hub Color/Line Status Green 11/1/2019 12:37 AM   Alcohol Cap Used Yes 11/1/2019 12:37 AM        Opportunity for questions and clarification was provided. Transport here to take patient. Will await return and any new orders.

## 2019-11-01 NOTE — PROGRESS NOTES
Hospitalist Note     Admit Date:  10/31/2019 10:34 PM   Name:  Larrie Prader   Age:  69 y.o.  :  1945   MRN:  [id number]   PCP:  Ivonne Coe MD  Treatment Team: Attending Provider: Lauralee Kussmaul, MD; Consulting Provider: Milagro Verdugo MD; Utilization Review: Yvonne Palacios RN    HPI/Subjective:       Mr. Eunice Forbes is a 70 yo male with PMH of PAD on xarelto, DM2, CAD s/p CABG, HTN, obesity admitted with painful LLE and need for AKA. He has been followed by vascular surgery and s/p 10-24-19 LLE arteriogram/ tibial stent/ femoral PTA. He has been seen by Dr. Brooklynn Martinez and agreeable for left AKA. He is currently on ancef.    Discharge plans pending       19 wife present, NPO for OR, no pain, no chest pain or dyspnea, some recent falls,       Objective:     Patient Vitals for the past 24 hrs:   Temp Pulse Resp BP SpO2   19 1559 98.6 °F (37 °C) 67 19 131/67 95 %   19 1314 -- 75 -- 167/70 93 %   19 1309 -- 78 -- 142/83 93 %   19 1305 -- 70 -- 138/65 93 %   19 1300 -- 70 -- 151/69 93 %   19 1255 -- 71 -- 145/65 95 %   19 1250 -- 75 -- 115/59 99 %   19 1245 97.8 °F (36.6 °C) 73 18 145/65 95 %   19 1239 -- 73 18 141/64 95 %   19 1234 -- 68 18 141/60 94 %   19 1229 -- 69 16 155/72 95 %   19 1224 -- 73 16 138/67 94 %   19 1219 -- 76 16 138/67 94 %   19 1214 -- 77 14 141/69 94 %   19 1209 -- 82 14 129/65 94 %   19 1204 -- 71 12 117/59 93 %   19 1201 98.5 °F (36.9 °C) 73 12 114/60 94 %   19 1200 -- 76 -- 114/60 90 %   19 1032 -- 62 20 122/67 94 %   19 1017 -- 61 17 128/59 93 %   19 0959 -- 70 18 114/84 95 %   19 0942 -- 63 15 130/66 96 %   19 0927 -- 65 15 138/65 95 %   19 0912 -- 66 22 142/64 94 %   19 0907 -- 66 15 148/66 95 %   19 0903 -- 69 14 159/74 96 %   19 0858 -- 77 28 120/56 95 %   19 0853 -- 65 14 120/59 97 %   19 0851 -- 67 16 123/61 94 %   11/01/19 0837 98.2 °F (36.8 °C) 71 16 163/77 95 %   11/01/19 0749 -- -- -- -- 94 %   11/01/19 0727 98.2 °F (36.8 °C) 66 18 148/80 95 %   11/01/19 0218 98.1 °F (36.7 °C) 97 19 151/90 96 %   11/01/19 0028 -- 77 -- 160/81 97 %   10/31/19 2227 98.8 °F (37.1 °C) 94 18 136/79 93 %     Oxygen Therapy  O2 Sat (%): 95 % (11/01/19 1559)  Pulse via Oximetry: 75 beats per minute (11/01/19 1314)  O2 Device: Nasal cannula (11/01/19 1245)  O2 Flow Rate (L/min): 4 l/min (11/01/19 1245)    Estimated body mass index is 38.52 kg/m² as calculated from the following:    Height as of this encounter: 6' 2\" (1.88 m). Weight as of this encounter: 136.1 kg (300 lb). Intake/Output Summary (Last 24 hours) at 11/1/2019 1607  Last data filed at 11/1/2019 1245  Gross per 24 hour   Intake 880 ml   Output 770 ml   Net 110 ml       *Note that automatically entered I/Os may not be accurate; dependent on patient compliance with collection and accurate  by techs. General:    Well nourished. Alert. No distress   CV:   RRR. III/VI CONRAD LUSB, no edema, absent DP  Lungs:   CTAB. No wheezing, rhonchi, or rales. anterior  Abdomen:   Soft, nontender, nondistended. Extremities: Warm and dry.     Skin:     Erythema to LLE anterior foot and shin  Neuro:  No gross focal deficits    Data Review:  I have reviewed all labs, meds, and studies from the last 24 hours:    Recent Results (from the past 24 hour(s))   CBC WITH AUTOMATED DIFF    Collection Time: 10/31/19 10:30 PM   Result Value Ref Range    WBC 10.6 4.3 - 11.1 K/uL    RBC 4.94 4.23 - 5.6 M/uL    HGB 12.8 (L) 13.6 - 17.2 g/dL    HCT 39.3 (L) 41.1 - 50.3 %    MCV 79.6 79.6 - 97.8 FL    MCH 25.9 (L) 26.1 - 32.9 PG    MCHC 32.6 31.4 - 35.0 g/dL    RDW 17.4 (H) 11.9 - 14.6 %    PLATELET 076 303 - 699 K/uL    MPV 9.9 9.4 - 12.3 FL    ABSOLUTE NRBC 0.00 0.0 - 0.2 K/uL    NEUTROPHILS 77 43 - 78 %    LYMPHOCYTES 10 (L) 13 - 44 %    MONOCYTES 12 4.0 - 12.0 % EOSINOPHILS 0 (L) 0.5 - 7.8 %    BASOPHILS 0 0.0 - 2.0 %    IMMATURE GRANULOCYTES 1 0.0 - 5.0 %    ABS. NEUTROPHILS 8.1 1.7 - 8.2 K/UL    ABS. LYMPHOCYTES 1.1 0.5 - 4.6 K/UL    ABS. MONOCYTES 1.3 0.1 - 1.3 K/UL    ABS. EOSINOPHILS 0.0 0.0 - 0.8 K/UL    ABS. BASOPHILS 0.0 0.0 - 0.2 K/UL    ABS. IMM. GRANS. 0.1 0.0 - 0.5 K/UL    RBC COMMENTS NORMOCYTIC/NORMOCHROMIC      WBC COMMENTS Result Confirmed By Smear      PLATELET COMMENTS ADEQUATE      DF AUTOMATED     METABOLIC PANEL, COMPREHENSIVE    Collection Time: 10/31/19 10:30 PM   Result Value Ref Range    Sodium 132 (L) 136 - 145 mmol/L    Potassium 6.8 (HH) 3.5 - 5.1 mmol/L    Chloride 103 98 - 107 mmol/L    CO2 22 21 - 32 mmol/L    Anion gap 7 7 - 16 mmol/L    Glucose 176 (H) 65 - 100 mg/dL    BUN 30 (H) 8 - 23 MG/DL    Creatinine 1.79 (H) 0.8 - 1.5 MG/DL    GFR est AA 48 (L) >60 ml/min/1.73m2    GFR est non-AA 40 (L) >60 ml/min/1.73m2    Calcium 9.3 8.3 - 10.4 MG/DL    Bilirubin, total 1.6 (H) 0.2 - 1.1 MG/DL    ALT (SGPT) 23 12 - 65 U/L    AST (SGOT) 91 (H) 15 - 37 U/L    Alk.  phosphatase 71 50 - 136 U/L    Protein, total 8.7 (H) 6.3 - 8.2 g/dL    Albumin 2.6 (L) 3.2 - 4.6 g/dL    Globulin 6.1 (H) 2.3 - 3.5 g/dL    A-G Ratio 0.4 (L) 1.2 - 3.5     PROTHROMBIN TIME + INR    Collection Time: 10/31/19 10:30 PM   Result Value Ref Range    Prothrombin time 16.6 (H) 11.7 - 14.5 sec    INR 1.3     PTT    Collection Time: 10/31/19 10:30 PM   Result Value Ref Range    aPTT 37.5 24.7 - 39.8 SEC   POC LACTIC ACID    Collection Time: 10/31/19 10:52 PM   Result Value Ref Range    Lactic Acid (POC) 1.54 0.5 - 1.9 mmol/L   TYPE & SCREEN    Collection Time: 10/31/19 11:11 PM   Result Value Ref Range    Crossmatch Expiration 11/03/2019     ABO/Rh(D) Christie Murillo POSITIVE     Antibody screen NEG    POTASSIUM    Collection Time: 10/31/19 11:11 PM   Result Value Ref Range    Potassium 4.0 3.5 - 5.1 mmol/L   GLUCOSE, POC    Collection Time: 11/01/19  2:49 AM   Result Value Ref Range    Glucose (POC) 205 (H) 65 - 100 mg/dL   GLUCOSE, POC    Collection Time: 11/01/19  5:43 AM   Result Value Ref Range    Glucose (POC) 151 (H) 65 - 100 mg/dL   GLUCOSE, POC    Collection Time: 11/01/19  8:35 AM   Result Value Ref Range    Glucose (POC) 143 (H) 65 - 100 mg/dL   CBC WITH AUTOMATED DIFF    Collection Time: 11/01/19  8:37 AM   Result Value Ref Range    WBC 8.6 4.3 - 11.1 K/uL    RBC 4.74 4.23 - 5.6 M/uL    HGB 12.3 (L) 13.6 - 17.2 g/dL    HCT 38.6 (L) 41.1 - 50.3 %    MCV 81.4 79.6 - 97.8 FL    MCH 25.9 (L) 26.1 - 32.9 PG    MCHC 31.9 31.4 - 35.0 g/dL    RDW 17.0 (H) 11.9 - 14.6 %    PLATELET 439 655 - 078 K/uL    MPV 9.9 9.4 - 12.3 FL    ABSOLUTE NRBC 0.00 0.0 - 0.2 K/uL    DF AUTOMATED      NEUTROPHILS 74 43 - 78 %    LYMPHOCYTES 11 (L) 13 - 44 %    MONOCYTES 14 (H) 4.0 - 12.0 %    EOSINOPHILS 0 (L) 0.5 - 7.8 %    BASOPHILS 0 0.0 - 2.0 %    IMMATURE GRANULOCYTES 1 0.0 - 5.0 %    ABS. NEUTROPHILS 6.3 1.7 - 8.2 K/UL    ABS. LYMPHOCYTES 0.9 0.5 - 4.6 K/UL    ABS. MONOCYTES 1.2 0.1 - 1.3 K/UL    ABS. EOSINOPHILS 0.0 0.0 - 0.8 K/UL    ABS. BASOPHILS 0.0 0.0 - 0.2 K/UL    ABS. IMM. GRANS. 0.1 0.0 - 0.5 K/UL   METABOLIC PANEL, BASIC    Collection Time: 11/01/19  8:37 AM   Result Value Ref Range    Sodium 138 136 - 145 mmol/L    Potassium 4.0 3.5 - 5.1 mmol/L    Chloride 105 98 - 107 mmol/L    CO2 26 21 - 32 mmol/L    Anion gap 7 7 - 16 mmol/L    Glucose 143 (H) 65 - 100 mg/dL    BUN 30 (H) 8 - 23 MG/DL    Creatinine 1.60 (H) 0.8 - 1.5 MG/DL    GFR est AA 55 (L) >60 ml/min/1.73m2    GFR est non-AA 45 (L) >60 ml/min/1.73m2    Calcium 9.4 8.3 - 10.4 MG/DL   GLUCOSE, POC    Collection Time: 11/01/19 12:35 PM   Result Value Ref Range    Glucose (POC) 173 (H) 65 - 100 mg/dL        All Micro Results     None          No results found for this visit on 10/31/19.     Current Meds:  Current Facility-Administered Medications   Medication Dose Route Frequency    insulin NPH (NOVOLIN N, HUMULIN N) injection 42 Units  42 Units SubCUTAneous BID    hydrALAZINE (APRESOLINE) tablet 50 mg  50 mg Oral BID    aspirin delayed-release tablet 81 mg  81 mg Oral DAILY    levothyroxine (SYNTHROID) tablet 75 mcg  75 mcg Oral ACB    lisinopril (PRINIVIL, ZESTRIL) tablet 40 mg  40 mg Oral DAILY    metoprolol tartrate (LOPRESSOR) tablet 25 mg  25 mg Oral BID    oxyCODONE-acetaminophen (PERCOCET) 5-325 mg per tablet 2 Tab  2 Tab Oral Q4H PRN    nitroglycerin (NITROSTAT) tablet 0.4 mg  0.4 mg SubLINGual Q5MIN PRN    tamsulosin (FLOMAX) capsule 0.4 mg  0.4 mg Oral DAILY    dextrose 40% (GLUTOSE) oral gel 1 Tube  15 g Oral PRN    glucagon (GLUCAGEN) injection 1 mg  1 mg IntraMUSCular PRN    dextrose (D50W) injection syrg 12.5-25 g  25-50 mL IntraVENous PRN    insulin lispro (HUMALOG) injection   SubCUTAneous AC&HS    sodium chloride (NS) flush 5-40 mL  5-40 mL IntraVENous Q8H    sodium chloride (NS) flush 5-40 mL  5-40 mL IntraVENous PRN    tuberculin injection 5 Units  5 Units IntraDERMal ONCE    acetaminophen (TYLENOL) tablet 650 mg  650 mg Oral Q4H PRN    HYDROmorphone (PF) (DILAUDID) injection 1 mg  1 mg IntraVENous Q3H PRN    naloxone (NARCAN) injection 0.4 mg  0.4 mg IntraVENous PRN    diphenhydrAMINE (BENADRYL) injection 12.5 mg  12.5 mg IntraVENous Q4H PRN    ondansetron (ZOFRAN) injection 4 mg  4 mg IntraVENous Q4H PRN    heparin (porcine) injection 5,000 Units  5,000 Units SubCUTAneous Q8H    bisacodyl (DULCOLAX) tablet 5 mg  5 mg Oral DAILY PRN    nystatin (MYCOSTATIN) 100,000 unit/gram powder   Topical BID    0.9% sodium chloride infusion  150 mL/hr IntraVENous CONTINUOUS       Other Studies (last 24 hours):  Xr Femur Lt 2 V    Result Date: 11/1/2019  LEFT FEMUR 2 view(s). HISTORY: Above-knee amputation, preop assessment. TECHNIQUE: AP and lateral views. COMPARISON: None. FINDINGS: There is a total knee prosthesis. The femoral component does not extend above the femoral condyles. There is no central medullary stem.  There is a small stem on the tibial component. There are vascular clips medially. Dense arterial calcifications. IMPRESSION: Status post knee arthroplasty. There is no femoral stem.        Assessment and Plan:     Hospital Problems as of 11/1/2019 Date Reviewed: 10/15/2019          Codes Class Noted - Resolved POA    CAD (coronary artery disease) ICD-10-CM: I25.10  ICD-9-CM: 414.00  2/13/2019 - Present Yes        * (Principal) Ischemic leg pain ICD-10-CM: I99.8  ICD-9-CM: 459.9  11/1/2019 - Present Yes        Hypothyroidism ICD-10-CM: E03.9  ICD-9-CM: 244.9  11/1/2019 - Present Yes    Overview Signed 11/1/2019 12:10 AM by Hero Fraire MD     managed well with Synthroid             S/P bypass graft of extremity ICD-10-CM: Z95.828  ICD-9-CM: V45.89  4/19/2019 - Present Yes    Overview Signed 4/19/2019  9:38 AM by Aubrey Mathis NP     3/27/19 (Dr. Homa Barrera) Left common femoral to posterior tibial bypass with PTFE graft                 Severe obesity ([facility] ICD-10-CM: E66.01  ICD-9-CM: 278.01  4/12/2019 - Present Yes        Peripheral artery disease ([facility] [address] ICD-10-CM: I73.9  ICD-9-CM: 443.9  3/27/2019 - Present Yes        HTN (hypertension) ICD-10-CM: I10  ICD-9-CM: 401.9  2/14/2019 - Present Yes        DM (diabetes mellitus) ([facility] [address] ICD-10-CM: E11.9  ICD-9-CM: 250.00  2/14/2019 - Present Yes        Hx of CABG ICD-10-CM: Z95.1  ICD-9-CM: V45.81  2/14/2019 - Present Yes              Plan:  · Severe ischemic LLE PAD: pending AKA todau, continued on ancef, xarelto held  · HTN: continued hydralazine, lisinopril, metoprolol  · DM2: continued SSI and NPH  · CAD: continued asa  · CKD3: stable, followup BMP  · Elevated LFTS: followup labs    DC planning/Dispo:  PPD      Diet:  DIET REGULAR  DIET REGULAR  DVT ppx:  heparin    Signed:  Avi Willson MD

## 2019-11-02 LAB
ALBUMIN SERPL-MCNC: 2.3 G/DL (ref 3.2–4.6)
ALBUMIN/GLOB SERPL: 0.5 {RATIO} (ref 1.2–3.5)
ALP SERPL-CCNC: 64 U/L (ref 50–136)
ALT SERPL-CCNC: 19 U/L (ref 12–65)
ANION GAP SERPL CALC-SCNC: 6 MMOL/L (ref 7–16)
AST SERPL-CCNC: 35 U/L (ref 15–37)
BASOPHILS # BLD: 0 K/UL (ref 0–0.2)
BASOPHILS NFR BLD: 0 % (ref 0–2)
BILIRUB DIRECT SERPL-MCNC: 0.2 MG/DL
BILIRUB DIRECT SERPL-MCNC: 0.2 MG/DL
BILIRUB INDIRECT SERPL-MCNC: 0.4 MG/DL (ref 0–1.1)
BILIRUB SERPL-MCNC: 0.6 MG/DL (ref 0.2–1.1)
BILIRUB SERPL-MCNC: 0.7 MG/DL (ref 0.2–1.1)
BUN SERPL-MCNC: 42 MG/DL (ref 8–23)
CALCIUM SERPL-MCNC: 9.1 MG/DL (ref 8.3–10.4)
CHLORIDE SERPL-SCNC: 106 MMOL/L (ref 98–107)
CO2 SERPL-SCNC: 26 MMOL/L (ref 21–32)
CREAT SERPL-MCNC: 1.65 MG/DL (ref 0.8–1.5)
DIFFERENTIAL METHOD BLD: ABNORMAL
EOSINOPHIL # BLD: 0 K/UL (ref 0–0.8)
EOSINOPHIL NFR BLD: 0 % (ref 0.5–7.8)
ERYTHROCYTE [DISTWIDTH] IN BLOOD BY AUTOMATED COUNT: 17.2 % (ref 11.9–14.6)
GLOBULIN SER CALC-MCNC: 4.9 G/DL (ref 2.3–3.5)
GLUCOSE BLD STRIP.AUTO-MCNC: 150 MG/DL (ref 65–100)
GLUCOSE BLD STRIP.AUTO-MCNC: 191 MG/DL (ref 65–100)
GLUCOSE BLD STRIP.AUTO-MCNC: 211 MG/DL (ref 65–100)
GLUCOSE BLD STRIP.AUTO-MCNC: 214 MG/DL (ref 65–100)
GLUCOSE SERPL-MCNC: 205 MG/DL (ref 65–100)
HCT VFR BLD AUTO: 34.9 % (ref 41.1–50.3)
HGB BLD-MCNC: 10.9 G/DL (ref 13.6–17.2)
IMM GRANULOCYTES # BLD AUTO: 0.1 K/UL (ref 0–0.5)
IMM GRANULOCYTES NFR BLD AUTO: 1 % (ref 0–5)
LYMPHOCYTES # BLD: 0.8 K/UL (ref 0.5–4.6)
LYMPHOCYTES NFR BLD: 12 % (ref 13–44)
MCH RBC QN AUTO: 25.9 PG (ref 26.1–32.9)
MCHC RBC AUTO-ENTMCNC: 31.2 G/DL (ref 31.4–35)
MCV RBC AUTO: 82.9 FL (ref 79.6–97.8)
MM INDURATION POC: 0 MM (ref 0–5)
MONOCYTES # BLD: 0.8 K/UL (ref 0.1–1.3)
MONOCYTES NFR BLD: 13 % (ref 4–12)
NEUTS SEG # BLD: 4.6 K/UL (ref 1.7–8.2)
NEUTS SEG NFR BLD: 74 % (ref 43–78)
NRBC # BLD: 0 K/UL (ref 0–0.2)
PLATELET # BLD AUTO: 226 K/UL (ref 150–450)
PMV BLD AUTO: 10.4 FL (ref 9.4–12.3)
POTASSIUM SERPL-SCNC: 4.7 MMOL/L (ref 3.5–5.1)
PPD POC: NEGATIVE NEGATIVE
PROT SERPL-MCNC: 7.2 G/DL (ref 6.3–8.2)
RBC # BLD AUTO: 4.21 M/UL (ref 4.23–5.6)
SODIUM SERPL-SCNC: 138 MMOL/L (ref 136–145)
WBC # BLD AUTO: 6.3 K/UL (ref 4.3–11.1)

## 2019-11-02 PROCEDURE — 74011250636 HC RX REV CODE- 250/636: Performed by: HOSPITALIST

## 2019-11-02 PROCEDURE — 80048 BASIC METABOLIC PNL TOTAL CA: CPT

## 2019-11-02 PROCEDURE — 77030019605

## 2019-11-02 PROCEDURE — 94760 N-INVAS EAR/PLS OXIMETRY 1: CPT

## 2019-11-02 PROCEDURE — 74011636637 HC RX REV CODE- 636/637: Performed by: INTERNAL MEDICINE

## 2019-11-02 PROCEDURE — 82962 GLUCOSE BLOOD TEST: CPT

## 2019-11-02 PROCEDURE — 80076 HEPATIC FUNCTION PANEL: CPT

## 2019-11-02 PROCEDURE — 74011250636 HC RX REV CODE- 250/636: Performed by: INTERNAL MEDICINE

## 2019-11-02 PROCEDURE — 74011250637 HC RX REV CODE- 250/637: Performed by: HOSPITALIST

## 2019-11-02 PROCEDURE — 36415 COLL VENOUS BLD VENIPUNCTURE: CPT

## 2019-11-02 PROCEDURE — 74011250637 HC RX REV CODE- 250/637: Performed by: INTERNAL MEDICINE

## 2019-11-02 PROCEDURE — 77030020263 HC SOL INJ SOD CL0.9% LFCR 1000ML

## 2019-11-02 PROCEDURE — 65270000029 HC RM PRIVATE

## 2019-11-02 PROCEDURE — 74011636637 HC RX REV CODE- 636/637: Performed by: HOSPITALIST

## 2019-11-02 PROCEDURE — 85025 COMPLETE CBC W/AUTO DIFF WBC: CPT

## 2019-11-02 PROCEDURE — 77010033678 HC OXYGEN DAILY

## 2019-11-02 PROCEDURE — 82248 BILIRUBIN DIRECT: CPT

## 2019-11-02 RX ORDER — DOCUSATE SODIUM 100 MG/1
100 CAPSULE, LIQUID FILLED ORAL 2 TIMES DAILY
Status: DISCONTINUED | OUTPATIENT
Start: 2019-11-02 | End: 2019-11-05 | Stop reason: HOSPADM

## 2019-11-02 RX ADMIN — HYDROMORPHONE HYDROCHLORIDE 1 MG: 1 INJECTION, SOLUTION INTRAMUSCULAR; INTRAVENOUS; SUBCUTANEOUS at 13:27

## 2019-11-02 RX ADMIN — DOCUSATE SODIUM 100 MG: 100 CAPSULE, LIQUID FILLED ORAL at 17:46

## 2019-11-02 RX ADMIN — HYDRALAZINE HYDROCHLORIDE 50 MG: 50 TABLET, FILM COATED ORAL at 17:46

## 2019-11-02 RX ADMIN — SODIUM CHLORIDE 75 ML/HR: 900 INJECTION, SOLUTION INTRAVENOUS at 16:46

## 2019-11-02 RX ADMIN — Medication 10 ML: at 21:53

## 2019-11-02 RX ADMIN — HEPARIN SODIUM 5000 UNITS: 5000 INJECTION INTRAVENOUS; SUBCUTANEOUS at 05:48

## 2019-11-02 RX ADMIN — HYDROMORPHONE HYDROCHLORIDE 1 MG: 1 INJECTION, SOLUTION INTRAMUSCULAR; INTRAVENOUS; SUBCUTANEOUS at 02:31

## 2019-11-02 RX ADMIN — HYDROMORPHONE HYDROCHLORIDE 1 MG: 1 INJECTION, SOLUTION INTRAMUSCULAR; INTRAVENOUS; SUBCUTANEOUS at 23:55

## 2019-11-02 RX ADMIN — HEPARIN SODIUM 5000 UNITS: 5000 INJECTION INTRAVENOUS; SUBCUTANEOUS at 21:51

## 2019-11-02 RX ADMIN — INSULIN HUMAN 45 UNITS: 100 INJECTION, SUSPENSION SUBCUTANEOUS at 10:06

## 2019-11-02 RX ADMIN — METOPROLOL TARTRATE 25 MG: 25 TABLET ORAL at 17:46

## 2019-11-02 RX ADMIN — INSULIN HUMAN 45 UNITS: 100 INJECTION, SUSPENSION SUBCUTANEOUS at 17:50

## 2019-11-02 RX ADMIN — INSULIN LISPRO 4 UNITS: 100 INJECTION, SOLUTION INTRAVENOUS; SUBCUTANEOUS at 17:45

## 2019-11-02 RX ADMIN — LEVOTHYROXINE SODIUM 75 MCG: 75 TABLET ORAL at 05:50

## 2019-11-02 RX ADMIN — SODIUM CHLORIDE 150 ML/HR: 900 INJECTION, SOLUTION INTRAVENOUS at 05:42

## 2019-11-02 RX ADMIN — HEPARIN SODIUM 5000 UNITS: 5000 INJECTION INTRAVENOUS; SUBCUTANEOUS at 13:26

## 2019-11-02 RX ADMIN — HYDROMORPHONE HYDROCHLORIDE 1 MG: 1 INJECTION, SOLUTION INTRAMUSCULAR; INTRAVENOUS; SUBCUTANEOUS at 16:42

## 2019-11-02 RX ADMIN — Medication 10 ML: at 05:12

## 2019-11-02 RX ADMIN — NYSTATIN: 100000 POWDER TOPICAL at 10:06

## 2019-11-02 RX ADMIN — DOCUSATE SODIUM 100 MG: 100 CAPSULE, LIQUID FILLED ORAL at 10:05

## 2019-11-02 RX ADMIN — ASPIRIN 81 MG: 81 TABLET, COATED ORAL at 09:00

## 2019-11-02 RX ADMIN — LISINOPRIL 40 MG: 20 TABLET ORAL at 10:06

## 2019-11-02 RX ADMIN — INSULIN LISPRO 2 UNITS: 100 INJECTION, SOLUTION INTRAVENOUS; SUBCUTANEOUS at 13:28

## 2019-11-02 RX ADMIN — Medication 5 ML: at 13:29

## 2019-11-02 RX ADMIN — INSULIN LISPRO 2 UNITS: 100 INJECTION, SOLUTION INTRAVENOUS; SUBCUTANEOUS at 21:49

## 2019-11-02 RX ADMIN — TAMSULOSIN HYDROCHLORIDE 0.4 MG: 0.4 CAPSULE ORAL at 10:02

## 2019-11-02 RX ADMIN — METOPROLOL TARTRATE 25 MG: 25 TABLET ORAL at 10:02

## 2019-11-02 RX ADMIN — HYDRALAZINE HYDROCHLORIDE 50 MG: 50 TABLET, FILM COATED ORAL at 10:05

## 2019-11-02 RX ADMIN — INSULIN LISPRO 4 UNITS: 100 INJECTION, SOLUTION INTRAVENOUS; SUBCUTANEOUS at 05:50

## 2019-11-02 RX ADMIN — HYDROMORPHONE HYDROCHLORIDE 1 MG: 1 INJECTION, SOLUTION INTRAMUSCULAR; INTRAVENOUS; SUBCUTANEOUS at 10:00

## 2019-11-02 RX ADMIN — HYDROMORPHONE HYDROCHLORIDE 1 MG: 1 INJECTION, SOLUTION INTRAMUSCULAR; INTRAVENOUS; SUBCUTANEOUS at 05:52

## 2019-11-02 NOTE — PROGRESS NOTES
CM met with patient / family at bedside to discuss d/c plan. Patient alert/orient to name, place and . Patient family verified demographic. Patient / spouse lives in a one level home with four steps to enter into the home. Patient was independent with his ADL's and has no DME's in the home. Patient / family states patient will need rehab before returning back home. Family interested in going thru [place] so patient would be able to stay longer to receive the therapy that is need since patient had a AKA on Friday. CM provided a provider list to family. Spouse will be bring in [place] contact information to make contact with the [place] on Monday. CM will continue to monitor. Care Management Interventions  PCP Verified by CM: Yes(Mayra Schumacher MD)  Mode of Transport at Discharge:  Other (see comment)([facility])  Transition of Care Consult (CM Consult): Discharge Planning, SNF  Discharge Durable Medical Equipment: No  Physical Therapy Consult: Yes  Occupational Therapy Consult: Yes  Speech Therapy Consult: No  Current Support Network: Own Home, Lives with Spouse  Confirm Follow Up Transport: Other (see comment)([facility] )  Plan discussed with Pt/Family/Caregiver: Yes  Freedom of Choice Offered: Yes  [employer] Provided?: No  Discharge Location  Discharge Placement: Skilled nursing facility

## 2019-11-02 NOTE — PROGRESS NOTES
Patient dozed off and awakened confused, took off dressing to left leg and spelled beverages. C/o severe pain, Diluadid 1 mg ivp given per prn order. Redressed leg, staples intact, approximated. Fall precautions in place. Will monitor.

## 2019-11-02 NOTE — OP NOTES
[address] [facility]  OPERATIVE REPORT    Name:  Angie Gilbert  MR#:  [id number]  :  1945  ACCOUNT #:  [de-identified]  DATE OF SERVICE:  2019      CLINICAL SERVICE:  Vascular Surgery. PREOPERATIVE DIAGNOSIS:  Ischemic left leg, nonreconstructible. POSTOPERATIVE DIAGNOSIS:  Ischemic left leg, nonreconstructible. PROCEDURE PERFORMED:  Left above-the-knee amputation. SURGEON:  Unruly Menendez. Brooklynn Martinez MD    ASSISTANT:      ANESTHESIA:  General.    COMPLICATIONS:  None. SPECIMENS REMOVED:  Left leg. IMPLANTS:      ESTIMATED BLOOD LOSS:      INDICATION FOR PROCEDURE:  This is a 28-year-old male with history of significant peripheral vascular disease who underwent a left common femoral to distal PT, anterior tibial artery several years ago. He presented to my office with high-grade stenosis distally. Of note, the patient had significant outflow disease with small vessels. We had a long discussion with the patient and potentially we elected to proceed. We underwent arteriogram.  Two weeks later, came back and the graft was occluded. The patient had now exhausted any other revascular options secondary to significant poor outflow, so we elected to proceed with left above-the-knee amputation. PROCEDURE IN DETAIL:  After getting informed consent, the patient was brought to the operating room, anesthesia was then induced. Preop antibiotics were given before skin incision. The patient's left leg was then prepped and draped in normal sterile fashion. A fishmouth incision was made just above the distal femur, we heike out. We used a 10-blade for a skin incision anterior and posterior. We dissected down with electrocautery through the anterior compartment and posterior compartment. The graft was tunneled subcutaneously which was occluded which we resected.   Once we circumferentially dissected around the femur, the vessels were identified on the medial aspect and suture ligated with 2-0 silk pop-off. We then resected the femur with anterior bevel. We completed our amputation with amputation knife. The wound was then irrigated out with antibiotic solution. Bleeding areas were then suture ligated with 2-0 silk pop-offs. The wound was then irrigated out with antibiotic solution. We approximated the fascia with 2-0 Vicryls and staples for the skin. The patient was extubated and transferred to the PACU in stable condition.       Clark OfficerMD [email or web address]  D:  11/01/2019 12:31  T:  11/01/2019 21:10  JOB #:  [id number]

## 2019-11-02 NOTE — PROGRESS NOTES
Patient awakened, requested medication for pain. Diluadid 1 mg ivp given per prn order. Dressing and ace wrap to left leg surgical site, dry and intact. Will monitor.

## 2019-11-02 NOTE — PROGRESS NOTES
[address]   [address] [facility] FAX: [phone]         VASCULAR SURGERY FLOOR PROGRESS NOTE    Admit Date: 10/31/2019  POD: 1 Day Post-Op    Procedure:  Procedure(s):  LEFT AMPUTATION KNEE(AKA)    Subjective:     Patient has no new complaints. Objective:     Vitals:  Blood pressure 131/71, pulse (!) 55, temperature 97.7 °F (36.5 °C), resp. rate 19, height 6' 2\" (1.88 m), weight 300 lb (136.1 kg), SpO2 93 %. Temp (24hrs), Av.2 °F (36.8 °C), Min:97.7 °F (36.5 °C), Max:98.6 °F (37 °C)      Intake / Output:    Intake/Output Summary (Last 24 hours) at 2019 1104  Last data filed at 2019 0704  Gross per 24 hour   Intake 2005 ml   Output 650 ml   Net 1355 ml       Physical Exam:    Constitutional: he appears well-developed. No distress. HENT:   Head: Atraumatic. Eyes: Pupils are equal, round, and reactive to light. Neck: Normal range of motion. Cardiovascular: Regular rhythm. Pulmonary/Chest: Effort normal and breath sounds normal. No respiratory distress. Abdominal: Soft. Bowel sounds are normal. he exhibits no distension. There is no tenderness. There is no guarding. No hernia. Musculoskeletal: Normal range of motion. Neurological: He is alert.  CN II- XII grossly intact  Vascular: Left above-knee amputation stump stable    Labs:   Recent Labs     19  0621 19  0837   HGB 10.9* 12.3*   WBC 6.3 8.6   K 4.7 4.0   * 143*       Data Review     Assessment:     Patient Active Problem List    Diagnosis Date Noted    CAD (coronary artery disease) 2019     Priority: 1 - One    Ischemic leg pain 2019    Hypothyroidism 2019    Bypass graft stenosis ([address] 10/15/2019    Yeast infection 2019    S/P bypass graft of extremity 2019    Severe obesity ([address] 2019    Peripheral artery disease ([address] 2019    Ankle ulcer ([address] 2019    PAD (peripheral artery disease) ([facility] 2019    HTN (hypertension) 02/14/2019    DM (diabetes mellitus) ([facility] [address] [address] 02/14/2019    Dyslipidemia 02/14/2019    Hx of CABG 02/14/2019       Plan/Recommendations/Medical Decision Making:     Can keep left amputation stump open to air  Consult physical therapy most likely will need outpatient rehab  -Can discontinue anticoagulation to Xarelto as indication was for to keep the patency of the lower extremity bypass      Elements of this note have been dictated using speech recognition software. As a result, errors of speech recognition may have occurred.

## 2019-11-02 NOTE — PROGRESS NOTES
Patient requested medication for pain at 0530. Dilaudid 1 mg ivp given per prn order. Resting quietly at this time.   Will give report to oncoming RN

## 2019-11-02 NOTE — PROGRESS NOTES
Patient requested medication for pain at 2300. Diluadid 1 mg ivp given per prn order. Has been resting quietly, eyes closed, respirations easy and unlabored. Will monitor.

## 2019-11-03 LAB
ANION GAP SERPL CALC-SCNC: 6 MMOL/L (ref 7–16)
BASOPHILS # BLD: 0 K/UL (ref 0–0.2)
BASOPHILS NFR BLD: 1 % (ref 0–2)
BUN SERPL-MCNC: 42 MG/DL (ref 8–23)
CALCIUM SERPL-MCNC: 8.6 MG/DL (ref 8.3–10.4)
CHLORIDE SERPL-SCNC: 109 MMOL/L (ref 98–107)
CO2 SERPL-SCNC: 25 MMOL/L (ref 21–32)
CREAT SERPL-MCNC: 1.46 MG/DL (ref 0.8–1.5)
DIFFERENTIAL METHOD BLD: ABNORMAL
EOSINOPHIL # BLD: 0.1 K/UL (ref 0–0.8)
EOSINOPHIL NFR BLD: 2 % (ref 0.5–7.8)
ERYTHROCYTE [DISTWIDTH] IN BLOOD BY AUTOMATED COUNT: 17.1 % (ref 11.9–14.6)
GLUCOSE BLD STRIP.AUTO-MCNC: 143 MG/DL (ref 65–100)
GLUCOSE BLD STRIP.AUTO-MCNC: 167 MG/DL (ref 65–100)
GLUCOSE BLD STRIP.AUTO-MCNC: 189 MG/DL (ref 65–100)
GLUCOSE BLD STRIP.AUTO-MCNC: 206 MG/DL (ref 65–100)
GLUCOSE BLD STRIP.AUTO-MCNC: 228 MG/DL (ref 65–100)
GLUCOSE SERPL-MCNC: 144 MG/DL (ref 65–100)
HCT VFR BLD AUTO: 34.8 % (ref 41.1–50.3)
HGB BLD-MCNC: 10.7 G/DL (ref 13.6–17.2)
IMM GRANULOCYTES # BLD AUTO: 0 K/UL (ref 0–0.5)
IMM GRANULOCYTES NFR BLD AUTO: 0 % (ref 0–5)
LYMPHOCYTES # BLD: 1.2 K/UL (ref 0.5–4.6)
LYMPHOCYTES NFR BLD: 25 % (ref 13–44)
MCH RBC QN AUTO: 25.7 PG (ref 26.1–32.9)
MCHC RBC AUTO-ENTMCNC: 30.7 G/DL (ref 31.4–35)
MCV RBC AUTO: 83.5 FL (ref 79.6–97.8)
MM INDURATION POC: 0 MM (ref 0–5)
MONOCYTES # BLD: 0.5 K/UL (ref 0.1–1.3)
MONOCYTES NFR BLD: 11 % (ref 4–12)
NEUTS SEG # BLD: 2.9 K/UL (ref 1.7–8.2)
NEUTS SEG NFR BLD: 61 % (ref 43–78)
NRBC # BLD: 0 K/UL (ref 0–0.2)
PLATELET # BLD AUTO: 225 K/UL (ref 150–450)
PMV BLD AUTO: 10.3 FL (ref 9.4–12.3)
POTASSIUM SERPL-SCNC: 4.4 MMOL/L (ref 3.5–5.1)
PPD POC: NEGATIVE NEGATIVE
RBC # BLD AUTO: 4.17 M/UL (ref 4.23–5.6)
SODIUM SERPL-SCNC: 140 MMOL/L (ref 136–145)
WBC # BLD AUTO: 4.8 K/UL (ref 4.3–11.1)

## 2019-11-03 PROCEDURE — 80048 BASIC METABOLIC PNL TOTAL CA: CPT

## 2019-11-03 PROCEDURE — 77030020255 HC SOL INJ LR 1000ML BG

## 2019-11-03 PROCEDURE — 74011636637 HC RX REV CODE- 636/637: Performed by: INTERNAL MEDICINE

## 2019-11-03 PROCEDURE — 97161 PT EVAL LOW COMPLEX 20 MIN: CPT

## 2019-11-03 PROCEDURE — 77030020263 HC SOL INJ SOD CL0.9% LFCR 1000ML

## 2019-11-03 PROCEDURE — 74011250637 HC RX REV CODE- 250/637: Performed by: HOSPITALIST

## 2019-11-03 PROCEDURE — 36415 COLL VENOUS BLD VENIPUNCTURE: CPT

## 2019-11-03 PROCEDURE — 65270000029 HC RM PRIVATE

## 2019-11-03 PROCEDURE — 97530 THERAPEUTIC ACTIVITIES: CPT

## 2019-11-03 PROCEDURE — 74011636637 HC RX REV CODE- 636/637: Performed by: HOSPITALIST

## 2019-11-03 PROCEDURE — 97535 SELF CARE MNGMENT TRAINING: CPT

## 2019-11-03 PROCEDURE — 85025 COMPLETE CBC W/AUTO DIFF WBC: CPT

## 2019-11-03 PROCEDURE — 74011250637 HC RX REV CODE- 250/637: Performed by: INTERNAL MEDICINE

## 2019-11-03 PROCEDURE — 74011250636 HC RX REV CODE- 250/636: Performed by: INTERNAL MEDICINE

## 2019-11-03 PROCEDURE — 97165 OT EVAL LOW COMPLEX 30 MIN: CPT

## 2019-11-03 PROCEDURE — 82962 GLUCOSE BLOOD TEST: CPT

## 2019-11-03 PROCEDURE — 74011250636 HC RX REV CODE- 250/636: Performed by: HOSPITALIST

## 2019-11-03 RX ORDER — FACIAL-BODY WIPES
10 EACH TOPICAL DAILY PRN
Status: DISCONTINUED | OUTPATIENT
Start: 2019-11-03 | End: 2019-11-05 | Stop reason: HOSPADM

## 2019-11-03 RX ADMIN — INSULIN HUMAN 45 UNITS: 100 INJECTION, SUSPENSION SUBCUTANEOUS at 17:55

## 2019-11-03 RX ADMIN — HEPARIN SODIUM 5000 UNITS: 5000 INJECTION INTRAVENOUS; SUBCUTANEOUS at 05:33

## 2019-11-03 RX ADMIN — Medication 5 ML: at 17:58

## 2019-11-03 RX ADMIN — DOCUSATE SODIUM 100 MG: 100 CAPSULE, LIQUID FILLED ORAL at 17:56

## 2019-11-03 RX ADMIN — HYDROMORPHONE HYDROCHLORIDE 1 MG: 1 INJECTION, SOLUTION INTRAMUSCULAR; INTRAVENOUS; SUBCUTANEOUS at 20:14

## 2019-11-03 RX ADMIN — LEVOTHYROXINE SODIUM 75 MCG: 75 TABLET ORAL at 05:31

## 2019-11-03 RX ADMIN — METOPROLOL TARTRATE 25 MG: 25 TABLET ORAL at 10:13

## 2019-11-03 RX ADMIN — TAMSULOSIN HYDROCHLORIDE 0.4 MG: 0.4 CAPSULE ORAL at 10:14

## 2019-11-03 RX ADMIN — INSULIN LISPRO 2 UNITS: 100 INJECTION, SOLUTION INTRAVENOUS; SUBCUTANEOUS at 12:37

## 2019-11-03 RX ADMIN — HEPARIN SODIUM 5000 UNITS: 5000 INJECTION INTRAVENOUS; SUBCUTANEOUS at 21:11

## 2019-11-03 RX ADMIN — HYDRALAZINE HYDROCHLORIDE 50 MG: 50 TABLET, FILM COATED ORAL at 17:56

## 2019-11-03 RX ADMIN — HYDRALAZINE HYDROCHLORIDE 50 MG: 50 TABLET, FILM COATED ORAL at 10:15

## 2019-11-03 RX ADMIN — HYDROMORPHONE HYDROCHLORIDE 1 MG: 1 INJECTION, SOLUTION INTRAMUSCULAR; INTRAVENOUS; SUBCUTANEOUS at 03:52

## 2019-11-03 RX ADMIN — ASPIRIN 81 MG: 81 TABLET, COATED ORAL at 10:15

## 2019-11-03 RX ADMIN — Medication 10 ML: at 21:13

## 2019-11-03 RX ADMIN — OXYCODONE HYDROCHLORIDE AND ACETAMINOPHEN 2 TABLET: 5; 325 TABLET ORAL at 10:13

## 2019-11-03 RX ADMIN — Medication 10 ML: at 05:33

## 2019-11-03 RX ADMIN — METOPROLOL TARTRATE 25 MG: 25 TABLET ORAL at 17:56

## 2019-11-03 RX ADMIN — LISINOPRIL 40 MG: 20 TABLET ORAL at 10:15

## 2019-11-03 RX ADMIN — INSULIN LISPRO 4 UNITS: 100 INJECTION, SOLUTION INTRAVENOUS; SUBCUTANEOUS at 21:12

## 2019-11-03 RX ADMIN — HEPARIN SODIUM 5000 UNITS: 5000 INJECTION INTRAVENOUS; SUBCUTANEOUS at 17:52

## 2019-11-03 RX ADMIN — INSULIN HUMAN 45 UNITS: 100 INJECTION, SUSPENSION SUBCUTANEOUS at 10:13

## 2019-11-03 RX ADMIN — SODIUM CHLORIDE 75 ML/HR: 900 INJECTION, SOLUTION INTRAVENOUS at 03:54

## 2019-11-03 RX ADMIN — DOCUSATE SODIUM 100 MG: 100 CAPSULE, LIQUID FILLED ORAL at 10:13

## 2019-11-03 RX ADMIN — INSULIN LISPRO 4 UNITS: 100 INJECTION, SOLUTION INTRAVENOUS; SUBCUTANEOUS at 17:55

## 2019-11-03 NOTE — PROGRESS NOTES
Initial visit to assess pt's spiritual needs. Medical staff in pt's room. Left a card.       Chaplain Willard Varghese MDiv,ThM,PhD

## 2019-11-03 NOTE — PROGRESS NOTES
Problem: Mobility Impaired (Adult and Pediatric)  Goal: *Acute Goals and Plan of Care (Insert Text)  Description  LTG:  (1.)Mr. Herrera will move from supine to sit and sit to supine , scoot up and down and roll side to side in bed with MINIMAL ASSIST within 7 treatment day(s). (2.)Mr. Herrera will transfer from bed to chair and chair to bed with MINIMAL ASSIST using the least restrictive device within 7 treatment day(s). (3.)Mr. Herrera will ambulate with MINIMAL ASSIST for >or=25 feet with the least restrictive device, fair dynamic standing balance within 7 treatment day(s). (4.)Mr. Herrera will perform B LE therapeutic exercises (per AKA protocol on L LE) x 20 reps with SUPERVISION within 7 days to increase strength for improved safety and independence in transfers and gait. (5.)Mr. Herrera will tolerate sitting upright x 4 hours to increase tolerance for physical activity within 7 treatment days. (6.)Mr. Herrera will propel manual wheelchair along flat surfaces 250' with B UE's and supervision within 7 treatment days for improved mobility in the community and home.   ________________________________________________________________________________________________     Outcome: Progressing Towards Goal      PHYSICAL THERAPY: Initial Assessment, Daily Note and AM 11/3/2019  INPATIENT: PT Visit Days : 1  [place] / [place] / Product Type: Medicare /       NAME/AGE/GENDER: Carmelo Aviles is a 69 y.o. male   PRIMARY DIAGNOSIS: Ischemic leg pain [I99.8] Ischemic leg pain   Ischemic leg pain    Procedure(s) (LRB):  LEFT AMPUTATION KNEE(AKA) (Left)  2 Days Post-Op  ICD-10: Treatment Diagnosis:    Generalized Muscle Weakness (M62.81)  Difficulty in walking, Not elsewhere classified (R26.2)   Precaution/Allergies:  Gabapentin and Rosuvastatin      ASSESSMENT:     Mr. Shreyas Juan presents supine in bed, alert and agreeable to treatment. Pt s/p L AKA secondary to ischemia.  Pt was independent with all functional mobility and gait without assistive device prior to surgery. Currently, pt is requiring mod assist for sit to supine and mod assist x 2 for sit to stand to walker. Pt's sitting balance is fair/good and standing balance is fair/poor with constant support. Pt practiced sit to stand x 5 and worked on standing balance while OT performed co-treatment and addressed self care activities. Pt performed SPT bed to chair with mod assist x 2. Assistance required for walker management during transfer. Pt is motivated and plans to get a prosthesis when cleared to do so. Pt positioned for comfort up in chair with LE's elevated. Discussed positioning techniques for residual limb to avoid contracture formation. Also discussed need for pressure relief to avoid skin breakdown. Pt demonstrated how to do a wheelchair push-up to give some pressure relief and verbalized understanding. RN agreed to assist pt back to be when ready. Call bell in reach and chair alarm on. Strongly recommend rehab. Skilled PT to con't to follow secondary to pt not functioning at baseline.      This section established at most recent assessment   PROBLEM LIST (Impairments causing functional limitations):  Decreased Strength  Decreased ADL/Functional Activities  Decreased Transfer Abilities  Decreased Ambulation Ability/Technique  Decreased Balance  Increased Pain  Decreased Activity Tolerance  Decreased Flexibility/Joint Mobility  Decreased Skin Integrity/Hygeine  Decreased Naches with Home Exercise Program   INTERVENTIONS PLANNED: (Benefits and precautions of physical therapy have been discussed with the patient.)  Balance Exercise  Bed Mobility  Gait Training  Home Exercise Program (HEP)  Range of Motion (ROM)  Therapeutic Activites  Therapeutic Exercise/Strengthening  Transfer Training     TREATMENT PLAN: Frequency/Duration: 3 times a week for duration of hospital stay  Rehabilitation Potential For Stated Goals: Good     REHAB RECOMMENDATIONS (at time of discharge pending progress):    Placement: It is my opinion, based on this patient's performance to date, that Mr. Herrera may benefit from intensive therapy at an [facility] after discharge due to potential to make ongoing and sustainable functional improvement that is of practical value. .  Equipment:   Will need to be assessed by rehab facility               HISTORY:   History of Present Injury/Illness (Reason for Referral):  Per chart: This is a 72-year-old male with history of significant peripheral vascular disease who underwent a left common femoral to distal PT, anterior tibial artery several years ago. He presented to my office with high-grade stenosis distally. Of note, the patient had significant outflow disease with small vessels. We had a long discussion with the patient and potentially we elected to proceed. We underwent arteriogram.  Two weeks later, came back and the graft was occluded. The patient had now exhausted any other revascular options secondary to significant poor outflow, so we elected to proceed with left above-the-knee amputation. Past Medical History/Comorbidities:   Mr. Regina Romo  has a past medical history of Arthritis, CAD (coronary artery disease), Enlarged prostate, Former cigarette smoker, History of hepatitis (age 13), CABG, Hypercholesteremia, Hypertension, Hypothyroidism, Hypothyroidism (11/1/2019), Left leg pain, Long term (current) use of systemic steroids, PAD (peripheral artery disease) ([address] RBBB, Type 2 diabetes mellitus ([facility] and Vitamin D deficiency. He also has no past medical history of Difficult intubation, Malignant hyperthermia due to anesthesia, Nausea & vomiting, or Pseudocholinesterase deficiency.   Mr. Regina Romo  has a past surgical history that includes pr cardiac surg procedure unlist; hx heart catheterization; hx colonoscopy; hx hemorrhoidectomy; hx knee replacement (Bilateral); and vascular surgery procedure aranza (03/27/2019). Social History/Living Environment:   Home Environment: Private residence  # Steps to Enter: 4  Rails to Enter: Yes  Hand Rails : Bilateral  Wheelchair Ramp: No  One/Two Story Residence: One story  Living Alone: No  Support Systems: Family member(s), Spouse/Significant Other/Partner  Patient Expects to be Discharged to[de-identified] Private residence  Current DME Used/Available at Home: None  Prior Level of Function/Work/Activity:  Pt was independent with all functional mobility and gait without assistive device. Lives with wife in one level home, 4 steps to enter. Was driving. No recent falls. Son lives next door and is a physical therapist per pt. Personal Factors:          Sex:  male        Age:  69 y.o. Number of Personal Factors/Comorbidities that affect the Plan of Care: 1-2: MODERATE COMPLEXITY   EXAMINATION:   Most Recent Physical Functioning:   Gross Assessment:  AROM: Generally decreased, functional  Strength: Generally decreased, functional  Coordination: Within functional limits  Sensation: Intact               Posture:  Posture (WDL): Exceptions to WDL  Posture Assessment: Forward head, Rounded shoulders  Balance:  Sitting: Impaired  Sitting - Static: Good (unsupported)  Sitting - Dynamic: Fair (occasional)  Standing: Impaired  Standing - Static: Fair;Constant support  Standing - Dynamic : Poor;Constant support Bed Mobility:  Supine to Sit: Moderate assistance  Scooting: Contact guard assistance  Interventions: Safety awareness training;Verbal cues  Wheelchair Mobility:     Transfers:  Sit to Stand:  Moderate assistance;Assist x2  Stand to Sit: Moderate assistance;Assist x2  Bed to Chair: Moderate assistance;Assist x2  Interventions: Safety awareness training;Verbal cues  Gait:            Body Structures Involved:  Nerves  Bones  Joints  Muscles  Ligaments Body Functions Affected:  Neuromusculoskeletal  Movement Related Activities and Participation Affected:  General Tasks and Demands  Mobility  Community, Social and [place]   Number of elements that affect the Plan of Care: 4+: HIGH COMPLEXITY   CLINICAL PRESENTATION:   Presentation: Stable and uncomplicated: LOW COMPLEXITY   CLINICAL DECISION MAKIN [facility] Mobility Inpatient Short Form  How much difficulty does the patient currently have. .. Unable A Lot A Little None   1. Turning over in bed (including adjusting bedclothes, sheets and blankets)? ? 1   ? 2   ? 3   ? 4   2. Sitting down on and standing up from a chair with arms ( e.g., wheelchair, bedside commode, etc.)   ? 1   ? 2   ? 3   ? 4   3. Moving from lying on back to sitting on the side of the bed?   ? 1   ? 2   ? 3   ? 4   How much help from another person does the patient currently need. .. Total A Lot A Little None   4. Moving to and from a bed to a chair (including a wheelchair)? ? 1   ? 2   ? 3   ? 4   5. Need to walk in hospital room? ? 1   ? 2   ? 3   ? 4   6. Climbing 3-5 steps with a railing? ? 1   ? 2   ? 3   ? 4   © , Trustees of [address] [place] [address], under license to [employer]. All rights reserved      Score:  Initial: 14 Most Recent: X (Date: -- )    Interpretation of Tool:  Represents activities that are increasingly more difficult (i.e. Bed mobility, Transfers, Gait). Medical Necessity:     Patient demonstrates good   rehab potential due to higher previous functional level. Reason for Services/Other Comments:  Patient continues to require present interventions due to patient's inability to function at baseline   .    Use of outcome tool(s) and clinical judgement create a POC that gives a: Clear prediction of patient's progress: LOW COMPLEXITY            TREATMENT:   (In addition to Assessment/Re-Assessment sessions the following treatments were rendered)   Pre-treatment Symptoms/Complaints:  requesting to change shorts to more comfortable ones (this was done during treatment)  Pain: Initial:     0/10 Post Session:  0/10     Therapeutic Activity: (    50 min): Therapeutic activities including Bed transfers, Chair transfers and sitting/standing balance activities, and education  to improve mobility, strength, balance and coordination. Required moderate verbal and tactile cues   to safety and independence . Braces/Orthotics/Lines/Etc:   IV  O2 Device: Nasal cannula  Treatment/Session Assessment:    Response to Treatment:  strong in spirt and body, motivated  Interdisciplinary Collaboration:   Physical Therapist  Occupational Therapist  Registered Nurse  After treatment position/precautions:   Up in chair  Bed alarm/tab alert on  Bed/Chair-wheels locked  Bed in low position  Call light within reach  RN notified   Compliance with Program/Exercises: Will assess as treatment progresses  Recommendations/Intent for next treatment session: \"Next visit will focus on advancements to more challenging activities and reduction in assistance provided\".   Total Treatment Duration:  PT Patient Time In/Time Out  Time In: 9378  Time Out: Ryan Soriano PT

## 2019-11-03 NOTE — PROGRESS NOTES
Patient resting in bed. Assessment completed. Respirations are even and unlabored. Continuous IV fluid infusing at 75ml/hr. No dis tress at this time. Bed is low, locked and call light within reach.

## 2019-11-03 NOTE — PROGRESS NOTES
CM met with patient / spouse /s on to discuss choices for rehab. Spouse / son interested in [address] / [employer]. Spouse / son also interested in making sure [facility] is [facility] so that patient may be in a [place] facility were he can stay as long as he need to stay. [facility] contact information [phone] ext. [phone] Kehinde Monte). Spouse will be at beside after lunch if needed before then please make contact with spouse at [phone]. CM will continue to monitor. Care Management Interventions  PCP Verified by CM: Yes(Drea Schumacher MD)  Mode of Transport at Discharge:  Other (see comment)([facility])  Transition of Care Consult (CM Consult): Discharge Planning, SNF  Discharge Durable Medical Equipment: No  Physical Therapy Consult: Yes  Occupational Therapy Consult: Yes  Speech Therapy Consult: No  Current Support Network: Own Home, Lives with Spouse  Confirm Follow Up Transport: Other (see comment)([facility] )  Plan discussed with Pt/Family/Caregiver: Yes  Freedom of Choice Offered: Yes  [address] Provided?: No  Discharge Location  Discharge Placement: Skilled nursing facility

## 2019-11-03 NOTE — PROGRESS NOTES
Hospitalist Note     Admit Date:  10/31/2019 10:34 PM   Name:  Saadia Guerrero   Age:  69 y.o.  :  1945   MRN:  [id number]   PCP:  Preet Lara MD  Treatment Team: Attending Provider: Otto Martinez MD; Consulting Provider: Brendon Pinzon MD; Utilization Review: Gabino Huffman RN; Physical Therapist: Koko Bowie PT    HPI/Subjective:       Mr. Tabatha Matthew is a 68 yo male with PMH of PAD on xarelto, DM2, CAD s/p CABG, HTN, obesity admitted with painful LLE and need for AKA. He has been followed by vascular surgery and s/p 10-24-19 LLE arteriogram/ tibial stent/ femoral PTA. He has been seen by Dr. Jordan Flores and agreeable for left AKA done 19. Discharge plans pending to rehab      11-3-19 wife not present, diet tolerant, has some phantom pains, constipated, no dyspnea or cough     Objective:     Patient Vitals for the past 24 hrs:   Temp Pulse Resp BP SpO2   19 0742 97.8 °F (36.6 °C) (!) 58 20 135/69 92 %   19 0316 98 °F (36.7 °C) (!) 57 19 134/73 94 %   19 2353 98 °F (36.7 °C) (!) 53 18 123/81 94 %   19 2030 98.2 °F (36.8 °C) (!) 58 18 134/78 93 %   19 1545 97.7 °F (36.5 °C) 82 18 133/75 97 %   19 1509 -- -- -- -- 94 %     Oxygen Therapy  O2 Sat (%): 92 % (19 0742)  Pulse via Oximetry: 75 beats per minute (19 1314)  O2 Device: Nasal cannula (19 1509)  O2 Flow Rate (L/min): 1 l/min (19 1509)    Estimated body mass index is 35.27 kg/m² as calculated from the following:    Height as of this encounter: 6' 2\" (1.88 m). Weight as of this encounter: 124.6 kg (274 lb 11.1 oz). Intake/Output Summary (Last 24 hours) at 11/3/2019 1254  Last data filed at 11/3/2019 0545  Gross per 24 hour   Intake 480 ml   Output 1150 ml   Net -670 ml       *Note that automatically entered I/Os may not be accurate; dependent on patient compliance with collection and accurate  by techs. General:    Well nourished. Alert.   No distress   CV:   RRR. III/VI CONRAD LUSB, no edema  Lungs:   CTAB. No wheezing, rhonchi, or rales. anterior  Abdomen:   Soft, nontender, nondistended. Present BS  Extremities: Warm and dry. Left AKA bandaged   Neuro:  No gross focal deficits    Data Review:  I have reviewed all labs, meds, and studies from the last 24 hours:    Recent Results (from the past 24 hour(s))   GLUCOSE, POC    Collection Time: 11/02/19  4:57 PM   Result Value Ref Range    Glucose (POC) 211 (H) 65 - 100 mg/dL   GLUCOSE, POC    Collection Time: 11/02/19  8:57 PM   Result Value Ref Range    Glucose (POC) 150 (H) 65 - 100 mg/dL   PLEASE READ & DOCUMENT PPD TEST IN 48 HRS    Collection Time: 11/03/19  2:40 AM   Result Value Ref Range    PPD Negative Negative    mm Induration 0 0 - 5 mm   GLUCOSE, POC    Collection Time: 11/03/19  5:26 AM   Result Value Ref Range    Glucose (POC) 143 (H) 65 - 100 mg/dL   CBC WITH AUTOMATED DIFF    Collection Time: 11/03/19  5:56 AM   Result Value Ref Range    WBC 4.8 4.3 - 11.1 K/uL    RBC 4.17 (L) 4.23 - 5.6 M/uL    HGB 10.7 (L) 13.6 - 17.2 g/dL    HCT 34.8 (L) 41.1 - 50.3 %    MCV 83.5 79.6 - 97.8 FL    MCH 25.7 (L) 26.1 - 32.9 PG    MCHC 30.7 (L) 31.4 - 35.0 g/dL    RDW 17.1 (H) 11.9 - 14.6 %    PLATELET 807 702 - 064 K/uL    MPV 10.3 9.4 - 12.3 FL    ABSOLUTE NRBC 0.00 0.0 - 0.2 K/uL    DF AUTOMATED      NEUTROPHILS 61 43 - 78 %    LYMPHOCYTES 25 13 - 44 %    MONOCYTES 11 4.0 - 12.0 %    EOSINOPHILS 2 0.5 - 7.8 %    BASOPHILS 1 0.0 - 2.0 %    IMMATURE GRANULOCYTES 0 0.0 - 5.0 %    ABS. NEUTROPHILS 2.9 1.7 - 8.2 K/UL    ABS. LYMPHOCYTES 1.2 0.5 - 4.6 K/UL    ABS. MONOCYTES 0.5 0.1 - 1.3 K/UL    ABS. EOSINOPHILS 0.1 0.0 - 0.8 K/UL    ABS. BASOPHILS 0.0 0.0 - 0.2 K/UL    ABS. IMM.  GRANS. 0.0 0.0 - 0.5 K/UL   METABOLIC PANEL, BASIC    Collection Time: 11/03/19  5:56 AM   Result Value Ref Range    Sodium 140 136 - 145 mmol/L    Potassium 4.4 3.5 - 5.1 mmol/L    Chloride 109 (H) 98 - 107 mmol/L    CO2 25 21 - 32 mmol/L    Anion gap 6 (L) 7 - 16 mmol/L    Glucose 144 (H) 65 - 100 mg/dL    BUN 42 (H) 8 - 23 MG/DL    Creatinine 1.46 0.8 - 1.5 MG/DL    GFR est AA >60 >60 ml/min/1.73m2    GFR est non-AA 50 (L) >60 ml/min/1.73m2    Calcium 8.6 8.3 - 10.4 MG/DL   GLUCOSE, POC    Collection Time: 11/03/19  7:38 AM   Result Value Ref Range    Glucose (POC) 167 (H) 65 - 100 mg/dL   GLUCOSE, POC    Collection Time: 11/03/19 11:05 AM   Result Value Ref Range    Glucose (POC) 189 (H) 65 - 100 mg/dL        All Micro Results     None          No results found for this visit on 10/31/19.     Current Meds:  Current Facility-Administered Medications   Medication Dose Route Frequency    bisacodyl (DULCOLAX) suppository 10 mg  10 mg Rectal DAILY PRN    insulin NPH (NOVOLIN N, HUMULIN N) injection 45 Units  45 Units SubCUTAneous BID    docusate sodium (COLACE) capsule 100 mg  100 mg Oral BID    hydrALAZINE (APRESOLINE) tablet 50 mg  50 mg Oral BID    aspirin delayed-release tablet 81 mg  81 mg Oral DAILY    levothyroxine (SYNTHROID) tablet 75 mcg  75 mcg Oral ACB    lisinopril (PRINIVIL, ZESTRIL) tablet 40 mg  40 mg Oral DAILY    metoprolol tartrate (LOPRESSOR) tablet 25 mg  25 mg Oral BID    oxyCODONE-acetaminophen (PERCOCET) 5-325 mg per tablet 2 Tab  2 Tab Oral Q4H PRN    nitroglycerin (NITROSTAT) tablet 0.4 mg  0.4 mg SubLINGual Q5MIN PRN    tamsulosin (FLOMAX) capsule 0.4 mg  0.4 mg Oral DAILY    dextrose 40% (GLUTOSE) oral gel 1 Tube  15 g Oral PRN    glucagon (GLUCAGEN) injection 1 mg  1 mg IntraMUSCular PRN    dextrose (D50W) injection syrg 12.5-25 g  25-50 mL IntraVENous PRN    insulin lispro (HUMALOG) injection   SubCUTAneous AC&HS    sodium chloride (NS) flush 5-40 mL  5-40 mL IntraVENous Q8H    sodium chloride (NS) flush 5-40 mL  5-40 mL IntraVENous PRN    acetaminophen (TYLENOL) tablet 650 mg  650 mg Oral Q4H PRN    HYDROmorphone (PF) (DILAUDID) injection 1 mg  1 mg IntraVENous Q3H PRN    naloxone (NARCAN) injection 0.4 mg  0.4 mg IntraVENous PRN    diphenhydrAMINE (BENADRYL) injection 12.5 mg  12.5 mg IntraVENous Q4H PRN    ondansetron (ZOFRAN) injection 4 mg  4 mg IntraVENous Q4H PRN    heparin (porcine) injection 5,000 Units  5,000 Units SubCUTAneous Q8H    bisacodyl (DULCOLAX) tablet 5 mg  5 mg Oral DAILY PRN    nystatin (MYCOSTATIN) 100,000 unit/gram powder   Topical BID       Other Studies (last 24 hours):  No results found.     Assessment and Plan:     Hospital Problems as of 11/3/2019 Date Reviewed: 10/15/2019          Codes Class Noted - Resolved POA    CAD (coronary artery disease) ICD-10-CM: I25.10  ICD-9-CM: 414.00  2/13/2019 - Present Yes        * (Principal) Ischemic leg pain ICD-10-CM: I99.8  ICD-9-CM: 459.9  11/1/2019 - Present Yes        Hypothyroidism ICD-10-CM: E03.9  ICD-9-CM: 244.9  11/1/2019 - Present Yes    Overview Signed 11/1/2019 12:10 AM by Kelly Pierre MD     managed well with Synthroid             S/P bypass graft of extremity ICD-10-CM: Z95.828  ICD-9-CM: V45.89  4/19/2019 - Present Yes    Overview Signed 4/19/2019  9:38 AM by Leena Shine NP     3/27/19 (Dr. Marianna Goncalves) Left common femoral to posterior tibial bypass with PTFE graft                 Severe obesity ([facility] ICD-10-CM: E66.01  ICD-9-CM: 278.01  4/12/2019 - Present Yes        Peripheral artery disease ([facility] ICD-10-CM: I73.9  ICD-9-CM: 443.9  3/27/2019 - Present Yes        HTN (hypertension) ICD-10-CM: I10  ICD-9-CM: 401.9  2/14/2019 - Present Yes        DM (diabetes mellitus) ([facility] ICD-10-CM: E11.9  ICD-9-CM: 250.00  2/14/2019 - Present Yes        Hx of CABG ICD-10-CM: Z95.1  ICD-9-CM: V45.81  2/14/2019 - Present Yes              Plan:  · Severe ischemic LLE PAD: s/p left AKA , stopped ancef, xarelto stopped permanently per vascular  · HTN: continued hydralazine, lisinopril, metoprolol  · DM2: continued SSI and NPH  · CAD: continued asa  · CKD3: stable, followup BMP daily   · Elevated LFTS: followup labs improved  · Constipation: on colace    DC planning/Dispo:  PPD, will need rehab      Diet:  DIET REGULAR  DIET REGULAR  DVT ppx:  heparin    Signed:  Toi Herrera MD

## 2019-11-03 NOTE — PROGRESS NOTES
Patient seen and examined. No acute issues overnight. Right above-knee amputations incision stable mild drainage. No hematoma no infection. We will work with physical therapy today and tomorrow. I have contacted prosthetic company which will evaluate the patient tomorrow morning. Patient complained of constipation we will add stool softener.     Dane Velez

## 2019-11-03 NOTE — PROGRESS NOTES
Complete total body bathing and dressing with contact guard assistance. Complete toilet/bedside commode transfers with contact guard assistance. Complete BUE strengthening 20-25 minutes in preparation for ADL. Complete toileting with contact guard assistance. Time frame: 4 - 7 visits. OCCUPATIONAL THERAPY: Initial Assessment and Daily Note 11/3/2019  INPATIENT: OT Visit Days: 1  [place] / [place] / Product Type: Medicare /      NAME/AGE/GENDER: Tereso Hoyt is a 69 y.o. male   PRIMARY DIAGNOSIS:  Ischemic leg pain [I99.8] Ischemic leg pain   Ischemic leg pain    Procedure(s) (LRB):  LEFT AMPUTATION KNEE(AKA) (Left)  2 Days Post-Op  ICD-10: Treatment Diagnosis:    Generalized Muscle Weakness (M62.81)   Precautions/Allergies:     Gabapentin and Rosuvastatin      ASSESSMENT:     Mr. Pato Shah presents s/p L AKA and is slated for prosthetist evaluation tomorrow per surgeon. Mr. Pato Shah is typically independent with ADL/instrumental ADL. He required significant assistance to complete lower/upper body dressing due to new amputation, balancing on his R LE with rolling walker, and decreased activity tolerance/strength. He would benefit from rehab post acute hospitalization. Tereso Hoyt presents with the following problems and would benefit from occupational therapy to maximize independence with self-care,instrumental activities of daily living, and functional transfers/mobility for activities of daily living/instrumental activities of daily living via the stated goals.     This section established at most recent assessment   PROBLEM LIST (Impairments causing functional limitations):  Decreased Strength  Decreased ADL/Functional Activities  Decreased Transfer Abilities  Decreased Balance  Increased Pain  Decreased Activity Tolerance  Decreased Flexibility/Joint Mobility  Edema/Girth  Decreased [place] with Home Exercise Program   INTERVENTIONS PLANNED: (Benefits and precautions of occupational therapy have been discussed with the patient.)  Activities of daily living training  Therapeutic activity  Therapeutic exercise     TREATMENT PLAN: Frequency/Duration: Follow patient 3 times/week to address above goals. Rehabilitation Potential For Stated Goals: Good     REHAB RECOMMENDATIONS (at time of discharge pending progress):    Placement: It is my opinion, based on this patient's performance to date, that Mr. Herrera may benefit from intensive therapy at an [address] [place] [address] after discharge due to potential to make ongoing and sustainable functional improvement that is of practical value. .  Equipment:   None at this time              OCCUPATIONAL PROFILE AND HISTORY:   History of Present Injury/Illness (Reason for Referral):  See H & P. Past Medical History/Comorbidities:   Mr. Marianna Henry  has a past medical history of Arthritis, CAD (coronary artery disease), Enlarged prostate, Former cigarette smoker, History of hepatitis (age 13), CABG, Hypercholesteremia, Hypertension, Hypothyroidism, Hypothyroidism (11/1/2019), Left leg pain, Long term (current) use of systemic steroids, PAD (peripheral artery disease) ([facility] RBBB, Type 2 diabetes mellitus ([facility] and Vitamin D deficiency. He also has no past medical history of Difficult intubation, Malignant hyperthermia due to anesthesia, Nausea & vomiting, or Pseudocholinesterase deficiency. Mr. Marianna Henry  has a past surgical history that includes pr cardiac surg procedure unlist; hx heart catheterization; hx colonoscopy; hx hemorrhoidectomy; hx knee replacement (Bilateral); and vascular surgery procedure unlist (03/27/2019).   Social History/Living Environment:   Home Environment: Private residence  One/Two Story Residence: Other (Comment)  Living Alone: No  Support Systems: Family member(s), Spouse/Significant Other/Partner  Patient Expects to be Discharged to[de-identified] Private residence  Current DME Used/Available at Home: None  Prior Level of Function/Work/Activity:  Independent. Son is a PT and lives nearby. 4 steps to get into home. Number of Personal Factors/Comorbidities that affect the Plan of Care: Brief history (0):  LOW COMPLEXITY   ASSESSMENT OF OCCUPATIONAL PERFORMANCE[de-identified]   Activities of Daily Living:   Basic ADLs (From Assessment) Complex ADLs (From Assessment)   Feeding: Supervision  Oral Facial Hygiene/Grooming: Supervision  Bathing: Moderate assistance  Upper Body Dressing: Minimum assistance  Lower Body Dressing: Moderate assistance  Toileting: Moderate assistance Instrumental ADL  Meal Preparation: Total assistance  Homemaking: Total assistance   Grooming/Bathing/Dressing Activities of Daily Living     Cognitive Retraining  Safety/Judgement: Fall prevention           Toileting  Bowel Hygiene: Minimum assistance  Clothing Management: Moderate assistance  Adaptive Equipment: [address]: Moderate assistance     Lower Body Dressing Assistance  Underpants: Moderate assistance Bed/Mat Mobility  Supine to Sit: Moderate assistance  Sit to Stand: Moderate assistance;Assist x2  Stand to Sit: Moderate assistance;Assist x2  Bed to Chair: Moderate assistance;Assist x2  Scooting: Contact guard assistance  Cues: Verbal cues provided; Tactile cues provided;Visual cues provided     Most Recent Physical Functioning:   Gross Assessment:  AROM: Generally decreased, functional  Strength: Generally decreased, functional               Posture:     Balance:  Sitting: Impaired  Sitting - Static: Good (unsupported)  Sitting - Dynamic: Fair (occasional)  Standing: Impaired  Standing - Static: Constant support  Standing - Dynamic : Poor Bed Mobility:  Supine to Sit: Moderate assistance  Scooting: Contact guard assistance  Wheelchair Mobility:     Transfers:  Sit to Stand:  Moderate assistance;Assist x2  Stand to Sit: Moderate assistance;Assist x2  Bed to Chair: Moderate assistance;Assist x2            Patient Vitals for the past 6 hrs:   BP SpO2 Pulse   19 0742 135/69 92 % (!) 58       Mental Status  Neurologic State: Alert  Orientation Level: Oriented to person, Oriented to place  Cognition: Follows commands  Perception: Appears intact  Perseveration: No perseveration noted  Safety/Judgement: Fall prevention                          Physical Skills Involved:  Range of Motion  Balance  Strength  Activity Tolerance  Sensation  Gross Motor Control  Pain (acute)  Pain (Chronic)  Edema  Skin Integrity Cognitive Skills Affected (resulting in the inability to perform in a timely and safe manner):  None noted  Psychosocial Skills Affected:  Habits/Routines  Environmental Adaptation  Social Interaction  Social Roles   Number of elements that affect the Plan of Care: 3-5:  MODERATE COMPLEXITY   CLINICAL DECISION MAKIN[address] [place] [address] AM-PAC 6 Clicks   Daily Activity Inpatient Short Form  How much help from another person does the patient currently need. .. Total A Lot A Little None   1. Putting on and taking off regular lower body clothing? ? 1   ? 2   ? 3   ? 4   2. Bathing (including washing, rinsing, drying)? ? 1   ? 2   ? 3   ? 4   3. Toileting, which includes using toilet, bedpan or urinal?   ? 1   ? 2   ? 3   ? 4   4. Putting on and taking off regular upper body clothing? ? 1   ? 2   ? 3   ? 4   5. Taking care of personal grooming such as brushing teeth? ? 1   ? 2   ? 3   ? 4   6. Eating meals? ? 1   ? 2   ? 3   ? 4   © , Trustees of [address] [place], [place] [id number], under license to [employer]. All rights reserved      Score:  Initial: 14 Most Recent: X (Date: -- )    Interpretation of Tool:  Represents activities that are increasingly more difficult (i.e. Bed mobility, Transfers, Gait). Medical Necessity:     Patient demonstrates good   rehab potential due to higher previous functional level.   Reason for Services/Other Comments:  Patient continues to require skilled intervention due to decreased independence with ADL and instrumental ADL secondary to amputation   . Use of outcome tool(s) and clinical judgement create a POC that gives a: MODERATE COMPLEXITY         TREATMENT:   (In addition to Assessment/Re-Assessment sessions the following treatments were rendered)     Pre-treatment Symptoms/Complaints:    Pain: Initial:   Pain Intensity 1: (L LE - did not rate)  Post Session:  same     Today's treatment session addressed Decreased Strength, Decreased ADL/Functional Activities, Decreased Transfer Abilities, Decreased Balance, Increased Pain, Decreased Activity Tolerance, Decreased Flexibility/Joint Mobility, Edema/Girth, Decreased Skin Integrity/Hygeine and Decreased [place] with Home Exercise Program to progress towards achieving goal(s) 1. During this session,  Physical Therapy addressed  Bed Mobility to progress towards their discipline specific goal(s). Co-treatment was necessary to improve patient's ability to increase activity demands and ability to return to normal functional activity. Self Care: (23 minutes): Procedure(s) (per grid) utilized to improve and/or restore self-care/home management as related to dressing and toileting. Required moderate visual, verbal and tactile cueing to facilitate activities of daily living skills and compensatory activities. Braces/Orthotics/Lines/Etc:   IV  O2 Device: Nasal cannula  Treatment/Session Assessment:    Response to Treatment:  Tolerated well without complications. Interdisciplinary Collaboration:   Physical Therapist  Occupational Therapist  Registered Nurse  After treatment position/precautions:   Up in chair  Bed alarm/tab alert on  Bed/Chair-wheels locked  Call light within reach  RN notified   Compliance with Program/Exercises: Will assess as treatment progresses. Recommendations/Intent for next treatment session: \"Next visit will focus on advancements to more challenging activities\".   Total Treatment Duration:  OT Patient Time In/Time Out  Time In: 0855  Time Out: [address] [facility] MARIA L Ramos, OTR/L

## 2019-11-04 LAB
GLUCOSE BLD STRIP.AUTO-MCNC: 132 MG/DL (ref 65–100)
GLUCOSE BLD STRIP.AUTO-MCNC: 151 MG/DL (ref 65–100)
GLUCOSE BLD STRIP.AUTO-MCNC: 161 MG/DL (ref 65–100)
GLUCOSE BLD STRIP.AUTO-MCNC: 207 MG/DL (ref 65–100)
MM INDURATION POC: 0 MM (ref 0–5)
PPD POC: NEGATIVE NEGATIVE

## 2019-11-04 PROCEDURE — 74011250637 HC RX REV CODE- 250/637: Performed by: INTERNAL MEDICINE

## 2019-11-04 PROCEDURE — 74011636637 HC RX REV CODE- 636/637: Performed by: INTERNAL MEDICINE

## 2019-11-04 PROCEDURE — 82962 GLUCOSE BLOOD TEST: CPT

## 2019-11-04 PROCEDURE — 74011250637 HC RX REV CODE- 250/637: Performed by: HOSPITALIST

## 2019-11-04 PROCEDURE — 74011636637 HC RX REV CODE- 636/637: Performed by: HOSPITALIST

## 2019-11-04 PROCEDURE — 74011250636 HC RX REV CODE- 250/636: Performed by: HOSPITALIST

## 2019-11-04 PROCEDURE — 65270000029 HC RM PRIVATE

## 2019-11-04 RX ORDER — POLYETHYLENE GLYCOL 3350 17 G/17G
17 POWDER, FOR SOLUTION ORAL DAILY
Status: DISCONTINUED | OUTPATIENT
Start: 2019-11-04 | End: 2019-11-05 | Stop reason: HOSPADM

## 2019-11-04 RX ADMIN — HEPARIN SODIUM 5000 UNITS: 5000 INJECTION INTRAVENOUS; SUBCUTANEOUS at 21:05

## 2019-11-04 RX ADMIN — ASPIRIN 81 MG: 81 TABLET, COATED ORAL at 08:12

## 2019-11-04 RX ADMIN — HYDRALAZINE HYDROCHLORIDE 50 MG: 50 TABLET, FILM COATED ORAL at 08:12

## 2019-11-04 RX ADMIN — INSULIN HUMAN 45 UNITS: 100 INJECTION, SUSPENSION SUBCUTANEOUS at 08:12

## 2019-11-04 RX ADMIN — LEVOTHYROXINE SODIUM 75 MCG: 75 TABLET ORAL at 05:52

## 2019-11-04 RX ADMIN — INSULIN LISPRO 2 UNITS: 100 INJECTION, SOLUTION INTRAVENOUS; SUBCUTANEOUS at 17:38

## 2019-11-04 RX ADMIN — INSULIN LISPRO 2 UNITS: 100 INJECTION, SOLUTION INTRAVENOUS; SUBCUTANEOUS at 11:56

## 2019-11-04 RX ADMIN — DOCUSATE SODIUM 100 MG: 100 CAPSULE, LIQUID FILLED ORAL at 08:12

## 2019-11-04 RX ADMIN — METOPROLOL TARTRATE 25 MG: 25 TABLET ORAL at 08:12

## 2019-11-04 RX ADMIN — METOPROLOL TARTRATE 25 MG: 25 TABLET ORAL at 17:38

## 2019-11-04 RX ADMIN — DOCUSATE SODIUM 100 MG: 100 CAPSULE, LIQUID FILLED ORAL at 17:37

## 2019-11-04 RX ADMIN — Medication 10 ML: at 14:44

## 2019-11-04 RX ADMIN — HYDRALAZINE HYDROCHLORIDE 50 MG: 50 TABLET, FILM COATED ORAL at 17:37

## 2019-11-04 RX ADMIN — INSULIN HUMAN 45 UNITS: 100 INJECTION, SUSPENSION SUBCUTANEOUS at 17:38

## 2019-11-04 RX ADMIN — HEPARIN SODIUM 5000 UNITS: 5000 INJECTION INTRAVENOUS; SUBCUTANEOUS at 05:53

## 2019-11-04 RX ADMIN — INSULIN LISPRO 4 UNITS: 100 INJECTION, SOLUTION INTRAVENOUS; SUBCUTANEOUS at 21:14

## 2019-11-04 RX ADMIN — Medication 10 ML: at 21:15

## 2019-11-04 RX ADMIN — Medication 10 ML: at 06:20

## 2019-11-04 RX ADMIN — TAMSULOSIN HYDROCHLORIDE 0.4 MG: 0.4 CAPSULE ORAL at 08:12

## 2019-11-04 RX ADMIN — OXYCODONE HYDROCHLORIDE AND ACETAMINOPHEN 2 TABLET: 5; 325 TABLET ORAL at 15:01

## 2019-11-04 RX ADMIN — NYSTATIN: 100000 POWDER TOPICAL at 08:13

## 2019-11-04 RX ADMIN — HYDROMORPHONE HYDROCHLORIDE 1 MG: 1 INJECTION, SOLUTION INTRAMUSCULAR; INTRAVENOUS; SUBCUTANEOUS at 17:45

## 2019-11-04 RX ADMIN — NYSTATIN: 100000 POWDER TOPICAL at 17:39

## 2019-11-04 RX ADMIN — LISINOPRIL 40 MG: 20 TABLET ORAL at 08:12

## 2019-11-04 RX ADMIN — HEPARIN SODIUM 5000 UNITS: 5000 INJECTION INTRAVENOUS; SUBCUTANEOUS at 14:44

## 2019-11-04 RX ADMIN — HYDROMORPHONE HYDROCHLORIDE 1 MG: 1 INJECTION, SOLUTION INTRAMUSCULAR; INTRAVENOUS; SUBCUTANEOUS at 09:34

## 2019-11-04 RX ADMIN — HYDROMORPHONE HYDROCHLORIDE 1 MG: 1 INJECTION, SOLUTION INTRAMUSCULAR; INTRAVENOUS; SUBCUTANEOUS at 21:05

## 2019-11-04 NOTE — PROGRESS NOTES
CM received call from [facility] stating they would be calling tomorrow for a bed offer for patient. Kaleb Bass will be by today for a face to face with patient. At this time they are tight with beds so they may have a bed tomorrow or Wednesday but would let us know for sure tomorrow. Patient and family aware.

## 2019-11-04 NOTE — PROGRESS NOTES
Patient seen and examined no acute issues overnight. Patient still complained about constipation, ordered suppository. Left above-knee amputations site stable. Mild oozing from the middle portion, no signs of infection. Patient can be up and out of bed work with physical therapy. Prosthetic company will evaluate patient today for long-term prosthetic. From vascular standpoint patient can be allowed to leave the floor, patient is stated he wants some fresh air.       Lavanda Fly

## 2019-11-04 NOTE — INTERDISCIPLINARY ROUNDS
Interdisciplinary team rounds were held 11/4/2019 with the following team members:Care Management, Physical Therapy, Physician and Clinical Coordinator and the patient. Plan of care discussed. See clinical pathway and/or care plan for interventions and desired outcomes.

## 2019-11-04 NOTE — PROGRESS NOTES
Patient resting in bed. Assessment completed. Respirations are even and unlabored. Dressing on left AKA is intact and dry. No dis tress at this time. Bed is low, locked and call light within reach.

## 2019-11-04 NOTE — PROGRESS NOTES
Hospitalist Note     Admit Date:  10/31/2019 10:34 PM   Name:  Kiana Asencio   Age:  69 y.o.  :  1945   MRN:  [id number]   PCP:  Taurus Estrella MD  Treatment Team: Attending Provider: Gris Gibbs MD; Consulting Provider: America Alanis MD; Utilization Review: Jt Mason RN; Staff Nurse: Oracio Adams RN    HPI/Subjective:     Mr. Jodie Regan is a 70 yo male with PMH of PAD on xarelto, DM2, CAD s/p CABG, HTN, obesity admitted with painful LLE and need for AKA. He has been followed by vascular surgery and s/p 10-24-19 LLE arteriogram/ tibial stent/ femoral PTA. He has been seen by Dr. Johanna Frazier and agreeable for left AKA done 19. Discharge plans pending to rehab- precert pending        wife present, patient denies anxiety, wants to get fresh air, passing gas and still constipated, ate ok, has cough, no dyspnea,     Objective:     Patient Vitals for the past 24 hrs:   Temp Pulse Resp BP SpO2   19 1206 -- -- -- 167/74 --   19 1148 98 °F (36.7 °C) 63 20 179/83 97 %   19 0735 98.4 °F (36.9 °C) (!) 57 18 149/75 95 %   19 0232 98.1 °F (36.7 °C) (!) 53 18 178/83 95 %   19 2318 98.5 °F (36.9 °C) 63 18 180/86 92 %   19 2035 98.2 °F (36.8 °C) 60 17 172/68 92 %   19 -- (!) 55 -- 172/68 --   19 1550 98.2 °F (36.8 °C) 64 20 134/74 98 %     Oxygen Therapy  O2 Sat (%): 97 % (19 1148)  Pulse via Oximetry: 75 beats per minute (19 1314)  O2 Device: Room air (19 0803)  O2 Flow Rate (L/min): 1 l/min (19 1509)    Estimated body mass index is 35.27 kg/m² as calculated from the following:    Height as of this encounter: 6' 2\" (1.88 m). Weight as of this encounter: 124.6 kg (274 lb 11.1 oz).       Intake/Output Summary (Last 24 hours) at 2019 1338  Last data filed at 2019 1010  Gross per 24 hour   Intake 240 ml   Output 1225 ml   Net -985 ml       *Note that automatically entered I/Os may not be accurate; dependent on patient compliance with collection and accurate  by techs. General:    Well nourished. Alert. No distress   CV:   RRR. III/VI CONRAD LUSB, no edema  Lungs:   CTAB. No wheezing, rhonchi, or rales. anterior  Abdomen:   Soft, nontender, nondistended. Present BS  Extremities: Warm and dry. Left AKA open with wound clean, staples intact   Neuro:  No gross focal deficits    Data Review:  I have reviewed all labs, meds, and studies from the last 24 hours:    Recent Results (from the past 24 hour(s))   GLUCOSE, POC    Collection Time: 11/03/19  4:15 PM   Result Value Ref Range    Glucose (POC) 206 (H) 65 - 100 mg/dL   GLUCOSE, POC    Collection Time: 11/03/19  8:31 PM   Result Value Ref Range    Glucose (POC) 228 (H) 65 - 100 mg/dL   PLEASE READ & DOCUMENT PPD TEST IN 72 HRS    Collection Time: 11/04/19  2:40 AM   Result Value Ref Range    PPD Negative Negative    mm Induration 0 0 - 5 mm   GLUCOSE, POC    Collection Time: 11/04/19  5:47 AM   Result Value Ref Range    Glucose (POC) 132 (H) 65 - 100 mg/dL   GLUCOSE, POC    Collection Time: 11/04/19 11:24 AM   Result Value Ref Range    Glucose (POC) 161 (H) 65 - 100 mg/dL        All Micro Results     None          No results found for this visit on 10/31/19.     Current Meds:  Current Facility-Administered Medications   Medication Dose Route Frequency    bisacodyl (DULCOLAX) suppository 10 mg  10 mg Rectal DAILY PRN    insulin NPH (NOVOLIN N, HUMULIN N) injection 45 Units  45 Units SubCUTAneous BID    docusate sodium (COLACE) capsule 100 mg  100 mg Oral BID    hydrALAZINE (APRESOLINE) tablet 50 mg  50 mg Oral BID    aspirin delayed-release tablet 81 mg  81 mg Oral DAILY    levothyroxine (SYNTHROID) tablet 75 mcg  75 mcg Oral ACB    lisinopril (PRINIVIL, ZESTRIL) tablet 40 mg  40 mg Oral DAILY    metoprolol tartrate (LOPRESSOR) tablet 25 mg  25 mg Oral BID    oxyCODONE-acetaminophen (PERCOCET) 5-325 mg per tablet 2 Tab  2 Tab Oral Q4H PRN  nitroglycerin (NITROSTAT) tablet 0.4 mg  0.4 mg SubLINGual Q5MIN PRN    tamsulosin (FLOMAX) capsule 0.4 mg  0.4 mg Oral DAILY    dextrose 40% (GLUTOSE) oral gel 1 Tube  15 g Oral PRN    glucagon (GLUCAGEN) injection 1 mg  1 mg IntraMUSCular PRN    dextrose (D50W) injection syrg 12.5-25 g  25-50 mL IntraVENous PRN    insulin lispro (HUMALOG) injection   SubCUTAneous AC&HS    sodium chloride (NS) flush 5-40 mL  5-40 mL IntraVENous Q8H    sodium chloride (NS) flush 5-40 mL  5-40 mL IntraVENous PRN    acetaminophen (TYLENOL) tablet 650 mg  650 mg Oral Q4H PRN    HYDROmorphone (PF) (DILAUDID) injection 1 mg  1 mg IntraVENous Q3H PRN    naloxone (NARCAN) injection 0.4 mg  0.4 mg IntraVENous PRN    diphenhydrAMINE (BENADRYL) injection 12.5 mg  12.5 mg IntraVENous Q4H PRN    ondansetron (ZOFRAN) injection 4 mg  4 mg IntraVENous Q4H PRN    heparin (porcine) injection 5,000 Units  5,000 Units SubCUTAneous Q8H    bisacodyl (DULCOLAX) tablet 5 mg  5 mg Oral DAILY PRN    nystatin (MYCOSTATIN) 100,000 unit/gram powder   Topical BID       Other Studies (last 24 hours):  No results found.     Assessment and Plan:     Hospital Problems as of 11/4/2019 Date Reviewed: 10/15/2019          Codes Class Noted - Resolved POA    CAD (coronary artery disease) ICD-10-CM: I25.10  ICD-9-CM: 414.00  2/13/2019 - Present Yes        * (Principal) Ischemic leg pain ICD-10-CM: I99.8  ICD-9-CM: 459.9  11/1/2019 - Present Yes        Hypothyroidism ICD-10-CM: E03.9  ICD-9-CM: 244.9  11/1/2019 - Present Yes    Overview Signed 11/1/2019 12:10 AM by Osvaldo Gayle MD     managed well with Synthroid             S/P bypass graft of extremity ICD-10-CM: Z95.828  ICD-9-CM: V45.89  4/19/2019 - Present Yes    Overview Signed 4/19/2019  9:38 AM by Zane Kathleen NP     3/27/19 (Dr. Morton Monday) Left common femoral to posterior tibial bypass with PTFE graft                 Severe obesity ([facility] ICD-10-CM: E66.01  ICD-9-CM: 278.01 4/12/2019 - Present Yes        Peripheral artery disease ([facility] ICD-10-CM: I73.9  ICD-9-CM: 443.9  3/27/2019 - Present Yes        HTN (hypertension) ICD-10-CM: I10  ICD-9-CM: 401.9  2/14/2019 - Present Yes        DM (diabetes mellitus) ([facility] ICD-10-CM: E11.9  ICD-9-CM: 250.00  2/14/2019 - Present Yes        Hx of CABG ICD-10-CM: Z95.1  ICD-9-CM: V45.81  2/14/2019 - Present Yes              Plan:  · Severe ischemic LLE PAD: s/p left AKA , stopped ancef, xarelto stopped permanently per vascular  · HTN: continued hydralazine, lisinopril, metoprolol  · DM2: continued SSI and NPH  · CAD: continued asa  · CKD3: stable, followup BMP daily   · Elevated LFTS: followup labs improved  · Constipation: on colace, add miralax    DC planning/Dispo:  PPD, will need rehab, pending precert       Diet:  DIET REGULAR  DIET REGULAR  DVT ppx:  heparin    Signed:  Gordo Lauren MD

## 2019-11-05 VITALS
RESPIRATION RATE: 18 BRPM | OXYGEN SATURATION: 92 % | HEIGHT: 74 IN | TEMPERATURE: 97.5 F | DIASTOLIC BLOOD PRESSURE: 76 MMHG | WEIGHT: 274.69 LBS | SYSTOLIC BLOOD PRESSURE: 133 MMHG | HEART RATE: 61 BPM | BODY MASS INDEX: 35.25 KG/M2

## 2019-11-05 LAB
ANION GAP SERPL CALC-SCNC: 6 MMOL/L (ref 7–16)
BASOPHILS # BLD: 0 K/UL (ref 0–0.2)
BASOPHILS NFR BLD: 0 % (ref 0–2)
BUN SERPL-MCNC: 25 MG/DL (ref 8–23)
CALCIUM SERPL-MCNC: 9.5 MG/DL (ref 8.3–10.4)
CHLORIDE SERPL-SCNC: 107 MMOL/L (ref 98–107)
CO2 SERPL-SCNC: 26 MMOL/L (ref 21–32)
CREAT SERPL-MCNC: 1.2 MG/DL (ref 0.8–1.5)
DIFFERENTIAL METHOD BLD: ABNORMAL
EOSINOPHIL # BLD: 0.1 K/UL (ref 0–0.8)
EOSINOPHIL NFR BLD: 3 % (ref 0.5–7.8)
ERYTHROCYTE [DISTWIDTH] IN BLOOD BY AUTOMATED COUNT: 16.6 % (ref 11.9–14.6)
GLUCOSE BLD STRIP.AUTO-MCNC: 113 MG/DL (ref 65–100)
GLUCOSE BLD STRIP.AUTO-MCNC: 245 MG/DL (ref 65–100)
GLUCOSE SERPL-MCNC: 126 MG/DL (ref 65–100)
HCT VFR BLD AUTO: 37.2 % (ref 41.1–50.3)
HGB BLD-MCNC: 11.5 G/DL (ref 13.6–17.2)
IMM GRANULOCYTES # BLD AUTO: 0 K/UL (ref 0–0.5)
IMM GRANULOCYTES NFR BLD AUTO: 1 % (ref 0–5)
LYMPHOCYTES # BLD: 1.3 K/UL (ref 0.5–4.6)
LYMPHOCYTES NFR BLD: 25 % (ref 13–44)
MCH RBC QN AUTO: 25.2 PG (ref 26.1–32.9)
MCHC RBC AUTO-ENTMCNC: 30.9 G/DL (ref 31.4–35)
MCV RBC AUTO: 81.6 FL (ref 79.6–97.8)
MONOCYTES # BLD: 0.5 K/UL (ref 0.1–1.3)
MONOCYTES NFR BLD: 9 % (ref 4–12)
NEUTS SEG # BLD: 3.4 K/UL (ref 1.7–8.2)
NEUTS SEG NFR BLD: 63 % (ref 43–78)
NRBC # BLD: 0 K/UL (ref 0–0.2)
PLATELET # BLD AUTO: 299 K/UL (ref 150–450)
PMV BLD AUTO: 10.4 FL (ref 9.4–12.3)
POTASSIUM SERPL-SCNC: 4.2 MMOL/L (ref 3.5–5.1)
RBC # BLD AUTO: 4.56 M/UL (ref 4.23–5.6)
SODIUM SERPL-SCNC: 139 MMOL/L (ref 136–145)
WBC # BLD AUTO: 5.3 K/UL (ref 4.3–11.1)

## 2019-11-05 PROCEDURE — 85025 COMPLETE CBC W/AUTO DIFF WBC: CPT

## 2019-11-05 PROCEDURE — 74011250637 HC RX REV CODE- 250/637: Performed by: INTERNAL MEDICINE

## 2019-11-05 PROCEDURE — 74011250637 HC RX REV CODE- 250/637: Performed by: HOSPITALIST

## 2019-11-05 PROCEDURE — 36415 COLL VENOUS BLD VENIPUNCTURE: CPT

## 2019-11-05 PROCEDURE — 74011636637 HC RX REV CODE- 636/637: Performed by: INTERNAL MEDICINE

## 2019-11-05 PROCEDURE — 82962 GLUCOSE BLOOD TEST: CPT

## 2019-11-05 PROCEDURE — 80048 BASIC METABOLIC PNL TOTAL CA: CPT

## 2019-11-05 PROCEDURE — 97530 THERAPEUTIC ACTIVITIES: CPT

## 2019-11-05 PROCEDURE — 74011636637 HC RX REV CODE- 636/637: Performed by: HOSPITALIST

## 2019-11-05 PROCEDURE — 74011250636 HC RX REV CODE- 250/636: Performed by: HOSPITALIST

## 2019-11-05 RX ORDER — HYDRALAZINE HYDROCHLORIDE 50 MG/1
50 TABLET, FILM COATED ORAL 3 TIMES DAILY
Qty: 90 TAB | Refills: 1 | Status: SHIPPED
Start: 2019-11-05 | End: 2019-12-05

## 2019-11-05 RX ORDER — DOCUSATE SODIUM 100 MG/1
100 CAPSULE, LIQUID FILLED ORAL 2 TIMES DAILY
Qty: 60 CAP | Refills: 2 | Status: SHIPPED
Start: 2019-11-05 | End: 2020-01-27 | Stop reason: CLARIF

## 2019-11-05 RX ORDER — INSULIN LISPRO 100 [IU]/ML
INJECTION, SOLUTION INTRAVENOUS; SUBCUTANEOUS
Qty: 1 VIAL | Refills: 1 | Status: SHIPPED
Start: 2019-11-05 | End: 2020-01-27 | Stop reason: CLARIF

## 2019-11-05 RX ORDER — OXYCODONE AND ACETAMINOPHEN 5; 325 MG/1; MG/1
2 TABLET ORAL
Qty: 24 TAB | Refills: 0 | Status: SHIPPED | OUTPATIENT
Start: 2019-11-05 | End: 2019-11-08

## 2019-11-05 RX ORDER — BISACODYL 5 MG
10 TABLET, DELAYED RELEASE (ENTERIC COATED) ORAL
Qty: 30 TAB | Refills: 1 | Status: SHIPPED | OUTPATIENT
Start: 2019-11-05 | End: 2020-01-27 | Stop reason: CLARIF

## 2019-11-05 RX ORDER — HYDRALAZINE HYDROCHLORIDE 50 MG/1
50 TABLET, FILM COATED ORAL 3 TIMES DAILY
Status: DISCONTINUED | OUTPATIENT
Start: 2019-11-05 | End: 2019-11-05 | Stop reason: HOSPADM

## 2019-11-05 RX ADMIN — HYDRALAZINE HYDROCHLORIDE 50 MG: 50 TABLET, FILM COATED ORAL at 09:27

## 2019-11-05 RX ADMIN — ASPIRIN 81 MG: 81 TABLET, COATED ORAL at 09:27

## 2019-11-05 RX ADMIN — HEPARIN SODIUM 5000 UNITS: 5000 INJECTION INTRAVENOUS; SUBCUTANEOUS at 05:41

## 2019-11-05 RX ADMIN — INSULIN LISPRO 4 UNITS: 100 INJECTION, SOLUTION INTRAVENOUS; SUBCUTANEOUS at 12:18

## 2019-11-05 RX ADMIN — HYDROMORPHONE HYDROCHLORIDE 1 MG: 1 INJECTION, SOLUTION INTRAMUSCULAR; INTRAVENOUS; SUBCUTANEOUS at 09:27

## 2019-11-05 RX ADMIN — LEVOTHYROXINE SODIUM 75 MCG: 75 TABLET ORAL at 05:43

## 2019-11-05 RX ADMIN — METOPROLOL TARTRATE 25 MG: 25 TABLET ORAL at 09:27

## 2019-11-05 RX ADMIN — Medication 5 ML: at 05:05

## 2019-11-05 RX ADMIN — NYSTATIN: 100000 POWDER TOPICAL at 09:33

## 2019-11-05 RX ADMIN — HYDROMORPHONE HYDROCHLORIDE 1 MG: 1 INJECTION, SOLUTION INTRAMUSCULAR; INTRAVENOUS; SUBCUTANEOUS at 12:22

## 2019-11-05 RX ADMIN — INSULIN HUMAN 45 UNITS: 100 INJECTION, SUSPENSION SUBCUTANEOUS at 09:27

## 2019-11-05 RX ADMIN — LISINOPRIL 40 MG: 20 TABLET ORAL at 09:27

## 2019-11-05 RX ADMIN — HYDROMORPHONE HYDROCHLORIDE 1 MG: 1 INJECTION, SOLUTION INTRAMUSCULAR; INTRAVENOUS; SUBCUTANEOUS at 03:55

## 2019-11-05 RX ADMIN — TAMSULOSIN HYDROCHLORIDE 0.4 MG: 0.4 CAPSULE ORAL at 09:27

## 2019-11-05 RX ADMIN — HYDROMORPHONE HYDROCHLORIDE 1 MG: 1 INJECTION, SOLUTION INTRAMUSCULAR; INTRAVENOUS; SUBCUTANEOUS at 01:05

## 2019-11-05 RX ADMIN — DOCUSATE SODIUM 100 MG: 100 CAPSULE, LIQUID FILLED ORAL at 09:27

## 2019-11-05 NOTE — PROGRESS NOTES
Patient resting quietly in bed, eyes closed, respirations easy and unlabored. Call light in reach, will monitor.

## 2019-11-05 NOTE — PROGRESS NOTES
Patient awake, used urinal, states he was rearranging his sheets and began having sharp shooting, throbbing pain in left upper leg. Medicated with Dilaudid 1 mg ivp per prn order. BP elevated 188/82, will recheck.

## 2019-11-05 NOTE — PROGRESS NOTES
Patient seen and examined. No acute issues overnight. Left above-knee amputations surgical site stable. Can keep dry open to air. Patient follow-up in my office in 2 weeks to remove staples. Will contact patient's wife today.     Ti Maradiaga

## 2019-11-05 NOTE — PROGRESS NOTES
Patient resting quietly in bed at this time. No s/s of distress. Wife stated last night that she would like to speak with Dr. Stephon Amor if he could call her cell phone, [phone].   Will give report to oncoming RN

## 2019-11-05 NOTE — PROGRESS NOTES
Pt resting in the bed. Pt is stable. Pt is axo. Pt c/o L leg pain. PRN pain medication given per order. No other needs expressed. Safety measures are in place. Will continue to monitor.

## 2019-11-05 NOTE — PROGRESS NOTES
Patient requested pain medication for left leg pain, specifically Dilaudid as he states it takes his pain away quickly and better than percocet. Dilaudid 1 mg ivp given per prn order. Resting quietly in bed.   Will monitor

## 2019-11-05 NOTE — PROGRESS NOTES
Patient resting quietly in bed, family visiting at bedside. Concerns expressed on his being transferred to another facility. Explained care plan as indicated in notes. Will monitor.

## 2019-11-05 NOTE — PROGRESS NOTES
Patient requested Dilaudid for left leg pain. Medicated with 1 mg ivp per prn order. Awake, watching TV, expresses concerns of being transferred to rehab. Will continue to monitor.

## 2019-11-05 NOTE — PROGRESS NOTES
Pt resting in the bed with wife present at the bedside. Pt is stable. Pt is axo. No needs expressed. Safety measures are in place. Will continue to monitor.

## 2019-11-05 NOTE — PROGRESS NOTES
CM called [facility] regarding patient to see if they received a consult from Dr. Jordan Flores and find out the status. Patient will need a  prior to being discharged to [facility]. Will wait for a return call from the representative of [facility].

## 2019-11-05 NOTE — PROGRESS NOTES
Problem: Mobility Impaired (Adult and Pediatric)  Goal: *Acute Goals and Plan of Care (Insert Text)  Description  LTG:  (1.)Mr. Herrera will move from supine to sit and sit to supine , scoot up and down and roll side to side in bed with MINIMAL ASSIST within 7 treatment day(s). (2.)Mr. Herrera will transfer from bed to chair and chair to bed with MINIMAL ASSIST using the least restrictive device within 7 treatment day(s). (3.)Mr. Herrera will ambulate with MINIMAL ASSIST for >or=25 feet with the least restrictive device, fair dynamic standing balance within 7 treatment day(s). (4.)Mr. Herrera will perform B LE therapeutic exercises (per AKA protocol on L LE) x 20 reps with SUPERVISION within 7 days to increase strength for improved safety and independence in transfers and gait. (5.)Mr. Herrera will tolerate sitting upright x 4 hours to increase tolerance for physical activity within 7 treatment days. (6.)Mr. Herrera will propel manual wheelchair along flat surfaces 250' with B UE's and supervision within 7 treatment days for improved mobility in the community and home.   ________________________________________________________________________________________________     Outcome: Progressing Towards Goal      PHYSICAL THERAPY: Daily Note and AM 11/5/2019  INPATIENT: PT Visit Days : 2  [place] / [place] / Product Type: Medicare /       NAME/AGE/GENDER: Sarah Devlin is a 69 y.o. male   PRIMARY DIAGNOSIS: Ischemic leg pain [I99.8] Ischemic leg pain   Ischemic leg pain    Procedure(s) (LRB):  LEFT AMPUTATION KNEE(AKA) (Left)  4 Days Post-Op  ICD-10: Treatment Diagnosis:    · Generalized Muscle Weakness (M62.81)  · Difficulty in walking, Not elsewhere classified (R26.2)   Precaution/Allergies:  Gabapentin and Rosuvastatin      ASSESSMENT:     Mr. Naomi Woods presents supine in bed, alert and agreeable to treatment. Pt s/p L AKA secondary to ischemia.  Pt was independent with all functional mobility and gait without assistive device prior to surgery. He is sitting up on the EOB having just had a bath. He worked on scooting along EOB with cues for hand placement. He performed seated exercises below for strengthening then practiced standing several times from the EOB. He needs a lot of verbal and tactile cues to straighten trunk, R knee and push through arms. He responds well to cues but fatigues quickly. He was able to get from bed to the chair with minimal assist and shuffling his R leg. Gave him lots of information about not placing pillow under stump, positioning and exercising. Good progress towards goals. This section established at most recent assessment   PROBLEM LIST (Impairments causing functional limitations):  1. Decreased Strength  2. Decreased ADL/Functional Activities  3. Decreased Transfer Abilities  4. Decreased Ambulation Ability/Technique  5. Decreased Balance  6. Increased Pain  7. Decreased Activity Tolerance  8. Decreased Flexibility/Joint Mobility  9. Decreased Skin Integrity/Hygeine  10. Decreased [place] with Home Exercise Program   INTERVENTIONS PLANNED: (Benefits and precautions of physical therapy have been discussed with the patient.)  1. Balance Exercise  2. Bed Mobility  3. Gait Training  4. Home Exercise Program (HEP)  5. Range of Motion (ROM)  6. Therapeutic Activites  7. Therapeutic Exercise/Strengthening  8. Transfer Training     TREATMENT PLAN: Frequency/Duration: 3 times a week for duration of hospital stay  Rehabilitation Potential For Stated Goals: Good     REHAB RECOMMENDATIONS (at time of discharge pending progress):    Placement: It is my opinion, based on this patient's performance to date, that Mr. Herrera may benefit from intensive therapy at an [address] [place] [address] after discharge due to potential to make ongoing and sustainable functional improvement that is of practical value. .  Equipment:    Will need to be assessed by rehab facility               HISTORY:   History of Present Injury/Illness (Reason for Referral):  Per chart: This is a 72-year-old male with history of significant peripheral vascular disease who underwent a left common femoral to distal PT, anterior tibial artery several years ago. He presented to my office with high-grade stenosis distally. Of note, the patient had significant outflow disease with small vessels. We had a long discussion with the patient and potentially we elected to proceed. We underwent arteriogram.  Two weeks later, came back and the graft was occluded. The patient had now exhausted any other revascular options secondary to significant poor outflow, so we elected to proceed with left above-the-knee amputation. Past Medical History/Comorbidities:   Mr. Audra Funez  has a past medical history of Arthritis, CAD (coronary artery disease), Enlarged prostate, Former cigarette smoker, History of hepatitis (age 13), CABG, Hypercholesteremia, Hypertension, Hypothyroidism, Hypothyroidism (11/1/2019), Left leg pain, Long term (current) use of systemic steroids, PAD (peripheral artery disease) ([facility] RBBB, Type 2 diabetes mellitus ([facility] and Vitamin D deficiency. He also has no past medical history of Difficult intubation, Malignant hyperthermia due to anesthesia, Nausea & vomiting, or Pseudocholinesterase deficiency. Mr. Audra Funez  has a past surgical history that includes pr cardiac surg procedure unlist; hx heart catheterization; hx colonoscopy; hx hemorrhoidectomy; hx knee replacement (Bilateral); and vascular surgery procedure unlist (03/27/2019).   Social History/Living Environment:   Home Environment: Private residence  # Steps to Enter: 4  Rails to Enter: Yes  Hand Rails : Bilateral  Wheelchair Ramp: No  One/Two Story Residence: One story  Living Alone: No  Support Systems: Family member(s), Spouse/Significant Other/Partner  Patient Expects to be Discharged to[de-identified] Private residence  Current DME Used/Available at Home: None  Prior Level of Function/Work/Activity:  Pt was independent with all functional mobility and gait without assistive device. Lives with wife in one level home, 4 steps to enter. Was driving. No recent falls. Son lives next door and is a physical therapist per pt. Personal Factors:          Sex:  male        Age:  69 y.o. Number of Personal Factors/Comorbidities that affect the Plan of Care: 1-2: MODERATE COMPLEXITY   EXAMINATION:   Most Recent Physical Functioning:   Gross Assessment:                  Posture:     Balance:  Standing - Static: Constant support; Fair  Standing - Dynamic : Fair;Poor Bed Mobility:     Wheelchair Mobility:     Transfers:  Sit to Stand: Contact guard assistance;Minimum assistance  Stand to Sit: Contact guard assistance  Bed to Chair: Minimum assistance  Gait:            Body Structures Involved:  1. Nerves  2. Bones  3. Joints  4. Muscles  5. Ligaments Body Functions Affected:  1. Neuromusculoskeletal  2. Movement Related Activities and Participation Affected:  1. General Tasks and Demands  2. Mobility  3. Community, Social and [place]   Number of elements that affect the Plan of Care: 4+: HIGH COMPLEXITY   CLINICAL PRESENTATION:   Presentation: Stable and uncomplicated: LOW COMPLEXITY   CLINICAL DECISION MAKIN [facility] Inpatient Short Form  How much difficulty does the patient currently have. .. Unable A Lot A Little None   1. Turning over in bed (including adjusting bedclothes, sheets and blankets)? ? 1   ? 2   ? 3   ? 4   2. Sitting down on and standing up from a chair with arms ( e.g., wheelchair, bedside commode, etc.)   ? 1   ? 2   ? 3   ? 4   3. Moving from lying on back to sitting on the side of the bed?   ? 1   ? 2   ? 3   ? 4   How much help from another person does the patient currently need. .. Total A Lot A Little None   4. Moving to and from a bed to a chair (including a wheelchair)?    ? 1   ? 2   ? 3   ? 4 5.  Need to walk in hospital room? ? 1   ? 2   ? 3   ? 4   6. Climbing 3-5 steps with a railing? ? 1   ? 2   ? 3   ? 4   © 2007, Trustees of [address] [place] [address] [address] [address], under license to [employer]. All rights reserved      Score:  Initial: 14 Most Recent: X (Date: -- )    Interpretation of Tool:  Represents activities that are increasingly more difficult (i.e. Bed mobility, Transfers, Gait). Medical Necessity:     · Patient demonstrates good  ·  rehab potential due to higher previous functional level. Reason for Services/Other Comments:  · Patient continues to require present interventions due to patient's inability to function at baseline   · . Use of outcome tool(s) and clinical judgement create a POC that gives a: Clear prediction of patient's progress: LOW COMPLEXITY            TREATMENT:   (In addition to Assessment/Re-Assessment sessions the following treatments were rendered)   Pre-treatment Symptoms/Complaints:  \"I will do want you tell me to\"  Pain: Initial:   Pain Intensity 1: 0 0/10 Post Session:  0/10     Therapeutic Activity: (    15 min): Therapeutic activities including Bed transfers, scooting seated Chair transfers and sitting/standing balance activities, and education  to improve mobility, strength, balance and coordination. Required moderate verbal and tactile cues   to safety and independence . Therapeutic Exercise: (  15 minutes):  Exercises per grid below to improve mobility, strength and balance. Required moderate verbal, manual and tactile cues to promote proper body mechanics. Progressed complexity of movement as indicated.      Date:  11/5/19 Date:   Date:     Activity/Exercise Parameters Parameters Parameters   Seated calf raises 20x R manual resistance from me     Seated TKE/HS curls 20x R manual resistance     Seated marching 20x B     Standing hip ext 20x3 L stump                           Braces/Orthotics/Lines/Etc:   · none  Treatment/Session Assessment:    · Response to Treatment:  strong in spirt and body, motivated  · Interdisciplinary Collaboration:   o Physical Therapy Assistant  o Registered Nurse  · After treatment position/precautions:   o Up in chair  o Bed alarm/tab alert on  o Bed/Chair-wheels locked  o Bed in low position  o Call light within reach  o RN notified   · Compliance with Program/Exercises: Compliant all of the time  · Recommendations/Intent for next treatment session: \"Next visit will focus on advancements to more challenging activities and reduction in assistance provided\".   Total Treatment Duration:  PT Patient Time In/Time Out  Time In: 1000  Time Out: Kristyn 87, PTA

## 2019-11-05 NOTE — PROGRESS NOTES
Received call from [facility] and was offered bed for patient.  called GEOFFREY Coley for patient to have  before being discharged. Marylee stated she would contact the prosthetics for a referral to get  today so patient can be discharged to [facility] today. Called Dr. Cheryle Sawyer to notify her patient has bed and can be discharged today. Dr. Cheryle Sawyer stated she would work on the discharge. [facility] notified patient will have  prior to discharge and will call him back with transport time.

## 2019-11-05 NOTE — DISCHARGE SUMMARY
Discharge Summary     Patient: Vladislav Todd MRN: [id number]  SSN: [id number]    YOB: 1945  Age: 69 y.o.   Sex: male       Admit Date: 10/31/2019    Discharge Date: 11/5/2019      Admission Diagnoses: Ischemic leg pain [I99.8]    Discharge Diagnoses:   Problem List as of 11/5/2019 Date Reviewed: 10/15/2019          Codes Class Noted - Resolved    CAD (coronary artery disease) ICD-10-CM: I25.10  ICD-9-CM: 414.00  2/13/2019 - Present        * (Principal) Ischemic leg pain ICD-10-CM: I99.8  ICD-9-CM: 459.9  11/1/2019 - Present        Hypothyroidism ICD-10-CM: E03.9  ICD-9-CM: 244.9  11/1/2019 - Present    Overview Signed 11/1/2019 12:10 AM by Nita Marie MD     managed well with Synthroid             Bypass graft stenosis ([facility] ICD-10-CM: B51.481R  ICD-9-CM: 996.74  10/15/2019 - Present        Yeast infection ICD-10-CM: B37.9  ICD-9-CM: 112.9  6/5/2019 - Present        S/P bypass graft of extremity ICD-10-CM: Z95.828  ICD-9-CM: V45.89  4/19/2019 - Present    Overview Signed 4/19/2019  9:38 AM by Delwyn Cushing, NP     3/27/19 (Dr. Christofer Peterson) Left common femoral to posterior tibial bypass with PTFE graft                 Severe obesity ([facility] ICD-10-CM: E66.01  ICD-9-CM: 278.01  4/12/2019 - Present        Peripheral artery disease ([facility] ICD-10-CM: I73.9  ICD-9-CM: 443.9  3/27/2019 - Present        Ankle ulcer ([facility] ICD-10-CM: L97.309  ICD-9-CM: 707.13  2/22/2019 - Present        PAD (peripheral artery disease) ([facility] ICD-10-CM: I73.9  ICD-9-CM: 443.9  2/14/2019 - Present        HTN (hypertension) ICD-10-CM: I10  ICD-9-CM: 401.9  2/14/2019 - Present        DM (diabetes mellitus) ([facility] ICD-10-CM: E11.9  ICD-9-CM: 250.00  2/14/2019 - Present        Dyslipidemia ICD-10-CM: E78.5  ICD-9-CM: 272.4  2/14/2019 - Present        Hx of CABG ICD-10-CM: Z95.1  ICD-9-CM: V45.81  2/14/2019 - Present               Discharge Condition: [place] Course:   Mr. Rain Brannon is a 70 yo male with PMH of PAD on xarelto, DM2, CAD s/p CABG, HTN, obesity admitted with painful LLE and need for AKA. He was followed by vascular surgery and s/p 10-24-19 LLE arteriogram/ tibial stent/ femoral PTA. Dr. Marianna Goncalves from vascular performed  left AKA on 11-1-19. Plan is to be off xarelto permanently. He is only on aspirin. His clinical status remained stable. Pain is controlled. The patient has been accepted to [facility] rehab to continue his care. Physical exam:  General:          Well nourished. Alert. No distress   CV:                  RRR. III/VI CONRAD LUSB, no edema  Lungs:             CTAB. No wheezing, rhonchi, or rales. anterior  Abdomen:        Soft, nontender, nondistended. Present BS  Extremities:     Warm and dry. Left AKA open with wound clean, staples intact   Neuro:             No gross focal deficits     Consults: Vascular Surgery    Significant Diagnostic Studies: see note     Disposition: STR-ANMED     Discharge Medications:   Current Discharge Medication List      START taking these medications    Details   bisacodyl (DULCOLAX) 5 mg EC tablet Take 2 Tabs by mouth daily as needed for Constipation. Qty: 30 Tab, Refills: 1      docusate sodium (COLACE) 100 mg capsule Take 1 Cap by mouth two (2) times a day for 90 days. Qty: 60 Cap, Refills: 2      insulin lispro (HUMALOG) 100 unit/mL injection INITIATE INSULIN CORRECTIVE PROTOCOL:  Normal Insulin Sensitivity   For Blood Sugar (mg/dL) of:     Less than 150 =   0 units           150 -199 =   2 units  200 -249 =   4 units  250 -299 =   6 units  300 -349 =   8 units  350 and above = 10 units and Call Physician     Initiate Hypoglycemia protoc  Qty: 1 Vial, Refills: 1         CONTINUE these medications which have CHANGED    Details   hydrALAZINE (APRESOLINE) 50 mg tablet Take 1 Tab by mouth three (3) times daily for 30 days.   Qty: 90 Tab, Refills: 1      oxyCODONE-acetaminophen (PERCOCET) 5-325 mg per tablet Take 2 Tabs by mouth every four (4) hours as needed for Pain for up to 3 days. Max Daily Amount: 12 Tabs. Qty: 24 Tab, Refills: 0    Associated Diagnoses: Ischemic leg pain         CONTINUE these medications which have NOT CHANGED    Details   metoprolol tartrate (LOPRESSOR) 25 mg tablet Take 25 mg by mouth two (2) times a day. Take morning of procedure. nitroglycerin (NITROSTAT) 0.4 mg SL tablet 1 Tab by SubLINGual route every five (5) minutes as needed for Chest Pain. Up to 3 doses. Qty: 1 Bottle, Refills: 5      aspirin delayed-release 81 mg tablet Take 81 mg by mouth daily. Take morning of procedure. ergocalciferol (ERGOCALCIFEROL) 50,000 unit capsule Take 50,000 Units by mouth every seven (7) days. Pt takes on Saturday  Hold seven days for procedure. tamsulosin (FLOMAX) 0.4 mg capsule Take 0.4 mg by mouth daily. Take morning of procedure. levothyroxine (SYNTHROID) 75 mcg tablet Take 75 mcg by mouth Daily (before breakfast). Take morning of procedure. lisinopril (PRINIVIL, ZESTRIL) 40 mg tablet Take 40 mg by mouth daily. Do not take morning of procedure. insulin NPH/insulin regular (NOVOLIN 70/30 U-100 INSULIN) 100 unit/mL (70-30) injection 60 Units by SubCUTAneous route two (2) times a day. Morning and evening of 10/23: Take 48 units (80% of usual dose)  Morning of procedure 10/24: Take 30 units (50% of usual dose) if blood glucose is >120. Hold if <120. STOP taking these medications       ibuprofen (MOTRIN) 200 mg tablet Comments:   Reason for Stopping:         rivaroxaban (XARELTO) 2.5 mg tablet Comments:   Reason for Stopping:               Activity: Activity as tolerated  Diet: Diabetic Diet  Wound Care: As directed    Follow-up Appointments   Procedures    FOLLOW UP VISIT Appointment in: One Week pcp     pcp     Standing Status:   Standing     Number of Occurrences:   1     Order Specific Question:   Appointment in     Answer:    One Week    FOLLOW UP VISIT Appointment in: Two Weeks Vascular surgery     Vascular surgery Standing Status:   Standing     Number of Occurrences:   1     Standing Expiration Date:   11/6/2019     Order Specific Question:   Appointment in     Answer:    Two Weeks       Signed By: Addie Bird MD     November 5, 2019

## 2019-11-06 ENCOUNTER — PATIENT OUTREACH (OUTPATIENT)
Dept: CASE MANAGEMENT | Age: 74
End: 2019-11-06

## 2019-11-06 NOTE — PROGRESS NOTES
This note will not be viewable in [address] Patient discharged to [facility] on 11/5/2019. ROBERT outreach postponed for 21 days due to discharge to non-preferred network SNF.

## 2019-11-26 ENCOUNTER — PATIENT OUTREACH (OUTPATIENT)
Dept: CASE MANAGEMENT | Age: 74
End: 2019-11-26

## 2019-11-26 NOTE — PROGRESS NOTES
This note will not be viewable in [address] Transitions of Care - Follow up Outreach Note   Outreach type Phone call: spoke with patient  Home visit:   Date/Time of Outreach: 2019 [id number]     Has patient attended PCP or specialist follow-up appointments since last contact? What was outcome of appointment? When is next follow-up scheduled? Patients wife states he is doing well. She reports the patient was d/c from [facility] 2019. Patients wife states he had a f/u appointment with Dr. Nishant Gutiérrez. Krish on 2019 and the staples were removed. Patients wife states that she is waiting on a f/u appointment with the [facility] and also waiting on DME equipment that was ordered. Review medications. Any medication changes since last outreach? Does patient have any questions or issues related to their medications? Yes, patient state an anxiety medication was added and Lyrica was added. Patients wife stated that patient did not want to take the medication Lyrica and [facility], waiting on scheduled appointment. Home health active? If yes - any issue? Progress? No       Referrals needed?  (CM, SW, HH, etc. )   No    Other issues/Miscellaneous? (Transportation, access to meals, ability to perform ADLs, adequate caregiver support, etc.) No other needs or concerns at this time. Patients wife states her gratitude for follow up. Next Outreach Scheduled?     Graduation from program?   N/A    Yes        Next Steps/Goals (if applicable):   N/A     Outreach completed by:   Hulda Apgar, LPN  Care Coordinator

## 2020-01-01 NOTE — PROGRESS NOTES
Patient arrived floor accompanied by transport and wife. Patient is oriented to room. Dual skin assessment completed with Zhane Melendez RN. Left leg is cool to touch with redness noted. Excoriation noted on perineal area, abrasion on buttocks, lumps on both elbows, Multiple scars from surgery( chest, knees). Respirations are even and unlabored. No distress at this time. (2) good, crying

## 2020-01-17 PROBLEM — L97.509 TOE ULCER (HCC): Status: ACTIVE | Noted: 2020-01-17

## 2020-01-27 ENCOUNTER — HOSPITAL ENCOUNTER (OUTPATIENT)
Dept: SURGERY | Age: 75
Discharge: HOME | End: 2020-01-27
Payer: MEDICARE

## 2020-01-27 VITALS
DIASTOLIC BLOOD PRESSURE: 80 MMHG | BODY MASS INDEX: 35.16 KG/M2 | HEART RATE: 51 BPM | OXYGEN SATURATION: 95 % | WEIGHT: 274 LBS | SYSTOLIC BLOOD PRESSURE: 150 MMHG | TEMPERATURE: 97.7 F | HEIGHT: 74 IN | RESPIRATION RATE: 17 BRPM

## 2020-01-27 LAB
ANION GAP SERPL CALC-SCNC: 5 MMOL/L (ref 7–16)
BASOPHILS # BLD: 0 K/UL (ref 0–0.2)
BASOPHILS NFR BLD: 1 % (ref 0–2)
BUN SERPL-MCNC: 27 MG/DL (ref 8–23)
CALCIUM SERPL-MCNC: 10 MG/DL (ref 8.3–10.4)
CHLORIDE SERPL-SCNC: 106 MMOL/L (ref 98–107)
CO2 SERPL-SCNC: 27 MMOL/L (ref 21–32)
CREAT SERPL-MCNC: 1.56 MG/DL (ref 0.8–1.5)
DIFFERENTIAL METHOD BLD: ABNORMAL
EOSINOPHIL # BLD: 0.1 K/UL (ref 0–0.8)
EOSINOPHIL NFR BLD: 2 % (ref 0.5–7.8)
ERYTHROCYTE [DISTWIDTH] IN BLOOD BY AUTOMATED COUNT: 16.1 % (ref 11.9–14.6)
GLUCOSE BLD STRIP.AUTO-MCNC: 61 MG/DL (ref 65–100)
GLUCOSE SERPL-MCNC: 80 MG/DL (ref 65–100)
HCT VFR BLD AUTO: 43.1 % (ref 41.1–50.3)
HGB BLD-MCNC: 13.4 G/DL (ref 13.6–17.2)
IMM GRANULOCYTES # BLD AUTO: 0 K/UL (ref 0–0.5)
IMM GRANULOCYTES NFR BLD AUTO: 0 % (ref 0–5)
LYMPHOCYTES # BLD: 1.6 K/UL (ref 0.5–4.6)
LYMPHOCYTES NFR BLD: 37 % (ref 13–44)
MCH RBC QN AUTO: 25.8 PG (ref 26.1–32.9)
MCHC RBC AUTO-ENTMCNC: 31.1 G/DL (ref 31.4–35)
MCV RBC AUTO: 82.9 FL (ref 79.6–97.8)
MONOCYTES # BLD: 0.8 K/UL (ref 0.1–1.3)
MONOCYTES NFR BLD: 17 % (ref 4–12)
NEUTS SEG # BLD: 1.9 K/UL (ref 1.7–8.2)
NEUTS SEG NFR BLD: 43 % (ref 43–78)
NRBC # BLD: 0 K/UL (ref 0–0.2)
PLATELET # BLD AUTO: 226 K/UL (ref 150–450)
PMV BLD AUTO: 9.8 FL (ref 9.4–12.3)
POTASSIUM SERPL-SCNC: 4.4 MMOL/L (ref 3.5–5.1)
RBC # BLD AUTO: 5.2 M/UL (ref 4.23–5.6)
SODIUM SERPL-SCNC: 138 MMOL/L (ref 136–145)
WBC # BLD AUTO: 4.5 K/UL (ref 4.3–11.1)

## 2020-01-27 PROCEDURE — 36415 COLL VENOUS BLD VENIPUNCTURE: CPT

## 2020-01-27 PROCEDURE — 80048 BASIC METABOLIC PNL TOTAL CA: CPT

## 2020-01-27 PROCEDURE — 82962 GLUCOSE BLOOD TEST: CPT

## 2020-01-27 PROCEDURE — 85025 COMPLETE CBC W/AUTO DIFF WBC: CPT

## 2020-01-27 NOTE — PERIOP NOTES
Patient confirms name and . Order to obtain consent found in EHR and matches case posting. Type 2 surgery,  assessment complete. Labs per surgeon: CBC and BMP  Labs per anesthesia protocol: none  EKG: 10/24/19 & 19 reviewed and approved by Dr Aleja Puckett 2020. Glucose: 80    Medication list visualized today. Hibiclens and instructions given per hospital policy. Patient provided with and instructed on educational handouts including Guide to Surgery, Pain Management, Hand Hygiene, Blood Transfusion Education, and [facility] Brochure. Patient answered medical/surgical history questions at their best of ability. All prior to admission medications documented in [facility]. Patient instructed on the following:  Arrive at MAIN Entrance, time of arrival to be called the day before by 1700  NPO after midnight including gum, mints, and ice chips. Responsible adult must drive patient to the hospital, stay during surgery, and patient will require supervision 24 hours after anesthesia. Use hibiclens in shower the night before surgery and on the morning of surgery. Hibiclens provided to the patient with verbal instructions and handout. Leave all valuables (money and jewelry) at home but bring insurance card and ID on DOS. Do not wear make-up, nail polish, lotions, cologne, perfumes, powders, or oil on skin. Patient teach back successful and patient demonstrates knowledge of instruction.

## 2020-01-27 NOTE — PERIOP NOTES
PLEASE CONTINUE TAKING ALL PRESCRIPTION MEDICATIONS UP TO THE DAY OF SURGERY UNLESS OTHERWISE DIRECTED BELOW. DISCONTINUE all vitamins and supplements 7 days prior to surgery. DISCONTINUE Non-Steriodal Anti-Inflammatory (NSAIDS) such as Advil and Aleve 5 days prior to surgery. Home Medications to take  the day of surgery    Augmentin / amoxicillin-clavulanate  Aspirin 81 mg   levothyroxine/ Synthroid  metoprolol / Lopressor  Tamsulosin/ Flomax   NPH/ Novolin 70/30- Take 30 units on day of procedure only if blood sugar is greater than 120- 1/31/20-- Day before procedure take 48 units morning and evening 1/30/20  If needed may take oxycodone for pain     Home Medications   to Hold           Comments   Bring to hospital on day of surgery: Nitroglycerin             Please do not bring home medications with you on the day of surgery unless otherwise directed by your nurse. If you are instructed to bring home medications, please give them to your nurse as they will be administered by the nursing staff. If you have any questions, please call [facility] [phone] or [facility] [phone].

## 2020-01-27 NOTE — PERIOP NOTES
Recent Results (from the past 12 hour(s))   GLUCOSE, POC    Collection Time: 01/27/20  1:17 PM   Result Value Ref Range    Glucose (POC) 61 (L) 65 - 100 mg/dL   CBC WITH AUTOMATED DIFF    Collection Time: 01/27/20  2:19 PM   Result Value Ref Range    WBC 4.5 4.3 - 11.1 K/uL    RBC 5.20 4.23 - 5.6 M/uL    HGB 13.4 (L) 13.6 - 17.2 g/dL    HCT 43.1 41.1 - 50.3 %    MCV 82.9 79.6 - 97.8 FL    MCH 25.8 (L) 26.1 - 32.9 PG    MCHC 31.1 (L) 31.4 - 35.0 g/dL    RDW 16.1 (H) 11.9 - 14.6 %    PLATELET 715 774 - 020 K/uL    MPV 9.8 9.4 - 12.3 FL    ABSOLUTE NRBC 0.00 0.0 - 0.2 K/uL    DF AUTOMATED      NEUTROPHILS 43 43 - 78 %    LYMPHOCYTES 37 13 - 44 %    MONOCYTES 17 (H) 4.0 - 12.0 %    EOSINOPHILS 2 0.5 - 7.8 %    BASOPHILS 1 0.0 - 2.0 %    IMMATURE GRANULOCYTES 0 0.0 - 5.0 %    ABS. NEUTROPHILS 1.9 1.7 - 8.2 K/UL    ABS. LYMPHOCYTES 1.6 0.5 - 4.6 K/UL    ABS. MONOCYTES 0.8 0.1 - 1.3 K/UL    ABS. EOSINOPHILS 0.1 0.0 - 0.8 K/UL    ABS. BASOPHILS 0.0 0.0 - 0.2 K/UL    ABS. IMM. GRANS. 0.0 0.0 - 0.5 K/UL   METABOLIC PANEL, BASIC    Collection Time: 01/27/20  2:19 PM   Result Value Ref Range    Sodium 138 136 - 145 mmol/L    Potassium 4.4 3.5 - 5.1 mmol/L    Chloride 106 98 - 107 mmol/L    CO2 27 21 - 32 mmol/L    Anion gap 5 (L) 7 - 16 mmol/L    Glucose 80 65 - 100 mg/dL    BUN 27 (H) 8 - 23 MG/DL    Creatinine 1.56 (H) 0.8 - 1.5 MG/DL    GFR est AA 57 (L) >60 ml/min/1.73m2    GFR est non-AA 47 (L) >60 ml/min/1.73m2    Calcium 10.0 8.3 - 10.4 MG/DL      Abnormal creatinine is within 0.3 of previous results on 11/3/19 which is acceptable per anesthesia protocol.

## 2020-01-30 RX ORDER — HYDROMORPHONE HYDROCHLORIDE 2 MG/ML
0.5 INJECTION, SOLUTION INTRAMUSCULAR; INTRAVENOUS; SUBCUTANEOUS
Status: CANCELLED | OUTPATIENT
Start: 2020-01-30

## 2020-01-30 RX ORDER — NALOXONE HYDROCHLORIDE 0.4 MG/ML
0.1 INJECTION, SOLUTION INTRAMUSCULAR; INTRAVENOUS; SUBCUTANEOUS
Status: CANCELLED | OUTPATIENT
Start: 2020-01-30

## 2020-01-30 RX ORDER — SODIUM CHLORIDE, SODIUM LACTATE, POTASSIUM CHLORIDE, CALCIUM CHLORIDE 600; 310; 30; 20 MG/100ML; MG/100ML; MG/100ML; MG/100ML
75 INJECTION, SOLUTION INTRAVENOUS CONTINUOUS
Status: CANCELLED | OUTPATIENT
Start: 2020-01-30

## 2020-01-30 RX ORDER — FLUMAZENIL 0.1 MG/ML
0.2 INJECTION INTRAVENOUS
Status: CANCELLED | OUTPATIENT
Start: 2020-01-30

## 2020-01-30 RX ORDER — OXYCODONE HYDROCHLORIDE 5 MG/1
5 TABLET ORAL
Status: CANCELLED | OUTPATIENT
Start: 2020-01-30 | End: 2020-01-31

## 2020-01-30 RX ORDER — DIPHENHYDRAMINE HYDROCHLORIDE 50 MG/ML
12.5 INJECTION, SOLUTION INTRAMUSCULAR; INTRAVENOUS
Status: CANCELLED | OUTPATIENT
Start: 2020-01-30

## 2020-01-31 ENCOUNTER — HOSPITAL ENCOUNTER (OUTPATIENT)
Age: 75
Setting detail: OUTPATIENT SURGERY
Discharge: HOME | End: 2020-01-31
Attending: SURGERY | Admitting: SURGERY
Payer: MEDICARE

## 2020-01-31 VITALS
OXYGEN SATURATION: 96 % | TEMPERATURE: 98 F | WEIGHT: 274 LBS | DIASTOLIC BLOOD PRESSURE: 81 MMHG | HEIGHT: 74 IN | RESPIRATION RATE: 18 BRPM | BODY MASS INDEX: 35.16 KG/M2 | SYSTOLIC BLOOD PRESSURE: 185 MMHG | HEART RATE: 58 BPM

## 2020-01-31 LAB — GLUCOSE BLD STRIP.AUTO-MCNC: 118 MG/DL (ref 65–100)

## 2020-01-31 PROCEDURE — 74011250636 HC RX REV CODE- 250/636: Performed by: ANESTHESIOLOGY

## 2020-01-31 PROCEDURE — 82962 GLUCOSE BLOOD TEST: CPT

## 2020-01-31 RX ORDER — SODIUM CHLORIDE 0.9 % (FLUSH) 0.9 %
5-40 SYRINGE (ML) INJECTION AS NEEDED
Status: DISCONTINUED | OUTPATIENT
Start: 2020-01-31 | End: 2020-01-31 | Stop reason: HOSPADM

## 2020-01-31 RX ORDER — LIDOCAINE HYDROCHLORIDE 10 MG/ML
0.1 INJECTION INFILTRATION; PERINEURAL AS NEEDED
Status: DISCONTINUED | OUTPATIENT
Start: 2020-01-31 | End: 2020-01-31 | Stop reason: HOSPADM

## 2020-01-31 RX ORDER — MIDAZOLAM HYDROCHLORIDE 1 MG/ML
2 INJECTION, SOLUTION INTRAMUSCULAR; INTRAVENOUS
Status: DISCONTINUED | OUTPATIENT
Start: 2020-01-31 | End: 2020-01-31 | Stop reason: HOSPADM

## 2020-01-31 RX ORDER — SODIUM CHLORIDE 0.9 % (FLUSH) 0.9 %
5-40 SYRINGE (ML) INJECTION EVERY 8 HOURS
Status: DISCONTINUED | OUTPATIENT
Start: 2020-01-31 | End: 2020-01-31 | Stop reason: HOSPADM

## 2020-01-31 RX ORDER — SODIUM CHLORIDE, SODIUM LACTATE, POTASSIUM CHLORIDE, CALCIUM CHLORIDE 600; 310; 30; 20 MG/100ML; MG/100ML; MG/100ML; MG/100ML
100 INJECTION, SOLUTION INTRAVENOUS CONTINUOUS
Status: DISCONTINUED | OUTPATIENT
Start: 2020-01-31 | End: 2020-01-31 | Stop reason: HOSPADM

## 2020-01-31 RX ADMIN — SODIUM CHLORIDE, SODIUM LACTATE, POTASSIUM CHLORIDE, AND CALCIUM CHLORIDE 100 ML/HR: 600; 310; 30; 20 INJECTION, SOLUTION INTRAVENOUS at 05:45

## 2020-02-03 ENCOUNTER — ANESTHESIA EVENT (OUTPATIENT)
Dept: SURGERY | Age: 75
End: 2020-02-03
Payer: MEDICARE

## 2020-02-03 ENCOUNTER — APPOINTMENT (OUTPATIENT)
Dept: INTERVENTIONAL RADIOLOGY/VASCULAR | Age: 75
End: 2020-02-03
Attending: SURGERY
Payer: MEDICARE

## 2020-02-03 ENCOUNTER — ANESTHESIA (OUTPATIENT)
Dept: SURGERY | Age: 75
End: 2020-02-03
Payer: MEDICARE

## 2020-02-03 ENCOUNTER — HOSPITAL ENCOUNTER (OUTPATIENT)
Age: 75
Setting detail: OUTPATIENT SURGERY
Discharge: HOME | End: 2020-02-03
Attending: SURGERY | Admitting: SURGERY
Payer: MEDICARE

## 2020-02-03 VITALS
HEIGHT: 74 IN | DIASTOLIC BLOOD PRESSURE: 88 MMHG | OXYGEN SATURATION: 93 % | TEMPERATURE: 98.5 F | RESPIRATION RATE: 18 BRPM | BODY MASS INDEX: 35.16 KG/M2 | SYSTOLIC BLOOD PRESSURE: 185 MMHG | WEIGHT: 274 LBS | HEART RATE: 43 BPM

## 2020-02-03 LAB
ATRIAL RATE: 54 BPM
CALCULATED P AXIS, ECG09: 72 DEGREES
CALCULATED R AXIS, ECG10: 40 DEGREES
CALCULATED T AXIS, ECG11: 75 DEGREES
CREAT BLD-MCNC: 1.5 MG/DL (ref 0.8–1.5)
DIAGNOSIS, 93000: NORMAL
GLUCOSE BLD STRIP.AUTO-MCNC: 147 MG/DL (ref 65–100)
GLUCOSE BLD STRIP.AUTO-MCNC: 149 MG/DL (ref 65–100)
P-R INTERVAL, ECG05: 194 MS
Q-T INTERVAL, ECG07: 512 MS
QRS DURATION, ECG06: 158 MS
QTC CALCULATION (BEZET), ECG08: 485 MS
TROPONIN I SERPL-MCNC: <0.02 NG/ML (ref 0.02–0.05)
TROPONIN I SERPL-MCNC: <0.02 NG/ML (ref 0.02–0.05)
VENTRICULAR RATE, ECG03: 54 BPM

## 2020-02-03 PROCEDURE — 76210000025 HC REC RM PH II 3 TO 3.5 HR: Performed by: SURGERY

## 2020-02-03 PROCEDURE — C1894 INTRO/SHEATH, NON-LASER: HCPCS

## 2020-02-03 PROCEDURE — 74011636320 HC RX REV CODE- 636/320: Performed by: SURGERY

## 2020-02-03 PROCEDURE — 74011250636 HC RX REV CODE- 250/636: Performed by: ANESTHESIOLOGY

## 2020-02-03 PROCEDURE — 75625 CONTRAST EXAM ABDOMINL AORTA: CPT

## 2020-02-03 PROCEDURE — 76210000016 HC OR PH I REC 1 TO 1.5 HR: Performed by: SURGERY

## 2020-02-03 PROCEDURE — 76010000153 HC OR TIME 1.5 TO 2 HR: Performed by: SURGERY

## 2020-02-03 PROCEDURE — 77030037088 HC TUBE ENDOTRACH ORAL NSL COVD-A: Performed by: NURSE ANESTHETIST, CERTIFIED REGISTERED

## 2020-02-03 PROCEDURE — 84484 ASSAY OF TROPONIN QUANT: CPT

## 2020-02-03 PROCEDURE — 82565 ASSAY OF CREATININE: CPT

## 2020-02-03 PROCEDURE — 74011250637 HC RX REV CODE- 250/637: Performed by: ANESTHESIOLOGY

## 2020-02-03 PROCEDURE — C1769 GUIDE WIRE: HCPCS

## 2020-02-03 PROCEDURE — C1887 CATHETER, GUIDING: HCPCS

## 2020-02-03 PROCEDURE — 77030039425 HC BLD LARYNG TRULITE DISP TELE -A: Performed by: NURSE ANESTHETIST, CERTIFIED REGISTERED

## 2020-02-03 PROCEDURE — 74011000250 HC RX REV CODE- 250: Performed by: REGISTERED NURSE

## 2020-02-03 PROCEDURE — 93005 ELECTROCARDIOGRAM TRACING: CPT | Performed by: ANESTHESIOLOGY

## 2020-02-03 PROCEDURE — 74011250636 HC RX REV CODE- 250/636: Performed by: REGISTERED NURSE

## 2020-02-03 PROCEDURE — 82962 GLUCOSE BLOOD TEST: CPT

## 2020-02-03 PROCEDURE — 36245 INS CATH ABD/L-EXT ART 1ST: CPT

## 2020-02-03 PROCEDURE — 74011250636 HC RX REV CODE- 250/636: Performed by: NURSE ANESTHETIST, CERTIFIED REGISTERED

## 2020-02-03 PROCEDURE — 77030019908 HC STETH ESOPH SIMS -A: Performed by: ANESTHESIOLOGY

## 2020-02-03 PROCEDURE — 36415 COLL VENOUS BLD VENIPUNCTURE: CPT

## 2020-02-03 PROCEDURE — 76060000034 HC ANESTHESIA 1.5 TO 2 HR: Performed by: SURGERY

## 2020-02-03 PROCEDURE — 74011250636 HC RX REV CODE- 250/636: Performed by: SURGERY

## 2020-02-03 RX ORDER — SODIUM CHLORIDE 0.9 % (FLUSH) 0.9 %
5-40 SYRINGE (ML) INJECTION AS NEEDED
Status: DISCONTINUED | OUTPATIENT
Start: 2020-02-03 | End: 2020-02-03 | Stop reason: HOSPADM

## 2020-02-03 RX ORDER — GLYCOPYRROLATE 0.2 MG/ML
INJECTION INTRAMUSCULAR; INTRAVENOUS AS NEEDED
Status: DISCONTINUED | OUTPATIENT
Start: 2020-02-03 | End: 2020-02-03 | Stop reason: HOSPADM

## 2020-02-03 RX ORDER — PROPOFOL 10 MG/ML
INJECTION, EMULSION INTRAVENOUS AS NEEDED
Status: DISCONTINUED | OUTPATIENT
Start: 2020-02-03 | End: 2020-02-03 | Stop reason: HOSPADM

## 2020-02-03 RX ORDER — LIDOCAINE HYDROCHLORIDE 20 MG/ML
INJECTION, SOLUTION EPIDURAL; INFILTRATION; INTRACAUDAL; PERINEURAL AS NEEDED
Status: DISCONTINUED | OUTPATIENT
Start: 2020-02-03 | End: 2020-02-03 | Stop reason: HOSPADM

## 2020-02-03 RX ORDER — OXYCODONE AND ACETAMINOPHEN 10; 325 MG/1; MG/1
1 TABLET ORAL AS NEEDED
Status: DISCONTINUED | OUTPATIENT
Start: 2020-02-03 | End: 2020-02-03 | Stop reason: HOSPADM

## 2020-02-03 RX ORDER — SODIUM CHLORIDE, SODIUM LACTATE, POTASSIUM CHLORIDE, CALCIUM CHLORIDE 600; 310; 30; 20 MG/100ML; MG/100ML; MG/100ML; MG/100ML
75 INJECTION, SOLUTION INTRAVENOUS CONTINUOUS
Status: DISCONTINUED | OUTPATIENT
Start: 2020-02-03 | End: 2020-02-03 | Stop reason: HOSPADM

## 2020-02-03 RX ORDER — HEPARIN SODIUM 200 [USP'U]/100ML
INJECTION, SOLUTION INTRAVENOUS
Status: COMPLETED | OUTPATIENT
Start: 2020-02-03 | End: 2020-02-03

## 2020-02-03 RX ORDER — EPHEDRINE SULFATE/0.9% NACL/PF 50 MG/5 ML
SYRINGE (ML) INTRAVENOUS AS NEEDED
Status: DISCONTINUED | OUTPATIENT
Start: 2020-02-03 | End: 2020-02-03 | Stop reason: HOSPADM

## 2020-02-03 RX ORDER — ONDANSETRON 2 MG/ML
4 INJECTION INTRAMUSCULAR; INTRAVENOUS
Status: DISCONTINUED | OUTPATIENT
Start: 2020-02-03 | End: 2020-02-03 | Stop reason: HOSPADM

## 2020-02-03 RX ORDER — OXYCODONE AND ACETAMINOPHEN 5; 325 MG/1; MG/1
1 TABLET ORAL
Status: DISCONTINUED | OUTPATIENT
Start: 2020-02-03 | End: 2020-02-03 | Stop reason: HOSPADM

## 2020-02-03 RX ORDER — MORPHINE SULFATE 10 MG/ML
1 INJECTION, SOLUTION INTRAMUSCULAR; INTRAVENOUS
Status: DISCONTINUED | OUTPATIENT
Start: 2020-02-03 | End: 2020-02-03 | Stop reason: HOSPADM

## 2020-02-03 RX ORDER — SODIUM CHLORIDE 0.9 % (FLUSH) 0.9 %
5-40 SYRINGE (ML) INJECTION EVERY 8 HOURS
Status: DISCONTINUED | OUTPATIENT
Start: 2020-02-03 | End: 2020-02-03 | Stop reason: HOSPADM

## 2020-02-03 RX ORDER — OXYCODONE HYDROCHLORIDE 5 MG/1
5 TABLET ORAL
Status: DISCONTINUED | OUTPATIENT
Start: 2020-02-03 | End: 2020-02-03 | Stop reason: HOSPADM

## 2020-02-03 RX ORDER — SUCCINYLCHOLINE CHLORIDE 20 MG/ML
INJECTION INTRAMUSCULAR; INTRAVENOUS AS NEEDED
Status: DISCONTINUED | OUTPATIENT
Start: 2020-02-03 | End: 2020-02-03 | Stop reason: HOSPADM

## 2020-02-03 RX ORDER — FENTANYL CITRATE 50 UG/ML
INJECTION, SOLUTION INTRAMUSCULAR; INTRAVENOUS AS NEEDED
Status: DISCONTINUED | OUTPATIENT
Start: 2020-02-03 | End: 2020-02-03 | Stop reason: HOSPADM

## 2020-02-03 RX ORDER — NEOSTIGMINE METHYLSULFATE 1 MG/ML
INJECTION, SOLUTION INTRAVENOUS AS NEEDED
Status: DISCONTINUED | OUTPATIENT
Start: 2020-02-03 | End: 2020-02-03 | Stop reason: HOSPADM

## 2020-02-03 RX ORDER — DEXAMETHASONE SODIUM PHOSPHATE 4 MG/ML
INJECTION, SOLUTION INTRA-ARTICULAR; INTRALESIONAL; INTRAMUSCULAR; INTRAVENOUS; SOFT TISSUE AS NEEDED
Status: DISCONTINUED | OUTPATIENT
Start: 2020-02-03 | End: 2020-02-03 | Stop reason: HOSPADM

## 2020-02-03 RX ORDER — SODIUM CHLORIDE 9 MG/ML
3 INJECTION, SOLUTION INTRAVENOUS ONCE
Status: DISCONTINUED | OUTPATIENT
Start: 2020-02-03 | End: 2020-02-03 | Stop reason: HOSPADM

## 2020-02-03 RX ORDER — HYDROMORPHONE HYDROCHLORIDE 2 MG/ML
0.5 INJECTION, SOLUTION INTRAMUSCULAR; INTRAVENOUS; SUBCUTANEOUS
Status: DISCONTINUED | OUTPATIENT
Start: 2020-02-03 | End: 2020-02-03 | Stop reason: HOSPADM

## 2020-02-03 RX ORDER — SODIUM CHLORIDE 9 MG/ML
1 INJECTION, SOLUTION INTRAVENOUS AS NEEDED
Status: DISCONTINUED | OUTPATIENT
Start: 2020-02-03 | End: 2020-02-03 | Stop reason: HOSPADM

## 2020-02-03 RX ORDER — ONDANSETRON 2 MG/ML
INJECTION INTRAMUSCULAR; INTRAVENOUS AS NEEDED
Status: DISCONTINUED | OUTPATIENT
Start: 2020-02-03 | End: 2020-02-03 | Stop reason: HOSPADM

## 2020-02-03 RX ORDER — ROCURONIUM BROMIDE 10 MG/ML
INJECTION, SOLUTION INTRAVENOUS AS NEEDED
Status: DISCONTINUED | OUTPATIENT
Start: 2020-02-03 | End: 2020-02-03 | Stop reason: HOSPADM

## 2020-02-03 RX ORDER — MORPHINE SULFATE 2 MG/ML
1 INJECTION, SOLUTION INTRAMUSCULAR; INTRAVENOUS
Status: DISCONTINUED | OUTPATIENT
Start: 2020-02-03 | End: 2020-02-03 | Stop reason: HOSPADM

## 2020-02-03 RX ORDER — SODIUM CHLORIDE 9 MG/ML
75 INJECTION, SOLUTION INTRAVENOUS CONTINUOUS
Status: DISCONTINUED | OUTPATIENT
Start: 2020-02-03 | End: 2020-02-03 | Stop reason: HOSPADM

## 2020-02-03 RX ADMIN — LIDOCAINE HYDROCHLORIDE 100 MG: 20 INJECTION, SOLUTION EPIDURAL; INFILTRATION; INTRACAUDAL; PERINEURAL at 11:22

## 2020-02-03 RX ADMIN — FENTANYL CITRATE 100 MCG: 50 INJECTION INTRAMUSCULAR; INTRAVENOUS at 11:22

## 2020-02-03 RX ADMIN — PHENYLEPHRINE HYDROCHLORIDE 200 MCG: 10 INJECTION INTRAVENOUS at 12:03

## 2020-02-03 RX ADMIN — ROCURONIUM BROMIDE 10 MG: 10 INJECTION, SOLUTION INTRAVENOUS at 11:22

## 2020-02-03 RX ADMIN — PHENYLEPHRINE HYDROCHLORIDE 200 MCG: 10 INJECTION INTRAVENOUS at 11:56

## 2020-02-03 RX ADMIN — PHENYLEPHRINE HYDROCHLORIDE 200 MCG: 10 INJECTION INTRAVENOUS at 11:53

## 2020-02-03 RX ADMIN — ONDANSETRON 4 MG: 2 INJECTION INTRAMUSCULAR; INTRAVENOUS at 11:57

## 2020-02-03 RX ADMIN — PROPOFOL 200 MG: 10 INJECTION, EMULSION INTRAVENOUS at 11:22

## 2020-02-03 RX ADMIN — DEXAMETHASONE SODIUM PHOSPHATE 4 MG: 4 INJECTION, SOLUTION INTRAMUSCULAR; INTRAVENOUS at 11:57

## 2020-02-03 RX ADMIN — CEFAZOLIN 3 G: 1 INJECTION, POWDER, FOR SOLUTION INTRAVENOUS at 11:31

## 2020-02-03 RX ADMIN — SUCCINYLCHOLINE CHLORIDE 160 MG: 20 INJECTION, SOLUTION INTRAMUSCULAR; INTRAVENOUS at 11:22

## 2020-02-03 RX ADMIN — MORPHINE SULFATE 1 MG: 10 INJECTION INTRAVENOUS at 13:14

## 2020-02-03 RX ADMIN — PHENYLEPHRINE HYDROCHLORIDE 100 MCG: 10 INJECTION INTRAVENOUS at 11:47

## 2020-02-03 RX ADMIN — Medication 4.5 MG: at 12:38

## 2020-02-03 RX ADMIN — PHENYLEPHRINE HYDROCHLORIDE 200 MCG: 10 INJECTION INTRAVENOUS at 12:06

## 2020-02-03 RX ADMIN — Medication 10 MG: at 11:40

## 2020-02-03 RX ADMIN — GLYCOPYRROLATE 0.6 MG: 0.2 INJECTION, SOLUTION INTRAMUSCULAR; INTRAVENOUS at 12:38

## 2020-02-03 RX ADMIN — Medication 10 MG: at 11:44

## 2020-02-03 RX ADMIN — PHENYLEPHRINE HYDROCHLORIDE 200 MCG: 10 INJECTION INTRAVENOUS at 11:50

## 2020-02-03 RX ADMIN — ROCURONIUM BROMIDE 30 MG: 10 INJECTION, SOLUTION INTRAVENOUS at 11:32

## 2020-02-03 RX ADMIN — NITROGLYCERIN 1 INCH: 20 OINTMENT TOPICAL at 13:40

## 2020-02-03 RX ADMIN — SODIUM CHLORIDE, SODIUM LACTATE, POTASSIUM CHLORIDE, AND CALCIUM CHLORIDE: 600; 310; 30; 20 INJECTION, SOLUTION INTRAVENOUS at 11:14

## 2020-02-03 RX ADMIN — SODIUM CHLORIDE 1 ML/KG/HR: 900 INJECTION, SOLUTION INTRAVENOUS at 10:32

## 2020-02-03 RX ADMIN — MORPHINE SULFATE 1 MG: 10 INJECTION INTRAVENOUS at 13:24

## 2020-02-03 NOTE — DISCHARGE INSTRUCTIONS
Your Recovery  An angiogram is an X-ray test that uses dye and a camera to take pictures of the blood flow in an artery or a vein. The doctor inserted a thin, flexible tube (catheter) into a blood vessel in your groin. After an angiogram, your groin or arm may have a bruise and feel sore for a day or two. You can do light activities around the house but nothing strenuous for several days. Your doctor may give you specific instructions on when you can do your normal activities again, such as driving and going back to work. This care sheet gives you a general idea about how long it will take for you to recover. But each person recovers at a different pace. Follow the steps below to feel better as quickly as possible. How can you care for yourself at home? Activity  · Do not do strenuous exercise and do not lift, pull, or push anything heavy until your doctor says it is okay. This may be for a day or two. You can walk around the house and do light activity, such as cooking. · You may shower 24 to 48 hours after the procedure, if your doctor okays it. Pat the incision dry. Do not take a bath for 1 week, or until your doctor tells you it is okay. · The catheter was placed in your groin, try not to walk up stairs for the first couple of days. · If your doctor recommends it, get more exercise. Walking is a good choice. Bit by bit, increase the amount you walk every day. Try for at least 30 minutes on most days of the week. Diet  · Drink plenty of fluids to help your body flush out the dye. If you have kidney, heart, or liver disease and have to limit fluids, talk with your doctor before you increase the amount of fluids you drink. · You can eat your normal diet. If your stomach is upset, try bland, low-fat foods like plain rice, broiled chicken, toast, and yogurt. Medicines  · Be safe with medicines. Read and follow all instructions on the label.   ¨ If the doctor gave you a prescription medicine for pain, take it as prescribed. ¨ If you are not taking a prescription pain medicine, ask your doctor if you can take an over-the-counter medicine. · If you take blood thinners, such as warfarin (Coumadin), clopidogrel (Plavix), or aspirin, be sure to talk to your doctor. He or she will tell you if and when to start taking those medicines again. Make sure that you understand exactly what your doctor wants you to do. · Your doctor will tell you if and when you can restart your medicines. He or she will also give you instructions about taking any new medicines. Care of the catheter site  · You will have a dressing over the cut (incision). A dressing helps the incision heal and protects it. Your doctor will tell you how to take care of this. · Put ice or a cold pack on the area for 10 to 20 minutes at a time to help with soreness or swelling. Put a thin cloth between the ice and your skin. Follow-up care is a key part of your treatment and safety. Be sure to make and go to all appointments, and call your doctor if you are having problems. It's also a good idea to know your test results and keep a list of the medicines you take. When should you call for help? Call 911 anytime you think you may need emergency care. For example, call if:  · You passed out (lost consciousness). · You have severe trouble breathing. · You have sudden chest pain and shortness of breath, or you cough up blood. Call your doctor now or seek immediate medical care if:  · You are bleeding from the area where the catheter was put in your artery. · You have a fast-growing, painful lump at the catheter site. · You have signs of infection, such as:  ¨ Increased pain, swelling, warmth, or redness. ¨ Red streaks leading from the incision. ¨ Pus draining from the incision. ¨ A fever. Watch closely for any changes in your health, and be sure to contact your doctor if:  · You don't get better as expected.     After general anesthesia or intravenous sedation, for 24 hours or while taking prescription Narcotics:  · Limit your activities  · A responsible adult needs to be with you for the next 24 hours  · Do not drive and operate hazardous machinery  · Do not make important personal or business decisions  · Do  not drink alcoholic beverages  · If you have not urinated within 8 hours after discharge, please contact your surgeon on call. · If you have sleep apnea and have a CPAP machine, please use it for all naps and sleeping. · Please use caution when taking narcotics and any of your home medications that may cause drowsiness. *  Please give a list of your current medications to your Primary Care Provider. *  Please update this list whenever your medications are discontinued, doses are      changed, or new medications (including over-the-counter products) are added. *  Please carry medication information at all times in case of emergency situations. These are general instructions for a healthy lifestyle:  No smoking/ No tobacco products/ Avoid exposure to second hand smoke  Surgeon General's Warning:  Quitting smoking now greatly reduces serious risk to your health. Obesity, smoking, and sedentary lifestyle greatly increases your risk for illness  A healthy diet, regular physical exercise & weight monitoring are important for maintaining a healthy lifestyle    You may be retaining fluid if you have a history of heart failure or if you experience any of the following symptoms:  Weight gain of 3 pounds or more overnight or 5 pounds in a week, increased swelling in our hands or feet or shortness of breath while lying flat in bed. Please call your doctor as soon as you notice any of these symptoms; do not wait until your next office visit.

## 2020-02-03 NOTE — ANESTHESIA PREPROCEDURE EVALUATION
Anesthetic History     PONV          Review of Systems / Medical History  Patient summary reviewed and pertinent labs reviewed    Pulmonary              Pertinent negatives: Smoker: Former smoker. 100 pack year. Neuro/Psych   Within defined limits           Cardiovascular    Hypertension        Dysrhythmias (RBBB)   CAD, PAD, CABG (2003) and hyperlipidemia    Exercise tolerance: <4 METS  Comments: Denies recent CP, SOB or Palpitations    Echo 2018 showing EF 50-54%, mild AS, mild MR  Sinus olga, RBBB   GI/Hepatic/Renal         Renal disease (Cr 1.8 (increase from baseline 1.5)):  CRI       Endo/Other    Diabetes: well controlled, type 2, using insulin  Hypothyroidism: well controlled  Obesity and arthritis     Other Findings            Physical Exam    Airway  Mallampati: II  TM Distance: 4 - 6 cm  Neck ROM: normal range of motion   Mouth opening: Normal     Cardiovascular    Rhythm: regular           Dental    Dentition: Lower partial plate and Full upper dentures     Pulmonary  Breath sounds clear to auscultation               Abdominal  GI exam deferred       Other Findings            Anesthetic Plan    ASA: 3  Anesthesia type: general      Post-op pain plan if not by surgeon: peripheral nerve block single    Induction: Intravenous  Anesthetic plan and risks discussed with: Patient

## 2020-02-03 NOTE — PERIOP NOTES
C/O chest pain, states 'my heart hurts'. Asked pt what type of pain he was having, could he describe it, he states, 'it just hurts'. Call placed to Dr. Rufus Vieira and orders rec'd for Morphine and 12 lead EKG. 1309 - 12 lead EKG done    1320 - Nitro paste and Troponin ordered. 1325 - Lab here to draw Troponin and pharmacy called to send Nitro paste to PACU.

## 2020-02-03 NOTE — ANESTHESIA POSTPROCEDURE EVALUATION
Procedure(s):  RIGHT LOWER EXTREMITY ARTERIOGRAM WITH ULTRSOUND VASUCULAR ACCESS. general    Anesthesia Post Evaluation      Multimodal analgesia: multimodal analgesia used between 6 hours prior to anesthesia start to PACU discharge  Patient location during evaluation: PACU  Patient participation: complete - patient participated  Level of consciousness: awake  Pain management: adequate  Airway patency: patent  Anesthetic complications: no  Cardiovascular status: acceptable  Respiratory status: acceptable  Hydration status: acceptable  Comments: Chest pain in RR(pt wd not address intensity or of what quality)--addressed by 12 lead EKG(nl), MS04 IV, 1 1/2 inch of NTG paste to chest and oxygen. !1st set of cardiac enzymes nl, 2nd set drawn and will await results prior to D/C. Post anesthesia nausea and vomiting:  none      Vitals Value Taken Time   /81 2/3/2020  3:42 PM   Temp 36.2 °C (97.2 °F) 2/3/2020 12:53 PM   Pulse 45 2/3/2020  3:46 PM   Resp 18 2/3/2020  2:52 PM   SpO2 90 % 2/3/2020  3:46 PM   Vitals shown include unvalidated device data.

## 2020-02-03 NOTE — BRIEF OP NOTE
[address]   [address] [facility]. [address]  [phone] FAX: [phone]    Brief Op Note Template Note    Pre-Op Diagnosis: PVD    Post-Op Diagnosis:  PVD    Procedures: Procedure(s):  RIGHT LOWER EXTREMITY ARTERIOGRAM [address] ACCESS    Surgeon: Akosua Sanchez MD    Assistants: Surgeon(s): Viktor Vargas MD      Anesthesia:  General     Findings: Long segment SFA occlusion reconstitutes above-knee pop, tibioperoneal trunk occluded posterior tibial dominant runoff to the foot small    Tourniquet Time:  * No tourniquets in log *    Estimated Blood Loss:               Specimens:          Implants:  * No implants in log *    Complications: None               Signed: Akosua Sanchez MD      Elements of this note have been dictated using speech recognition software. As a result, errors of speech recognition may have occurred.

## 2020-02-04 NOTE — OP NOTES
[address] [facility]  OPERATIVE REPORT    Name:  Barrington Watts  MR#:  [id number]  :  1945  ACCOUNT #:  [de-identified]  DATE OF SERVICE:  2020    CLINICAL SERVICE:  Vascular Surgery. PREOPERATIVE DIAGNOSIS:  Nonhealing second toe ulcer. POSTOPERATIVE DIAGNOSIS:  Nonhealing second toe ulcer. PROCEDURES PERFORMED:  1. Ultrasound-guided access of the left common femoral artery, placement of catheter for aortogram.  2.  Right lower extremity arteriograms. SURGEON:  Anaid Nelson MD    ASSISTANT:    ANESTHESIA:  Sedation. COMPLICATIONS:      SPECIMENS REMOVED:      IMPLANTS:      ESTIMATED BLOOD LOSS: .    OPERATIVE FINDINGS:  1. There was no evidence of any aortoiliac disease. 2.  Bilateral hypogastric arteries were patent. 3.  Right lower extremity arteriogram showed the common femoral artery patent with a large profunda, with a flush SFA occlusion which reconstitutes the above-knee pop with the tibioperoneal trunk being occluded with anterior tibial and peroneal and posterior tibial patent but diseased down to the plantar digits. PROCEDURE IN DETAIL:  After getting informed consent, the patient was brought to the operating room. Anesthesia was then induced. Preop antibiotics were given before skin incision. The patient's bilateral groins all prepped and draped in normal sterile fashion. Micropuncture access to the left common femoral artery. It was difficult secondary to multiple scar tissue. We were not able to get up a 5-[place] sheath and we exchanged to a stiff wire and then put the 4-[place] sheath it which was the only sheath that would track into the common femoral artery. We did a hand injection to confirm we were intraluminal with no evidence of any injury to the common femoral artery or the iliac artery. Because of that, we had to get a small [place] Mary sheath Omni catheter to the aorta which was difficult to get up and over to the contralateral limb.  However, we were able to do a hand injection with the catheter in place. This is showing that the iliac and the common femoral were open but a flush occlusion of the SFA. It reconstitutes shortly above-knee pop and then re-occludes the tibioperoneal trunk being occluded with posterior tibial being dominant runoff but patent down to the foot. We removed the sheath and held pressure.       MD ALVA Doll/[email or web address]/  D:  02/03/2020 12:45  T:  02/03/2020 22:38  JOB #:  [id number]  :

## 2020-04-30 ENCOUNTER — ANESTHESIA EVENT (OUTPATIENT)
Dept: SURGERY | Age: 75
DRG: 252 | End: 2020-04-30
Payer: MEDICARE

## 2020-05-01 ENCOUNTER — HOSPITAL ENCOUNTER (OUTPATIENT)
Dept: LAB | Age: 75
Discharge: HOME | End: 2020-05-01
Payer: MEDICARE

## 2020-05-01 LAB
ANION GAP SERPL CALC-SCNC: 3 MMOL/L (ref 7–16)
BASOPHILS # BLD: 0 K/UL (ref 0–0.2)
BASOPHILS NFR BLD: 1 % (ref 0–2)
BUN SERPL-MCNC: 25 MG/DL (ref 8–23)
CALCIUM SERPL-MCNC: 9.7 MG/DL (ref 8.3–10.4)
CHLORIDE SERPL-SCNC: 107 MMOL/L (ref 98–107)
CO2 SERPL-SCNC: 29 MMOL/L (ref 21–32)
CREAT SERPL-MCNC: 1.55 MG/DL (ref 0.8–1.5)
DIFFERENTIAL METHOD BLD: ABNORMAL
EOSINOPHIL # BLD: 0.1 K/UL (ref 0–0.8)
EOSINOPHIL NFR BLD: 2 % (ref 0.5–7.8)
ERYTHROCYTE [DISTWIDTH] IN BLOOD BY AUTOMATED COUNT: 16.5 % (ref 11.9–14.6)
GLUCOSE SERPL-MCNC: 98 MG/DL (ref 65–100)
HCT VFR BLD AUTO: 39.9 % (ref 41.1–50.3)
HGB BLD-MCNC: 12.5 G/DL (ref 13.6–17.2)
IMM GRANULOCYTES # BLD AUTO: 0 K/UL (ref 0–0.5)
IMM GRANULOCYTES NFR BLD AUTO: 0 % (ref 0–5)
LYMPHOCYTES # BLD: 1.1 K/UL (ref 0.5–4.6)
LYMPHOCYTES NFR BLD: 20 % (ref 13–44)
MCH RBC QN AUTO: 25.1 PG (ref 26.1–32.9)
MCHC RBC AUTO-ENTMCNC: 31.3 G/DL (ref 31.4–35)
MCV RBC AUTO: 80 FL (ref 79.6–97.8)
MONOCYTES # BLD: 0.9 K/UL (ref 0.1–1.3)
MONOCYTES NFR BLD: 15 % (ref 4–12)
NEUTS SEG # BLD: 3.4 K/UL (ref 1.7–8.2)
NEUTS SEG NFR BLD: 62 % (ref 43–78)
NRBC # BLD: 0 K/UL (ref 0–0.2)
PLATELET # BLD AUTO: 231 K/UL (ref 150–450)
PMV BLD AUTO: 9.5 FL (ref 9.4–12.3)
POTASSIUM SERPL-SCNC: 4 MMOL/L (ref 3.5–5.1)
RBC # BLD AUTO: 4.99 M/UL (ref 4.23–5.6)
SODIUM SERPL-SCNC: 139 MMOL/L (ref 136–145)
WBC # BLD AUTO: 5.6 K/UL (ref 4.3–11.1)

## 2020-05-01 PROCEDURE — 85025 COMPLETE CBC W/AUTO DIFF WBC: CPT

## 2020-05-01 PROCEDURE — 36415 COLL VENOUS BLD VENIPUNCTURE: CPT

## 2020-05-01 PROCEDURE — 80048 BASIC METABOLIC PNL TOTAL CA: CPT

## 2020-05-05 ENCOUNTER — ANESTHESIA (OUTPATIENT)
Dept: SURGERY | Age: 75
DRG: 252 | End: 2020-05-05
Payer: MEDICARE

## 2020-05-05 ENCOUNTER — APPOINTMENT (OUTPATIENT)
Dept: GENERAL RADIOLOGY | Age: 75
DRG: 252 | End: 2020-05-05
Attending: INTERNAL MEDICINE
Payer: MEDICARE

## 2020-05-05 ENCOUNTER — HOSPITAL ENCOUNTER (INPATIENT)
Age: 75
LOS: 3 days | Discharge: HOME HEALTH | DRG: 252 | End: 2020-05-08
Attending: SURGERY | Admitting: SURGERY
Payer: MEDICARE

## 2020-05-05 ENCOUNTER — APPOINTMENT (OUTPATIENT)
Dept: ULTRASOUND IMAGING | Age: 75
DRG: 252 | End: 2020-05-05
Attending: SURGERY
Payer: MEDICARE

## 2020-05-05 DIAGNOSIS — I73.9 PVD (PERIPHERAL VASCULAR DISEASE) (HCC): ICD-10-CM

## 2020-05-05 DIAGNOSIS — E87.70 HYPERVOLEMIA, UNSPECIFIED HYPERVOLEMIA TYPE: ICD-10-CM

## 2020-05-05 DIAGNOSIS — J96.01 ACUTE RESPIRATORY FAILURE WITH HYPOXIA AND HYPERCAPNIA (HCC): ICD-10-CM

## 2020-05-05 DIAGNOSIS — E66.01 MORBID OBESITY (HCC): ICD-10-CM

## 2020-05-05 DIAGNOSIS — I73.9 PERIPHERAL ARTERY DISEASE (HCC): Chronic | ICD-10-CM

## 2020-05-05 DIAGNOSIS — L97.519 ULCER OF RIGHT FOOT, UNSPECIFIED ULCER STAGE (HCC): ICD-10-CM

## 2020-05-05 DIAGNOSIS — L97.511 SKIN ULCER OF RIGHT FOOT, LIMITED TO BREAKDOWN OF SKIN (HCC): Primary | ICD-10-CM

## 2020-05-05 DIAGNOSIS — I73.9 PAD (PERIPHERAL ARTERY DISEASE) (HCC): ICD-10-CM

## 2020-05-05 DIAGNOSIS — I95.81 POSTPROCEDURAL HYPOTENSION: ICD-10-CM

## 2020-05-05 DIAGNOSIS — R29.818 SUSPECTED SLEEP APNEA: ICD-10-CM

## 2020-05-05 DIAGNOSIS — J96.02 ACUTE RESPIRATORY FAILURE WITH HYPOXIA AND HYPERCAPNIA (HCC): ICD-10-CM

## 2020-05-05 LAB
ABO + RH BLD: NORMAL
ACT BLD: 142 SECS (ref 70–128)
ACT BLD: 224 SECS (ref 70–128)
ANION GAP SERPL CALC-SCNC: 9 MMOL/L (ref 7–16)
APTT PPP: 31.3 SEC (ref 24.3–35.4)
ARTERIAL PATENCY WRIST A: ABNORMAL
BASE DEFICIT BLD-SCNC: 3 MMOL/L
BASE DEFICIT BLD-SCNC: 6 MMOL/L
BASOPHILS # BLD: 0 K/UL (ref 0–0.2)
BASOPHILS NFR BLD: 0 % (ref 0–2)
BDY SITE: ABNORMAL
BLOOD GROUP ANTIBODIES SERPL: NORMAL
BUN SERPL-MCNC: 25 MG/DL (ref 8–23)
CA-I BLD-MCNC: 1.31 MMOL/L (ref 1.12–1.32)
CALCIUM SERPL-MCNC: 8.9 MG/DL (ref 8.3–10.4)
CHLORIDE SERPL-SCNC: 108 MMOL/L (ref 98–107)
CO2 BLD-SCNC: 23 MMOL/L
CO2 SERPL-SCNC: 22 MMOL/L (ref 21–32)
CREAT SERPL-MCNC: 1.59 MG/DL (ref 0.8–1.5)
DIFFERENTIAL METHOD BLD: ABNORMAL
EOSINOPHIL # BLD: 0 K/UL (ref 0–0.8)
EOSINOPHIL NFR BLD: 0 % (ref 0.5–7.8)
ERYTHROCYTE [DISTWIDTH] IN BLOOD BY AUTOMATED COUNT: 16.4 % (ref 11.9–14.6)
ERYTHROCYTE [DISTWIDTH] IN BLOOD BY AUTOMATED COUNT: 16.6 % (ref 11.9–14.6)
GAS FLOW.O2 O2 DELIVERY SYS: ABNORMAL L/MIN
GLUCOSE BLD STRIP.AUTO-MCNC: 110 MG/DL (ref 65–100)
GLUCOSE BLD STRIP.AUTO-MCNC: 130 MG/DL (ref 65–100)
GLUCOSE BLD STRIP.AUTO-MCNC: 177 MG/DL (ref 65–100)
GLUCOSE BLD STRIP.AUTO-MCNC: 234 MG/DL (ref 65–100)
GLUCOSE BLD STRIP.AUTO-MCNC: 65 MG/DL (ref 65–100)
GLUCOSE BLD STRIP.AUTO-MCNC: 99 MG/DL (ref 65–100)
GLUCOSE SERPL-MCNC: 163 MG/DL (ref 65–100)
HCO3 BLD-SCNC: 21.1 MMOL/L (ref 22–26)
HCO3 BLD-SCNC: 21.8 MMOL/L (ref 22–26)
HCT VFR BLD AUTO: 34 % (ref 41.1–50.3)
HCT VFR BLD AUTO: 36.5 % (ref 41.1–50.3)
HGB BLD-MCNC: 10.8 G/DL (ref 13.6–17.2)
HGB BLD-MCNC: 11.3 G/DL (ref 13.6–17.2)
IMM GRANULOCYTES # BLD AUTO: 0 K/UL (ref 0–0.5)
IMM GRANULOCYTES NFR BLD AUTO: 1 % (ref 0–5)
INR PPP: 1.1
LYMPHOCYTES # BLD: 1.3 K/UL (ref 0.5–4.6)
LYMPHOCYTES NFR BLD: 15 % (ref 13–44)
MCH RBC QN AUTO: 25.3 PG (ref 26.1–32.9)
MCH RBC QN AUTO: 25.5 PG (ref 26.1–32.9)
MCHC RBC AUTO-ENTMCNC: 31 G/DL (ref 31.4–35)
MCHC RBC AUTO-ENTMCNC: 31.8 G/DL (ref 31.4–35)
MCV RBC AUTO: 80.4 FL (ref 79.6–97.8)
MCV RBC AUTO: 81.7 FL (ref 79.6–97.8)
MONOCYTES # BLD: 1.2 K/UL (ref 0.1–1.3)
MONOCYTES NFR BLD: 15 % (ref 4–12)
NEUTS SEG # BLD: 5.8 K/UL (ref 1.7–8.2)
NEUTS SEG NFR BLD: 69 % (ref 43–78)
NRBC # BLD: 0 K/UL (ref 0–0.2)
NRBC # BLD: 0 K/UL (ref 0–0.2)
O2/TOTAL GAS SETTING VFR VENT: 50 %
PCO2 BLD: 38.1 MMHG (ref 35–45)
PCO2 BLD: 47.9 MMHG (ref 35–45)
PH BLD: 7.25 [PH] (ref 7.35–7.45)
PH BLD: 7.37 [PH] (ref 7.35–7.45)
PLATELET # BLD AUTO: 202 K/UL (ref 150–450)
PLATELET # BLD AUTO: 223 K/UL (ref 150–450)
PMV BLD AUTO: 9.5 FL (ref 9.4–12.3)
PMV BLD AUTO: 9.8 FL (ref 9.4–12.3)
PO2 BLD: 143 MMHG (ref 75–100)
PO2 BLD: 80 MMHG (ref 75–100)
POTASSIUM BLD-SCNC: 3.9 MMOL/L (ref 3.5–5.1)
POTASSIUM SERPL-SCNC: 4.2 MMOL/L (ref 3.5–5.1)
PROTHROMBIN TIME: 14.5 SEC (ref 12–14.7)
RBC # BLD AUTO: 4.23 M/UL (ref 4.23–5.6)
RBC # BLD AUTO: 4.47 M/UL (ref 4.23–5.6)
SAO2 % BLD: 93 % (ref 95–98)
SAO2 % BLD: 99 % (ref 95–98)
SERVICE CMNT-IMP: ABNORMAL
SERVICE CMNT-IMP: ABNORMAL
SODIUM BLD-SCNC: 139 MMOL/L (ref 136–145)
SODIUM SERPL-SCNC: 139 MMOL/L (ref 136–145)
SPECIMEN EXP DATE BLD: NORMAL
SPECIMEN TYPE: ABNORMAL
WBC # BLD AUTO: 7.7 K/UL (ref 4.3–11.1)
WBC # BLD AUTO: 8.3 K/UL (ref 4.3–11.1)

## 2020-05-05 PROCEDURE — 82962 GLUCOSE BLOOD TEST: CPT

## 2020-05-05 PROCEDURE — 77030013292 HC BOWL MX PRSM J&J -A: Performed by: ANESTHESIOLOGY

## 2020-05-05 PROCEDURE — 74011000250 HC RX REV CODE- 250: Performed by: SURGERY

## 2020-05-05 PROCEDURE — 77030031139 HC SUT VCRL2 J&J -A: Performed by: SURGERY

## 2020-05-05 PROCEDURE — 74750000023 HC WOUND THERAPY

## 2020-05-05 PROCEDURE — 77030031639: Performed by: SURGERY

## 2020-05-05 PROCEDURE — C1768 GRAFT, VASCULAR: HCPCS | Performed by: SURGERY

## 2020-05-05 PROCEDURE — 74011250636 HC RX REV CODE- 250/636: Performed by: NURSE PRACTITIONER

## 2020-05-05 PROCEDURE — 77030005401 HC CATH RAD ARRO -A: Performed by: ANESTHESIOLOGY

## 2020-05-05 PROCEDURE — 85347 COAGULATION TIME ACTIVATED: CPT

## 2020-05-05 PROCEDURE — 85730 THROMBOPLASTIN TIME PARTIAL: CPT

## 2020-05-05 PROCEDURE — 4A133B1 MONITORING OF ARTERIAL PRESSURE, PERIPHERAL, PERCUTANEOUS APPROACH: ICD-10-PCS | Performed by: ANESTHESIOLOGY

## 2020-05-05 PROCEDURE — 77030027138 HC INCENT SPIROMETER -A

## 2020-05-05 PROCEDURE — 85027 COMPLETE CBC AUTOMATED: CPT

## 2020-05-05 PROCEDURE — 77030010512 HC APPL CLP LIG J&J -C: Performed by: SURGERY

## 2020-05-05 PROCEDURE — 77030019908 HC STETH ESOPH SIMS -A: Performed by: ANESTHESIOLOGY

## 2020-05-05 PROCEDURE — 86900 BLOOD TYPING SEROLOGIC ABO: CPT

## 2020-05-05 PROCEDURE — 77030013794 HC KT TRNSDUC BLD EDWD -B: Performed by: ANESTHESIOLOGY

## 2020-05-05 PROCEDURE — 77030014008 HC SPNG HEMSTAT J&J -C: Performed by: SURGERY

## 2020-05-05 PROCEDURE — 76060000040 HC ANESTHESIA 4.5 TO 5 HR: Performed by: SURGERY

## 2020-05-05 PROCEDURE — 74011000250 HC RX REV CODE- 250: Performed by: NURSE ANESTHETIST, CERTIFIED REGISTERED

## 2020-05-05 PROCEDURE — 0KBV0ZZ EXCISION OF RIGHT FOOT MUSCLE, OPEN APPROACH: ICD-10-PCS | Performed by: SURGERY

## 2020-05-05 PROCEDURE — 03HY32Z INSERTION OF MONITORING DEVICE INTO UPPER ARTERY, PERCUTANEOUS APPROACH: ICD-10-PCS | Performed by: ANESTHESIOLOGY

## 2020-05-05 PROCEDURE — 77030002933 HC SUT MCRYL J&J -A: Performed by: SURGERY

## 2020-05-05 PROCEDURE — 77030002987 HC SUT PROL J&J -B: Performed by: SURGERY

## 2020-05-05 PROCEDURE — 77030037088 HC TUBE ENDOTRACH ORAL NSL COVD-A: Performed by: ANESTHESIOLOGY

## 2020-05-05 PROCEDURE — 65620000000 HC RM CCU GENERAL

## 2020-05-05 PROCEDURE — 76010000176 HC OR TIME 4.5 TO 5 HR INTENSV-TIER 1: Performed by: SURGERY

## 2020-05-05 PROCEDURE — 85610 PROTHROMBIN TIME: CPT

## 2020-05-05 PROCEDURE — 74011250637 HC RX REV CODE- 250/637: Performed by: SURGERY

## 2020-05-05 PROCEDURE — 77030034888 HC SUT PROL 2 J&J -B: Performed by: SURGERY

## 2020-05-05 PROCEDURE — 77030019952 HC CANSTR VAC ASST KCON -B: Performed by: SURGERY

## 2020-05-05 PROCEDURE — 76210000016 HC OR PH I REC 1 TO 1.5 HR: Performed by: SURGERY

## 2020-05-05 PROCEDURE — 82947 ASSAY GLUCOSE BLOOD QUANT: CPT

## 2020-05-05 PROCEDURE — 74011250636 HC RX REV CODE- 250/636: Performed by: ANESTHESIOLOGY

## 2020-05-05 PROCEDURE — 74011636637 HC RX REV CODE- 636/637: Performed by: NURSE PRACTITIONER

## 2020-05-05 PROCEDURE — 74011000258 HC RX REV CODE- 258: Performed by: NURSE ANESTHETIST, CERTIFIED REGISTERED

## 2020-05-05 PROCEDURE — 71045 X-RAY EXAM CHEST 1 VIEW: CPT

## 2020-05-05 PROCEDURE — 77030034850: Performed by: SURGERY

## 2020-05-05 PROCEDURE — 041K09N BYPASS RIGHT FEMORAL ARTERY TO POSTERIOR TIBIAL ARTERY WITH AUTOLOGOUS VENOUS TISSUE, OPEN APPROACH: ICD-10-PCS | Performed by: SURGERY

## 2020-05-05 PROCEDURE — 77030040922 HC BLNKT HYPOTHRM STRY -A: Performed by: ANESTHESIOLOGY

## 2020-05-05 PROCEDURE — 80048 BASIC METABOLIC PNL TOTAL CA: CPT

## 2020-05-05 PROCEDURE — 82803 BLOOD GASES ANY COMBINATION: CPT

## 2020-05-05 PROCEDURE — 77030029354 HC BLD MINI RND RBSI -A: Performed by: SURGERY

## 2020-05-05 PROCEDURE — 74011250637 HC RX REV CODE- 250/637: Performed by: ANESTHESIOLOGY

## 2020-05-05 PROCEDURE — 77030018836 HC SOL IRR NACL ICUM -A: Performed by: SURGERY

## 2020-05-05 PROCEDURE — 74011250636 HC RX REV CODE- 250/636: Performed by: NURSE ANESTHETIST, CERTIFIED REGISTERED

## 2020-05-05 PROCEDURE — 36415 COLL VENOUS BLD VENIPUNCTURE: CPT

## 2020-05-05 PROCEDURE — 85025 COMPLETE CBC W/AUTO DIFF WBC: CPT

## 2020-05-05 PROCEDURE — 77030002916 HC SUT ETHLN J&J -A

## 2020-05-05 PROCEDURE — 77030002916 HC SUT ETHLN J&J -A: Performed by: SURGERY

## 2020-05-05 PROCEDURE — 74011250636 HC RX REV CODE- 250/636: Performed by: SURGERY

## 2020-05-05 PROCEDURE — 99223 1ST HOSP IP/OBS HIGH 75: CPT | Performed by: INTERNAL MEDICINE

## 2020-05-05 PROCEDURE — 77030039425 HC BLD LARYNG TRULITE DISP TELE -A: Performed by: ANESTHESIOLOGY

## 2020-05-05 PROCEDURE — 77030008462 HC STPLR SKN PROX J&J -A: Performed by: SURGERY

## 2020-05-05 PROCEDURE — 77030019934 HC DRSG VAC ASST KCON -B: Performed by: SURGERY

## 2020-05-05 PROCEDURE — 74011000258 HC RX REV CODE- 258: Performed by: ANESTHESIOLOGY

## 2020-05-05 PROCEDURE — 77030002996 HC SUT SLK J&J -A: Performed by: SURGERY

## 2020-05-05 PROCEDURE — 4A133J1 MONITORING OF ARTERIAL PULSE, PERIPHERAL, PERCUTANEOUS APPROACH: ICD-10-PCS | Performed by: ANESTHESIOLOGY

## 2020-05-05 DEVICE — IMPLANT HUM TISS L20-80CM DIA3-6MM SAPH VEIN CRYOPRESERVED: Type: IMPLANTABLE DEVICE | Site: LEG | Status: FUNCTIONAL

## 2020-05-05 RX ORDER — SODIUM CHLORIDE, SODIUM LACTATE, POTASSIUM CHLORIDE, CALCIUM CHLORIDE 600; 310; 30; 20 MG/100ML; MG/100ML; MG/100ML; MG/100ML
100 INJECTION, SOLUTION INTRAVENOUS CONTINUOUS
Status: DISCONTINUED | OUTPATIENT
Start: 2020-05-05 | End: 2020-05-05 | Stop reason: HOSPADM

## 2020-05-05 RX ORDER — LIDOCAINE HYDROCHLORIDE 10 MG/ML
INJECTION INFILTRATION; PERINEURAL AS NEEDED
Status: DISCONTINUED | OUTPATIENT
Start: 2020-05-05 | End: 2020-05-05 | Stop reason: HOSPADM

## 2020-05-05 RX ORDER — FENTANYL CITRATE 50 UG/ML
100 INJECTION, SOLUTION INTRAMUSCULAR; INTRAVENOUS ONCE
Status: COMPLETED | OUTPATIENT
Start: 2020-05-05 | End: 2020-05-05

## 2020-05-05 RX ORDER — FENTANYL CITRATE 50 UG/ML
INJECTION, SOLUTION INTRAMUSCULAR; INTRAVENOUS AS NEEDED
Status: DISCONTINUED | OUTPATIENT
Start: 2020-05-05 | End: 2020-05-05 | Stop reason: HOSPADM

## 2020-05-05 RX ORDER — HYDRALAZINE HYDROCHLORIDE 50 MG/1
100 TABLET, FILM COATED ORAL DAILY
Status: DISCONTINUED | OUTPATIENT
Start: 2020-05-06 | End: 2020-05-08 | Stop reason: HOSPADM

## 2020-05-05 RX ORDER — EPHEDRINE SULFATE/0.9% NACL/PF 50 MG/5 ML
SYRINGE (ML) INTRAVENOUS AS NEEDED
Status: DISCONTINUED | OUTPATIENT
Start: 2020-05-05 | End: 2020-05-05 | Stop reason: HOSPADM

## 2020-05-05 RX ORDER — CEFAZOLIN SODIUM/WATER 2 G/20 ML
2 SYRINGE (ML) INTRAVENOUS ONCE
Status: COMPLETED | OUTPATIENT
Start: 2020-05-05 | End: 2020-05-05

## 2020-05-05 RX ORDER — HYDROCODONE BITARTRATE AND ACETAMINOPHEN 5; 325 MG/1; MG/1
2 TABLET ORAL
Status: DISCONTINUED | OUTPATIENT
Start: 2020-05-05 | End: 2020-05-08 | Stop reason: HOSPADM

## 2020-05-05 RX ORDER — METOPROLOL TARTRATE 25 MG/1
25 TABLET, FILM COATED ORAL 2 TIMES DAILY
Status: DISCONTINUED | OUTPATIENT
Start: 2020-05-05 | End: 2020-05-08 | Stop reason: HOSPADM

## 2020-05-05 RX ORDER — DEXTROSE MONOHYDRATE 25 G/50ML
25 INJECTION, SOLUTION INTRAVENOUS
Status: COMPLETED | OUTPATIENT
Start: 2020-05-05 | End: 2020-05-05

## 2020-05-05 RX ORDER — MIDAZOLAM HYDROCHLORIDE 1 MG/ML
2 INJECTION, SOLUTION INTRAMUSCULAR; INTRAVENOUS
Status: DISCONTINUED | OUTPATIENT
Start: 2020-05-05 | End: 2020-05-05 | Stop reason: HOSPADM

## 2020-05-05 RX ORDER — NALOXONE HYDROCHLORIDE 0.4 MG/ML
0.1 INJECTION, SOLUTION INTRAMUSCULAR; INTRAVENOUS; SUBCUTANEOUS AS NEEDED
Status: DISCONTINUED | OUTPATIENT
Start: 2020-05-05 | End: 2020-05-05 | Stop reason: HOSPADM

## 2020-05-05 RX ORDER — SODIUM CHLORIDE 0.9 % (FLUSH) 0.9 %
5-40 SYRINGE (ML) INJECTION EVERY 8 HOURS
Status: DISCONTINUED | OUTPATIENT
Start: 2020-05-05 | End: 2020-05-08 | Stop reason: HOSPADM

## 2020-05-05 RX ORDER — ONDANSETRON 2 MG/ML
INJECTION INTRAMUSCULAR; INTRAVENOUS AS NEEDED
Status: DISCONTINUED | OUTPATIENT
Start: 2020-05-05 | End: 2020-05-05 | Stop reason: HOSPADM

## 2020-05-05 RX ORDER — OXYCODONE HYDROCHLORIDE 5 MG/1
5 TABLET ORAL
Status: DISCONTINUED | OUTPATIENT
Start: 2020-05-05 | End: 2020-05-05 | Stop reason: HOSPADM

## 2020-05-05 RX ORDER — ACETAMINOPHEN 500 MG
1000 TABLET ORAL ONCE
Status: COMPLETED | OUTPATIENT
Start: 2020-05-05 | End: 2020-05-05

## 2020-05-05 RX ORDER — SODIUM BICARBONATE 84 MG/ML
50 INJECTION, SOLUTION INTRAVENOUS ONCE
Status: DISCONTINUED | OUTPATIENT
Start: 2020-05-05 | End: 2020-05-05

## 2020-05-05 RX ORDER — LIDOCAINE HYDROCHLORIDE 10 MG/ML
0.1 INJECTION INFILTRATION; PERINEURAL AS NEEDED
Status: DISCONTINUED | OUTPATIENT
Start: 2020-05-05 | End: 2020-05-05 | Stop reason: HOSPADM

## 2020-05-05 RX ORDER — TAMSULOSIN HYDROCHLORIDE 0.4 MG/1
0.4 CAPSULE ORAL DAILY
Status: DISCONTINUED | OUTPATIENT
Start: 2020-05-06 | End: 2020-05-08 | Stop reason: HOSPADM

## 2020-05-05 RX ORDER — LISINOPRIL 20 MG/1
40 TABLET ORAL DAILY
Status: DISCONTINUED | OUTPATIENT
Start: 2020-05-06 | End: 2020-05-08 | Stop reason: HOSPADM

## 2020-05-05 RX ORDER — SODIUM CHLORIDE, SODIUM LACTATE, POTASSIUM CHLORIDE, CALCIUM CHLORIDE 600; 310; 30; 20 MG/100ML; MG/100ML; MG/100ML; MG/100ML
75 INJECTION, SOLUTION INTRAVENOUS CONTINUOUS
Status: DISCONTINUED | OUTPATIENT
Start: 2020-05-05 | End: 2020-05-05 | Stop reason: HOSPADM

## 2020-05-05 RX ORDER — MORPHINE SULFATE 2 MG/ML
2 INJECTION, SOLUTION INTRAMUSCULAR; INTRAVENOUS
Status: DISCONTINUED | OUTPATIENT
Start: 2020-05-05 | End: 2020-05-08 | Stop reason: HOSPADM

## 2020-05-05 RX ORDER — BUPIVACAINE HYDROCHLORIDE 2.5 MG/ML
INJECTION, SOLUTION EPIDURAL; INFILTRATION; INTRACAUDAL AS NEEDED
Status: DISCONTINUED | OUTPATIENT
Start: 2020-05-05 | End: 2020-05-05 | Stop reason: HOSPADM

## 2020-05-05 RX ORDER — ROCURONIUM BROMIDE 10 MG/ML
INJECTION, SOLUTION INTRAVENOUS AS NEEDED
Status: DISCONTINUED | OUTPATIENT
Start: 2020-05-05 | End: 2020-05-05 | Stop reason: HOSPADM

## 2020-05-05 RX ORDER — PROTAMINE SULFATE 10 MG/ML
INJECTION, SOLUTION INTRAVENOUS AS NEEDED
Status: DISCONTINUED | OUTPATIENT
Start: 2020-05-05 | End: 2020-05-05 | Stop reason: HOSPADM

## 2020-05-05 RX ORDER — SODIUM CHLORIDE 0.9 % (FLUSH) 0.9 %
5-40 SYRINGE (ML) INJECTION AS NEEDED
Status: DISCONTINUED | OUTPATIENT
Start: 2020-05-05 | End: 2020-05-08 | Stop reason: HOSPADM

## 2020-05-05 RX ORDER — ONDANSETRON 2 MG/ML
4 INJECTION INTRAMUSCULAR; INTRAVENOUS ONCE
Status: DISCONTINUED | OUTPATIENT
Start: 2020-05-05 | End: 2020-05-05 | Stop reason: HOSPADM

## 2020-05-05 RX ORDER — OXYCODONE HYDROCHLORIDE 5 MG/1
10 TABLET ORAL
Status: DISCONTINUED | OUTPATIENT
Start: 2020-05-05 | End: 2020-05-05 | Stop reason: HOSPADM

## 2020-05-05 RX ORDER — LEVOTHYROXINE SODIUM 75 UG/1
75 TABLET ORAL
Status: DISCONTINUED | OUTPATIENT
Start: 2020-05-06 | End: 2020-05-08 | Stop reason: HOSPADM

## 2020-05-05 RX ORDER — DIPHENHYDRAMINE HYDROCHLORIDE 50 MG/ML
12.5 INJECTION, SOLUTION INTRAMUSCULAR; INTRAVENOUS ONCE
Status: DISCONTINUED | OUTPATIENT
Start: 2020-05-05 | End: 2020-05-05 | Stop reason: HOSPADM

## 2020-05-05 RX ORDER — INSULIN LISPRO 100 [IU]/ML
INJECTION, SOLUTION INTRAVENOUS; SUBCUTANEOUS
Status: DISCONTINUED | OUTPATIENT
Start: 2020-05-05 | End: 2020-05-08 | Stop reason: HOSPADM

## 2020-05-05 RX ORDER — NITROGLYCERIN 0.4 MG/1
0.4 TABLET SUBLINGUAL
Status: DISCONTINUED | OUTPATIENT
Start: 2020-05-05 | End: 2020-05-08 | Stop reason: HOSPADM

## 2020-05-05 RX ORDER — HYDROMORPHONE HYDROCHLORIDE 2 MG/ML
0.5 INJECTION, SOLUTION INTRAMUSCULAR; INTRAVENOUS; SUBCUTANEOUS
Status: DISCONTINUED | OUTPATIENT
Start: 2020-05-05 | End: 2020-05-05 | Stop reason: HOSPADM

## 2020-05-05 RX ORDER — HEPARIN SODIUM 1000 [USP'U]/ML
INJECTION, SOLUTION INTRAVENOUS; SUBCUTANEOUS AS NEEDED
Status: DISCONTINUED | OUTPATIENT
Start: 2020-05-05 | End: 2020-05-05 | Stop reason: HOSPADM

## 2020-05-05 RX ORDER — SODIUM CHLORIDE 9 MG/ML
125 INJECTION, SOLUTION INTRAVENOUS CONTINUOUS
Status: DISCONTINUED | OUTPATIENT
Start: 2020-05-05 | End: 2020-05-06

## 2020-05-05 RX ORDER — ASPIRIN 81 MG/1
81 TABLET ORAL DAILY
Status: DISCONTINUED | OUTPATIENT
Start: 2020-05-06 | End: 2020-05-08 | Stop reason: HOSPADM

## 2020-05-05 RX ORDER — PAPAVERINE HYDROCHLORIDE 30 MG/ML
INJECTION INTRAMUSCULAR; INTRAVENOUS AS NEEDED
Status: DISCONTINUED | OUTPATIENT
Start: 2020-05-05 | End: 2020-05-05 | Stop reason: HOSPADM

## 2020-05-05 RX ORDER — HEPARIN SODIUM 5000 [USP'U]/ML
INJECTION, SOLUTION INTRAVENOUS; SUBCUTANEOUS AS NEEDED
Status: DISCONTINUED | OUTPATIENT
Start: 2020-05-05 | End: 2020-05-05 | Stop reason: HOSPADM

## 2020-05-05 RX ORDER — PHENYLEPHRINE HCL IN 0.9% NACL 30MG/250ML
10-100 PLASTIC BAG, INJECTION (ML) INTRAVENOUS
Status: DISCONTINUED | OUTPATIENT
Start: 2020-05-05 | End: 2020-05-06

## 2020-05-05 RX ORDER — PROPOFOL 10 MG/ML
INJECTION, EMULSION INTRAVENOUS AS NEEDED
Status: DISCONTINUED | OUTPATIENT
Start: 2020-05-05 | End: 2020-05-05 | Stop reason: HOSPADM

## 2020-05-05 RX ORDER — ALBUTEROL SULFATE 0.83 MG/ML
2.5 SOLUTION RESPIRATORY (INHALATION)
Status: DISCONTINUED | OUTPATIENT
Start: 2020-05-05 | End: 2020-05-08 | Stop reason: HOSPADM

## 2020-05-05 RX ORDER — LIDOCAINE HYDROCHLORIDE 20 MG/ML
INJECTION, SOLUTION EPIDURAL; INFILTRATION; INTRACAUDAL; PERINEURAL AS NEEDED
Status: DISCONTINUED | OUTPATIENT
Start: 2020-05-05 | End: 2020-05-05 | Stop reason: HOSPADM

## 2020-05-05 RX ORDER — SODIUM CHLORIDE 0.9 % (FLUSH) 0.9 %
5-40 SYRINGE (ML) INJECTION EVERY 8 HOURS
Status: DISCONTINUED | OUTPATIENT
Start: 2020-05-05 | End: 2020-05-05 | Stop reason: HOSPADM

## 2020-05-05 RX ORDER — SODIUM CHLORIDE 0.9 % (FLUSH) 0.9 %
5-40 SYRINGE (ML) INJECTION AS NEEDED
Status: DISCONTINUED | OUTPATIENT
Start: 2020-05-05 | End: 2020-05-05 | Stop reason: HOSPADM

## 2020-05-05 RX ORDER — MIDAZOLAM HYDROCHLORIDE 1 MG/ML
2 INJECTION, SOLUTION INTRAMUSCULAR; INTRAVENOUS ONCE
Status: DISCONTINUED | OUTPATIENT
Start: 2020-05-05 | End: 2020-05-05 | Stop reason: HOSPADM

## 2020-05-05 RX ADMIN — Medication 2 G: at 08:13

## 2020-05-05 RX ADMIN — PHENYLEPHRINE HYDROCHLORIDE 120 MCG: 10 INJECTION INTRAVENOUS at 08:45

## 2020-05-05 RX ADMIN — Medication 5 MG: at 08:53

## 2020-05-05 RX ADMIN — Medication 5 MG: at 08:46

## 2020-05-05 RX ADMIN — FENTANYL CITRATE 25 MCG: 50 INJECTION INTRAMUSCULAR; INTRAVENOUS at 12:01

## 2020-05-05 RX ADMIN — PROTAMINE SULFATE 10 MG: 10 INJECTION, SOLUTION INTRAVENOUS at 11:37

## 2020-05-05 RX ADMIN — SODIUM CHLORIDE, SODIUM LACTATE, POTASSIUM CHLORIDE, AND CALCIUM CHLORIDE 100 ML/HR: 600; 310; 30; 20 INJECTION, SOLUTION INTRAVENOUS at 06:20

## 2020-05-05 RX ADMIN — ACETAMINOPHEN 1000 MG: 500 TABLET ORAL at 06:20

## 2020-05-05 RX ADMIN — PHENYLEPHRINE HYDROCHLORIDE 120 MCG: 10 INJECTION INTRAVENOUS at 10:53

## 2020-05-05 RX ADMIN — SODIUM CHLORIDE 1 G: 900 INJECTION, SOLUTION INTRAVENOUS at 08:31

## 2020-05-05 RX ADMIN — FENTANYL CITRATE 25 MCG: 50 INJECTION INTRAMUSCULAR; INTRAVENOUS at 11:44

## 2020-05-05 RX ADMIN — SODIUM CHLORIDE, SODIUM LACTATE, POTASSIUM CHLORIDE, AND CALCIUM CHLORIDE: 600; 310; 30; 20 INJECTION, SOLUTION INTRAVENOUS at 09:07

## 2020-05-05 RX ADMIN — HEPARIN SODIUM 4000 UNITS: 1000 INJECTION INTRAVENOUS; SUBCUTANEOUS at 10:11

## 2020-05-05 RX ADMIN — LIDOCAINE HYDROCHLORIDE 100 MG: 20 INJECTION, SOLUTION EPIDURAL; INFILTRATION; INTRACAUDAL; PERINEURAL at 08:08

## 2020-05-05 RX ADMIN — Medication 5 MG: at 09:55

## 2020-05-05 RX ADMIN — PROPOFOL 20 MG: 10 INJECTION, EMULSION INTRAVENOUS at 12:04

## 2020-05-05 RX ADMIN — HYDROCODONE BITARTRATE AND ACETAMINOPHEN 2 TABLET: 5; 325 TABLET ORAL at 22:07

## 2020-05-05 RX ADMIN — SODIUM CHLORIDE, SODIUM LACTATE, POTASSIUM CHLORIDE, AND CALCIUM CHLORIDE: 600; 310; 30; 20 INJECTION, SOLUTION INTRAVENOUS at 10:44

## 2020-05-05 RX ADMIN — PHENYLEPHRINE HYDROCHLORIDE 120 MCG: 10 INJECTION INTRAVENOUS at 08:48

## 2020-05-05 RX ADMIN — FENTANYL CITRATE 50 MCG: 50 INJECTION, SOLUTION INTRAMUSCULAR; INTRAVENOUS at 07:03

## 2020-05-05 RX ADMIN — INSULIN LISPRO 4 UNITS: 100 INJECTION, SOLUTION INTRAVENOUS; SUBCUTANEOUS at 22:26

## 2020-05-05 RX ADMIN — FENTANYL CITRATE 100 MCG: 50 INJECTION INTRAMUSCULAR; INTRAVENOUS at 08:05

## 2020-05-05 RX ADMIN — PROPOFOL 10 MG: 10 INJECTION, EMULSION INTRAVENOUS at 12:08

## 2020-05-05 RX ADMIN — Medication 5 MG: at 09:03

## 2020-05-05 RX ADMIN — PHENYLEPHRINE HYDROCHLORIDE 120 MCG: 10 INJECTION INTRAVENOUS at 11:53

## 2020-05-05 RX ADMIN — ROCURONIUM BROMIDE 10 MG: 10 INJECTION, SOLUTION INTRAVENOUS at 09:55

## 2020-05-05 RX ADMIN — LIDOCAINE HYDROCHLORIDE 100 MG: 20 INJECTION, SOLUTION EPIDURAL; INFILTRATION; INTRACAUDAL; PERINEURAL at 11:51

## 2020-05-05 RX ADMIN — FENTANYL CITRATE 25 MCG: 50 INJECTION INTRAMUSCULAR; INTRAVENOUS at 10:45

## 2020-05-05 RX ADMIN — PHENYLEPHRINE HYDROCHLORIDE 120 MCG: 10 INJECTION INTRAVENOUS at 08:14

## 2020-05-05 RX ADMIN — SODIUM CHLORIDE 125 ML/HR: 9 INJECTION, SOLUTION INTRAVENOUS at 14:57

## 2020-05-05 RX ADMIN — PROTAMINE SULFATE 10 MG: 10 INJECTION, SOLUTION INTRAVENOUS at 11:40

## 2020-05-05 RX ADMIN — HEPARIN SODIUM 9000 UNITS: 1000 INJECTION INTRAVENOUS; SUBCUTANEOUS at 10:01

## 2020-05-05 RX ADMIN — PHENYLEPHRINE HYDROCHLORIDE 120 MCG: 10 INJECTION INTRAVENOUS at 10:01

## 2020-05-05 RX ADMIN — PROPOFOL 100 MG: 10 INJECTION, EMULSION INTRAVENOUS at 08:08

## 2020-05-05 RX ADMIN — METOPROLOL TARTRATE 25 MG: 25 TABLET, FILM COATED ORAL at 18:00

## 2020-05-05 RX ADMIN — PROTAMINE SULFATE 10 MG: 10 INJECTION, SOLUTION INTRAVENOUS at 11:38

## 2020-05-05 RX ADMIN — ROCURONIUM BROMIDE 20 MG: 10 INJECTION, SOLUTION INTRAVENOUS at 10:27

## 2020-05-05 RX ADMIN — Medication 5 MG: at 09:19

## 2020-05-05 RX ADMIN — Medication 5 MG: at 11:54

## 2020-05-05 RX ADMIN — PHENYLEPHRINE HYDROCHLORIDE 120 MCG: 10 INJECTION INTRAVENOUS at 10:34

## 2020-05-05 RX ADMIN — ROCURONIUM BROMIDE 20 MG: 10 INJECTION, SOLUTION INTRAVENOUS at 09:19

## 2020-05-05 RX ADMIN — PHENYLEPHRINE HYDROCHLORIDE 120 MCG: 10 INJECTION INTRAVENOUS at 08:08

## 2020-05-05 RX ADMIN — Medication 10 ML: at 14:57

## 2020-05-05 RX ADMIN — Medication 5 MG: at 08:59

## 2020-05-05 RX ADMIN — FENTANYL CITRATE 25 MCG: 50 INJECTION INTRAMUSCULAR; INTRAVENOUS at 10:08

## 2020-05-05 RX ADMIN — PROTAMINE SULFATE 10 MG: 10 INJECTION, SOLUTION INTRAVENOUS at 11:33

## 2020-05-05 RX ADMIN — Medication 5 MG: at 11:53

## 2020-05-05 RX ADMIN — PROPOFOL 20 MG: 10 INJECTION, EMULSION INTRAVENOUS at 11:51

## 2020-05-05 RX ADMIN — PHENYLEPHRINE HYDROCHLORIDE 120 MCG: 10 INJECTION INTRAVENOUS at 08:46

## 2020-05-05 RX ADMIN — PHENYLEPHRINE HYDROCHLORIDE 120 MCG: 10 INJECTION INTRAVENOUS at 12:08

## 2020-05-05 RX ADMIN — PHENYLEPHRINE HYDROCHLORIDE 120 MCG: 10 INJECTION INTRAVENOUS at 09:19

## 2020-05-05 RX ADMIN — ROCURONIUM BROMIDE 50 MG: 10 INJECTION, SOLUTION INTRAVENOUS at 08:09

## 2020-05-05 RX ADMIN — INSULIN LISPRO 2 UNITS: 100 INJECTION, SOLUTION INTRAVENOUS; SUBCUTANEOUS at 17:23

## 2020-05-05 RX ADMIN — PHENYLEPHRINE HYDROCHLORIDE 240 MCG: 10 INJECTION INTRAVENOUS at 12:14

## 2020-05-05 RX ADMIN — FENTANYL CITRATE 25 MCG: 50 INJECTION INTRAMUSCULAR; INTRAVENOUS at 11:39

## 2020-05-05 RX ADMIN — Medication 5 MG: at 11:06

## 2020-05-05 RX ADMIN — PHENYLEPHRINE HYDROCHLORIDE 120 MCG: 10 INJECTION INTRAVENOUS at 11:06

## 2020-05-05 RX ADMIN — PHENYLEPHRINE HYDROCHLORIDE 120 MCG: 10 INJECTION INTRAVENOUS at 08:44

## 2020-05-05 RX ADMIN — DEXTROSE MONOHYDRATE 12.5 G: 25 INJECTION, SOLUTION INTRAVENOUS at 06:42

## 2020-05-05 RX ADMIN — ROCURONIUM BROMIDE 20 MG: 10 INJECTION, SOLUTION INTRAVENOUS at 08:39

## 2020-05-05 RX ADMIN — NICARDIPINE HYDROCHLORIDE 2.5 MG/HR: 25 INJECTION INTRAVENOUS at 22:18

## 2020-05-05 RX ADMIN — SODIUM CHLORIDE, SODIUM LACTATE, POTASSIUM CHLORIDE, AND CALCIUM CHLORIDE 500 ML: 600; 310; 30; 20 INJECTION, SOLUTION INTRAVENOUS at 13:33

## 2020-05-05 RX ADMIN — PHENYLEPHRINE HYDROCHLORIDE 120 MCG: 10 INJECTION INTRAVENOUS at 08:49

## 2020-05-05 RX ADMIN — Medication 15 MG: at 08:14

## 2020-05-05 RX ADMIN — HEPARIN SODIUM 5000 UNITS: 1000 INJECTION INTRAVENOUS; SUBCUTANEOUS at 10:53

## 2020-05-05 RX ADMIN — Medication 5 MG: at 10:34

## 2020-05-05 RX ADMIN — PHENYLEPHRINE HYDROCHLORIDE 120 MCG: 10 INJECTION INTRAVENOUS at 09:03

## 2020-05-05 RX ADMIN — PROPOFOL 50 MG: 10 INJECTION, EMULSION INTRAVENOUS at 08:09

## 2020-05-05 RX ADMIN — Medication 5 MG: at 09:39

## 2020-05-05 RX ADMIN — Medication 10 ML: at 22:00

## 2020-05-05 RX ADMIN — Medication 10 MG: at 12:08

## 2020-05-05 RX ADMIN — FENTANYL CITRATE 50 MCG: 50 INJECTION INTRAMUSCULAR; INTRAVENOUS at 08:35

## 2020-05-05 RX ADMIN — ONDANSETRON 4 MG: 2 INJECTION INTRAMUSCULAR; INTRAVENOUS at 11:38

## 2020-05-05 RX ADMIN — PHENYLEPHRINE HYDROCHLORIDE 40 MCG/MIN: 10 INJECTION INTRAVENOUS at 08:52

## 2020-05-05 RX ADMIN — NICARDIPINE HYDROCHLORIDE 5 MG/HR: 25 INJECTION INTRAVENOUS at 17:29

## 2020-05-05 RX ADMIN — PHENYLEPHRINE HYDROCHLORIDE 120 MCG: 10 INJECTION INTRAVENOUS at 08:59

## 2020-05-05 RX ADMIN — PHENYLEPHRINE HYDROCHLORIDE 240 MCG: 10 INJECTION INTRAVENOUS at 12:20

## 2020-05-05 NOTE — PROGRESS NOTES
Alleyvn placed on sacrum. Pt has 2 dime sized wounds to each side of buttock. No drainage noted. Right foot wrapped with kerlix. Large wound with yellow slough to top of right foot with weeping. Malodorous. Pt tolerated well.

## 2020-05-05 NOTE — PROGRESS NOTES
Patient alert and orientated. Patient taken off bipap and started on 3l nc sat 96%. Breath sounds are clear.

## 2020-05-05 NOTE — PROGRESS NOTES
Pt off reza gtt since 1615. Now hypertensive -180s, with HR in 50's to low 60s. Dr. Priya Cantrell notified and gave orders to start cardene gtt.

## 2020-05-05 NOTE — PROGRESS NOTES
TRANSFER - IN REPORT:    Verbal report received from [employer] (name) on Korin Ryan  being received from PACU (unit) for routine progression of care      Report consisted of patients Situation, Background, Assessment and   Recommendations(SBAR). Information from the following report(s) SBAR, Kardex, OR Summary, Procedure Summary, Intake/Output, MAR, Recent Results, Cardiac Rhythm NSR and Alarm Parameters ' was reviewed with the receiving nurse. Opportunity for questions and clarification was provided. Assessment completed upon patients arrival to unit and care assumed.

## 2020-05-05 NOTE — ANESTHESIA PREPROCEDURE EVALUATION
Anesthetic History     PONV          Review of Systems / Medical History  Patient summary reviewed and pertinent labs reviewed    Pulmonary              Pertinent negatives: Smoker: Former smoker. 100 pack year.      Neuro/Psych   Within defined limits           Cardiovascular    Hypertension        Dysrhythmias (RBBB)   CAD, PAD, CABG (2003) and hyperlipidemia    Exercise tolerance: <4 METS  Comments: Denies recent CP, SOB or Palpitations    Echo 2018 showing EF 50-54%, mild AS, mild MR  Sinus olga, RBBB    Endorses difficulty lying flat - this has been for years with no acute worsening   GI/Hepatic/Renal         Renal disease (Baseline 1.5): CRI       Endo/Other    Diabetes: well controlled, type 2, using insulin  Hypothyroidism: well controlled  Obesity and arthritis     Other Findings   Comments: Sats low 90's RA         Physical Exam    Airway  Mallampati: II  TM Distance: 4 - 6 cm  Neck ROM: normal range of motion   Mouth opening: Normal     Cardiovascular    Rhythm: regular           Dental    Dentition: Lower partial plate and Full upper dentures     Pulmonary  Breath sounds clear to auscultation               Abdominal  GI exam deferred       Other Findings            Anesthetic Plan    ASA: 3  Anesthesia type: general    Monitoring Plan: Arterial line      Induction: Intravenous  Anesthetic plan and risks discussed with: Patient

## 2020-05-05 NOTE — BRIEF OP NOTE
[address]   [address] [facility]. [address]  [phone] FAX: [phone]    Brief Op Note Template Note    Pre-Op Diagnosis: Peripheral vascular disease, unspecified ([address] [I73.9]    Post-Op Diagnosis:  Peripheral vascular disease, unspecified ([address] [I73.9]    Procedures: Procedure(s):  RIGHT COMMON FEMORAL TO POSTERIOR TIBIAL ARTERY BYPASS W/ CRYOVEIN    Surgeon: Beryle Glenn, MD    Assistants: Surgeon(s): Ingrid Pham MD      Anesthesia:  General     Findings: right necrotic foot, dopperable PT signal    Tourniquet Time:  * No tourniquets in log *    Estimated Blood Loss:               Specimens:            Implants:    Implant Name Type Inv. Item Serial No.  Lot No. LRB No. Used Action   GRAFT TISS VEIN SAPHENS 20-80 -- Heladio Flakito - [id number]  GRAFT TISS VEIN SAPHENS 20-80 -- [employer]OG[employer] CRYOVEIN [id number] [address]  Right 1 Implanted       Complications: None               Signed: Beryle Glenn, MD      Elements of this note have been dictated using speech recognition software. As a result, errors of speech recognition may have occurred.

## 2020-05-05 NOTE — CONSULTS
CONSULT NOTE    Ganesh Herrera    5/5/2020    Date of Admission:  5/5/2020    The patient's chart is reviewed and the patient is discussed with the staff. Subjective:     Patient is a 69 y.o.  male seen and evaluated at the request of Dr. Mary Navarro and seen via [facility] protocol. The patient is status post right common femoral to posterior tibial artery bypass with cryopreserved vein. He was brought to ICU post op due to respiratory failure requiring BIPAP. He has history of CAD, PAD, HTN, reportedly prior smoker 2packs per day quit 2008, but no known lung disease. He is morbidly obese without history of known JOSE. He states he's had some coughing since surgery, but otherwise feels okay. He denies previously using CPAP or BIPAP. Reports that he \"used to snore. \" He denies chest pain, wheezing, N/V. He has not coughed anything up. Preop he states he felt well this morning, no dyspnea, cough, sputum, fevers. He hasn't had any sick contacts. Per report he was breathing shallow after the OR and was placed on BIPAP then became hypotensive and tachypneic. Was started on castillo and given a bolus of LR and arrives to the ICU awake, but still mildly sleepy on BIPAP on 25mcg Castillo with MAP 90. Goal MAP is 70 post op. Review of Systems  A comprehensive review of systems was negative except for that written in the HPI.     Patient Active Problem List   Diagnosis Code    CAD (coronary artery disease) I25.10    PAD (peripheral artery disease) ([facility]) I73.9    HTN (hypertension) I10    DM (diabetes mellitus) ([address] E11.9    Dyslipidemia E78.5    Hx of CABG Z95.1    Ankle ulcer ([address] L97.309    Peripheral artery disease ([address] I73.9    Severe obesity ([address] E66.01    S/P bypass graft of extremity Z95.828    Yeast infection B37.9    Bypass graft stenosis ([facility]) T82.858A    Ischemic leg pain I99.8    Hypothyroidism E03.9    Toe ulcer ([address] L97.509    PVD (peripheral vascular disease) ([address] I73.9    Right foot ulcer ([facility]) L97.519    Acute respiratory failure with hypoxia and hypercapnia ([facility]) J96.01, J96.02    Morbid obesity ([facility]) E66.01    Postprocedural hypotension I95.81     Prior to Admission Medications   Prescriptions Last Dose Informant Patient Reported? Taking?   aspirin delayed-release 81 mg tablet 2020 at 0300  Yes Yes   Sig: Take 81 mg by mouth every morning. Indications: prevention of transient ischemic attack   ergocalciferol (ERGOCALCIFEROL) 50,000 unit capsule 2020  Yes No   Sig: Take 50,000 Units by mouth every seven (7) days. Pt takes on Saturday   hydrALAZINE (APRESOLINE) 50 mg tablet 2020 at 0300  Yes Yes   Sig: Take 100 mg by mouth daily. insulin NPH/insulin regular (NOVOLIN 70/30 U-100 INSULIN) 100 unit/mL (70-30) injection 2020 at 0300  Yes Yes   Si Units by SubCUTAneous route two (2) times a day. levothyroxine (SYNTHROID) 75 mcg tablet 2020 at 0300  Yes Yes   Sig: Take 75 mcg by mouth Daily (before breakfast). Take morning of procedure. lisinopril (PRINIVIL, ZESTRIL) 40 mg tablet 2020 at Unknown time  Yes Yes   Sig: Take 40 mg by mouth daily. metoprolol tartrate (LOPRESSOR) 25 mg tablet 2020 at 0300  Yes Yes   Sig: Take 25 mg by mouth two (2) times a day. nitroglycerin (NITROSTAT) 0.4 mg SL tablet   No No   Si Tab by SubLINGual route every five (5) minutes as needed for Chest Pain. Up to 3 doses. oxyCODONE IR (ROXICODONE) 5 mg immediate release tablet 2020  Yes No   Sig: Take 5 mg by mouth every four (4) hours as needed for Pain.   tamsulosin (FLOMAX) 0.4 mg capsule 2020 at 0300  Yes Yes   Sig: Take 0.4 mg by mouth every morning.       Facility-Administered Medications: None     Past Medical History:   Diagnosis Date    Amputation of lower limb ([facility]     left AKA    Anxiety     Arthritis     generalized    At risk for falls     CAD (coronary artery disease)     [facility]- CABG ~ MI-  XNEHTSYNSE  ( [facility])- no coronary stents----     Enlarged prostate     Former cigarette smoker     2 ppd x 46 years - quit 2006    History of hepatitis 1952    age 13 pt reports/ bhargavi NAIR (hard of hearing)     hearing aids bilat    Hx of CABG 2007    Hypercholesteremia     Hypertension     managed well with meds    Hypothyroidism     managed well with Synthroid    Long term (current) use of systemic steroids     Prednisone    Obesity (BMI 30-39. 9)     BMI 31    PAD (peripheral artery disease) (HCC)     RBBB     Type 2 diabetes mellitus ([facility]     type 2-- sqbs am avg --- no hypo    Vitamin D deficiency     replace with PO vitamin D    Wound, open     right foot -- nonhealing     Past Surgical History:   Procedure Laterality Date    CABG, ARTERY-VEIN, FOUR  2007    [address] [place] [address], [address]    HX ABOVE KNEE AMPUTATION  11/2019    HX COLONOSCOPY      HX HEART CATHETERIZATION      HX HEMORRHOIDECTOMY      HX KNEE REPLACEMENT Left 1997~    HX KNEE REPLACEMENT Right 2000~    VASCULAR SURGERY PROCEDURE UNLIST  03/27/2019    left common femoral to posterior tibial bypass with PTFE graft    VASCULAR SURGERY PROCEDURE UNLIST  10/24/2019    Ultrasound-guided access of the right common femoral artery, placement of catheter to the aorta for aortogram, Left lower extremity arteriogram with balloon angioplasty and stenting of the distal outflow with a 4 x 33 balloon.  VASCULAR SURGERY PROCEDURE UNLIST Left 11/01/2019    AKA    VASCULAR SURGERY PROCEDURE UNLIST  02/03/2020     Ultrasound-guided access of the left common femoral artery, placement of catheter for aortogram. Right lower extremity arteriograms.      Social History     Socioeconomic History    Marital status:      Spouse name: Not on file    Number of children: Not on file    Years of education: Not on file    Highest education level: Not on file   Occupational History    Not on file   Social Needs    Financial resource strain: Not on file  Food insecurity     Worry: Not on file     Inability: Not on file    Transportation needs     Medical: Not on file     Non-medical: Not on file   Tobacco Use    Smoking status: Former Smoker     Packs/day: 2.00     Years: 50.00     Pack years: 100.00     Last attempt to quit: 2006     Years since quittin.3    Smokeless tobacco: Never Used    Tobacco comment: cigar   Substance and Sexual Activity    Alcohol use: No     Frequency: Never    Drug use: No    Sexual activity: Not on file   Lifestyle    Physical activity     Days per week: Not on file     Minutes per session: Not on file    Stress: Not on file   Relationships    Social connections     Talks on phone: Not on file     Gets together: Not on file     Attends [employer] service: Not on file     Active member of club or organization: Not on file     Attends meetings of clubs or organizations: Not on file     Relationship status: Not on file    Intimate partner violence     Fear of current or ex partner: Not on file     Emotionally abused: Not on file     Physically abused: Not on file     Forced sexual activity: Not on file   Other Topics Concern    Not on file   Social History Narrative    Not on file     Family History   Problem Relation Age of Onset    No Known Problems Mother     Lung Cancer Father     Diabetes Father     Heart Disease Father     No Known Problems Sister      Allergies   Allergen Reactions    Gabapentin Other (comments)     Severe pain in legs    Rosuvastatin Rash     Current Facility-Administered Medications   Medication Dose Route Frequency    [START ON 2020] aspirin delayed-release tablet 81 mg  81 mg Oral DAILY    [START ON 2020] hydrALAZINE (APRESOLINE) tablet 100 mg  100 mg Oral DAILY    [START ON 2020] levothyroxine (SYNTHROID) tablet 75 mcg  75 mcg Oral ACB    [START ON 2020] lisinopriL (PRINIVIL, ZESTRIL) tablet 40 mg  40 mg Oral DAILY    metoprolol tartrate (LOPRESSOR) tablet 25 mg  25 mg Oral BID    nitroglycerin (NITROSTAT) tablet 0.4 mg  0.4 mg SubLINGual Q5MIN PRN    [START ON 5/6/2020] tamsulosin (FLOMAX) capsule 0.4 mg  0.4 mg Oral DAILY    sodium chloride (NS) flush 5-40 mL  5-40 mL IntraVENous Q8H    sodium chloride (NS) flush 5-40 mL  5-40 mL IntraVENous PRN    HYDROcodone-acetaminophen (NORCO) 5-325 mg per tablet 2 Tab  2 Tab Oral Q4H PRN    morphine injection 2 mg  2 mg IntraVENous Q3H PRN    0.9% sodium chloride infusion  125 mL/hr IntraVENous CONTINUOUS    PHENYLephrine (PF)(STEFANY-SYNEPHRINE) 30 mg in 0.9% sodium chloride 250 mL infusion   mcg/min IntraVENous TITRATE    insulin lispro (HUMALOG) injection   SubCUTAneous AC&HS    albuterol (PROVENTIL VENTOLIN) nebulizer solution 2.5 mg  2.5 mg Nebulization Q4H PRN     Objective:     Vitals:    05/05/20 1415 05/05/20 1432 05/05/20 1436 05/05/20 1446   BP: 117/59  149/69 158/72   Pulse: 76 73 73 62   Resp: 17 (!) 45 18 18   Temp: 98 °F (36.7 °C)  97.8 °F (36.6 °C)    SpO2: 97% 100% 99% 100%   Weight:       Height:         PHYSICAL EXAM     Constitutional:  the patient is well developed and in no acute distress  EENMT:  Sclera clear, pupils equal, oral mucosa moist  Respiratory: mildly coarse on BIPAP, no wheezing  Cardiovascular:  RRR without M,G,R  Gastrointestinal: soft and non-tender; with positive bowel sounds. Musculoskeletal: warm without cyanosis. There is right lower extremity edema. Left AKA. Skin:  no jaundice or rashes, chronic venous stasis wound to right leg   Neurologic: no gross neuro deficits     Psychiatric:  alert and oriented x 3    CXR:  ordered    Recent Labs     05/05/20  1347   WBC 8.3   HGB 10.8*   HCT 34.0*      INR 1.1     No results for input(s): NA, K, CL, GLU, CO2, BUN, CREA, MG, PHOS, CA, TROIQ, ALB, TBIL, TBILI, GPT, ALT, SGOT, BNPP in the last 72 hours.     No lab exists for component: NINI  Recent Labs     05/05/20  1327 05/05/20  1015   PHI 7.252* 7.366   PCO2I 47.9* 38.1 PO2I 80 143*   HCO3I 21.1* 21.8*     No results for input(s): LCAD, LAC in the last 72 hours. Assessment:  (Medical Decision Making)     Hospital Problems  Date Reviewed: 3/13/2020          Codes Class Noted POA    PVD (peripheral vascular disease) ([facility] ICD-10-CM: I73.9  ICD-9-CM: 443.9  5/5/2020 Unknown        Right foot ulcer ([facility] ICD-10-CM: L97.519  ICD-9-CM: 707.15  5/5/2020 Unknown        Acute respiratory failure with hypoxia and hypercapnia (HCC) ICD-10-CM: J96.01, J96.02  ICD-9-CM: 518.81  5/5/2020         Morbid obesity ([facility] ICD-10-CM: E66.01  ICD-9-CM: 278.01  5/5/2020 Unknown        Postprocedural hypotension ICD-10-CM: I95.81  ICD-9-CM: 458.29  5/5/2020 Unknown        PAD (peripheral artery disease) (HCC) ICD-10-CM: I73.9  ICD-9-CM: 443.9  2/14/2019 Unknown            68 y/o male with CAD, PAD, morbid obesity, prior smoker with post op respiratory failure. Plan:  (Medical Decision Making)     --check CXR  --allow to wake up more  --if CXR clear and more awake will trial patient on NC and wean as able  --if fails to progress with more time away from sedation will need further evaluation  --can repeat ausculatation after off BIPAP and assure no wheezing once positive pressure is removed. --IS, deep coughing, upright as able  --wean reza off as able for MAP goal >70 post op-seems anesthesia related, will eval for other causes of hypotension if persists    More than 50% of the time documented was spent in face-to-face contact with the patient and in the care of the patient on the floor/unit where the patient is located. Thank you very much for this referral.  We appreciate the opportunity to participate in this patient's care. Will follow along with above stated plan.     Awilda Calderón MD

## 2020-05-05 NOTE — PROGRESS NOTES
Pt arrived to [phone] from PACU with RN. RT at bedside to place pt on BIPAP. Pt AOx4. Dual skin assessment performed. R groin incision with wound vac - 125 mmHg suction. 4 other incisions to R leg. R foot wrapped - R PT pulse found with doppler. 2 open areas to buttocks covered with allevyn. R foot elevated.

## 2020-05-05 NOTE — PERIOP NOTES
Dr Stefania Boston to bedside updated on pt's condition, urine output pink tinged, phenylephrine drip, BIPAP and awaiting bed in ICU. Saturated dressings to lower leg reinforced with ace wrap as directed by Dr Stefania Boston.

## 2020-05-05 NOTE — PROGRESS NOTES
[address]   [address] [facility] FAX: [phone]         VASCULAR SURGERY FLOOR PROGRESS NOTE    Admit Date: 2020  POD: Day of Surgery    Procedure:  Procedure(s):  RIGHT COMMON FEMORAL TO POSTERIOR TIBIAL ARTERY BYPASS W/ CRYOVEIN    Subjective:     Patient has no new complaints. Objective:     Vitals:  Blood pressure 129/68, pulse 64, temperature 99.2 °F (37.3 °C), resp. rate 20, height 6' 2\" (1.88 m), weight 274 lb (124.3 kg), SpO2 92 %. Temp (24hrs), Av.2 °F (37.3 °C), Min:99.2 °F (37.3 °C), Max:99.2 °F (37.3 °C)      Intake / Output:  No intake or output data in the 24 hours ending 20 0747    Physical Exam:    Constitutional: he appears well-developed. No distress. HENT:   Head: Atraumatic. Eyes: Pupils are equal, round, and reactive to light. Neck: Normal range of motion. Cardiovascular: Regular rhythm. Pulmonary/Chest: Effort normal and breath sounds normal. No respiratory distress. Abdominal: Soft. Bowel sounds are normal. he exhibits no distension. There is no tenderness. There is no guarding. No hernia. Musculoskeletal: Normal range of motion. Neurological: He is alert. CN II- XII grossly intact  Vascular: dopperable PT    Labs: No results for input(s): HGB, WBC, K, GLU, HGBEXT in the last 72 hours.     No lab exists for component:  CREA    Data Review     Assessment:     Patient Active Problem List    Diagnosis Date Noted    CAD (coronary artery disease) 2019     Priority: 1 - One    Toe ulcer ([address] 2020    Ischemic leg pain 2019    Hypothyroidism 2019    Bypass graft stenosis ([address] 10/15/2019    Yeast infection 2019    S/P bypass graft of extremity 2019    Severe obesity ([address] 2019    Peripheral artery disease ([address] 2019    Ankle ulcer ([address] 2019    PAD (peripheral artery disease) ([address] 2019    HTN (hypertension) 2019    [facility] 02/14/2019    Dyslipidemia 02/14/2019    Hx of CABG 02/14/2019       Plan/Recommendations/Medical Decision Making:     Plan right common femoral to PT bypass     Elements of this note have been dictated using speech recognition software. As a result, errors of speech recognition may have occurred.

## 2020-05-05 NOTE — ANESTHESIA PROCEDURE NOTES
Arterial Line Placement    Start time: 2020 7:03 AM  End time: 2020 7:08 AM  Performed by: Algie Apgar, MD  Authorized by: Algie Apgar, MD     Pre-Procedure  Indications:  Arterial pressure monitoring and blood sampling  Preanesthetic Checklist: patient identified, risks and benefits discussed, anesthesia consent, site marked, patient being monitored, timeout performed and patient being monitored    Timeout Time: 07:03        Procedure:   Prep:  Chlorhexidine  Seldinger Technique?: Yes    Orientation:  Left  Location:  Radial artery  Catheter size:  20 G  Number of attempts:  1    Assessment:   Post-procedure:  Line secured and sterile dressing applied  Patient Tolerance:  Patient tolerated the procedure well with no immediate complications

## 2020-05-05 NOTE — ANESTHESIA POSTPROCEDURE EVALUATION
Procedure(s):  RIGHT COMMON FEMORAL TO POSTERIOR TIBIAL ARTERY BYPASS W/ CRYOVEIN. general    Anesthesia Post Evaluation      Multimodal analgesia: multimodal analgesia used between 6 hours prior to anesthesia start to PACU discharge  Patient location during evaluation: bedside  Patient participation: complete - patient participated  Level of consciousness: responsive to verbal stimuli  Pain management: adequate  Airway patency: patent  Anesthetic complications: no  Cardiovascular status: hemodynamically stable  Respiratory status: spontaneous ventilation  Hydration status: stable  Comments: Supplementing with BiPAP given body habitus and pulmonary morbidity. Denies pain currently. Will monitor in ICU overnight        Vitals Value Taken Time   /65 5/5/2020  1:51 PM   Temp 36.9 °C (98.5 °F) 5/5/2020 12:38 PM   Pulse 86 5/5/2020  1:52 PM   Resp 21 5/5/2020  1:00 PM   SpO2 96 % 5/5/2020  1:52 PM   Vitals shown include unvalidated device data.

## 2020-05-05 NOTE — PERIOP NOTES
TRANSFER - OUT REPORT:    Verbal report given to Crystal Beltre RN on Shira Maier  being transferred to [phone] for routine post - op       Report consisted of patients Situation, Background, Assessment and   Recommendations(SBAR). Information from the following report(s) SBAR, Kardex, OR Summary, Intake/Output, MAR, Recent Results and Cardiac Rhythm NSR was reviewed with the receiving nurse. Lines:   Peripheral IV 05/05/20 Right;Posterior Hand (Active)   Site Assessment Clean, dry, & intact 5/5/2020  2:01 PM   Phlebitis Assessment 0 5/5/2020  2:01 PM   Infiltration Assessment 0 5/5/2020  2:01 PM   Dressing Status Clean, dry, & intact 5/5/2020  2:01 PM   Dressing Type Transparent 5/5/2020  2:01 PM   Hub Color/Line Status Green; Infusing;Patent; Other (comment) 5/5/2020  2:01 PM       Peripheral IV 05/05/20 Left Wrist (Active)   Site Assessment Clean, dry, & intact 5/5/2020  2:01 PM   Phlebitis Assessment 0 5/5/2020  2:01 PM   Infiltration Assessment 0 5/5/2020  2:01 PM   Dressing Status Clean, dry, & intact 5/5/2020  2:01 PM   Dressing Type Transparent 5/5/2020  2:01 PM   Hub Color/Line Status Green;Patent 5/5/2020  2:01 PM       Arterial Line 05/05/20 Left Radial artery (Active)   Site Assessment Clean, dry, & intact 5/5/2020  2:01 PM   Dressing Status Clean, dry, & intact 5/5/2020  2:01 PM   Dressing Type Transparent 5/5/2020  2:01 PM   Line Status Intact and in place 5/5/2020  2:01 PM   Treatment Zeroed or re-zeroed 5/5/2020  2:01 PM   Affected Extremity/Extremities Pulses palpable;Color distal to insertion site pink (or appropriate for race) 5/5/2020  2:01 PM        Opportunity for questions and clarification was provided. Patient transported with:  RT with pt on BIPAP   Monitor  Registered Nurse   Pt transported to room 3303 without incident. Crystal Beltre RN at bedside assuming pt's care. VTE prophylaxis orders have not been written for Shira Maier.     Patient and family given floor number and nurses name. Family updated re: pt status after security code verified.

## 2020-05-05 NOTE — PROGRESS NOTES
Patient status post right common femoral to posterior tibial artery bypass with cryopreserved vein. Postop the patient has good Doppler signal in his posterior tibial.  Patient has a lot of edema in his leg secondary to chronic wound with drainage. I suspect patient will have significant amount of drainage and edema overnight. Instructed nurses to keep right foot elevated and if surgical dressing becomes saturated can continue to reinforce with Kerlix and Ace. My plan is to take the dressing down tomorrow and redress. Patient can have clear liquid diet can advance diet as tolerated.     Baldemar Levo

## 2020-05-06 ENCOUNTER — ANESTHESIA EVENT (OUTPATIENT)
Dept: SURGERY | Age: 75
DRG: 252 | End: 2020-05-06
Payer: MEDICARE

## 2020-05-06 PROBLEM — I73.9 PAD (PERIPHERAL ARTERY DISEASE) (HCC): Chronic | Status: ACTIVE | Noted: 2019-02-14

## 2020-05-06 PROBLEM — E66.01 MORBID OBESITY (HCC): Chronic | Status: ACTIVE | Noted: 2020-05-05

## 2020-05-06 LAB
ANION GAP SERPL CALC-SCNC: 8 MMOL/L (ref 7–16)
BUN SERPL-MCNC: 22 MG/DL (ref 8–23)
CALCIUM SERPL-MCNC: 8.5 MG/DL (ref 8.3–10.4)
CHLORIDE SERPL-SCNC: 106 MMOL/L (ref 98–107)
CO2 SERPL-SCNC: 23 MMOL/L (ref 21–32)
CREAT SERPL-MCNC: 1.32 MG/DL (ref 0.8–1.5)
ERYTHROCYTE [DISTWIDTH] IN BLOOD BY AUTOMATED COUNT: 16.5 % (ref 11.9–14.6)
GLUCOSE BLD STRIP.AUTO-MCNC: 164 MG/DL (ref 65–100)
GLUCOSE BLD STRIP.AUTO-MCNC: 169 MG/DL (ref 65–100)
GLUCOSE BLD STRIP.AUTO-MCNC: 209 MG/DL (ref 65–100)
GLUCOSE BLD STRIP.AUTO-MCNC: 241 MG/DL (ref 65–100)
GLUCOSE SERPL-MCNC: 158 MG/DL (ref 65–100)
HCT VFR BLD AUTO: 30.9 % (ref 41.1–50.3)
HGB BLD-MCNC: 9.8 G/DL (ref 13.6–17.2)
MCH RBC QN AUTO: 25.3 PG (ref 26.1–32.9)
MCHC RBC AUTO-ENTMCNC: 31.7 G/DL (ref 31.4–35)
MCV RBC AUTO: 79.8 FL (ref 79.6–97.8)
NRBC # BLD: 0 K/UL (ref 0–0.2)
PLATELET # BLD AUTO: 200 K/UL (ref 150–450)
PMV BLD AUTO: 9.5 FL (ref 9.4–12.3)
POTASSIUM SERPL-SCNC: 4 MMOL/L (ref 3.5–5.1)
RBC # BLD AUTO: 3.87 M/UL (ref 4.23–5.6)
SODIUM SERPL-SCNC: 137 MMOL/L (ref 136–145)
WBC # BLD AUTO: 5.7 K/UL (ref 4.3–11.1)

## 2020-05-06 PROCEDURE — 74011636637 HC RX REV CODE- 636/637: Performed by: SURGERY

## 2020-05-06 PROCEDURE — 74750000023 HC WOUND THERAPY

## 2020-05-06 PROCEDURE — 74011250636 HC RX REV CODE- 250/636: Performed by: SURGERY

## 2020-05-06 PROCEDURE — 74011636637 HC RX REV CODE- 636/637: Performed by: NURSE PRACTITIONER

## 2020-05-06 PROCEDURE — 80048 BASIC METABOLIC PNL TOTAL CA: CPT

## 2020-05-06 PROCEDURE — 77010033678 HC OXYGEN DAILY

## 2020-05-06 PROCEDURE — 82962 GLUCOSE BLOOD TEST: CPT

## 2020-05-06 PROCEDURE — 74011000258 HC RX REV CODE- 258: Performed by: SURGERY

## 2020-05-06 PROCEDURE — 85027 COMPLETE CBC AUTOMATED: CPT

## 2020-05-06 PROCEDURE — 99232 SBSQ HOSP IP/OBS MODERATE 35: CPT | Performed by: INTERNAL MEDICINE

## 2020-05-06 PROCEDURE — 74011250637 HC RX REV CODE- 250/637: Performed by: SURGERY

## 2020-05-06 PROCEDURE — 65270000029 HC RM PRIVATE

## 2020-05-06 RX ORDER — SODIUM CHLORIDE 9 MG/ML
100 INJECTION, SOLUTION INTRAVENOUS CONTINUOUS
Status: DISCONTINUED | OUTPATIENT
Start: 2020-05-06 | End: 2020-05-06

## 2020-05-06 RX ORDER — HYDRALAZINE HYDROCHLORIDE 20 MG/ML
10 INJECTION INTRAMUSCULAR; INTRAVENOUS
Status: DISCONTINUED | OUTPATIENT
Start: 2020-05-06 | End: 2020-05-08 | Stop reason: HOSPADM

## 2020-05-06 RX ORDER — HEPARIN SODIUM 5000 [USP'U]/ML
5000 INJECTION, SOLUTION INTRAVENOUS; SUBCUTANEOUS EVERY 8 HOURS
Status: DISCONTINUED | OUTPATIENT
Start: 2020-05-06 | End: 2020-05-08 | Stop reason: HOSPADM

## 2020-05-06 RX ADMIN — INSULIN LISPRO 2 UNITS: 100 INJECTION, SOLUTION INTRAVENOUS; SUBCUTANEOUS at 16:40

## 2020-05-06 RX ADMIN — INSULIN LISPRO 4 UNITS: 100 INJECTION, SOLUTION INTRAVENOUS; SUBCUTANEOUS at 11:22

## 2020-05-06 RX ADMIN — Medication 10 ML: at 05:18

## 2020-05-06 RX ADMIN — ASPIRIN 81 MG: 81 TABLET ORAL at 08:09

## 2020-05-06 RX ADMIN — HEPARIN SODIUM 5000 UNITS: 5000 INJECTION INTRAVENOUS; SUBCUTANEOUS at 14:08

## 2020-05-06 RX ADMIN — HYDROCODONE BITARTRATE AND ACETAMINOPHEN 2 TABLET: 5; 325 TABLET ORAL at 05:17

## 2020-05-06 RX ADMIN — SODIUM CHLORIDE 125 ML/HR: 9 INJECTION, SOLUTION INTRAVENOUS at 07:26

## 2020-05-06 RX ADMIN — METOPROLOL TARTRATE 25 MG: 25 TABLET, FILM COATED ORAL at 08:09

## 2020-05-06 RX ADMIN — PIPERACILLIN AND TAZOBACTAM 3.38 G: 3; .375 INJECTION, POWDER, FOR SOLUTION INTRAVENOUS at 09:43

## 2020-05-06 RX ADMIN — SODIUM CHLORIDE 125 ML/HR: 9 INJECTION, SOLUTION INTRAVENOUS at 00:00

## 2020-05-06 RX ADMIN — INSULIN LISPRO 2 UNITS: 100 INJECTION, SOLUTION INTRAVENOUS; SUBCUTANEOUS at 07:11

## 2020-05-06 RX ADMIN — PIPERACILLIN AND TAZOBACTAM 3.38 G: 3; .375 INJECTION, POWDER, FOR SOLUTION INTRAVENOUS at 16:29

## 2020-05-06 RX ADMIN — Medication 10 ML: at 22:05

## 2020-05-06 RX ADMIN — INSULIN LISPRO 4 UNITS: 100 INJECTION, SOLUTION INTRAVENOUS; SUBCUTANEOUS at 22:04

## 2020-05-06 RX ADMIN — HYDRALAZINE HYDROCHLORIDE 100 MG: 50 TABLET, FILM COATED ORAL at 08:09

## 2020-05-06 RX ADMIN — LISINOPRIL 40 MG: 20 TABLET ORAL at 08:09

## 2020-05-06 RX ADMIN — MORPHINE SULFATE 2 MG: 2 INJECTION, SOLUTION INTRAMUSCULAR; INTRAVENOUS at 08:34

## 2020-05-06 RX ADMIN — Medication 10 ML: at 13:45

## 2020-05-06 RX ADMIN — METOPROLOL TARTRATE 25 MG: 25 TABLET, FILM COATED ORAL at 16:29

## 2020-05-06 RX ADMIN — LEVOTHYROXINE SODIUM 75 MCG: 0.07 TABLET ORAL at 07:12

## 2020-05-06 RX ADMIN — HEPARIN SODIUM 5000 UNITS: 5000 INJECTION INTRAVENOUS; SUBCUTANEOUS at 22:04

## 2020-05-06 RX ADMIN — TAMSULOSIN HYDROCHLORIDE 0.4 MG: 0.4 CAPSULE ORAL at 08:09

## 2020-05-06 RX ADMIN — HYDROCODONE BITARTRATE AND ACETAMINOPHEN 2 TABLET: 5; 325 TABLET ORAL at 10:03

## 2020-05-06 NOTE — PROGRESS NOTES
Date of Outreach Update:  Jessica Deng was seen and assessed. Previous Outreach assessment has been reviewed. There have been no significant clinical changes since the completion of the last dated Outreach assessment. Patient resting in bed, breathing even and unlabored. No visible signs of distress. Patient denies any pain. Will continue to follow up per outreach protocol.     Signed By:   Elliott Giron    May 6, 2020 4:42 PM

## 2020-05-06 NOTE — PROGRESS NOTES
Jon Fitzpatrick Short  Admission Date: 5/5/2020             Daily Progress Note: 5/6/2020    The patient's chart is reviewed and the patient is discussed with the staff. Patient is a 69 y.o.  male seen and evaluated at the request of Dr. Tommy Carreno and seen via [facility] protocol. The patient is status post right common femoral to posterior tibial artery bypass with cryopreserved vein. He was brought to ICU post op due to respiratory failure requiring BIPAP. He has history of CAD, PAD, HTN, reportedly prior smoker 2packs per day quit 2008, but no known lung disease. He is morbidly obese without history of known JOSE. He states he's had some coughing since surgery, but otherwise feels okay. He denies previously using CPAP or BIPAP. Reports that he \"used to snore. \" He denies chest pain, wheezing, N/V. He has not coughed anything up. Preop he states he felt well this morning, no dyspnea, cough, sputum, fevers. He hasn't had any sick contacts.      Per report he was breathing shallow after the OR and was placed on BIPAP then became hypotensive and tachypneic. Was started on castillo and given a bolus of LR and arrives to the ICU awake, but still mildly sleepy on BIPAP on 25mcg Castillo with MAP 90. Goal MAP is 70 post op. Subjective:     Feels better. Was taken off BIPAP shortly after arrival to ICU and was able to stay off. Weaned off castillo quickly and actually was on nicardipine briefly overnight. Off all drips now and on 2L. Pulled 2.5L on IS this morning. Main complaint is foot pain. Has transfer orders to floor.     Current Facility-Administered Medications   Medication Dose Route Frequency    heparin (porcine) injection 5,000 Units  5,000 Units SubCUTAneous Q8H    piperacillin-tazobactam (ZOSYN) 3.375 g in 0.9% sodium chloride (MBP/ADV) 100 mL  3.375 g IntraVENous Q8H    0.9% sodium chloride infusion  100 mL/hr IntraVENous CONTINUOUS    hydrALAZINE (APRESOLINE) 20 mg/mL injection 10 mg  10 mg IntraVENous Q6H PRN    aspirin delayed-release tablet 81 mg  81 mg Oral DAILY    hydrALAZINE (APRESOLINE) tablet 100 mg  100 mg Oral DAILY    levothyroxine (SYNTHROID) tablet 75 mcg  75 mcg Oral ACB    lisinopriL (PRINIVIL, ZESTRIL) tablet 40 mg  40 mg Oral DAILY    metoprolol tartrate (LOPRESSOR) tablet 25 mg  25 mg Oral BID    nitroglycerin (NITROSTAT) tablet 0.4 mg  0.4 mg SubLINGual Q5MIN PRN    tamsulosin (FLOMAX) capsule 0.4 mg  0.4 mg Oral DAILY    sodium chloride (NS) flush 5-40 mL  5-40 mL IntraVENous Q8H    sodium chloride (NS) flush 5-40 mL  5-40 mL IntraVENous PRN    HYDROcodone-acetaminophen (NORCO) 5-325 mg per tablet 2 Tab  2 Tab Oral Q4H PRN    morphine injection 2 mg  2 mg IntraVENous Q3H PRN    insulin lispro (HUMALOG) injection   SubCUTAneous AC&HS    albuterol (PROVENTIL VENTOLIN) nebulizer solution 2.5 mg  2.5 mg Nebulization Q4H PRN     Review of Systems  Constitutional: negative for fever, chills, sweats  Cardiovascular: negative for chest pain, palpitations, syncope, edema  Gastrointestinal:  negative for dysphagia, reflux, vomiting, diarrhea, abdominal pain, or melena  Neurologic:  negative for focal weakness, numbness, headache    Objective:     Vitals:    05/06/20 0630 05/06/20 0645 05/06/20 0700 05/06/20 0722   BP:    140/56   Pulse: 72 69 76 73   Resp: 30 19 26    Temp:       SpO2: 91% 92% 93%    Weight:       Height:           Intake/Output Summary (Last 24 hours) at 5/6/2020 0848  Last data filed at 5/6/2020 0756  Gross per 24 hour   Intake 6198.17 ml   Output 2120 ml   Net 4078.17 ml     Constitutional:  the patient is well developed and in no acute distress  EENMT:  Sclera clear, pupils equal, oral mucosa moist  Respiratory: mildly coarse on BIPAP, no wheezing  Cardiovascular:  RRR without M,G,R  Gastrointestinal: soft and non-tender; with positive bowel sounds. Musculoskeletal: warm without cyanosis. There is right lower extremity edema. Left AKA.   Skin:  no jaundice or rashes, chronic venous stasis wound to right leg   Neurologic: no gross neuro deficits     Psychiatric:  alert and oriented x 3    CXR: 5/5: no clear infiltrate      LAB  Recent Labs     05/06/20  0706 05/05/20  2210 05/05/20  1718 05/05/20  1246 05/05/20  1015   GLUCPOC 169* 234* 177* 130* 99      Recent Labs     05/06/20  0346 05/05/20  1601 05/05/20  1347   WBC 5.7 7.7 8.3   HGB 9.8* 11.3* 10.8*   HCT 30.9* 36.5* 34.0*    202 223   INR  --   --  1.1     Recent Labs     05/06/20  0346 05/05/20  1601    139   K 4.0 4.2    108*   CO2 23 22   * 163*   BUN 22 25*   CREA 1.32 1.59*   CA 8.5 8.9     Recent Labs     05/05/20  1327 05/05/20  1015   PHI 7.252* 7.366   PCO2I 47.9* 38.1   PO2I 80 143*   HCO3I 21.1* 21.8*     No results for input(s): LCAD, LAC in the last 72 hours.     Assessment:  (Medical Decision Making)     Hospital Problems  Date Reviewed: 3/13/2020          Codes Class Noted POA    PVD (peripheral vascular disease) ([facility] ICD-10-CM: I73.9  ICD-9-CM: 443.9  5/5/2020 Unknown        Right foot ulcer ([facility] ICD-10-CM: W58.210  ICD-9-CM: 707.15  5/5/2020 Unknown        Acute respiratory failure with hypoxia and hypercapnia (HCC) ICD-10-CM: J96.01, J96.02  ICD-9-CM: 518.81  5/5/2020         Morbid obesity ([facility] ICD-10-CM: E66.01  ICD-9-CM: 278.01  5/5/2020 Unknown        Postprocedural hypotension ICD-10-CM: I95.81  ICD-9-CM: 458.29  5/5/2020 Unknown        PAD (peripheral artery disease) (HCC) ICD-10-CM: I73.9  ICD-9-CM: 443.9  2/14/2019 Unknown              Plan:  (Medical Decision Making)     --Continue IS, OOB to chair as able  --wean O2, doesn't wear at home  --agree with transfer to floor  --prior smoker, but no known COPD and no wheezing  --uses nasal saline for nasal congestion  --has diet order, will stop IVF    More than 50% of the time documented was spent in face-to-face contact with the patient and in the care of the patient on the floor/unit where the patient is located.     Destiney De La Cruz MD

## 2020-05-06 NOTE — PROGRESS NOTES
TRANSFER - OUT REPORT:    Verbal report given to SLID (name) on Noam Chaney  being transferred to 2nd floor (unit) for routine progression of care       Report consisted of patients Situation, Background, Assessment and   Recommendations(SBAR). Information from the following report(s) SBAR, OR Summary, Procedure Summary, Intake/Output, MAR, Recent Results, Med Rec Status, Cardiac Rhythm NSR and Alarm Parameters  was reviewed with the receiving nurse. Lines:   Peripheral IV 05/05/20 Right;Posterior Hand (Active)   Site Assessment Clean, dry, & intact 5/6/2020 11:00 AM   Phlebitis Assessment 0 5/6/2020 11:00 AM   Infiltration Assessment 0 5/6/2020 11:00 AM   Dressing Status Clean, dry, & intact 5/6/2020 11:00 AM   Dressing Type Transparent;Tape 5/6/2020 11:00 AM   Hub Color/Line Status Patent; Infusing 5/6/2020 11:00 AM   Alcohol Cap Used No 5/6/2020 11:00 AM       Peripheral IV 05/05/20 Left Wrist (Active)   Site Assessment Clean, dry, & intact 5/6/2020 11:00 AM   Phlebitis Assessment 0 5/6/2020 11:00 AM   Infiltration Assessment 0 5/6/2020 11:00 AM   Dressing Status Clean, dry, & intact 5/6/2020 11:00 AM   Dressing Type Transparent;Tape 5/6/2020  7:00 AM   Hub Color/Line Status Patent; Flushed;Capped 5/6/2020 11:00 AM   Alcohol Cap Used No 5/6/2020 11:00 AM        Opportunity for questions and clarification was provided.       Patient transported with:   O2 @ 3 liters  Registered Nurse

## 2020-05-06 NOTE — OP NOTES
[address] [facility]  OPERATIVE REPORT    Name:  Kip   MR#:  [id number]  :  1945  ACCOUNT #:  [de-identified]  DATE OF SERVICE:  2020    CLINICAL SERVICE:  Vascular Surgery. PREOPERATIVE DIAGNOSIS:  Nonhealing right foot wound. POSTOPERATIVE DIAGNOSIS:  Nonhealing right foot wound. PROCEDURE PERFORMED:  Right common femoral to posterior tibial bypass with Cryo preserving. SURGEON:  Daisy Dowell. Tommy Carreno MD    ASSISTANT:  None. ANESTHESIA:  General.    COMPLICATIONS:  None. SPECIMENS REMOVED:      IMPLANTS:      ESTIMATED BLOOD LOSS:  500 mL. INDICATIONS FOR PROCEDURE:  This is a 60-year-old male with a history of peripheral vascular disease, status post multiple lower extremity bypasses. He had subsequently gone for left above-knee amputation and presented with a nonhealing right foot wound. He previously underwent a diagnostic arteriogram, which showed he had a flush SFA occlusion with popliteal artery occlusion. Tibioperoneal trunk was recon to the posterior tibial being dominant runoff down to the ankle. The patient  cryo-preserved vein secondary to the patient having no evidence of any usable saphenous vein and with evidence of infection of his foot, we elected to proceed with cryo. PROCEDURE:  After getting informed consent,  the patient was brought to the operating room. Anesthesia was introduced. Preop antibiotics were given before skin incision. The patient's right leg was then prepped and draped in normal sterile fashion. Incision was made just 5 cm above the malleolus. We dissected down through the subcutaneous tissue. There was a lot subcutaneous tissue edema in the space. We got down to the fascia which we exposed. The posterior tibial artery and bilateral veins were then identified. The artery was dissected for about 5 cm. We got proximal and distal control with vessel loops.   We had Doppler signal and had a good Doppler signal in the vessel and it appeared soft. At the time we then started following up the cryo vein which was probably measuring 6-7 mm and was 82 cm in length. We made a longitudinal incision in the groin area. We also got down to the subcutaneous tissue and the fascia and got the common femoral artery, SFA and profunda out. We got control of it proximally and distally. Secondary to the amount of length, we made two other incisions to help tunneling one above knee and one just below the knee pop, which we dissected down to the fascia. We then brought in field a cryo-preserved vein and did an end-to-side anastomosis in the common femoral using 6-0 Prolene. We allowed the vein to dilate up. Checking it from vein branches, which were all patent, no evidence of any bleeding. We marked the vein and we tunneled it using a Jane-Wick tunneler in subfascial throughout four incisions down to the posterior tibial.  We checked and there was good pulsatile bleeding. There was no evidence of any twisting or turning. We then got control of the posterior tibial artery. We re-heparinized the patient every 4 to 5 minutes with 5000 units of heparin. A [place] blade was then used to do arteriotomy in the anterior medial aspect and we extended with Pott's scissors. There was backbleeding through the posterior tibial.  We then spatulated the graph and did an end-to-side anastomosis using  6-0 Prolene. Post procedure we back flushed and forward flushed the graft. There was good Doppler signal in the posterior tibial.  We then reversed the patient with 50 mg of Protamine. ACT for that was 134 after holding pressure the whole time. The distal wound that we closed, the fascia with 3-0 Vicryl's and used interrupted horizontal mattress with nylon. Other incisions were closed with 2-0 Vicryls, 3-0 Vicryls, and staples. * was then placed in the groin. The patient was then extubated and returned to the PACU in stable condition.         ALMA DANIEL Pedro Bauer MD      [id number]  D:  05/05/2020 13:38  T:  05/05/2020 23:00  JOB #:  [id number]

## 2020-05-06 NOTE — PROGRESS NOTES
TRANSFER - IN REPORT:    Verbal report received from Rain(name) on Ирина Barfield  being received from CVICU(unit) for routine progression of care      Report consisted of patients Situation, Background, Assessment and   Recommendations(SBAR). Information from the following report(s) SBAR was reviewed with the receiving nurse. Opportunity for questions and clarification was provided. Assessment completed upon patients arrival to unit and care assumed.

## 2020-05-06 NOTE — PROGRESS NOTES
[address] FAX: [phone]         VASCULAR SURGERY FLOOR PROGRESS NOTE    Admit Date: 2020  POD: 1 Day Post-Op    Procedure:  Procedure(s):  RIGHT COMMON FEMORAL TO POSTERIOR TIBIAL ARTERY BYPASS W/ CRYOVEIN    Subjective:     Patient has no new complaints. Objective:     Vitals:  Blood pressure 140/56, pulse 73, temperature 99.4 °F (37.4 °C), resp. rate 26, height 6' 2\" (1.88 m), weight 274 lb (124.3 kg), SpO2 93 %. Temp (24hrs), Av.2 °F (36.8 °C), Min:97.6 °F (36.4 °C), Max:99.4 °F (37.4 °C)      Intake / Output:    Intake/Output Summary (Last 24 hours) at 2020 0749  Last data filed at 2020 0703  Gross per 24 hour   Intake 6657.75 ml   Output 2445 ml   Net 4212.75 ml       Physical Exam:    Constitutional: he appears well-developed. No distress. HENT:   Head: Atraumatic. Eyes: Pupils are equal, round, and reactive to light. Neck: Normal range of motion. Cardiovascular: Regular rhythm. Pulmonary/Chest: Effort normal and breath sounds normal. No respiratory distress. Abdominal: Soft. Bowel sounds are normal. he exhibits no distension. There is no tenderness. There is no guarding. No hernia. Musculoskeletal: Normal range of motion. Neurological: He is alert.  CN II- XII grossly intact  Vascular: Right groin wound VAC in place dopplerable posterior tibial signal infected eschar on the forefoot    Labs:   Recent Labs     20  0346 20  1601   HGB 9.8* 11.3*   WBC 5.7 7.7   K 4.0 4.2   * 163*       Data Review     Assessment:     Patient Active Problem List    Diagnosis Date Noted    CAD (coronary artery disease) 2019     Priority: 1 - One    PVD (peripheral vascular disease) ([facility] 2020    Right foot ulcer ([facility] 2020    Acute respiratory failure with hypoxia and hypercapnia ([facility] 2020    Morbid obesity ([facility] 2020    Postprocedural hypotension 2020    Toe ulcer ([facility] 01/17/2020    Ischemic leg pain 11/01/2019    Hypothyroidism 11/01/2019    [facility] graft [facility] 10/15/2019    Yeast infection 06/05/2019    S/P bypass graft of extremity 04/19/2019    Severe obesity ([facility] 04/12/2019    Peripheral artery disease ([facility] 03/27/2019    Ankle ulcer ([facility] 02/22/2019    PAD (peripheral artery disease) ([facility] 02/14/2019    HTN (hypertension) 02/14/2019    DM (diabetes mellitus) ([facility] 02/14/2019    Dyslipidemia 02/14/2019    Hx of CABG 02/14/2019       Plan/Recommendations/Medical Decision Making:     Patient well-perfused with patent bypass graft  -Concerned about soft tissue infection on the forefoot of the foot we will plan to debride superficially on Friday  -We will keep on IV Zosyn until Friday  -We will plan for home health with incisional wound VAC to be discharged on Friday after surgery  -Patient already has home health for wound care  -Transfer to the floor  -Start subcu heparin will wait to start full anticoagulation until Thursday    Elements of this note have been dictated using speech recognition software. As a result, errors of speech recognition may have occurred.

## 2020-05-06 NOTE — PROGRESS NOTES
Pt seen in CCU s/p surgery by Dr. Aimee Looney. Covid test for pre-surgery per nursing. Alert and oriented, talkative. Confirms demographics. Independent prior to admission with ADL's, does drive some, but son and DIL live next door, so they assist if needed or wife, Adiel Felipe. States he has been current with [facility], he thinks. Discussed possible need for wound vac at d/c and CM will assist with setting up with [facility] resumption. CM following per MD orders. Care Management Interventions  [facility] [place]  Mode of Transport at Discharge: Other (see comment)  Transition of Care Consult (CM Consult): Discharge Planning  Discharge Durable Medical Equipment: (glucometer)  Current Support Network: Lives with Spouse(lives with spouse, Adiel Felipe)  Confirm Follow Up Transport: Family  The Plan for Transition of Care is Related to the Following Treatment Goals : possible HH at d/c.    The Patient and/or Patient Representative was Provided with a Choice of Provider and Agrees with the Discharge Plan?: Yes  Name of the Patient Representative Who was Provided with a Choice of Provider and Agrees with the Discharge Plan: patient  Freedom of Choice List was Provided with Basic Dialogue that Supports the Patient's Individualized Plan of Care/Goals, Treatment Preferences and Shares the Quality Data Associated with the Providers?: Yes  The [employer] Information Provided?: Yes(confirms MCR/BC STATE/pt is /gets RX at [place])  Discharge Location  Discharge Placement: Unable to determine at this time

## 2020-05-06 NOTE — PROGRESS NOTES
Bedside and Verbal shift change report given to Verna Lindsey (oncoming nurse) by Matilda Clements (offgoing nurse). Report included the following information SBAR, ED Summary, OR Summary, Procedure Summary, Intake/Output, MAR, Recent Results, Med Rec Status, Cardiac Rhythm NSR and Alarm Parameters .

## 2020-05-06 NOTE — PROGRESS NOTES
We will plan for right foot debridement tomorrow, instead of Friday. Patient be n.p.o. after midnight.     Romulo Shultz

## 2020-05-06 NOTE — ANESTHESIA PREPROCEDURE EVALUATION
Relevant Problems   No relevant active problems       Anesthetic History               Review of Systems / Medical History  Patient summary reviewed and pertinent labs reviewed    Pulmonary          Smoker         Neuro/Psych              Cardiovascular    Hypertension        Dysrhythmias (RBBB)   CAD, PAD, CABG and hyperlipidemia    Exercise tolerance: >4 METS  Comments: Denies recent CP, SOB or Palpitations     Echo 2018 showing EF 50-54%, mild AS, mild MR  Sinus olga, RBBB     Endorses difficulty lying flat - this has been for years with no acute worsening   GI/Hepatic/Renal         Renal disease: CRI       Endo/Other    Diabetes: type 2  Hypothyroidism  Obesity     Other Findings   Comments: S/p R Fem to PT bypass 5/5         Physical Exam    Airway  Mallampati: III    Neck ROM: decreased range of motion   Mouth opening: Normal     Cardiovascular  Regular rate and rhythm,  S1 and S2 normal,  no murmur, click, rub, or gallop             Dental         Pulmonary  Breath sounds clear to auscultation               Abdominal         Other Findings            Anesthetic Plan    ASA: 3  Anesthesia type: total IV anesthesia            Anesthetic plan and risks discussed with: Patient      Agreeable to TIVA. Spoke with surgeon who states this will be a minor procedure.

## 2020-05-06 NOTE — PROGRESS NOTES
Initial visit made to patient and a prayer was provided. He was thankful that his foot wound was improving. He talked about this stacy in God and god's care upon him. He shared about his family and how well each one was doing. He invited the  to visit him again.       Magi Mccormick MDIV

## 2020-05-06 NOTE — PROGRESS NOTES
END OF SHIFT NOTE:    INTAKE/OUTPUT  05/05 0701 - 05/06 0700  In: 4473.2 [I.V.:4473.2]  Out: 6902 [Urine:2145]  Voiding: YES  Catheter: NO  Drain:              Flatus: Patient does have flatus present. Stool:  0 occurrences. Characteristics:       Emesis: 0 occurrences. Characteristics:        VITAL SIGNS  Patient Vitals for the past 12 hrs:   Temp Pulse Resp BP SpO2   05/06/20 1510 98.4 °F (36.9 °C) 80 18 119/71 95 %   05/06/20 1215 99.2 °F (37.3 °C) 64 18 125/68 95 %   05/06/20 1156 -- 63 15 -- 91 %   05/06/20 1101 -- 70 18 125/57 92 %   05/06/20 1100 99.7 °F (37.6 °C) 71 19 125/57 93 %   05/06/20 1001 -- 76 30 141/66 92 %   05/06/20 0910 -- 93 28 143/80 93 %   05/06/20 0901 -- 87 25 166/79 92 %   05/06/20 0847 -- 76 (!) 31 (!) 169/112 92 %   05/06/20 0829 -- 72 19 155/74 93 %   05/06/20 0820 -- 82 23 -- (!) 85 %   05/06/20 0815 -- 74 (!) 37 160/74 93 %   05/06/20 0801 -- 72 18 151/69 92 %   05/06/20 0800 -- 72 17 -- 91 %   05/06/20 0755 -- 73 (!) 31 156/70 93 %   05/06/20 0745 -- 79 23 -- 92 %   05/06/20 0730 -- 74 (!) 47 -- 92 %   05/06/20 0722 -- 73 -- 140/56 --   05/06/20 0715 -- 74 22 -- 93 %   05/06/20 0700 98.9 °F (37.2 °C) 76 26 140/56 93 %   05/06/20 0645 -- 69 19 -- 92 %       Pain Assessment  Pain Intensity 1: 0 (05/06/20 1100)  Pain Location 1: Foot  Pain Intervention(s) 1: Repositioned  Patient Stated Pain Goal: 0    Ambulating  No    Shift report given to oncoming nurse at the bedside.     Gage Oglesby RN

## 2020-05-06 NOTE — PROGRESS NOTES
Physical Therapy Note:    Participated in interdisciplinary rounds in ICU/CCU and chart reviewed. Patient is experiencing decrease in function from baseline. Patient would benefit from skilled acute therapy to increase independence with self care/ADLs, strength, endurance, and functional mobility. Recommend PT/OT consult when medically stable and MD agrees.     Thank you for your consideration,  Juan Carter, PT, DPT

## 2020-05-06 NOTE — PROGRESS NOTES
[facility] CRITICAL CARE OUTREACH NURSE PROGRESS REPORT      SUBJECTIVE: Called to assess patient secondary to transfer from CCU. MEWS Score: 1 (05/06/20 1100)  Vitals:    05/06/20 1100 05/06/20 1101 05/06/20 1156 05/06/20 1215   BP: 125/57 125/57  125/68   Pulse: 71 70 63 64   Resp: 19 18 15 18   Temp: 99.7 °F (37.6 °C)   99.2 °F (37.3 °C)   SpO2: 93% 92% 91% 95%   Weight:       Height:           LAB DATA:    Recent Labs     05/06/20  0346 05/05/20  1601    139   K 4.0 4.2    108*   CO2 23 22   AGAP 8 9   * 163*   BUN 22 25*   CREA 1.32 1.59*   GFRAA >60 55*   GFRNA 57* 46*   CA 8.5 8.9        Recent Labs     05/06/20  0346 05/05/20  1601 05/05/20  1347   WBC 5.7 7.7 8.3   HGB 9.8* 11.3* 10.8*   HCT 30.9* 36.5* 34.0*    202 223          OBJECTIVE: On arrival to room, I found patient to be resting in bed, staff at bedside. Pain Assessment  Pain Intensity 1: 0 (05/06/20 1100)  Pain Location 1: Foot  Pain Intervention(s) 1: Repositioned  Patient Stated Pain Goal: 0        ASSESSMENT:  Patient transferred to [facility] from CCU. Breathing even and unlabored on 3L NC. O2 sat 95%, HR 64. Patient denies any pain at this time. Dressing intact and wound vac in place. Outreach program and RN role explained to patient who is appreciative. Chart and labs reviewed. PLAN:  Will continue to follow per outreach protocol.

## 2020-05-07 ENCOUNTER — ANESTHESIA (OUTPATIENT)
Dept: SURGERY | Age: 75
DRG: 252 | End: 2020-05-07
Payer: MEDICARE

## 2020-05-07 PROBLEM — E11.9 DM (DIABETES MELLITUS) (HCC): Chronic | Status: ACTIVE | Noted: 2019-02-14

## 2020-05-07 PROBLEM — E03.9 HYPOTHYROIDISM: Chronic | Status: ACTIVE | Noted: 2019-11-01

## 2020-05-07 PROBLEM — E66.01 SEVERE OBESITY (HCC): Chronic | Status: ACTIVE | Noted: 2019-04-12

## 2020-05-07 PROBLEM — I73.9 PVD (PERIPHERAL VASCULAR DISEASE) (HCC): Chronic | Status: ACTIVE | Noted: 2020-05-05

## 2020-05-07 PROBLEM — I25.10 CAD (CORONARY ARTERY DISEASE): Chronic | Status: ACTIVE | Noted: 2019-02-13

## 2020-05-07 PROBLEM — I10 HTN (HYPERTENSION): Chronic | Status: ACTIVE | Noted: 2019-02-14

## 2020-05-07 PROBLEM — Z95.828 S/P BYPASS GRAFT OF EXTREMITY: Chronic | Status: ACTIVE | Noted: 2019-04-19

## 2020-05-07 PROBLEM — R29.818 SUSPECTED SLEEP APNEA: Status: ACTIVE | Noted: 2020-05-07

## 2020-05-07 PROBLEM — Z95.1 HX OF CABG: Chronic | Status: ACTIVE | Noted: 2019-02-14

## 2020-05-07 PROBLEM — B37.9 YEAST INFECTION: Status: RESOLVED | Noted: 2019-06-05 | Resolved: 2020-05-07

## 2020-05-07 PROBLEM — T82.858A BYPASS GRAFT STENOSIS (HCC): Chronic | Status: ACTIVE | Noted: 2019-10-15

## 2020-05-07 PROBLEM — I73.9 PERIPHERAL ARTERY DISEASE (HCC): Chronic | Status: ACTIVE | Noted: 2019-03-27

## 2020-05-07 PROBLEM — E78.5 DYSLIPIDEMIA: Chronic | Status: ACTIVE | Noted: 2019-02-14

## 2020-05-07 LAB
ANION GAP SERPL CALC-SCNC: 9 MMOL/L (ref 7–16)
BUN SERPL-MCNC: 23 MG/DL (ref 8–23)
CALCIUM SERPL-MCNC: 9.2 MG/DL (ref 8.3–10.4)
CHLORIDE SERPL-SCNC: 105 MMOL/L (ref 98–107)
CO2 SERPL-SCNC: 23 MMOL/L (ref 21–32)
CREAT SERPL-MCNC: 1.47 MG/DL (ref 0.8–1.5)
ERYTHROCYTE [DISTWIDTH] IN BLOOD BY AUTOMATED COUNT: 16.6 % (ref 11.9–14.6)
GLUCOSE BLD STRIP.AUTO-MCNC: 167 MG/DL (ref 65–100)
GLUCOSE BLD STRIP.AUTO-MCNC: 177 MG/DL (ref 65–100)
GLUCOSE BLD STRIP.AUTO-MCNC: 185 MG/DL (ref 65–100)
GLUCOSE BLD STRIP.AUTO-MCNC: 186 MG/DL (ref 65–100)
GLUCOSE BLD STRIP.AUTO-MCNC: 200 MG/DL (ref 65–100)
GLUCOSE SERPL-MCNC: 147 MG/DL (ref 65–100)
HCT VFR BLD AUTO: 33 % (ref 41.1–50.3)
HGB BLD-MCNC: 10.5 G/DL (ref 13.6–17.2)
MCH RBC QN AUTO: 25.2 PG (ref 26.1–32.9)
MCHC RBC AUTO-ENTMCNC: 31.8 G/DL (ref 31.4–35)
MCV RBC AUTO: 79.3 FL (ref 79.6–97.8)
NRBC # BLD: 0 K/UL (ref 0–0.2)
PLATELET # BLD AUTO: 201 K/UL (ref 150–450)
PMV BLD AUTO: 9.9 FL (ref 9.4–12.3)
POTASSIUM SERPL-SCNC: 4.1 MMOL/L (ref 3.5–5.1)
RBC # BLD AUTO: 4.16 M/UL (ref 4.23–5.6)
SODIUM SERPL-SCNC: 137 MMOL/L (ref 136–145)
WBC # BLD AUTO: 6.3 K/UL (ref 4.3–11.1)

## 2020-05-07 PROCEDURE — 74011636637 HC RX REV CODE- 636/637: Performed by: SURGERY

## 2020-05-07 PROCEDURE — 87077 CULTURE AEROBIC IDENTIFY: CPT

## 2020-05-07 PROCEDURE — 76210000006 HC OR PH I REC 0.5 TO 1 HR: Performed by: SURGERY

## 2020-05-07 PROCEDURE — 74750000023 HC WOUND THERAPY

## 2020-05-07 PROCEDURE — 82962 GLUCOSE BLOOD TEST: CPT

## 2020-05-07 PROCEDURE — 36415 COLL VENOUS BLD VENIPUNCTURE: CPT

## 2020-05-07 PROCEDURE — 76060000032 HC ANESTHESIA 0.5 TO 1 HR: Performed by: SURGERY

## 2020-05-07 PROCEDURE — 74011250636 HC RX REV CODE- 250/636: Performed by: NURSE ANESTHETIST, CERTIFIED REGISTERED

## 2020-05-07 PROCEDURE — 74011250637 HC RX REV CODE- 250/637: Performed by: SURGERY

## 2020-05-07 PROCEDURE — 87186 SC STD MICRODIL/AGAR DIL: CPT

## 2020-05-07 PROCEDURE — 87076 CULTURE ANAEROBE IDENT EACH: CPT

## 2020-05-07 PROCEDURE — 74011250636 HC RX REV CODE- 250/636: Performed by: INTERNAL MEDICINE

## 2020-05-07 PROCEDURE — 85027 COMPLETE CBC AUTOMATED: CPT

## 2020-05-07 PROCEDURE — 77030018836 HC SOL IRR NACL ICUM -A: Performed by: SURGERY

## 2020-05-07 PROCEDURE — 87075 CULTR BACTERIA EXCEPT BLOOD: CPT

## 2020-05-07 PROCEDURE — 74011250636 HC RX REV CODE- 250/636: Performed by: ANESTHESIOLOGY

## 2020-05-07 PROCEDURE — 74011250636 HC RX REV CODE- 250/636: Performed by: SURGERY

## 2020-05-07 PROCEDURE — 87205 SMEAR GRAM STAIN: CPT

## 2020-05-07 PROCEDURE — 87176 TISSUE HOMOGENIZATION CULTR: CPT

## 2020-05-07 PROCEDURE — 76010000138 HC OR TIME 0.5 TO 1 HR: Performed by: SURGERY

## 2020-05-07 PROCEDURE — 74011000250 HC RX REV CODE- 250: Performed by: NURSE ANESTHETIST, CERTIFIED REGISTERED

## 2020-05-07 PROCEDURE — 87153 DNA/RNA SEQUENCING: CPT

## 2020-05-07 PROCEDURE — 74011000258 HC RX REV CODE- 258: Performed by: SURGERY

## 2020-05-07 PROCEDURE — 80048 BASIC METABOLIC PNL TOTAL CA: CPT

## 2020-05-07 PROCEDURE — 65270000029 HC RM PRIVATE

## 2020-05-07 PROCEDURE — 99232 SBSQ HOSP IP/OBS MODERATE 35: CPT | Performed by: INTERNAL MEDICINE

## 2020-05-07 RX ORDER — SODIUM CHLORIDE, SODIUM LACTATE, POTASSIUM CHLORIDE, CALCIUM CHLORIDE 600; 310; 30; 20 MG/100ML; MG/100ML; MG/100ML; MG/100ML
75 INJECTION, SOLUTION INTRAVENOUS CONTINUOUS
Status: DISCONTINUED | OUTPATIENT
Start: 2020-05-07 | End: 2020-05-07 | Stop reason: HOSPADM

## 2020-05-07 RX ORDER — PROPOFOL 10 MG/ML
INJECTION, EMULSION INTRAVENOUS
Status: DISCONTINUED | OUTPATIENT
Start: 2020-05-07 | End: 2020-05-07 | Stop reason: HOSPADM

## 2020-05-07 RX ORDER — FUROSEMIDE 10 MG/ML
40 INJECTION INTRAMUSCULAR; INTRAVENOUS ONCE
Status: COMPLETED | OUTPATIENT
Start: 2020-05-07 | End: 2020-05-07

## 2020-05-07 RX ORDER — HALOPERIDOL 5 MG/ML
1 INJECTION INTRAMUSCULAR
Status: DISCONTINUED | OUTPATIENT
Start: 2020-05-07 | End: 2020-05-07 | Stop reason: HOSPADM

## 2020-05-07 RX ORDER — ACETAMINOPHEN 500 MG
1000 TABLET ORAL ONCE
Status: DISCONTINUED | OUTPATIENT
Start: 2020-05-07 | End: 2020-05-07 | Stop reason: HOSPADM

## 2020-05-07 RX ORDER — NALOXONE HYDROCHLORIDE 0.4 MG/ML
0.2 INJECTION, SOLUTION INTRAMUSCULAR; INTRAVENOUS; SUBCUTANEOUS AS NEEDED
Status: DISCONTINUED | OUTPATIENT
Start: 2020-05-07 | End: 2020-05-07 | Stop reason: HOSPADM

## 2020-05-07 RX ORDER — LIDOCAINE HYDROCHLORIDE 20 MG/ML
INJECTION, SOLUTION EPIDURAL; INFILTRATION; INTRACAUDAL; PERINEURAL AS NEEDED
Status: DISCONTINUED | OUTPATIENT
Start: 2020-05-07 | End: 2020-05-07 | Stop reason: HOSPADM

## 2020-05-07 RX ORDER — SODIUM CHLORIDE, SODIUM LACTATE, POTASSIUM CHLORIDE, CALCIUM CHLORIDE 600; 310; 30; 20 MG/100ML; MG/100ML; MG/100ML; MG/100ML
25 INJECTION, SOLUTION INTRAVENOUS CONTINUOUS
Status: DISCONTINUED | OUTPATIENT
Start: 2020-05-07 | End: 2020-05-07 | Stop reason: HOSPADM

## 2020-05-07 RX ORDER — ONDANSETRON 2 MG/ML
4 INJECTION INTRAMUSCULAR; INTRAVENOUS
Status: DISCONTINUED | OUTPATIENT
Start: 2020-05-07 | End: 2020-05-07 | Stop reason: HOSPADM

## 2020-05-07 RX ORDER — NALOXONE HYDROCHLORIDE 0.4 MG/ML
0.2 INJECTION, SOLUTION INTRAMUSCULAR; INTRAVENOUS; SUBCUTANEOUS
Status: DISCONTINUED | OUTPATIENT
Start: 2020-05-07 | End: 2020-05-07 | Stop reason: HOSPADM

## 2020-05-07 RX ORDER — MIDAZOLAM HYDROCHLORIDE 1 MG/ML
2 INJECTION, SOLUTION INTRAMUSCULAR; INTRAVENOUS ONCE
Status: DISCONTINUED | OUTPATIENT
Start: 2020-05-07 | End: 2020-05-07 | Stop reason: HOSPADM

## 2020-05-07 RX ORDER — MIDAZOLAM HYDROCHLORIDE 1 MG/ML
2 INJECTION, SOLUTION INTRAMUSCULAR; INTRAVENOUS
Status: DISCONTINUED | OUTPATIENT
Start: 2020-05-07 | End: 2020-05-07 | Stop reason: HOSPADM

## 2020-05-07 RX ORDER — OXYCODONE HYDROCHLORIDE 5 MG/1
5 TABLET ORAL
Status: DISCONTINUED | OUTPATIENT
Start: 2020-05-07 | End: 2020-05-07 | Stop reason: HOSPADM

## 2020-05-07 RX ORDER — HYDROMORPHONE HYDROCHLORIDE 2 MG/ML
0.5 INJECTION, SOLUTION INTRAMUSCULAR; INTRAVENOUS; SUBCUTANEOUS
Status: COMPLETED | OUTPATIENT
Start: 2020-05-07 | End: 2020-05-07

## 2020-05-07 RX ORDER — LIDOCAINE HYDROCHLORIDE 10 MG/ML
0.1 INJECTION INFILTRATION; PERINEURAL AS NEEDED
Status: DISCONTINUED | OUTPATIENT
Start: 2020-05-07 | End: 2020-05-07 | Stop reason: HOSPADM

## 2020-05-07 RX ORDER — PROPOFOL 10 MG/ML
INJECTION, EMULSION INTRAVENOUS AS NEEDED
Status: DISCONTINUED | OUTPATIENT
Start: 2020-05-07 | End: 2020-05-07 | Stop reason: HOSPADM

## 2020-05-07 RX ORDER — FENTANYL CITRATE 50 UG/ML
100 INJECTION, SOLUTION INTRAMUSCULAR; INTRAVENOUS ONCE
Status: DISCONTINUED | OUTPATIENT
Start: 2020-05-07 | End: 2020-05-07 | Stop reason: HOSPADM

## 2020-05-07 RX ADMIN — INSULIN LISPRO 2 UNITS: 100 INJECTION, SOLUTION INTRAVENOUS; SUBCUTANEOUS at 17:34

## 2020-05-07 RX ADMIN — Medication 10 ML: at 20:46

## 2020-05-07 RX ADMIN — MORPHINE SULFATE 2 MG: 2 INJECTION, SOLUTION INTRAMUSCULAR; INTRAVENOUS at 02:39

## 2020-05-07 RX ADMIN — LIDOCAINE HYDROCHLORIDE 40 MG: 20 INJECTION, SOLUTION EPIDURAL; INFILTRATION; INTRACAUDAL; PERINEURAL at 09:17

## 2020-05-07 RX ADMIN — Medication 10 ML: at 05:50

## 2020-05-07 RX ADMIN — PROPOFOL 20 MG: 10 INJECTION, EMULSION INTRAVENOUS at 09:29

## 2020-05-07 RX ADMIN — PROPOFOL 75 MCG/KG/MIN: 10 INJECTION, EMULSION INTRAVENOUS at 09:16

## 2020-05-07 RX ADMIN — PIPERACILLIN AND TAZOBACTAM 3.38 G: 3; .375 INJECTION, POWDER, FOR SOLUTION INTRAVENOUS at 00:39

## 2020-05-07 RX ADMIN — PROPOFOL 20 MG: 10 INJECTION, EMULSION INTRAVENOUS at 09:32

## 2020-05-07 RX ADMIN — INSULIN LISPRO 2 UNITS: 100 INJECTION, SOLUTION INTRAVENOUS; SUBCUTANEOUS at 20:47

## 2020-05-07 RX ADMIN — LEVOTHYROXINE SODIUM 75 MCG: 0.07 TABLET ORAL at 05:42

## 2020-05-07 RX ADMIN — TAMSULOSIN HYDROCHLORIDE 0.4 MG: 0.4 CAPSULE ORAL at 05:40

## 2020-05-07 RX ADMIN — FUROSEMIDE 40 MG: 10 INJECTION, SOLUTION INTRAMUSCULAR; INTRAVENOUS at 14:58

## 2020-05-07 RX ADMIN — PROPOFOL 20 MG: 10 INJECTION, EMULSION INTRAVENOUS at 09:24

## 2020-05-07 RX ADMIN — METOPROLOL TARTRATE 25 MG: 25 TABLET, FILM COATED ORAL at 17:34

## 2020-05-07 RX ADMIN — METOPROLOL TARTRATE 25 MG: 25 TABLET, FILM COATED ORAL at 05:41

## 2020-05-07 RX ADMIN — HYDRALAZINE HYDROCHLORIDE 100 MG: 50 TABLET, FILM COATED ORAL at 05:41

## 2020-05-07 RX ADMIN — SODIUM CHLORIDE, SODIUM LACTATE, POTASSIUM CHLORIDE, AND CALCIUM CHLORIDE: 600; 310; 30; 20 INJECTION, SOLUTION INTRAVENOUS at 08:43

## 2020-05-07 RX ADMIN — Medication 10 ML: at 12:02

## 2020-05-07 RX ADMIN — HEPARIN SODIUM 5000 UNITS: 5000 INJECTION INTRAVENOUS; SUBCUTANEOUS at 20:43

## 2020-05-07 RX ADMIN — HYDROMORPHONE HYDROCHLORIDE 0.5 MG: 2 INJECTION INTRAMUSCULAR; INTRAVENOUS; SUBCUTANEOUS at 10:04

## 2020-05-07 RX ADMIN — HYDROMORPHONE HYDROCHLORIDE 0.5 MG: 2 INJECTION INTRAMUSCULAR; INTRAVENOUS; SUBCUTANEOUS at 09:58

## 2020-05-07 RX ADMIN — ASPIRIN 81 MG: 81 TABLET ORAL at 05:42

## 2020-05-07 RX ADMIN — PIPERACILLIN AND TAZOBACTAM 3.38 G: 3; .375 INJECTION, POWDER, FOR SOLUTION INTRAVENOUS at 09:16

## 2020-05-07 RX ADMIN — PIPERACILLIN AND TAZOBACTAM 3.38 G: 3; .375 INJECTION, POWDER, FOR SOLUTION INTRAVENOUS at 17:34

## 2020-05-07 RX ADMIN — INSULIN LISPRO 2 UNITS: 100 INJECTION, SOLUTION INTRAVENOUS; SUBCUTANEOUS at 11:58

## 2020-05-07 RX ADMIN — PROPOFOL 30 MG: 10 INJECTION, EMULSION INTRAVENOUS at 09:17

## 2020-05-07 NOTE — PERIOP NOTES
TRANSFER - OUT REPORT:    Verbal report given to Aure Corley RN on Jay Childs  being transferred to [facility] for routine post - op       Report consisted of patients Situation, Background, Assessment and   Recommendations(SBAR). Information from the following report(s) SBAR, Kardex, OR Summary, MAR and Cardiac Rhythm NSR was reviewed with the receiving nurse. Lines:   Peripheral IV 05/05/20 Right;Posterior Hand (Active)   Site Assessment Clean, dry, & intact 5/7/2020  9:51 AM   Phlebitis Assessment 0 5/7/2020  9:51 AM   Infiltration Assessment 0 5/7/2020  9:51 AM   Dressing Status Clean, dry, & intact 5/7/2020  9:51 AM   Dressing Type Transparent 5/7/2020  9:51 AM   Hub Color/Line Status Green;Patent 5/7/2020  9:51 AM   Alcohol Cap Used No 5/6/2020 11:00 AM       Peripheral IV 05/05/20 Left Wrist (Active)   Site Assessment Clean, dry, & intact 5/7/2020  9:51 AM   Phlebitis Assessment 0 5/7/2020  9:51 AM   Infiltration Assessment 0 5/7/2020  9:51 AM   Dressing Status Clean, dry, & intact 5/7/2020  9:51 AM   Dressing Type Transparent 5/7/2020  9:51 AM   Hub Color/Line Status Green;Patent 5/7/2020  9:51 AM   Alcohol Cap Used No 5/6/2020 11:00 AM        Opportunity for questions and clarification was provided. Patient transported with:   O2 @ 3 liters    VTE prophylaxis orders have not been written for Jay Childs. Patient and family given floor number and nurses name. Family updated re: pt status after security code verified.

## 2020-05-07 NOTE — PROGRESS NOTES
[facility] [address] CRITICAL CARE OUTREACH NURSE PROGRESS REPORT      SUBJECTIVE: Outreach assessment per ICU outreach protocol. MEWS Score: 1 (05/06/20 2202)  Vitals:    05/06/20 1215 05/06/20 1510 05/06/20 2017 05/06/20 2202   BP: 125/68 119/71 (!) 162/92 111/64   Pulse: 64 80 67 69   Resp: 18 18 18 18   Temp: 99.2 °F (37.3 °C) 98.4 °F (36.9 °C) 98.2 °F (36.8 °C) 98.4 °F (36.9 °C)   SpO2: 95% 95% 91% 95%   Weight:       Height:          EKG: normal EKG, normal sinus rhythm, unchanged from previous tracings. LAB DATA:    Recent Labs     05/06/20 0346 05/05/20  1601    139   K 4.0 4.2    108*   CO2 23 22   AGAP 8 9   * 163*   BUN 22 25*   CREA 1.32 1.59*   GFRAA >60 55*   GFRNA 57* 46*   CA 8.5 8.9        Recent Labs     05/06/20  0346 05/05/20  1601 05/05/20  1347   WBC 5.7 7.7 8.3   HGB 9.8* 11.3* 10.8*   HCT 30.9* 36.5* 34.0*    202 223          OBJECTIVE: On arrival to room, I found patient to be attempting to sit up in bed, slightly anxious. O2 sats in the upper 80's-low 90's, and oxygen had been taken out of the nostrils. Oxygen placed back on pt at 3L and educated on POC. Wound vac draining with no issues. Primary RN notified. Pain Assessment  Pain Intensity 1: 0 (05/06/20 1935)  Pain Location 1: Foot  Pain Intervention(s) 1: Repositioned  Patient Stated Pain Goal: 0                                 ASSESSMENT:  Lung sounds diminished, regular respirations - symmetrical chest rise, no complaints of pain or discomfort.      PLAN:  Will continue monitoring via ICU outreach program.

## 2020-05-07 NOTE — PROGRESS NOTES
Date of Outreach Update:  Maryana Bhakta was seen and assessed. MEWS Score: 1 (05/07/20 0723)  Vitals:    05/07/20 1032 05/07/20 1037 05/07/20 1138 05/07/20 1547   BP: 176/80 175/82 161/76 131/90   Pulse: 76 79 80 87   Resp: 15 15 16 16   Temp:  99.4 °F (37.4 °C) 97.5 °F (36.4 °C) 98.2 °F (36.8 °C)   SpO2: 95% 95% 93% 95%   Weight:       Height:             Pain Assessment  Pain Intensity 1: 0 (05/07/20 1110)  Pain Location 1: Foot  Pain Intervention(s) 1: Medication (see MAR), Emotional support  Patient Stated Pain Goal: 0      Previous Outreach assessment has been reviewed. There have been no significant clinical changes since the completion of the last dated Outreach assessment. Will continue to follow up per outreach protocol.     Signed By:   Celena Mcginnis RN    May 7, 2020 6:08 PM

## 2020-05-07 NOTE — PROGRESS NOTES
[address]   [address] [facility]. [address] FAX: [phone]         VASCULAR SURGERY FLOOR PROGRESS NOTE    Admit Date: 2020  POD: Day of Surgery    Procedure:  Procedure(s):  RIGHT FOOT DEBRIDEMENT    Subjective:     Patient has no new complaints. Objective:     Vitals:  Blood pressure 182/90, pulse 96, temperature 98.6 °F (37 °C), resp. rate 19, height 6' 2\" (1.88 m), weight 274 lb (124.3 kg), SpO2 94 %. Temp (24hrs), Av.7 °F (37.1 °C), Min:98.2 °F (36.8 °C), Max:99.7 °F (37.6 °C)      Intake / Output:    Intake/Output Summary (Last 24 hours) at 2020 0700  Last data filed at 2020 0039  Gross per 24 hour   Intake 2925 ml   Output 300 ml   Net 2625 ml       Physical Exam:    Constitutional: he appears well-developed. No distress. HENT:   Head: Atraumatic. Eyes: Pupils are equal, round, and reactive to light. Neck: Normal range of motion. Cardiovascular: Regular rhythm. Pulmonary/Chest: Effort normal and breath sounds normal. No respiratory distress. Abdominal: Soft. Bowel sounds are normal. he exhibits no distension. There is no tenderness. There is no guarding. No hernia. Musculoskeletal: Normal range of motion. Neurological: He is alert.  CN II- XII grossly intact  Vascular: foot warm    Labs:   Recent Labs     20  0429 20  0346   HGB 10.5* 9.8*   WBC 6.3 5.7   K 4.1 4.0   * 158*       Data Review     Assessment:     Patient Active Problem List    Diagnosis Date Noted    CAD (coronary artery disease) 2019     Priority: 1 - One    PVD (peripheral vascular disease) ([address] 2020    Right foot ulcer ([address] 2020    Acute respiratory failure with hypoxia and hypercapnia ([address] 2020    Morbid obesity ([address] 2020    Postprocedural hypotension 2020    Toe ulcer ([address] 2020    Ischemic leg pain 2019    Hypothyroidism 2019    Bypass graft stenosis ([address] 10/15/2019    Yeast infection 06/05/2019    S/P bypass graft of extremity 04/19/2019    Severe obesity ([facility] 04/12/2019    Peripheral [facility] disease [facility] 03/27/2019    Ankle ulcer ([facility] 02/22/2019    PAD (peripheral artery disease) ([facility] 02/14/2019    HTN (hypertension) 02/14/2019    DM (diabetes mellitus) ([facility] 02/14/2019    Dyslipidemia 02/14/2019    Hx of CABG 02/14/2019       Plan/Recommendations/Medical Decision Making:     Plan for right foot debridement    Elements of this note have been dictated using speech recognition software. As a result, errors of speech recognition may have occurred.

## 2020-05-07 NOTE — BRIEF OP NOTE
[address]   [address] FAX: [phone]    Brief Op Note Template Note    Pre-Op Diagnosis: RIGHT FOOT WOUND    Post-Op Diagnosis:  RIGHT FOOT WOUND    Procedures: Procedure(s):  RIGHT FOOT DEBRIDEMENT    Surgeon: Lori Boyle MD    Assistants: Surgeon(s): Sebastian Shore MD      Anesthesia:  General     Findings: right forefoot abscess    Tourniquet Time:  * No tourniquets in log *    Estimated Blood Loss:               Specimens: same           Implants:  * No implants in log *    Complications: None               Signed: Lori Boyle MD      Elements of this note have been dictated using speech recognition software. As a result, errors of speech recognition may have occurred.

## 2020-05-07 NOTE — ROUTINE PROCESS
END OF SHIFT NOTE:    INTAKE/OUTPUT  05/06 0701 - 05/07 0700  In: 9131 [P.O.:600; I.V.:2325]  Out: 300 [Urine:300]  Voiding: YES  Catheter: NO  Drain:              Flatus: Patient does not have flatus present. Stool:  0 occurrences. Characteristics:       Emesis: 0 occurrences. Characteristics:        VITAL SIGNS  Patient Vitals for the past 12 hrs:   Temp Pulse Resp BP SpO2   05/07/20 1547 98.2 °F (36.8 °C) 87 16 131/90 95 %   05/07/20 1138 97.5 °F (36.4 °C) 80 16 161/76 93 %   05/07/20 1037 99.4 °F (37.4 °C) 79 15 175/82 95 %   05/07/20 1032 -- 76 15 176/80 95 %   05/07/20 1027 -- 76 16 167/81 95 %   05/07/20 1022 -- 74 16 174/83 96 %   05/07/20 1017 -- 74 15 165/78 97 %   05/07/20 1013 -- 69 15 169/81 96 %   05/07/20 1008 -- 69 15 157/74 96 %   05/07/20 1003 -- 71 18 162/82 98 %   05/07/20 0957 -- 68 18 145/64 94 %   05/07/20 0951 98.9 °F (37.2 °C) 67 14 156/75 94 %   05/07/20 0723 99 °F (37.2 °C) 70 18 188/88 95 %       Pain Assessment  Pain Intensity 1: 0 (05/07/20 1110)  Pain Location 1: Foot  Pain Intervention(s) 1: Medication (see MAR), Emotional support  Patient Stated Pain Goal: 0    Ambulating  No.    Shift report given to oncoming nurse at the bedside.     Elmer Miguel RN

## 2020-05-07 NOTE — PROGRESS NOTES
Problem: Pressure Injury - Risk of  Goal: *Prevention of pressure injury  Description: Document Burton Scale and appropriate interventions in the flowsheet. Outcome: Progressing Towards Goal  Note: Pressure Injury Interventions:  Sensory Interventions: Assess changes in LOC, Assess need for specialty bed, Minimize linen layers, Pad between skin to skin    Moisture Interventions: Absorbent underpads, Apply protective barrier, creams and emollients    Activity Interventions: Pressure redistribution bed/mattress(bed type), Increase time out of bed    Mobility Interventions: HOB 30 degrees or less, Pressure redistribution bed/mattress (bed type)    Nutrition Interventions: Document food/fluid/supplement intake    Friction and Shear Interventions: Apply protective barrier, creams and emollients                Problem: Falls - Risk of  Goal: *Absence of Falls  Description: Document Ronal Fall Risk and appropriate interventions in the flowsheet.   Outcome: Progressing Towards Goal  Note: Fall Risk Interventions:  Mobility Interventions: Communicate number of staff needed for ambulation/transfer         Medication Interventions: Evaluate medications/consider consulting pharmacy    Elimination Interventions: Call light in reach, Patient to call for help with toileting needs    History of Falls Interventions: Door open when patient unattended

## 2020-05-07 NOTE — PROGRESS NOTES
END OF SHIFT NOTE:    INTAKE/OUTPUT  05/06 0701 - 05/07 0700  In: 2006 [P.O.:600; I.V.:2325]  Out: 300 [Urine:300]  Voiding: YES  Catheter: NO  Drain:              Flatus: Patient does have flatus present. Stool:  0 occurrences. Characteristics:       Emesis: 0 occurrences. Characteristics:        VITAL SIGNS  Patient Vitals for the past 12 hrs:   Temp Pulse Resp BP SpO2   05/07/20 0723 99 °F (37.2 °C) 70 18 188/88 95 %   05/07/20 0540 -- 96 -- 182/90 --   05/07/20 0237 98.6 °F (37 °C) 78 19 165/76 94 %   05/07/20 0124 98.7 °F (37.1 °C) 69 19 146/74 92 %   05/06/20 2202 98.4 °F (36.9 °C) 69 18 111/64 95 %   05/06/20 2017 98.2 °F (36.8 °C) 67 18 (!) 162/92 91 %       Pain Assessment  Pain Intensity 1: 0 (05/07/20 0723)  Pain Location 1: Foot  Pain Intervention(s) 1: Medication (see MAR)  Patient Stated Pain Goal: 0    Ambulating  No    Shift report given to oncoming nurse at the bedside.     [address]

## 2020-05-07 NOTE — PROGRESS NOTES
Gayle Garcia Short  Admission Date: 5/5/2020             Daily Progress Note: 5/7/2020    The patient's chart is reviewed and the patient is discussed with the staff. 69 y.o.  male seen and evaluated at the request of Dr. Ev Ayers patient is status post right common femoral to posterior tibial artery bypass with cryopreserved vein. He required admission to ICU post op due to respiratory failure requiring BIPAP in addition to hypotension requiring pressor support. He has history of CAD, PAD, HTN, reportedly prior smoker 2 packs per day quit 2008, but no known lung disease. He is morbidly obese without history of known JOSE. He has since stabilized and is now on low flow oxygen support with hypertension. His home medications for blood pressure have been restarted. .    Subjective:      Denies any current pain - states that he feels a lot better today.  Had surgery this AM.     Current Facility-Administered Medications   Medication Dose Route Frequency    heparin (porcine) injection 5,000 Units  5,000 Units SubCUTAneous Q8H    piperacillin-tazobactam (ZOSYN) 3.375 g in 0.9% sodium chloride (MBP/ADV) 100 mL  3.375 g IntraVENous Q8H    hydrALAZINE (APRESOLINE) 20 mg/mL injection 10 mg  10 mg IntraVENous Q6H PRN    sodium chloride (OCEAN) 0.65 % nasal squeeze bottle 2 Spray  2 Spray Both Nostrils Q2H PRN    aspirin delayed-release tablet 81 mg  81 mg Oral DAILY    hydrALAZINE (APRESOLINE) tablet 100 mg  100 mg Oral DAILY    levothyroxine (SYNTHROID) tablet 75 mcg  75 mcg Oral ACB    lisinopriL (PRINIVIL, ZESTRIL) tablet 40 mg  40 mg Oral DAILY    metoprolol tartrate (LOPRESSOR) tablet 25 mg  25 mg Oral BID    nitroglycerin (NITROSTAT) tablet 0.4 mg  0.4 mg SubLINGual Q5MIN PRN    tamsulosin (FLOMAX) capsule 0.4 mg  0.4 mg Oral DAILY    sodium chloride (NS) flush 5-40 mL  5-40 mL IntraVENous Q8H    sodium chloride (NS) flush 5-40 mL  5-40 mL IntraVENous PRN    HYDROcodone-acetaminophen (NORCO) 5-325 mg per tablet 2 Tab  2 Tab Oral Q4H PRN    morphine injection 2 mg  2 mg IntraVENous Q3H PRN    insulin lispro (HUMALOG) injection   SubCUTAneous AC&HS    albuterol (PROVENTIL VENTOLIN) nebulizer solution 2.5 mg  2.5 mg Nebulization Q4H PRN         Objective:     Vitals:    05/07/20 1027 05/07/20 1032 05/07/20 1037 05/07/20 1138   BP: 167/81 176/80 175/82 161/76   Pulse: 76 76 79 80   Resp: 16 15 15 16   Temp:   99.4 °F (37.4 °C) 97.5 °F (36.4 °C)   SpO2: 95% 95% 95% 93%   Weight:       Height:         Intake and Output:   05/05 1901 - 05/07 0700  In: 1039 [P.O.:600; I.V.:2325]  Out: 1460 [Urine:1460]  05/07 0701 - 05/07 1900  In: 340 [P.O.:90; I.V.:250]  Out: 10     Physical Exam:   Constitutional:  the patient is well developed and in no acute distress  HEENT:  Sclera clear, pupils equal, oral mucosa moist  Lungs: some rhonchi after using IS - productive cough. Overall decreased bilaterally. Wearing 3 liter cannula  Cardiovascular:  RRR without M,G,R  Abd/GI: soft and non-tender; with positive bowel sounds. Ext: warm without cyanosis. There is 1+ right lower leg edema. Sutures in place  Musculoskeletal: moves all four extremities - left leg has been amputated  Skin:  no jaundice or rashes, right leg wound  Neuro: no gross neuro deficits   Musculoskeletal: ? can ambulate - not witnessed. No deformity  Psychiatric: Calm.      Review of Systems - General ROS: positive for  - none  Respiratory ROS: positive for - cough  Cardiovascular ROS: positive for - some lower extremity edema  Gastrointestinal ROS: positive for - none  Musculoskeletal ROS: positive for - previous leg amputation     Lines: 2 peripherals    CHEST XRAY:         LAB  Recent Labs     05/07/20  0429 05/06/20  0346 05/05/20  1601 05/05/20  1347   WBC 6.3 5.7 7.7 8.3   HGB 10.5* 9.8* 11.3* 10.8*   HCT 33.0* 30.9* 36.5* 34.0*    200 202 223   INR  --   --   --  1.1     Recent Labs     05/07/20  0429 05/06/20  0346 05/05/20  1601    137 139   K 4.1 4.0 4.2    106 108*   CO2 23 23 22   * 158* 163*   BUN 23 22 25*   CREA 1.47 1.32 1.59*       Assessment:  (Medical Decision Making)     Hospital Problems  Date Reviewed: 5/7/2020          Codes Class Noted POA    PVD (peripheral vascular disease) ([facility] (Chronic) ICD-10-CM: I73.9  ICD-9-CM: 443.9  5/5/2020 Yes    S/p  Bypass right leg    * (Principal) Right foot ulcer ([facility] ICD-10-CM: T74.647  ICD-9-CM: 707.15  5/5/2020 Yes    As above. Debridement of wound this AM    Acute respiratory failure with hypoxia and hypercapnia (HCC) ICD-10-CM: J96.01, J96.02  ICD-9-CM: 518.81  5/5/2020 Yes    Remains on 3 liters oxygen. CXR reviewed. Volumes on IS 2.5 liters. Some cough after deep breathing  CO2 up to 47 on 5/5    Morbid obesity (HCC) (Chronic) ICD-10-CM: E66.01  ICD-9-CM: 278.01  5/5/2020 Yes    Contributes to the complexity of care    Postprocedural hypotension ICD-10-CM: I95.81  ICD-9-CM: 458.29  5/5/2020 No    Now hypertensive    PAD (peripheral artery disease) (HCC) (Chronic) ICD-10-CM: I73.9  ICD-9-CM: 443.9  2/14/2019 Yes              Plan:  (Medical Decision Making)   1. Remains hypoxic and on 3 liters oxygen. CXR on 5/5 reviewed. Fluid balance + 2.6 liters and some edema right leg. Consider dose of lasix with reevaluation. Good volumes on IS - 2.5 liters. Minimal congestion  2. Zosyn per surgery  3. Hypertensive - home meds have been restarted - monitor  4. Subq heparin for DVT prophylaxis  5. No Rx for JOSE but had hypercapnea post op - consider outpatient evaluation    Maximino Hudson NP    More than 50% of time documented was spent face-to-face contact with the patient and in the care of the patient on the floor/unit where the patient is located. Lungs:  Decreased BS  Heart:  RRR with no Murmur/Rubs/Gallops    Additional Comments:  Agree with lasix x 1. Follow sats and wean as able. Needs to use IS.  Does report hx of EDS and likely has JOSE. Check BERKLEY if able to wean off oxygen. Donny Thomas MD      I have spoken with and examined the patient. I agree with the above assessment and plan as documented.

## 2020-05-07 NOTE — PROGRESS NOTES
Date of Outreach Update:  Destiny Hall was seen and assessed. MEWS Score: 1 (05/07/20 0723)  Vitals:    05/07/20 1027 05/07/20 1032 05/07/20 1037 05/07/20 1138   BP: 167/81 176/80 175/82 161/76   Pulse: 76 76 79 80   Resp: 16 15 15 16   Temp:   99.4 °F (37.4 °C) 97.5 °F (36.4 °C)   SpO2: 95% 95% 95% 93%   Weight:       Height:             Pain Assessment  Pain Intensity 1: 0 (05/07/20 1110)  Pain Location 1: Foot  Pain Intervention(s) 1: Medication (see MAR), Emotional support  Patient Stated Pain Goal: 0      Previous Outreach assessment has been reviewed. There have been no significant clinical changes since the completion of the last dated Outreach assessment. Patient sitting up in bed. Respirations even and unlabored. Denies complaints. RLE warm. Foot dressing CDI. VS, labs, and progress notes reviewed. Will continue to follow up per outreach protocol.     Signed By:   Kayla Palma RN    May 7, 2020 1:32 PM

## 2020-05-07 NOTE — ANESTHESIA POSTPROCEDURE EVALUATION
Procedure(s):  RIGHT FOOT DEBRIDEMENT.    total IV anesthesia    Anesthesia Post Evaluation      Multimodal analgesia: multimodal analgesia used between 6 hours prior to anesthesia start to PACU discharge  Patient location during evaluation: bedside  Patient participation: complete - patient participated  Level of consciousness: awake and alert  Pain score: 1  Pain management: adequate  Airway patency: patent  Anesthetic complications: no  Cardiovascular status: acceptable  Respiratory status: acceptable  Hydration status: acceptable  Comments: Patient doing well. Continue care on floor. Post anesthesia nausea and vomiting:  none      Vitals Value Taken Time   /82 5/7/2020 10:39 AM   Temp     Pulse 79 5/7/2020 10:38 AM   Resp 15 5/7/2020 10:37 AM   SpO2 91 % 5/7/2020 10:38 AM   Vitals shown include unvalidated device data.

## 2020-05-07 NOTE — OP NOTES
[facility]  OPERATIVE REPORT    Name:  Nils Batista  MR#:  [id number]  :  1945  ACCOUNT #:  [de-identified]  DATE OF SERVICE:  2020    CLINICAL SERVICE:  Vascular Surgery. PREOPERATIVE DIAGNOSIS:  Right foot abscess. POSTOPERATIVE DIAGNOSIS:  Right foot abscess. PROCEDURE PERFORMED:  Debridement of skin, soft tissue, muscle and fascia measuring 8 x 8 x 2 cm. SURGEON:  Tabatha Jo. Aidee Wagner MD    ASSISTANT:      ANESTHESIA:  Sedation. COMPLICATIONS:      SPECIMENS REMOVED:      IMPLANTS:      ESTIMATED BLOOD LOSS:      OPERATIVE FINDINGS:  There was anterior forefoot abscess and the chronic tissue sent for culture. INDICATION FOR PROCEDURE:  A 32-year-old male with history of peripheral vascular disease status post postop day 4 from right common femoral to the posterior tibial bypass with CryoVein. He was noted postoperatively to have a large abscess on his forefoot. We elected to proceed with debridement. PROCEDURE IN DETAIL:  After getting informed consent, the patient was brought to the operating room. Anesthesia was then induced. Preop antibiotics were given before skin incision. The patient's right foot was then prepped and draped in normal sterile fashion. With 10 blade, I was able to excise the anterior necrotic tissue on the forefoot of the foot. It was measuring about 8 x 8 cm. We used Metzenbaum scissors to remove the rest of the subcu tissue abscess. This was sent for culture and the tissue was also sent for culture. We then scrubbed the wound over the top of the tendons with sterile antibiotic scrub brush, chlorhexidine, and then we thoroughly irrigated out with antibiotic solution. Once the wound, we measured it, it was 8 x 8 x 2. There was no other evidence of any necrotic tissue. We then put a postop Dakin solution, 4x4s, Kerlix and Ace. The patient was extubated and returned to the PACU in stable condition.       ALMA Mitchell, MD CALLE/MAYRA_[email or web address]/BC_[email or web address]  D:  05/07/2020 11:08  T:  05/07/2020 15:05  JOB #:  [id number]

## 2020-05-07 NOTE — PROGRESS NOTES
Date of Outreach Update:  Marilyn Monson was seen and assessed. MEWS Score: 1 (05/07/20 0124)  Vitals:    05/06/20 1510 05/06/20 2017 05/06/20 2202 05/07/20 0124   BP: 119/71 (!) 162/92 111/64 146/74   Pulse: 80 67 69 69   Resp: 18 18 18 19   Temp: 98.4 °F (36.9 °C) 98.2 °F (36.8 °C) 98.4 °F (36.9 °C) 98.7 °F (37.1 °C)   SpO2: 95% 91% 95% 92%   Weight:       Height:             Pain Assessment  Pain Intensity 1: 8 (05/07/20 0240)  Pain Location 1: Foot  Pain Intervention(s) 1: Medication (see MAR)  Patient Stated Pain Goal: 0    Previous Outreach assessment has been reviewed. There have been no significant clinical changes since the completion of the last dated Outreach assessment. Pt asleep resting in bed, VSS. Will continue to follow up per outreach protocol.     Signed By:   Landy Dixon    May 7, 2020 3:13 AM

## 2020-05-08 VITALS
TEMPERATURE: 97.5 F | SYSTOLIC BLOOD PRESSURE: 151 MMHG | OXYGEN SATURATION: 94 % | HEART RATE: 78 BPM | DIASTOLIC BLOOD PRESSURE: 88 MMHG | BODY MASS INDEX: 35.16 KG/M2 | HEIGHT: 74 IN | WEIGHT: 274 LBS | RESPIRATION RATE: 18 BRPM

## 2020-05-08 LAB
GLUCOSE BLD STRIP.AUTO-MCNC: 169 MG/DL (ref 65–100)
GLUCOSE BLD STRIP.AUTO-MCNC: 175 MG/DL (ref 65–100)

## 2020-05-08 PROCEDURE — 74011250636 HC RX REV CODE- 250/636: Performed by: SURGERY

## 2020-05-08 PROCEDURE — 74011636637 HC RX REV CODE- 636/637: Performed by: SURGERY

## 2020-05-08 PROCEDURE — 99232 SBSQ HOSP IP/OBS MODERATE 35: CPT | Performed by: INTERNAL MEDICINE

## 2020-05-08 PROCEDURE — 77010033678 HC OXYGEN DAILY

## 2020-05-08 PROCEDURE — 82962 GLUCOSE BLOOD TEST: CPT

## 2020-05-08 PROCEDURE — 74011000258 HC RX REV CODE- 258: Performed by: SURGERY

## 2020-05-08 PROCEDURE — 94760 N-INVAS EAR/PLS OXIMETRY 1: CPT

## 2020-05-08 PROCEDURE — 74011250637 HC RX REV CODE- 250/637: Performed by: SURGERY

## 2020-05-08 PROCEDURE — 74750000023 HC WOUND THERAPY

## 2020-05-08 RX ORDER — CIPROFLOXACIN 500 MG/1
500 TABLET ORAL 2 TIMES DAILY
Qty: 30 TAB | Refills: 2 | Status: SHIPPED | OUTPATIENT
Start: 2020-05-08 | End: 2020-07-20 | Stop reason: CLARIF

## 2020-05-08 RX ORDER — OXYCODONE AND ACETAMINOPHEN 5; 325 MG/1; MG/1
1 TABLET ORAL
Qty: 30 TAB | Refills: 0 | Status: SHIPPED | OUTPATIENT
Start: 2020-05-08 | End: 2020-05-11

## 2020-05-08 RX ADMIN — MORPHINE SULFATE 2 MG: 2 INJECTION, SOLUTION INTRAMUSCULAR; INTRAVENOUS at 09:01

## 2020-05-08 RX ADMIN — LEVOTHYROXINE SODIUM 75 MCG: 0.07 TABLET ORAL at 05:04

## 2020-05-08 RX ADMIN — Medication 10 ML: at 05:06

## 2020-05-08 RX ADMIN — HYDROCODONE BITARTRATE AND ACETAMINOPHEN 2 TABLET: 5; 325 TABLET ORAL at 00:48

## 2020-05-08 RX ADMIN — Medication 10 ML: at 03:16

## 2020-05-08 RX ADMIN — TAMSULOSIN HYDROCHLORIDE 0.4 MG: 0.4 CAPSULE ORAL at 08:42

## 2020-05-08 RX ADMIN — INSULIN LISPRO 2 UNITS: 100 INJECTION, SOLUTION INTRAVENOUS; SUBCUTANEOUS at 08:44

## 2020-05-08 RX ADMIN — HYDROCODONE BITARTRATE AND ACETAMINOPHEN 2 TABLET: 5; 325 TABLET ORAL at 05:03

## 2020-05-08 RX ADMIN — LISINOPRIL 40 MG: 20 TABLET ORAL at 08:42

## 2020-05-08 RX ADMIN — INSULIN LISPRO 2 UNITS: 100 INJECTION, SOLUTION INTRAVENOUS; SUBCUTANEOUS at 11:31

## 2020-05-08 RX ADMIN — HYDRALAZINE HYDROCHLORIDE 100 MG: 50 TABLET, FILM COATED ORAL at 08:42

## 2020-05-08 RX ADMIN — HEPARIN SODIUM 5000 UNITS: 5000 INJECTION INTRAVENOUS; SUBCUTANEOUS at 03:41

## 2020-05-08 RX ADMIN — ASPIRIN 81 MG: 81 TABLET ORAL at 08:42

## 2020-05-08 RX ADMIN — PIPERACILLIN AND TAZOBACTAM 3.38 G: 3; .375 INJECTION, POWDER, FOR SOLUTION INTRAVENOUS at 08:43

## 2020-05-08 RX ADMIN — HEPARIN SODIUM 5000 UNITS: 5000 INJECTION INTRAVENOUS; SUBCUTANEOUS at 11:31

## 2020-05-08 RX ADMIN — METOPROLOL TARTRATE 25 MG: 25 TABLET, FILM COATED ORAL at 08:42

## 2020-05-08 RX ADMIN — PIPERACILLIN AND TAZOBACTAM 3.38 G: 3; .375 INJECTION, POWDER, FOR SOLUTION INTRAVENOUS at 00:48

## 2020-05-08 RX ADMIN — HYDRALAZINE HYDROCHLORIDE 10 MG: 20 INJECTION, SOLUTION INTRAMUSCULAR; INTRAVENOUS at 03:15

## 2020-05-08 NOTE — DISCHARGE SUMMARY
[address]   [address]  [phone] FAX: [phone]          Physician Discharge Summary     Patient: Joellen Camp MRN: [id number]  SSN: [id number]    YOB: 1945  Age: 69 y.o. Sex: male       Admit Date: 5/5/2020    Discharge Date: 5/8/2020      Admitting Physician: Franny Uribe MD     Discharge Physician: Franny Uribe MD    Admission Diagnoses: Peripheral vascular disease, unspecified ([facility] [I73.9];PVD (peripheral vascular disease) ([facility] [I73.9];PAD (peripheral artery disease) ([facility] [I73.9]; Right foot ulcer ([facility] [L97.519]    Discharge Diagnoses:   Problem List as of 5/8/2020 Date Reviewed: 5/7/2020          Codes Class Noted - Resolved    CAD (coronary artery disease) (Chronic) ICD-10-CM: I25.10  ICD-9-CM: 414.00  2/13/2019 - Present        Suspected sleep apnea ICD-10-CM: R29.818  ICD-9-CM: 781.99  5/7/2020 - Present        PVD (peripheral vascular disease) ([facility] (Chronic) ICD-10-CM: I73.9  ICD-9-CM: 443.9  5/5/2020 - Present        * (Principal) Right foot ulcer ([facility] ICD-10-CM: L97.519  ICD-9-CM: 707.15  5/5/2020 - Present        Acute respiratory failure with hypoxia and hypercapnia ([facility] ICD-10-CM: J96.01, J96.02  ICD-9-CM: 518.81  5/5/2020 - Present        Morbid obesity ([facility] (Chronic) ICD-10-CM: E66.01  ICD-9-CM: 278.01  5/5/2020 - Present        Postprocedural hypotension ICD-10-CM: I95.81  ICD-9-CM: 458.29  5/5/2020 - Present        Ischemic leg pain ICD-10-CM: I99.8  ICD-9-CM: 459.9  11/1/2019 - Present        Hypothyroidism (Chronic) ICD-10-CM: E03.9  ICD-9-CM: 244.9  11/1/2019 - Present    Overview Signed 11/1/2019 12:10 AM by Maikol Fierro MD     managed well with Synthroid             Bypass graft stenosis (HCC) (Chronic) ICD-10-CM: F52.105P  ICD-9-CM: 996.74  10/15/2019 - Present        S/P bypass graft of extremity (Chronic) ICD-10-CM: X99.025  ICD-9-CM: V45.89  4/19/2019 - Present    Overview Signed 4/19/2019  9:38 AM by Rich Oreilly NP     3/27/19 (Dr. Sugey Vazquez) Left common femoral to posterior tibial bypass with PTFE graft                 Severe obesity ([facility] (Chronic) ICD-10-CM: E66.01  ICD-9-CM: 278.01  4/12/2019 - Present        Peripheral artery disease (HCC) (Chronic) ICD-10-CM: I73.9  ICD-9-CM: 443.9  3/27/2019 - Present        PAD (peripheral artery disease) (HCC) (Chronic) ICD-10-CM: I73.9  ICD-9-CM: 443.9  2/14/2019 - Present        HTN (hypertension) (Chronic) ICD-10-CM: I10  ICD-9-CM: 401.9  2/14/2019 - Present        DM (diabetes mellitus) ([facility] (Chronic) ICD-10-CM: E11.9  ICD-9-CM: 250.00  2/14/2019 - Present        Dyslipidemia (Chronic) ICD-10-CM: E78.5  ICD-9-CM: 272.4  2/14/2019 - Present        Hx of CABG (Chronic) ICD-10-CM: Z95.1  ICD-9-CM: V45.81  2/14/2019 - Present        RESOLVED: Yeast infection ICD-10-CM: B37.9  ICD-9-CM: 112.9  6/5/2019 - 5/7/2020               Procedures for this admission: Procedure(s):  RIGHT FOOT DEBRIDEMENT    Discharged Condition: stable    Hospital Course: Uncomplicated    Consults: None    Significant Diagnostic Studies:   Treatments: Right common femoral to PT bypass with CryoVein and right foot debridement    Discharge Exam: Palpable  pedal pulses with open 8 x 8 cm forefoot wound    Disposition: Discharged home follow-up next week for wound check    Patient Instructions:   Current Discharge Medication List      START taking these medications    Details   ciprofloxacin HCl (CIPRO) 500 mg tablet Take 1 Tab by mouth two (2) times a day. Qty: 30 Tab, Refills: 2      oxyCODONE-acetaminophen (PERCOCET) 5-325 mg per tablet Take 1 Tab by mouth every four (4) hours as needed for Pain for up to 3 days. Max Daily Amount: 6 Tabs. Qty: 30 Tab, Refills: 0    Associated Diagnoses: Skin ulcer of right foot, limited to breakdown of skin ([facility]         CONTINUE these medications which have NOT CHANGED    Details   hydrALAZINE (APRESOLINE) 50 mg tablet Take 100 mg by mouth daily. metoprolol tartrate (LOPRESSOR) 25 mg tablet Take 25 mg by mouth two (2) times a day. aspirin delayed-release 81 mg tablet Take 81 mg by mouth every morning. Indications: prevention of transient ischemic attack      levothyroxine (SYNTHROID) 75 mcg tablet Take 75 mcg by mouth Daily (before breakfast). Take morning of procedure. lisinopril (PRINIVIL, ZESTRIL) 40 mg tablet Take 40 mg by mouth daily. insulin NPH/insulin regular (NOVOLIN 70/30 U-100 INSULIN) 100 unit/mL (70-30) injection 60 Units by SubCUTAneous route two (2) times a day. oxyCODONE IR (ROXICODONE) 5 mg immediate release tablet Take 5 mg by mouth every four (4) hours as needed for Pain. nitroglycerin (NITROSTAT) 0.4 mg SL tablet 1 Tab by SubLINGual route every five (5) minutes as needed for Chest Pain. Up to 3 doses. Qty: 1 Bottle, Refills: 5      ergocalciferol (ERGOCALCIFEROL) 50,000 unit capsule Take 50,000 Units by mouth every seven (7) days. Pt takes on Saturday      tamsulosin (FLOMAX) 0.4 mg capsule Take 0.4 mg by mouth every morning. Reference discharge instructions as provided by nursing for diet, labs, medications, activity, wound care and any outpatient referrals. Follow-up Appointments   Procedures    FOLLOW UP VISIT Appointment in: One Week Next Thursday with Nyasia for wound check     Next Thursday with Nyasia for wound check     Standing Status:   Standing     Number of Occurrences:   1     Order Specific Question:   Appointment in     Answer:    One Week        Signed:  Flynn Ivy MD  5/8/2020  7:18 AM

## 2020-05-08 NOTE — PROGRESS NOTES
Rosibel Robert Short  Admission Date: 5/5/2020             Daily Progress Note: 5/8/2020    The patient's chart is reviewed and the patient is discussed with the staff. 69 y.o. CM evaluated at the request of Dr. Juan Hilliard in ICU. He is s/p right common femoral to posterior tibial artery bypass with cryopreserved vein. He was brought to ICU post op due to respiratory failure requiring BIPAP. Has history of CAD, PAD, HTN, reportedly prior smoker 2packs per day quit 2008, but no known lung disease. He is morbidly obese without history of known JOSE. He states he's had some coughing since surgery, but otherwise feels okay. He denies previously using CPAP or BIPAP. Reports that he \"used to snore. \" He denies chest pain, wheezing, N/V. Per report he was breathing shallow after the OR, was placed on BIPAP then became hypotensive and tachypneic. Was started on castillo and given LR bolus and arrives to the ICU awake, but still mildly sleepy on BIPAP on 25mcg Castillo with MAP 90. Goal MAP is 70 post op. Was taken off BIPAP shortly after arrival to ICU and was able to stay off. Weaned off castillo quickly and actually was on nicardipine briefly overnight. Off all drips now and on 2L. Pulled 2.5L on IS this morning. Feeling better with main complaint of foot pain. Moved to the floor. Taken back to OR 5/7 for right foot wound/abscess debridement. Subjective:     Resting in bed, remains on NC and weaned to 2L. Denies shortness of breath or cough. Did not sleep last night and drowsy today. Received lasix x1 dose yesterday. Being discharged home this morning.     Current Facility-Administered Medications   Medication Dose Route Frequency    heparin (porcine) injection 5,000 Units  5,000 Units SubCUTAneous Q8H    piperacillin-tazobactam (ZOSYN) 3.375 g in 0.9% sodium chloride (MBP/ADV) 100 mL  3.375 g IntraVENous Q8H    hydrALAZINE (APRESOLINE) 20 mg/mL injection 10 mg  10 mg IntraVENous Q6H PRN    sodium chloride (OCEAN) 0.65 % nasal squeeze bottle 2 Spray  2 Spray Both Nostrils Q2H PRN    aspirin delayed-release tablet 81 mg  81 mg Oral DAILY    hydrALAZINE (APRESOLINE) tablet 100 mg  100 mg Oral DAILY    levothyroxine (SYNTHROID) tablet 75 mcg  75 mcg Oral ACB    lisinopriL (PRINIVIL, ZESTRIL) tablet 40 mg  40 mg Oral DAILY    metoprolol tartrate (LOPRESSOR) tablet 25 mg  25 mg Oral BID    nitroglycerin (NITROSTAT) tablet 0.4 mg  0.4 mg SubLINGual Q5MIN PRN    tamsulosin (FLOMAX) capsule 0.4 mg  0.4 mg Oral DAILY    sodium chloride (NS) flush 5-40 mL  5-40 mL IntraVENous Q8H    sodium chloride (NS) flush 5-40 mL  5-40 mL IntraVENous PRN    HYDROcodone-acetaminophen (NORCO) 5-325 mg per tablet 2 Tab  2 Tab Oral Q4H PRN    morphine injection 2 mg  2 mg IntraVENous Q3H PRN    insulin lispro (HUMALOG) injection   SubCUTAneous AC&HS    albuterol (PROVENTIL VENTOLIN) nebulizer solution 2.5 mg  2.5 mg Nebulization Q4H PRN     Review of Systems  Constitutional: negative for fever, chills, sweats  Cardiovascular: LE edema, negative for chest pain, palpitations, syncope  Gastrointestinal:  negative for dysphagia, reflux, vomiting, diarrhea, abdominal pain, or melena  Neurologic:  negative for focal weakness, numbness, headache    Objective:     Vitals:    05/07/20 2305 05/08/20 0304 05/08/20 0315 05/08/20 0744   BP: 147/77 182/82 182/86    Pulse: 68 69 69    Resp: 17 17     Temp: 98.6 °F (37 °C) 98.3 °F (36.8 °C)     SpO2: 96% 92%  97%   Weight:       Height:           Intake/Output Summary (Last 24 hours) at 5/8/2020 0746  Last data filed at 5/8/2020 8764  Gross per 24 hour   Intake 522 ml   Output 1510 ml   Net -988 ml     Constitutional:  the patient is obese and in no acute distress, NC 2L sat 97%  EENMT:  Sclera clear, pupils equal, oral mucosa moist  Respiratory: clear anterior, no wheezing or productive cough  Cardiovascular:  RRR without M,G,R  Gastrointestinal: soft and non-tender; with positive bowel sounds. Musculoskeletal: warm without cyanosis. There is right lower extremity edema. Left AKA. Skin:  no jaundice or rashes, chronic venous stasis wound to right leg with secure dressing and clean incision  Neurologic: no gross neuro deficits     Psychiatric:  alert and oriented x 3    CXR: 5/5: no clear infiltrate      LAB  Recent Labs     05/07/20  2033 05/07/20  1624 05/07/20  1139 05/07/20  0955 05/07/20  0545   GLUCPOC 186* 185* 177* 167* 200*      Recent Labs     05/07/20  0429 05/06/20  0346 05/05/20  1601 05/05/20  1347   WBC 6.3 5.7 7.7 8.3   HGB 10.5* 9.8* 11.3* 10.8*   HCT 33.0* 30.9* 36.5* 34.0*    200 202 223   INR  --   --   --  1.1     Recent Labs     05/07/20 0429 05/06/20  0346 05/05/20  1601    137 139   K 4.1 4.0 4.2    106 108*   CO2 23 23 22   * 158* 163*   BUN 23 22 25*   CREA 1.47 1.32 1.59*   CA 9.2 8.5 8.9     Recent Labs     05/05/20  1327 05/05/20  1015   PHI 7.252* 7.366   PCO2I 47.9* 38.1   PO2I 80 143*   HCO3I 21.1* 21.8*     No results for input(s): LCAD, LAC in the last 72 hours. Assessment:  (Medical Decision Making)     Hospital Problems  Date Reviewed: 5/8/2020          Codes Class Noted POA    Suspected sleep apnea ICD-10-CM: R29.818  ICD-9-CM: 781.99  5/7/2020 Unknown    Will check BERKLEY as outpatient and follow up in our office.       PVD (peripheral vascular disease) (HCC) (Chronic) ICD-10-CM: I73.9  ICD-9-CM: 443.9  5/5/2020 Yes    chronic    * (Principal) Right foot ulcer ([facility] ICD-10-CM: I54.359  ICD-9-CM: 707.15  5/5/2020 Yes    debridement 5/7 by vascular surgery    Acute respiratory failure with hypoxia and hypercapnia (HCC) ICD-10-CM: J96.01, J96.02  ICD-9-CM: 518.81  5/5/2020 Yes    On NC 2L--wean as tolerated    Morbid obesity (HCC) (Chronic) ICD-10-CM: E66.01  ICD-9-CM: 278.01  5/5/2020 Yes    Chronic--unchanged    Postprocedural hypotension ICD-10-CM: I95.81  ICD-9-CM: 458.29  5/5/2020 No    resolved    PAD (peripheral artery disease) [facility]) (Chronic) ICD-10-CM: I73.9  ICD-9-CM: 443.9  2/14/2019 Yes    chronic    DM (diabetes mellitus) ([facility] (Chronic) ICD-10-CM: E11.9  ICD-9-CM: 250.00  2/14/2019 Yes    Chronic, uncontrolled--glucose ranges 167-200      Hypervolemia: now with mild edema. Lasix given. Suspected sleep apnea: Will need f/u as outpatient to determine if he can do an outpatient sleep study vs home sleep study. Plan:  (Medical Decision Making)     --Lasix x1 dose with urine output 1500 ml, net neg 988 ml. Creat 1.47 today  --Zosyn day 3--changing to Cipro per vascular  --Wound cultures 5/7: heavy gram neg rods:  Pending  --Being discharged today by vascular with anticoagulation, Cipro and home health for dressing changes. --Respiratory determine if qualifies for home O2. Was not on home O2 prior to admission. Case management will arrange home O2 if he qualifies. --Case management to arrange for home overnight oximetry as a n outpatient. --Will follow up in our office to review overnight and consider sleep study. States he has chronic insomnia and spends the night in a recliner. BMI is 35.2.    --Appointment on May 27th at 9:40am with Dr. Maricruz Salas at [facility]. More than 50% of the time documented was spent in face-to-face contact with the patient and in the care of the patient on the floor/unit where the patient is located. Regis Castillo NP  I have spoken with and examined the patient. I agree with the above assessment and plan as documented. Gen: pleasant, no distress  Lungs:  CTA anteriorly  Heart:  RRR with no Murmur/Rubs/Gallops  Abd: NTND  Ext:  + edema, RLE wrapped    --f/u at [facility] in 2-3 weeks in person. Will likely need to have sleep study arranged at that point. Will f/u overnight oximetry.     Collins Bolivar MD

## 2020-05-08 NOTE — PROGRESS NOTES
END OF SHIFT NOTE:    INTAKE/OUTPUT  05/07 0701 - 05/08 0700  In: 522 [P.O.:90; I.V.:432]  Out: 1510 [Urine:1500]  Voiding: YES  Catheter: NO  Drain:              Flatus: Patient does not have flatus present. Stool:  0 occurrences. Characteristics:       Emesis: 0 occurrences. Characteristics:        VITAL SIGNS  Patient Vitals for the past 12 hrs:   Temp Pulse Resp BP SpO2   05/08/20 0315 -- 69 -- 182/86 --   05/08/20 0304 98.3 °F (36.8 °C) 69 17 182/82 92 %   05/07/20 2305 98.6 °F (37 °C) 68 17 147/77 96 %   05/07/20 1910 98.3 °F (36.8 °C) 64 16 164/81 96 %       Pain Assessment  Pain Intensity 1: 6 (05/08/20 0504)  Pain Location 1: Foot  Pain Intervention(s) 1: Medication (see MAR)  Patient Stated Pain Goal: 0    Ambulating  No    Shift report given to oncoming nurse at the bedside.     [address]

## 2020-05-08 NOTE — PROGRESS NOTES
[address] FAX: [phone]         VASCULAR SURGERY FLOOR PROGRESS NOTE    Admit Date: 2020  POD: 1 Day Post-Op    Procedure:  Procedure(s):  RIGHT FOOT DEBRIDEMENT    Subjective:     Patient has no new complaints. Objective:     Vitals:  Blood pressure 182/86, pulse 69, temperature 98.3 °F (36.8 °C), resp. rate 17, height 6' 2\" (1.88 m), weight 274 lb (124.3 kg), SpO2 92 %. Temp (24hrs), Av.5 °F (36.9 °C), Min:97.5 °F (36.4 °C), Max:99.4 °F (37.4 °C)      Intake / Output:    Intake/Output Summary (Last 24 hours) at 2020 0714  Last data filed at 2020 1217  Gross per 24 hour   Intake 522 ml   Output 1510 ml   Net -988 ml       Physical Exam:    Constitutional: he appears well-developed. No distress. HENT:   Head: Atraumatic. Eyes: Pupils are equal, round, and reactive to light. Neck: Normal range of motion. Cardiovascular: Regular rhythm. Pulmonary/Chest: Effort normal and breath sounds normal. No respiratory distress. Abdominal: Soft. Bowel sounds are normal. he exhibits no distension. There is no tenderness. There is no guarding. No hernia. Musculoskeletal: Normal range of motion. Neurological: He is alert.  CN II- XII grossly intact  Vascular: Right foot wound stable palpable pedal pulses    Labs:   Recent Labs     20  0429 20  0346   HGB 10.5* 9.8*   WBC 6.3 5.7   K 4.1 4.0   * 158*       Data Review     Assessment:     Patient Active Problem List    Diagnosis Date Noted    CAD (coronary artery disease) 2019     Priority: 1 - One    Suspected sleep apnea 2020    PVD (peripheral vascular disease) ([address] 2020    Right foot ulcer ([address] 2020    Acute respiratory failure with hypoxia and hypercapnia ([address] 2020    Morbid obesity ([address] 2020    Postprocedural hypotension 2020    Ischemic leg pain 2019    Hypothyroidism 2019    Bypass graft stenosis ([facility] 10/15/2019    S/P bypass graft of extremity 04/19/2019    Severe obesity ([facility] 04/12/2019    Peripheral artery disease ([facility] 03/27/2019    PAD (peripheral artery disease) ([facility] 02/14/2019    HTN (hypertension) 02/14/2019    DM (diabetes mellitus) ([facility] 02/14/2019    Dyslipidemia 02/14/2019    Hx of CABG 02/14/2019       Plan/Recommendations/Medical Decision Making: We will plan discharge today  -We will start patient on anticoagulation orally to increase patency of lower semi-bypass graft most likely Xarelto 2.5 twice daily we will give samples  -We will also send patient home on p.o. antibiotics Cipro as culture growing gram-negative rods most likely Pseudomonas once finalized will call in prescription. On sensitivity patient   will have home health twice a day normal saline soaked 4 x 4's want open right forefoot wound covered by Kerlix and Ace  Will follow-up next week for wound check  -For wound care    Elements of this note have been dictated using speech recognition software. As a result, errors of speech recognition may have occurred.

## 2020-05-08 NOTE — DISCHARGE INSTRUCTIONS
We will start patient on anticoagulation orally to increase patency of lower semi-bypass graft most likely Xarelto 2.5 twice daily we will give samples  -We will also send patient home on p.o. antibiotics Cipro as culture growing gram-negative rods most likely Pseudomonas once finalized will call in prescription. On sensitivity patient   will have home health twice a day normal saline soaked 4 x 4's want open right forefoot wound covered by Kerlix and Ace  Will follow-up next week for wound check         Wound Debridement: What to Expect at [address] [place] [address]  After surgery to remove debris or dead tissue from your wound (debridement), you can expect some pain and swelling around your wound. This should get better within a few days after the procedure. You may have a bandage or a moist dressing over your wound. Your doctor will let you know how long to keep it on and how often to change it. The amount of time it will take for your wound to heal depends on how serious your wound is and whether you have any other health problems that may slow healing. You may need to have the wound debrided again. How soon you can return to work and your normal routine depends on the type of work you do and how you feel. For example, if your wound is on your arm and your job requires you to do heavy lifting, you may need to wait until your wound has healed before you go back to work. This care sheet gives you a general idea about how long it will take for you to recover. But each person recovers at a different pace. Follow the steps below to feel better as quickly as possible. How can you care for yourself at home? Activity    · Rest when you feel tired.  Getting enough sleep will help you recover.     · Avoid activities that put stress on the affected body part until your doctor says it's okay.     · Change positions often to keep pressure off your wound, and spread your body weight evenly with cushions, mattresses, foam wedges, or other pressure-relieving devices.     · If your wound is on your leg or foot, you may have to use crutches, a supportive boot, or a fitted shoe to keep pressure off your wound. If you need crutches, it may help to use a backpack or wear clothes with a lot of pockets to carry items.     · Do not shower for at least 24 hours after the procedure or for as long as your doctor tells you to. When you shower, keep your dressing and wound dry.     · Do not take a bath, swim, use a hot tub, or soak your affected body part until your wound has healed. Diet    · You can eat your normal diet. If your stomach is upset, try bland, low-fat foods like plain rice, broiled chicken, toast, and yogurt.     · Eat a well-balanced diet with enough protein to help the wound heal. Protein is a irizarry nutrient in helping to repair damaged tissue and promote new tissue growth. Good sources of protein are milk, yogurt, cheese, meat, and beans. Medicines    · Your doctor will tell you if and when you can restart your medicines. He or she will also give you instructions about taking any new medicines.     · If you take aspirin or some other blood thinner, ask your doctor if and when to start taking it again. Make sure that you understand exactly what your doctor wants you to do.     · Take pain medicines exactly as directed. ? If the doctor gave you a prescription medicine for pain, take it as prescribed. ? If you are not taking a prescription pain medicine, ask your doctor if you can take an over-the-counter medicine.     · If you think your pain medicine is making you sick to your stomach:  ? Take your medicine after meals (unless your doctor has told you not to). ? Ask your doctor for a different pain medicine.     · If your doctor prescribed antibiotics, take them as directed. Do not stop taking them just because you feel better.  You need to take the full course of antibiotics.     · If your doctor prescribed an antibiotic ointment that you put on the wound, use it as directed. Wound care    · A moist dressing may cover your wound. A dressing helps the wound heal and protects it. Your doctor will tell you how to take care of this.     · If you had a skin graft, you may have a bandage that is stitched over the graft. Your doctor will remove the bandage and stitches. Other instructions    · Don't smoke. Smoking dries out the skin, reduces blood flow to the skin, and slows healing. If you need help quitting, talk to your doctor about stop-smoking programs and medicines. These can increase your chances of quitting for good. Follow-up care is a key part of your treatment and safety. Be sure to make and go to all appointments, and call your doctor if you are having problems. It's also a good idea to know your test results and keep a list of the medicines you take. When should you call for help? Call your doctor now or seek immediate medical care if:    · You have pain that does not get better after you take pain medicine.     · You have signs of infection, such as:  ? Increased pain, swelling, warmth, or redness. ? Red streaks leading from the wound. ? Pus draining from the wound. ? A fever.     · The wound starts to bleed, and blood soaks through the bandage. Oozing small amounts of blood is normal.     · You have loose stitches, or your skin graft comes loose.    Watch closely for any changes in your health, and be sure to contact your doctor if:    · The wound is not getting better as expected. Where can you learn more? Go to http://hollie-madeline.info/  Enter Z931 in the search box to learn more about \"Wound Debridement: What to Expect at Home. \"  Current as of: July 14, 2019Content Version: 12.4  © 3796-4652 Healthwise, Incorporated. Care instructions adapted under license by [employer] (which disclaims liability or warranty for this information).  If you have questions about a medical condition or this instruction, always ask your healthcare professional. [address] any warranty or liability for your use of this information. DISCHARGE SUMMARY from Nurse    PATIENT INSTRUCTIONS:    After general anesthesia or intravenous sedation, for 24 hours or while taking prescription Narcotics:  · Limit your activities  · Do not drive and operate hazardous machinery  · Do not make important personal or business decisions  · Do  not drink alcoholic beverages  · If you have not urinated within 8 hours after discharge, please contact your surgeon on call. Report the following to your surgeon:  · Excessive pain, swelling, redness or odor of or around the surgical area  · Temperature over 100.5  · Nausea and vomiting lasting longer than 4 hours or if unable to take medications  · Any signs of decreased circulation or nerve impairment to extremity: change in color, persistent  numbness, tingling, coldness or increase pain  · Any questions    What to do at Home:  Recommended activity: Activity as directed by your doctor. If you experience any of the following symptoms ***, please follow up with ***. *  Please give a list of your current medications to your Primary Care Provider. *  Please update this list whenever your medications are discontinued, doses are      changed, or new medications (including over-the-counter products) are added. *  Please carry medication information at all times in case of emergency situations. These are general instructions for a healthy lifestyle:    No smoking/ No tobacco products/ Avoid exposure to second hand smoke  Surgeon General's Warning:  Quitting smoking now greatly reduces serious risk to your health.     Obesity, smoking, and sedentary lifestyle greatly increases your risk for illness    A healthy diet, regular physical exercise & weight monitoring are important for maintaining a healthy lifestyle    You may be retaining fluid if you have a history of heart failure or if you experience any of the following symptoms:  Weight gain of 3 pounds or more overnight or 5 pounds in a week, increased swelling in our hands or feet or shortness of breath while lying flat in bed. Please call your doctor as soon as you notice any of these symptoms; do not wait until your next office visit. The discharge information has been reviewed with the {PATIENT PARENT GUARDIAN:48399}. The {PATIENT PARENT GUARDIAN:84413} verbalized understanding. Discharge medications reviewed with the {Dishcarge meds reviewed JNXN:30678} and appropriate educational materials and side effects teaching were provided.   ___________________________________________________________________________________________________________________________________

## 2020-05-08 NOTE — PROGRESS NOTES
[facility] ambulance transport arranged for 12:30 pm (after discussion with patient's wife) to home, and resume Angela home health RN (phone [phone]; fax [phone]). Overnight oximetry at home on room air to be arranged with [facility] (phone [phone], fax [phone]) before pulmonary f/u on May 27, 2020. Oximetry studies to be faxed to [facility] (phone [phone]; fax [phone]). This plan is ok with Mr. Davis Avendano in room 214. Care Management Interventions  PCP Verified by CM: Yes  Mode of Transport at Discharge: Other (see comment)  Transition of Care Consult (CM Consult): [address]: No  Reason Outside [place]: Patient already serviced by other home care/hospice agency  Discharge Durable Medical Equipment: (glucometer)  Current Support Network: Lives with Spouse(lives with spouse, Zulema Araiza)  Confirm Follow Up Transport: Family  The Plan for Transition of Care is Related to the Following Treatment Goals : possible HH at d/c.    The Patient and/or Patient Representative was Provided with a Choice of Provider and Agrees with the Discharge Plan?: Yes  Name of the Patient Representative Who was Provided with a Choice of Provider and Agrees with the Discharge Plan: patient  Freedom of Choice List was Provided with Basic Dialogue that Supports the Patient's Individualized Plan of Care/Goals, Treatment Preferences and Shares the Quality Data Associated with the Providers?: Yes  The [employer] Information Provided?: Yes(confirms MCR/BC STATE/pt is /gets RX at [place])  Discharge Location  Discharge Placement: Home with home health(Angela Schmitz for RN )

## 2020-05-08 NOTE — ROUTINE PROCESS
Discharge instructions reviewed with patient/ patient's wife via phone. Patient verbalized/ signed agreement/ understanding. Wife verbalized agreement/ understanding. Paper copy placed in chart.

## 2020-05-08 NOTE — PROGRESS NOTES
[facility] CRITICAL CARE OUTREACH NURSE PROGRESS REPORT      SUBJECTIVE: Called to assess patient secondary to transfer from critical care. MEWS Score: 1 (05/07/20 1910)  Vitals:    05/07/20 1037 05/07/20 1138 05/07/20 1547 05/07/20 1910   BP: 175/82 161/76 131/90 164/81   Pulse: 79 80 87 64   Resp: 15 16 16 16   Temp: 99.4 °F (37.4 °C) 97.5 °F (36.4 °C) 98.2 °F (36.8 °C) 98.3 °F (36.8 °C)   SpO2: 95% 93% 95% 96%   Weight:       Height:              LAB DATA:    Recent Labs     05/07/20  0429 05/06/20  0346 05/05/20  1601    137 139   K 4.1 4.0 4.2    106 108*   CO2 23 23 22   AGAP 9 8 9   * 158* 163*   BUN 23 22 25*   CREA 1.47 1.32 1.59*   GFRAA >60 >60 55*   GFRNA 50* 57* 46*   CA 9.2 8.5 8.9        Recent Labs     05/07/20  0429 05/06/20  0346 05/05/20  1601   WBC 6.3 5.7 7.7   HGB 10.5* 9.8* 11.3*   HCT 33.0* 30.9* 36.5*    200 202          OBJECTIVE: On arrival to room, I found patient to be in bed, resting quietly. Staff at bedside. Pain Assessment  Pain Intensity 1: 0 (05/07/20 1940)  Pain Location 1: Foot  Pain Intervention(s) 1: Medication (see MAR), Emotional support  Patient Stated Pain Goal: 0    ASSESSMENT:  Pt drowsy, oriented x4. Denies any SOB or CP. SpO2 96% HR 66 on 3L NC. Lung sounds diminished throughout. R foot warm and pink, capillary refill less than 3 seconds. Dressing c/d/i. Encouraged and assisted with incentive spirometer. VS, labs, and progress notes reviewed. Primary RN without any additional needs/concerns at this time. PLAN:  Will continue to follow per outreach protocol. PRN Hydralazine for SBP > 160 per order.

## 2020-05-11 ENCOUNTER — PATIENT OUTREACH (OUTPATIENT)
Dept: CASE MANAGEMENT | Age: 75
End: 2020-05-11

## 2020-05-11 LAB
BACTERIA SPEC CULT: ABNORMAL
GRAM STN SPEC: ABNORMAL
SERVICE CMNT-IMP: ABNORMAL
SERVICE CMNT-IMP: ABNORMAL

## 2020-05-11 NOTE — PROGRESS NOTES
This note will not be viewable in [address] 1st Attempt to contact patient for [facility] call, phone not accepting calls.  Will attempt to contact patient again within 24 hours

## 2020-05-12 ENCOUNTER — PATIENT OUTREACH (OUTPATIENT)
Dept: CASE MANAGEMENT | Age: 75
End: 2020-05-12

## 2020-05-12 NOTE — PROGRESS NOTES
This note will not be viewable in [address] 2nd attempt to contact patient for [facility] call, no answer, phone not accepting calls. Will attempt 3rd call within 5 business days.

## 2020-05-18 ENCOUNTER — ANESTHESIA EVENT (OUTPATIENT)
Dept: SURGERY | Age: 75
End: 2020-05-18
Payer: MEDICARE

## 2020-05-19 ENCOUNTER — HOSPITAL ENCOUNTER (OUTPATIENT)
Age: 75
Setting detail: OUTPATIENT SURGERY
Discharge: HOME | End: 2020-05-19
Attending: SURGERY | Admitting: SURGERY
Payer: MEDICARE

## 2020-05-19 ENCOUNTER — ANESTHESIA (OUTPATIENT)
Dept: SURGERY | Age: 75
End: 2020-05-19
Payer: MEDICARE

## 2020-05-19 ENCOUNTER — PATIENT OUTREACH (OUTPATIENT)
Dept: CASE MANAGEMENT | Age: 75
End: 2020-05-19

## 2020-05-19 VITALS
OXYGEN SATURATION: 92 % | DIASTOLIC BLOOD PRESSURE: 72 MMHG | BODY MASS INDEX: 35.04 KG/M2 | WEIGHT: 273 LBS | SYSTOLIC BLOOD PRESSURE: 151 MMHG | HEIGHT: 74 IN | RESPIRATION RATE: 16 BRPM | HEART RATE: 58 BPM | TEMPERATURE: 97.9 F

## 2020-05-19 LAB
GLUCOSE BLD STRIP.AUTO-MCNC: 114 MG/DL (ref 65–100)
GLUCOSE BLD STRIP.AUTO-MCNC: 138 MG/DL (ref 65–100)

## 2020-05-19 PROCEDURE — 77030037088 HC TUBE ENDOTRACH ORAL NSL COVD-A: Performed by: ANESTHESIOLOGY

## 2020-05-19 PROCEDURE — 74011250636 HC RX REV CODE- 250/636: Performed by: ANESTHESIOLOGY

## 2020-05-19 PROCEDURE — 77030039425 HC BLD LARYNG TRULITE DISP TELE -A: Performed by: ANESTHESIOLOGY

## 2020-05-19 PROCEDURE — 74011250636 HC RX REV CODE- 250/636: Performed by: NURSE ANESTHETIST, CERTIFIED REGISTERED

## 2020-05-19 PROCEDURE — 74011000250 HC RX REV CODE- 250: Performed by: SURGERY

## 2020-05-19 PROCEDURE — 77030012894: Performed by: SURGERY

## 2020-05-19 PROCEDURE — 76210000063 HC OR PH I REC FIRST 0.5 HR: Performed by: SURGERY

## 2020-05-19 PROCEDURE — 74011250636 HC RX REV CODE- 250/636: Performed by: SURGERY

## 2020-05-19 PROCEDURE — 74011000250 HC RX REV CODE- 250: Performed by: NURSE ANESTHETIST, CERTIFIED REGISTERED

## 2020-05-19 PROCEDURE — 82962 GLUCOSE BLOOD TEST: CPT

## 2020-05-19 PROCEDURE — 76060000032 HC ANESTHESIA 0.5 TO 1 HR: Performed by: SURGERY

## 2020-05-19 PROCEDURE — 74011250637 HC RX REV CODE- 250/637: Performed by: ANESTHESIOLOGY

## 2020-05-19 PROCEDURE — 76210000021 HC REC RM PH II 0.5 TO 1 HR: Performed by: SURGERY

## 2020-05-19 PROCEDURE — 77030018717 HC DRSG GRNUFM KCON -B: Performed by: SURGERY

## 2020-05-19 PROCEDURE — 77030018836 HC SOL IRR NACL ICUM -A: Performed by: SURGERY

## 2020-05-19 PROCEDURE — 77030040922 HC BLNKT HYPOTHRM STRY -A: Performed by: ANESTHESIOLOGY

## 2020-05-19 PROCEDURE — 76010000160 HC OR TIME 0.5 TO 1 HR INTENSV-TIER 1: Performed by: SURGERY

## 2020-05-19 DEVICE — MATRIX WND CLLGN 6X9CM W/PHMB -- PURAPLY ANTIMICROBIAL: Type: IMPLANTABLE DEVICE | Site: FOOT | Status: FUNCTIONAL

## 2020-05-19 RX ORDER — SODIUM CHLORIDE 0.9 % (FLUSH) 0.9 %
5-40 SYRINGE (ML) INJECTION AS NEEDED
Status: DISCONTINUED | OUTPATIENT
Start: 2020-05-19 | End: 2020-05-19 | Stop reason: HOSPADM

## 2020-05-19 RX ORDER — SODIUM CHLORIDE 0.9 % (FLUSH) 0.9 %
5-40 SYRINGE (ML) INJECTION EVERY 8 HOURS
Status: DISCONTINUED | OUTPATIENT
Start: 2020-05-19 | End: 2020-05-19 | Stop reason: HOSPADM

## 2020-05-19 RX ORDER — ROCURONIUM BROMIDE 10 MG/ML
INJECTION, SOLUTION INTRAVENOUS AS NEEDED
Status: DISCONTINUED | OUTPATIENT
Start: 2020-05-19 | End: 2020-05-19 | Stop reason: HOSPADM

## 2020-05-19 RX ORDER — LIDOCAINE HYDROCHLORIDE 20 MG/ML
INJECTION, SOLUTION EPIDURAL; INFILTRATION; INTRACAUDAL; PERINEURAL AS NEEDED
Status: DISCONTINUED | OUTPATIENT
Start: 2020-05-19 | End: 2020-05-19 | Stop reason: HOSPADM

## 2020-05-19 RX ORDER — SUCCINYLCHOLINE CHLORIDE 20 MG/ML
INJECTION INTRAMUSCULAR; INTRAVENOUS AS NEEDED
Status: DISCONTINUED | OUTPATIENT
Start: 2020-05-19 | End: 2020-05-19 | Stop reason: HOSPADM

## 2020-05-19 RX ORDER — EPHEDRINE SULFATE/0.9% NACL/PF 50 MG/5 ML
SYRINGE (ML) INTRAVENOUS AS NEEDED
Status: DISCONTINUED | OUTPATIENT
Start: 2020-05-19 | End: 2020-05-19 | Stop reason: HOSPADM

## 2020-05-19 RX ORDER — PROPOFOL 10 MG/ML
INJECTION, EMULSION INTRAVENOUS
Status: DISCONTINUED | OUTPATIENT
Start: 2020-05-19 | End: 2020-05-19 | Stop reason: HOSPADM

## 2020-05-19 RX ORDER — SODIUM CHLORIDE, SODIUM LACTATE, POTASSIUM CHLORIDE, CALCIUM CHLORIDE 600; 310; 30; 20 MG/100ML; MG/100ML; MG/100ML; MG/100ML
75 INJECTION, SOLUTION INTRAVENOUS CONTINUOUS
Status: DISCONTINUED | OUTPATIENT
Start: 2020-05-19 | End: 2020-05-19 | Stop reason: HOSPADM

## 2020-05-19 RX ORDER — FENTANYL CITRATE 50 UG/ML
INJECTION, SOLUTION INTRAMUSCULAR; INTRAVENOUS AS NEEDED
Status: DISCONTINUED | OUTPATIENT
Start: 2020-05-19 | End: 2020-05-19 | Stop reason: HOSPADM

## 2020-05-19 RX ORDER — PROPOFOL 10 MG/ML
INJECTION, EMULSION INTRAVENOUS AS NEEDED
Status: DISCONTINUED | OUTPATIENT
Start: 2020-05-19 | End: 2020-05-19 | Stop reason: HOSPADM

## 2020-05-19 RX ORDER — OXYCODONE HYDROCHLORIDE 5 MG/1
5 TABLET ORAL
Status: COMPLETED | OUTPATIENT
Start: 2020-05-19 | End: 2020-05-19

## 2020-05-19 RX ORDER — OXYCODONE AND ACETAMINOPHEN 10; 325 MG/1; MG/1
1 TABLET ORAL AS NEEDED
Status: DISCONTINUED | OUTPATIENT
Start: 2020-05-19 | End: 2020-05-19 | Stop reason: HOSPADM

## 2020-05-19 RX ORDER — ONDANSETRON 2 MG/ML
INJECTION INTRAMUSCULAR; INTRAVENOUS AS NEEDED
Status: DISCONTINUED | OUTPATIENT
Start: 2020-05-19 | End: 2020-05-19 | Stop reason: HOSPADM

## 2020-05-19 RX ORDER — DEXAMETHASONE SODIUM PHOSPHATE 4 MG/ML
INJECTION, SOLUTION INTRA-ARTICULAR; INTRALESIONAL; INTRAMUSCULAR; INTRAVENOUS; SOFT TISSUE AS NEEDED
Status: DISCONTINUED | OUTPATIENT
Start: 2020-05-19 | End: 2020-05-19 | Stop reason: HOSPADM

## 2020-05-19 RX ORDER — BACITRACIN 50000 [IU]/1
INJECTION, POWDER, FOR SOLUTION INTRAMUSCULAR AS NEEDED
Status: DISCONTINUED | OUTPATIENT
Start: 2020-05-19 | End: 2020-05-19 | Stop reason: HOSPADM

## 2020-05-19 RX ORDER — HYDROMORPHONE HYDROCHLORIDE 2 MG/ML
0.5 INJECTION, SOLUTION INTRAMUSCULAR; INTRAVENOUS; SUBCUTANEOUS
Status: DISCONTINUED | OUTPATIENT
Start: 2020-05-19 | End: 2020-05-19 | Stop reason: HOSPADM

## 2020-05-19 RX ADMIN — PROPOFOL 30 MG: 10 INJECTION, EMULSION INTRAVENOUS at 07:39

## 2020-05-19 RX ADMIN — FENTANYL CITRATE 25 MCG: 50 INJECTION INTRAMUSCULAR; INTRAVENOUS at 08:01

## 2020-05-19 RX ADMIN — FENTANYL CITRATE 25 MCG: 50 INJECTION INTRAMUSCULAR; INTRAVENOUS at 07:36

## 2020-05-19 RX ADMIN — SUCCINYLCHOLINE CHLORIDE 140 MG: 20 INJECTION, SOLUTION INTRAMUSCULAR; INTRAVENOUS at 07:21

## 2020-05-19 RX ADMIN — PHENYLEPHRINE HYDROCHLORIDE 100 MCG: 10 INJECTION INTRAVENOUS at 07:34

## 2020-05-19 RX ADMIN — FENTANYL CITRATE 25 MCG: 50 INJECTION INTRAMUSCULAR; INTRAVENOUS at 07:43

## 2020-05-19 RX ADMIN — PHENYLEPHRINE HYDROCHLORIDE 100 MCG: 10 INJECTION INTRAVENOUS at 07:31

## 2020-05-19 RX ADMIN — Medication 10 MG: at 07:34

## 2020-05-19 RX ADMIN — PROPOFOL 25 MCG/KG/MIN: 10 INJECTION, EMULSION INTRAVENOUS at 07:24

## 2020-05-19 RX ADMIN — Medication 10 MG: at 07:45

## 2020-05-19 RX ADMIN — PROPOFOL 200 MG: 10 INJECTION, EMULSION INTRAVENOUS at 07:20

## 2020-05-19 RX ADMIN — FENTANYL CITRATE 25 MCG: 50 INJECTION INTRAMUSCULAR; INTRAVENOUS at 07:39

## 2020-05-19 RX ADMIN — Medication 10 MG: at 07:32

## 2020-05-19 RX ADMIN — SODIUM CHLORIDE, SODIUM LACTATE, POTASSIUM CHLORIDE, AND CALCIUM CHLORIDE 75 ML/HR: 600; 310; 30; 20 INJECTION, SOLUTION INTRAVENOUS at 05:59

## 2020-05-19 RX ADMIN — HYDROMORPHONE HYDROCHLORIDE 0.5 MG: 2 INJECTION INTRAMUSCULAR; INTRAVENOUS; SUBCUTANEOUS at 08:31

## 2020-05-19 RX ADMIN — LIDOCAINE HYDROCHLORIDE 100 MG: 20 INJECTION, SOLUTION EPIDURAL; INFILTRATION; INTRACAUDAL; PERINEURAL at 07:20

## 2020-05-19 RX ADMIN — DEXAMETHASONE SODIUM PHOSPHATE 4 MG: 4 INJECTION, SOLUTION INTRAMUSCULAR; INTRAVENOUS at 07:34

## 2020-05-19 RX ADMIN — CEFAZOLIN 3 G: 1 INJECTION, POWDER, FOR SOLUTION INTRAVENOUS at 07:26

## 2020-05-19 RX ADMIN — ROCURONIUM BROMIDE 5 MG: 10 INJECTION, SOLUTION INTRAVENOUS at 07:20

## 2020-05-19 RX ADMIN — OXYCODONE HYDROCHLORIDE 5 MG: 5 TABLET ORAL at 08:28

## 2020-05-19 RX ADMIN — HYDROMORPHONE HYDROCHLORIDE 0.5 MG: 2 INJECTION INTRAMUSCULAR; INTRAVENOUS; SUBCUTANEOUS at 08:23

## 2020-05-19 RX ADMIN — ONDANSETRON 4 MG: 2 INJECTION INTRAMUSCULAR; INTRAVENOUS at 07:34

## 2020-05-19 RX ADMIN — PHENYLEPHRINE HYDROCHLORIDE 100 MCG: 10 INJECTION INTRAVENOUS at 07:45

## 2020-05-19 NOTE — DISCHARGE INSTRUCTIONS
Keep dressing clean, dry and intact. Do not get dressing wet. Do not remove dressing. Dressing will be removed at follow up appointment. ACTIVITY  · As tolerated and as directed by your doctor. · Bathe or shower as directed by your doctor. DIET  · Clear liquids until no nausea or vomiting; then light diet for the first day. · Advance to regular diet on second day, unless your doctor orders otherwise. · If nausea and vomiting continues, call your doctor. PAIN  · Take pain medication as directed by your doctor. · Call your doctor if pain is NOT relieved by medication. · DO NOT take aspirin of blood thinners unless directed by your doctor. CALL YOUR DOCTOR IF   · Excessive bleeding that does not stop after holding pressure over the area  · Temperature of 101 degrees F or above  · Excessive redness, swelling or bruising, and/ or green or yellow, smelly discharge from incision    AFTER ANESTHESIA   · For the first 24 hours: DO NOT Drive, Drink alcoholic beverages, or Make important decisions. · Be aware of dizziness following anesthesia and while taking pain medication. After general anesthesia or intravenous sedation, for 24 hours or while taking prescription Narcotics:  · Limit your activities  · A responsible adult needs to be with you for the next 24 hours  · Do not drive and operate hazardous machinery  · Do not make important personal or business decisions  · Do not drink alcoholic beverages  · If you have not urinated within 8 hours after discharge, please contact your surgeon on call. · If you have sleep apnea and have a CPAP machine, please use it for all naps and sleeping. · Please use caution when taking narcotics and any of your home medications that may cause drowsiness. *  Please give a list of your current medications to your Primary Care Provider.   *  Please update this list whenever your medications are discontinued, doses are      changed, or new medications (including over-the-counter products) are added. *  Please carry medication information at all times in case of emergency situations. These are general instructions for a healthy lifestyle:  No smoking/ No tobacco products/ Avoid exposure to second hand smoke  Surgeon General's Warning:  Quitting smoking now greatly reduces serious risk to your health. Obesity, smoking, and sedentary lifestyle greatly increases your risk for illness  A healthy diet, regular physical exercise & weight monitoring are important for maintaining a healthy lifestyle    You may be retaining fluid if you have a history of heart failure or if you experience any of the following symptoms:  Weight gain of 3 pounds or more overnight or 5 pounds in a week, increased swelling in our hands or feet or shortness of breath while lying flat in bed. Please call your doctor as soon as you notice any of these symptoms; do not wait until your next office visit.

## 2020-05-19 NOTE — ANESTHESIA POSTPROCEDURE EVALUATION
Procedure(s):  RIGHT FOOT DEBRIDEMENT POSS WOUND VAC PLACEMENT URGENT PER DR Judd Grubbs.     general    Anesthesia Post Evaluation      Multimodal analgesia: multimodal analgesia used between 6 hours prior to anesthesia start to PACU discharge  Patient location during evaluation: PACU  Patient participation: complete - patient participated  Level of consciousness: awake  Pain management: adequate  Airway patency: patent  Anesthetic complications: no  Cardiovascular status: acceptable  Respiratory status: acceptable  Hydration status: acceptable  Post anesthesia nausea and vomiting:  none      Vitals Value Taken Time   /67 5/19/2020  8:16 AM   Temp 36.8 °C (98.2 °F) 5/19/2020  8:09 AM   Pulse 80 5/19/2020  8:16 AM   Resp 16 5/19/2020  8:16 AM   SpO2 96 % 5/19/2020  8:16 AM

## 2020-05-19 NOTE — OP NOTES
[facility]  OPERATIVE REPORT    Name:  Kimberly Oneal  MR#:  [id number]  :  1945  ACCOUNT #:  [de-identified]  DATE OF SERVICE:  2020    CLINICAL SERVICE:  Vascular Surgery    PREOPERATIVE DIAGNOSIS:  Status post bypass with open forefoot wound. POSTOPERATIVE DIAGNOSIS:  Status post bypass with open forefoot wound. PROCEDURE PERFORMED:  1. Excisional debridement of skin, subcutaneous tissue, and muscle measuring 9 x 9 cm. 2.  Placement of PuraPly graft. SURGEON:  Jonathan Riojas MD    ASSISTANT:     ANESTHESIA:  General.    COMPLICATIONS:  None. SPECIMENS REMOVED:  None. IMPLANTS:      ESTIMATED BLOOD LOSS:      PROCEDURE IN DETAIL:  After getting informed consent, the patient was brought to the operating room. Anesthesia was then induced. Preop antibiotics were given before skin incision. The patient's left foot was then prepped and draped in normal sterile fashion. The wound was then irrigated out. We then did debride it sharply with Metzenbaum scissors and a 10-blade to scrape off the necrotic tissue and the fibrinous tissue around the wound. We then took a surgical scrub brush to scrub the wound. Once we irrigated it out thoroughly, there was adequate good bleeding. There were Doppler signals in the posterior tibial.  We then placed and cut appropriately two 5 x 9 PuraPly graft to adhere to the open wound and we put a gauze on top of that and covered it with a Kerlix and an Ace. The patient was extubated and taken to the PACU in stable condition.       Vikki Streeter MD      [id number]/  D:  2020 8:11  T:  2020 10:22  JOB #:  [id number]

## 2020-05-19 NOTE — ANESTHESIA PREPROCEDURE EVALUATION
Relevant Problems   No relevant active problems       Anesthetic History               Review of Systems / Medical History  Patient summary reviewed and pertinent labs reviewed    Pulmonary          Smoker         Neuro/Psych              Cardiovascular    Hypertension        Dysrhythmias (RBBB)   CAD, PAD, CABG and hyperlipidemia    Exercise tolerance: >4 METS  Comments: Denies recent CP, SOB or Palpitations     Echo 2018 showing EF 50-54%,    GI/Hepatic/Renal         Renal disease: CRI       Endo/Other    Diabetes: type 2  Hypothyroidism  Obesity and morbid obesity     Other Findings   Comments: S/p R Fem to PT bypass 5/5           Physical Exam    Airway  Mallampati: III    Neck ROM: decreased range of motion   Mouth opening: Normal     Cardiovascular  Regular rate and rhythm,  S1 and S2 normal,  no murmur, click, rub, or gallop             Dental         Pulmonary  Breath sounds clear to auscultation               Abdominal         Other Findings            Anesthetic Plan    ASA: 3  Anesthesia type: general          Induction: Intravenous  Anesthetic plan and risks discussed with: Patient

## 2020-05-19 NOTE — PROGRESS NOTES
Patient status post wound debridement and placement of pure apply graft. Because the area I was not able to put a wound VAC in place. Patient will be discharged home with wound care, to keep pure apply an Adaptic on wound at all times, twice a day can remove 4 x 4 dressings and replaced. Patient will follow-up in my office next week for wound check.     Emanuel Ovalle

## 2020-05-19 NOTE — PERIOP NOTES
Discharge instructions discussed with wife over telephone per COVID-19 requirements. Security code obtained to give information. No questions or concerns at this time.

## 2020-05-19 NOTE — BRIEF OP NOTE
[facility] [address] FAX: [phone]    Brief Op Note Template Note    Pre-Op Diagnosis: Ulcer of right foot, unspecified ulcer stage ([address] [L97.519]    Post-Op Diagnosis:  Ulcer of right foot, unspecified ulcer stage ([address] [L97.519]    Procedures: Procedure(s):  RIGHT FOOT DEBRIDEMENT POSS WOUND VAC PLACEMENT URGENT PER DR Pedro Bauer    Surgeon: Emilie Nascimento MD    Assistants: Surgeon(s): Gabino Leavitt MD      Anesthesia:  General     Findings: Open graft, 9 x 5 wound debrided 2 pure apply graft placed. Tourniquet Time:  * No tourniquets in log *    Estimated Blood Loss:               Specimens:            Implants:    Implant Name Type Inv. Item Serial No.  Lot No. LRB No. Used Action   MATRIX WND CLLGN [id number] W/PHMB -- Dudley Troy  MATRIX WND CLLGN [id number] W/PHMB -- PURAPLY ANTIMICROBIAL  [employer] [id number] Right 2 Implanted       Complications: None               Signed: Emilie Nascimento MD      Elements of this note have been dictated using speech recognition software. As a result, errors of speech recognition may have occurred.

## 2020-05-19 NOTE — PROGRESS NOTES
This note will not be viewable in [address] 3rd attempt to contact patient for [facility] Call. No answer, phone not accepting calls. Episode will be closed at this time as [address] has been unsuccessful in reaching the patient.

## 2020-05-26 PROBLEM — S91.301A OPEN WOUND OF RIGHT FOOT: Status: ACTIVE | Noted: 2020-05-26

## 2020-06-15 LAB
BACTERIA SPEC CULT: ABNORMAL
Lab: NORMAL
REFERENCE LAB,REFLB: NORMAL
SERVICE CMNT-IMP: ABNORMAL
SERVICE CMNT-IMP: ABNORMAL
TEST DESCRIPTION:,ATST: NORMAL

## 2020-07-20 ENCOUNTER — ANESTHESIA EVENT (OUTPATIENT)
Dept: SURGERY | Age: 75
End: 2020-07-20
Payer: MEDICARE

## 2020-07-21 ENCOUNTER — HOSPITAL ENCOUNTER (OUTPATIENT)
Age: 75
Setting detail: OUTPATIENT SURGERY
Discharge: HOME | End: 2020-07-21
Attending: SURGERY | Admitting: SURGERY
Payer: MEDICARE

## 2020-07-21 ENCOUNTER — ANESTHESIA (OUTPATIENT)
Dept: SURGERY | Age: 75
End: 2020-07-21
Payer: MEDICARE

## 2020-07-21 VITALS
HEIGHT: 74 IN | BODY MASS INDEX: 35.16 KG/M2 | RESPIRATION RATE: 61 BRPM | HEART RATE: 67 BPM | OXYGEN SATURATION: 96 % | WEIGHT: 274 LBS | SYSTOLIC BLOOD PRESSURE: 135 MMHG | TEMPERATURE: 97.8 F | DIASTOLIC BLOOD PRESSURE: 66 MMHG

## 2020-07-21 DIAGNOSIS — L76.82 PAIN AT SURGICAL INCISION: Primary | ICD-10-CM

## 2020-07-21 LAB
GLUCOSE BLD STRIP.AUTO-MCNC: 104 MG/DL (ref 65–100)
GLUCOSE BLD STRIP.AUTO-MCNC: 130 MG/DL (ref 65–100)

## 2020-07-21 PROCEDURE — 82962 GLUCOSE BLOOD TEST: CPT

## 2020-07-21 PROCEDURE — 74011250637 HC RX REV CODE- 250/637: Performed by: ANESTHESIOLOGY

## 2020-07-21 PROCEDURE — 77030025869: Performed by: SURGERY

## 2020-07-21 PROCEDURE — 77030040922 HC BLNKT HYPOTHRM STRY -A: Performed by: ANESTHESIOLOGY

## 2020-07-21 PROCEDURE — 77030003602 HC NDL NRV BLK BBMI -B: Performed by: ANESTHESIOLOGY

## 2020-07-21 PROCEDURE — 77030018717 HC DRSG GRNUFM KCON -B: Performed by: SURGERY

## 2020-07-21 PROCEDURE — 77030018836 HC SOL IRR NACL ICUM -A: Performed by: SURGERY

## 2020-07-21 PROCEDURE — 74011250636 HC RX REV CODE- 250/636: Performed by: SURGERY

## 2020-07-21 PROCEDURE — 76210000006 HC OR PH I REC 0.5 TO 1 HR: Performed by: SURGERY

## 2020-07-21 PROCEDURE — 74011000250 HC RX REV CODE- 250: Performed by: SURGERY

## 2020-07-21 PROCEDURE — 76942 ECHO GUIDE FOR BIOPSY: CPT | Performed by: SURGERY

## 2020-07-21 PROCEDURE — 74011250636 HC RX REV CODE- 250/636: Performed by: ANESTHESIOLOGY

## 2020-07-21 PROCEDURE — 74011000272 HC RX REV CODE- 272: Performed by: SURGERY

## 2020-07-21 PROCEDURE — 77030008462 HC STPLR SKN PROX J&J -A: Performed by: SURGERY

## 2020-07-21 PROCEDURE — 76010010054 HC POST OP PAIN BLOCK: Performed by: SURGERY

## 2020-07-21 PROCEDURE — 74011250636 HC RX REV CODE- 250/636: Performed by: NURSE ANESTHETIST, CERTIFIED REGISTERED

## 2020-07-21 PROCEDURE — 74011000250 HC RX REV CODE- 250: Performed by: NURSE ANESTHETIST, CERTIFIED REGISTERED

## 2020-07-21 PROCEDURE — 76060000033 HC ANESTHESIA 1 TO 1.5 HR: Performed by: SURGERY

## 2020-07-21 PROCEDURE — 77030011283 HC ELECTRD NDL COVD -A: Performed by: SURGERY

## 2020-07-21 PROCEDURE — 76210000020 HC REC RM PH II FIRST 0.5 HR: Performed by: SURGERY

## 2020-07-21 PROCEDURE — 77030002982 HC SUT POLYSRB J&J -A: Performed by: SURGERY

## 2020-07-21 PROCEDURE — 76010000161 HC OR TIME 1 TO 1.5 HR INTENSV-TIER 1: Performed by: SURGERY

## 2020-07-21 PROCEDURE — 77030002996 HC SUT SLK J&J -A: Performed by: SURGERY

## 2020-07-21 PROCEDURE — 77030031139 HC SUT VCRL2 J&J -A: Performed by: SURGERY

## 2020-07-21 PROCEDURE — 77030010507 HC ADH SKN DERMBND J&J -B: Performed by: SURGERY

## 2020-07-21 RX ORDER — PROPOFOL 10 MG/ML
INJECTION, EMULSION INTRAVENOUS
Status: DISCONTINUED | OUTPATIENT
Start: 2020-07-21 | End: 2020-07-21 | Stop reason: HOSPADM

## 2020-07-21 RX ORDER — FENTANYL CITRATE 50 UG/ML
100 INJECTION, SOLUTION INTRAMUSCULAR; INTRAVENOUS ONCE
Status: COMPLETED | OUTPATIENT
Start: 2020-07-21 | End: 2020-07-21

## 2020-07-21 RX ORDER — KETAMINE HYDROCHLORIDE 50 MG/ML
INJECTION, SOLUTION INTRAMUSCULAR; INTRAVENOUS AS NEEDED
Status: DISCONTINUED | OUTPATIENT
Start: 2020-07-21 | End: 2020-07-21 | Stop reason: HOSPADM

## 2020-07-21 RX ORDER — CEFAZOLIN SODIUM/WATER 2 G/20 ML
2 SYRINGE (ML) INTRAVENOUS ONCE
Status: DISCONTINUED | OUTPATIENT
Start: 2020-07-21 | End: 2020-07-21 | Stop reason: DRUGHIGH

## 2020-07-21 RX ORDER — SODIUM CHLORIDE, SODIUM LACTATE, POTASSIUM CHLORIDE, CALCIUM CHLORIDE 600; 310; 30; 20 MG/100ML; MG/100ML; MG/100ML; MG/100ML
100 INJECTION, SOLUTION INTRAVENOUS CONTINUOUS
Status: DISCONTINUED | OUTPATIENT
Start: 2020-07-21 | End: 2020-07-21 | Stop reason: HOSPADM

## 2020-07-21 RX ORDER — EPHEDRINE SULFATE/0.9% NACL/PF 50 MG/5 ML
SYRINGE (ML) INTRAVENOUS AS NEEDED
Status: DISCONTINUED | OUTPATIENT
Start: 2020-07-21 | End: 2020-07-21 | Stop reason: HOSPADM

## 2020-07-21 RX ORDER — LIDOCAINE HYDROCHLORIDE 10 MG/ML
0.1 INJECTION INFILTRATION; PERINEURAL AS NEEDED
Status: DISCONTINUED | OUTPATIENT
Start: 2020-07-21 | End: 2020-07-21 | Stop reason: HOSPADM

## 2020-07-21 RX ORDER — SODIUM CHLORIDE 0.9 % (FLUSH) 0.9 %
5-40 SYRINGE (ML) INJECTION EVERY 8 HOURS
Status: DISCONTINUED | OUTPATIENT
Start: 2020-07-21 | End: 2020-07-21 | Stop reason: HOSPADM

## 2020-07-21 RX ORDER — SODIUM CHLORIDE 0.9 % (FLUSH) 0.9 %
5-40 SYRINGE (ML) INJECTION AS NEEDED
Status: DISCONTINUED | OUTPATIENT
Start: 2020-07-21 | End: 2020-07-21 | Stop reason: HOSPADM

## 2020-07-21 RX ORDER — HYDROCODONE BITARTRATE AND ACETAMINOPHEN 7.5; 325 MG/1; MG/1
1 TABLET ORAL AS NEEDED
Status: DISCONTINUED | OUTPATIENT
Start: 2020-07-21 | End: 2020-07-21 | Stop reason: HOSPADM

## 2020-07-21 RX ORDER — MIDAZOLAM HYDROCHLORIDE 1 MG/ML
2 INJECTION, SOLUTION INTRAMUSCULAR; INTRAVENOUS
Status: COMPLETED | OUTPATIENT
Start: 2020-07-21 | End: 2020-07-21

## 2020-07-21 RX ORDER — EPINEPHRINE 1 MG/ML
INJECTION INTRAMUSCULAR; INTRAVENOUS; SUBCUTANEOUS AS NEEDED
Status: DISCONTINUED | OUTPATIENT
Start: 2020-07-21 | End: 2020-07-21 | Stop reason: HOSPADM

## 2020-07-21 RX ORDER — ROPIVACAINE HYDROCHLORIDE 5 MG/ML
INJECTION, SOLUTION EPIDURAL; INFILTRATION; PERINEURAL
Status: COMPLETED | OUTPATIENT
Start: 2020-07-21 | End: 2020-07-21

## 2020-07-21 RX ORDER — SODIUM CHLORIDE 0.9 G/100ML
IRRIGANT IRRIGATION AS NEEDED
Status: DISCONTINUED | OUTPATIENT
Start: 2020-07-21 | End: 2020-07-21 | Stop reason: HOSPADM

## 2020-07-21 RX ORDER — OXYCODONE HYDROCHLORIDE 5 MG/1
5 TABLET ORAL
Status: DISCONTINUED | OUTPATIENT
Start: 2020-07-21 | End: 2020-07-21 | Stop reason: HOSPADM

## 2020-07-21 RX ORDER — LIDOCAINE HYDROCHLORIDE 10 MG/ML
INJECTION INFILTRATION; PERINEURAL AS NEEDED
Status: DISCONTINUED | OUTPATIENT
Start: 2020-07-21 | End: 2020-07-21 | Stop reason: HOSPADM

## 2020-07-21 RX ORDER — FAMOTIDINE 20 MG/1
20 TABLET, FILM COATED ORAL ONCE
Status: COMPLETED | OUTPATIENT
Start: 2020-07-21 | End: 2020-07-21

## 2020-07-21 RX ORDER — NALOXONE HYDROCHLORIDE 0.4 MG/ML
0.1 INJECTION, SOLUTION INTRAMUSCULAR; INTRAVENOUS; SUBCUTANEOUS AS NEEDED
Status: DISCONTINUED | OUTPATIENT
Start: 2020-07-21 | End: 2020-07-21 | Stop reason: HOSPADM

## 2020-07-21 RX ORDER — GLYCOPYRROLATE 0.2 MG/ML
INJECTION INTRAMUSCULAR; INTRAVENOUS AS NEEDED
Status: DISCONTINUED | OUTPATIENT
Start: 2020-07-21 | End: 2020-07-21 | Stop reason: HOSPADM

## 2020-07-21 RX ORDER — PROPOFOL 10 MG/ML
INJECTION, EMULSION INTRAVENOUS AS NEEDED
Status: DISCONTINUED | OUTPATIENT
Start: 2020-07-21 | End: 2020-07-21 | Stop reason: HOSPADM

## 2020-07-21 RX ORDER — HYDROMORPHONE HYDROCHLORIDE 2 MG/ML
0.5 INJECTION, SOLUTION INTRAMUSCULAR; INTRAVENOUS; SUBCUTANEOUS
Status: DISCONTINUED | OUTPATIENT
Start: 2020-07-21 | End: 2020-07-21 | Stop reason: HOSPADM

## 2020-07-21 RX ORDER — FENTANYL CITRATE 50 UG/ML
INJECTION, SOLUTION INTRAMUSCULAR; INTRAVENOUS AS NEEDED
Status: DISCONTINUED | OUTPATIENT
Start: 2020-07-21 | End: 2020-07-21 | Stop reason: HOSPADM

## 2020-07-21 RX ORDER — SODIUM CHLORIDE 9 MG/ML
25 INJECTION, SOLUTION INTRAVENOUS CONTINUOUS
Status: DISCONTINUED | OUTPATIENT
Start: 2020-07-21 | End: 2020-07-21 | Stop reason: HOSPADM

## 2020-07-21 RX ORDER — HYDROCODONE BITARTRATE AND ACETAMINOPHEN 5; 325 MG/1; MG/1
1 TABLET ORAL
Qty: 30 TAB | Refills: 0 | Status: SHIPPED | OUTPATIENT
Start: 2020-07-21 | End: 2020-07-24

## 2020-07-21 RX ADMIN — ROPIVACAINE HYDROCHLORIDE 10 ML: 150 INJECTION, SOLUTION EPIDURAL; INFILTRATION; PERINEURAL at 10:11

## 2020-07-21 RX ADMIN — Medication 5 MG: at 11:51

## 2020-07-21 RX ADMIN — KETAMINE HYDROCHLORIDE 10 MG: 50 INJECTION INTRAMUSCULAR; INTRAVENOUS at 11:40

## 2020-07-21 RX ADMIN — GLYCOPYRROLATE 0.1 MG: 0.2 INJECTION, SOLUTION INTRAMUSCULAR; INTRAVENOUS at 10:53

## 2020-07-21 RX ADMIN — KETAMINE HYDROCHLORIDE 10 MG: 50 INJECTION INTRAMUSCULAR; INTRAVENOUS at 11:26

## 2020-07-21 RX ADMIN — KETAMINE HYDROCHLORIDE 20 MG: 50 INJECTION INTRAMUSCULAR; INTRAVENOUS at 10:53

## 2020-07-21 RX ADMIN — PHENYLEPHRINE HYDROCHLORIDE 100 MCG: 10 INJECTION INTRAVENOUS at 11:51

## 2020-07-21 RX ADMIN — Medication 5 MG: at 11:43

## 2020-07-21 RX ADMIN — FENTANYL CITRATE 50 MCG: 50 INJECTION, SOLUTION INTRAMUSCULAR; INTRAVENOUS at 10:15

## 2020-07-21 RX ADMIN — MEPIVACAINE HYDROCHLORIDE 20 ML: 20 INJECTION, SOLUTION EPIDURAL; INFILTRATION at 10:11

## 2020-07-21 RX ADMIN — PHENYLEPHRINE HYDROCHLORIDE 100 MCG: 10 INJECTION INTRAVENOUS at 11:41

## 2020-07-21 RX ADMIN — MIDAZOLAM 1 MG: 1 INJECTION INTRAMUSCULAR; INTRAVENOUS at 10:15

## 2020-07-21 RX ADMIN — SODIUM CHLORIDE, SODIUM LACTATE, POTASSIUM CHLORIDE, AND CALCIUM CHLORIDE 100 ML/HR: 600; 310; 30; 20 INJECTION, SOLUTION INTRAVENOUS at 09:27

## 2020-07-21 RX ADMIN — PROPOFOL 50 MCG/KG/MIN: 10 INJECTION, EMULSION INTRAVENOUS at 10:55

## 2020-07-21 RX ADMIN — PROPOFOL 20 MG: 10 INJECTION, EMULSION INTRAVENOUS at 11:40

## 2020-07-21 RX ADMIN — FENTANYL CITRATE 25 MCG: 50 INJECTION INTRAMUSCULAR; INTRAVENOUS at 10:53

## 2020-07-21 RX ADMIN — PROPOFOL 20 MG: 10 INJECTION, EMULSION INTRAVENOUS at 10:55

## 2020-07-21 RX ADMIN — CEFAZOLIN 3 G: 1 INJECTION, POWDER, FOR SOLUTION INTRAVENOUS at 11:05

## 2020-07-21 RX ADMIN — FAMOTIDINE 20 MG: 20 TABLET, FILM COATED ORAL at 09:27

## 2020-07-21 NOTE — ANESTHESIA POSTPROCEDURE EVALUATION
Procedure(s):  SPLIT THICKNESS SKIN GRAFT FROM RIGHT HIP TO RIGHT LOWER LEG WOUND VAC PLACEMENT /RIGHT  FOOT REGIONAL/BLOCK.    total IV anesthesia    Anesthesia Post Evaluation      Multimodal analgesia: multimodal analgesia used between 6 hours prior to anesthesia start to PACU discharge  Patient location during evaluation: PACU  Patient participation: complete - patient participated  Level of consciousness: awake and alert  Pain management: adequate  Airway patency: patent  Anesthetic complications: no  Cardiovascular status: acceptable  Respiratory status: acceptable  Hydration status: acceptable  Post anesthesia nausea and vomiting:  none  Final Post Anesthesia Temperature Assessment:  Normothermia (36.0-37.5 degrees C)      INITIAL Post-op Vital signs:   Vitals Value Taken Time   /66 7/21/2020 12:39 PM   Temp 36.5 °C (97.7 °F) 7/21/2020 12:11 PM   Pulse 67 7/21/2020 12:43 PM   Resp 52 7/21/2020 12:42 PM   SpO2 96 % 7/21/2020 12:43 PM   Vitals shown include unvalidated device data.

## 2020-07-21 NOTE — DISCHARGE INSTRUCTIONS
Keep dressings in place and dry until return to clinic      After general anesthesia or intravenous sedation, for 24 hours or while taking prescription Narcotics:  · Limit your activities  · A responsible adult needs to be with you for the next 24 hours  · Do not drive and operate hazardous machinery  · Do not make important personal or business decisions  · Do not drink alcoholic beverages  · If you have not urinated within 8 hours after discharge, please contact your surgeon on call. · If you have sleep apnea and have a CPAP machine, please use it for all naps and sleeping. · Please use caution when taking narcotics and any of your home medications that may cause drowsiness. *  Please give a list of your current medications to your Primary Care Provider. *  Please update this list whenever your medications are discontinued, doses are      changed, or new medications (including over-the-counter products) are added. *  Please carry medication information at all times in case of emergency situations. These are general instructions for a healthy lifestyle:  No smoking/ No tobacco products/ Avoid exposure to second hand smoke  Surgeon General's Warning:  Quitting smoking now greatly reduces serious risk to your health. Obesity, smoking, and sedentary lifestyle greatly increases your risk for illness  A healthy diet, regular physical exercise & weight monitoring are important for maintaining a healthy lifestyle    You may be retaining fluid if you have a history of heart failure or if you experience any of the following symptoms:  Weight gain of 3 pounds or more overnight or 5 pounds in a week, increased swelling in our hands or feet or shortness of breath while lying flat in bed. Please call your doctor as soon as you notice any of these symptoms; do not wait until your next office visit.

## 2020-07-21 NOTE — H&P
CC: Right foot wound  HPI: 68 yo s/p revascularization RLL with Dr Shailesh Ca. Has undergone serial skin substitutes to right foot and ready for skin grafting    Past Medical History:   Diagnosis Date    Adverse effect of anesthesia     REITERATED by patient 7/20/20- DO NOT LEAVE FLAT in PACU! RAISE HEAD MINIMUM 30 degrees--- had to be transferred at ICU and use Bipap    Amputation of lower limb ([address]     left AKA    Anemia     Ankle ulcer ([address] 2/22/2019    Anxiety     Arthritis     generalized    At risk for falls     CAD (coronary artery disease)     [facility]- [facility]G ~2007 [facility])- no coronary stents----     Enlarged prostate     Former cigarette smoker     2 ppd x 46 years - quit 2006    History of hepatitis 1952    age 13 pt reports/ bhargavi NAIR (hard of hearing)     hearing aids bilat    Hx of CABG 2007    Hypercholesteremia     Hypertension     managed well with meds    Hypothyroidism     managed well with Synthroid    Long term (current) use of systemic steroids     Prednisone    Obesity (BMI 30-39. 9)     BMI 31    PAD (peripheral artery disease) ([address]     Peripheral artery disease ([address] 3/27/2019    Peripheral artery disease (HCC) 3/27/2019    RBBB     Toe ulcer ([address] 1/17/2020    Type 2 diabetes mellitus (HCC)     type 2-- insulin-- sqbs am avg 80---reports A1C  7.2 (3/2019) has not had mor recent--   no hypo    Vitamin D deficiency     replace with PO vitamin D    Wound, open     right foot -- nonhealing     Past Surgical History:   Procedure Laterality Date    [facility]    HX ABOVE KNEE AMPUTATION  11/2019    HX COLONOSCOPY      HX HEART CATHETERIZATION      HX HEMORRHOIDECTOMY      HX KNEE REPLACEMENT Left 1997~    HX KNEE REPLACEMENT Right 2000~    IL DEBRIDEMENT OPEN WOUND 20 SQ CM<  05/07/2020    RIGHT FOOT DEBRIDEMENT     VASCULAR SURGERY PROCEDURE UNLIST  03/27/2019    left common femoral to posterior tibial bypass with PTFE graft    VASCULAR SURGERY PROCEDURE UNLIST  10/24/2019    Ultrasound-guided access of the right common femoral artery, placement of catheter to the aorta for aortogram, Left lower extremity arteriogram with balloon angioplasty and stenting of the distal outflow with a 4 x 33 balloon.  VASCULAR SURGERY PROCEDURE UNLIST Left 11/01/2019    AKA    VASCULAR SURGERY PROCEDURE UNLIST  02/03/2020     Ultrasound-guided access of the left common femoral artery, placement of catheter for aortogram. Right lower extremity arteriograms.  VASCULAR SURGERY PROCEDURE UNLIST Right 05/05/2020    right common femoral bypass     A&O x3  RRR  Resp clear  Right foot dressing c/d/i.     A/P:  Will proceed with skin grafting

## 2020-07-21 NOTE — PROGRESS NOTES
Spiritual Care visit. Initial Visit, Pre Surgery Consult. Visit and prayer before patient goes to surgery.     Visit by Lesia Smith M.Ed., Th.B. ,Staff

## 2020-07-21 NOTE — ANESTHESIA PREPROCEDURE EVALUATION
Relevant Problems   No relevant active problems       Anesthetic History               Review of Systems / Medical History  Patient summary reviewed and pertinent labs reviewed    Pulmonary          Smoker (Former)         Neuro/Psych              Cardiovascular    Hypertension          Past MI, CAD, PAD, CABG (2007) and hyperlipidemia      Comments: Echo 2018 showing EF 50-54%   GI/Hepatic/Renal                Endo/Other    Diabetes: type 2    Obesity     Other Findings   Comments: RBBB         Physical Exam    Airway  Mallampati: II  TM Distance: > 6 cm  Neck ROM: decreased range of motion        Cardiovascular  Regular rate and rhythm,  S1 and S2 normal,  no murmur, click, rub, or gallop             Dental  No notable dental hx       Pulmonary  Breath sounds clear to auscultation               Abdominal         Other Findings            Anesthetic Plan    ASA: 3  Anesthesia type: total IV anesthesia      Post-op pain plan if not by surgeon: peripheral nerve block single      Anesthetic plan and risks discussed with: Patient      Has required post op Bipap in past

## 2020-07-21 NOTE — ANESTHESIA PROCEDURE NOTES
Peripheral Block    Start time: 7/21/2020 10:05 AM  End time: 7/21/2020 10:11 AM  Performed by: Delfin Forbes MD  Authorized by: Delfin Forbes MD       Pre-procedure:    Indications: at surgeon's request and post-op pain management    Preanesthetic Checklist: patient identified, risks and benefits discussed, site marked, timeout performed, anesthesia consent given and patient being monitored    Timeout Time: 10:05          Block Type:   Block Type:  Popliteal  Laterality:  Right  Monitoring:  Continuous pulse ox, frequent vital sign checks, heart rate, responsive to questions and oxygen  Injection Technique:  Single shot  Procedures: ultrasound guided and nerve stimulator    Prep: DuraPrep    Location:  Lower thigh  Needle Type:  Stimuplex  Needle Gauge:  20 G  Needle Localization:  Nerve stimulator and ultrasound guidance  Motor Response: minimal motor response >0.4 mA      Assessment:  Number of attempts:  1  Injection Assessment:  Incremental injection every 5 mL, local visualized surrounding nerve on ultrasound, negative aspiration for blood, no intravascular symptoms, no paresthesia and ultrasound image on chart  Patient tolerance:  Patient tolerated the procedure well with no immediate complications

## 2020-08-03 NOTE — OP NOTES
Operative Note    Patient: Meagan Herrera  YOB: 1945  MRN: [id number]    Date of Procedure: 7/21/2020     Pre-Op Diagnosis: Right foot wound    Post-Op Diagnosis: Same    Procedure(s):  Tangential excision and wound bed preparation right foot 5 cms  Split thickness skin graft from right thigh to right foot 5 cm2    Prior to the procedure the patient was met in holding. Once again a discussion of the risks, benefits and alternatives was conducted including but not limited to bleeding, infection, failure of the surgery, need for further procedures, disappointing or unacceptable cosmesis, damage to adjacent structures was conducted. The patient again affirmed understanding and consent. The patient was marked in the upright and relaxed position. Patient brought to the OR, surgical timeout performed and anesthesia induced. Patient positioned and prepped and draped.     Attention turned to right foot. This was irrigated with scrub brush and 3 L baci + NS after tangential excision with SG blad and 12 blade. Healthy bleeding tissue encountered with no sign of infection. Epi soaked gauze placed on wound.     Attention then turned to donor site. Wound dimensions transferred and thigh tumesced with 20 mls kleins. After 10 minutes had elapsed pneumatic dermatome used to harvest skin graft at 11/1000 thickness.      This was done in 2 passes to allow for adequate tissue.     Epi soaked gauze placed on the thigh. Graft placed, trimmed and secured with staples.  Further secured with Prevena wound vac.        Surgeon(s):  Bakari Meraz MD    Surgical Assistant: None    Anesthesia: Regional     Estimated Blood Loss (mL): 10 ml    Complications: None immediate

## 2021-05-24 ENCOUNTER — HOSPITAL ENCOUNTER (OUTPATIENT)
Dept: WOUND CARE | Age: 76
Discharge: HOME | End: 2021-05-24
Attending: PHYSICAL MEDICINE & REHABILITATION
Payer: MEDICARE

## 2021-05-24 VITALS
WEIGHT: 240 LBS | SYSTOLIC BLOOD PRESSURE: 168 MMHG | HEART RATE: 58 BPM | HEIGHT: 74 IN | DIASTOLIC BLOOD PRESSURE: 81 MMHG | OXYGEN SATURATION: 96 % | RESPIRATION RATE: 20 BRPM | TEMPERATURE: 96.4 F | BODY MASS INDEX: 30.8 KG/M2

## 2021-05-24 DIAGNOSIS — E66.01 MORBID OBESITY (HCC): Chronic | ICD-10-CM

## 2021-05-24 DIAGNOSIS — I99.8 ISCHEMIC LEG PAIN: ICD-10-CM

## 2021-05-24 DIAGNOSIS — T82.858S BYPASS GRAFT STENOSIS, SEQUELA: Chronic | ICD-10-CM

## 2021-05-24 DIAGNOSIS — M79.606 ISCHEMIC LEG PAIN: ICD-10-CM

## 2021-05-24 DIAGNOSIS — I73.9 PAD (PERIPHERAL ARTERY DISEASE) (HCC): Chronic | ICD-10-CM

## 2021-05-24 DIAGNOSIS — S91.301S OPEN WOUND OF RIGHT FOOT, SEQUELA: ICD-10-CM

## 2021-05-24 PROCEDURE — 99203 OFFICE O/P NEW LOW 30 MIN: CPT | Performed by: PHYSICAL MEDICINE & REHABILITATION

## 2021-05-24 PROCEDURE — 99213 OFFICE O/P EST LOW 20 MIN: CPT

## 2021-05-24 NOTE — DISCHARGE INSTRUCTIONS
[address]  [address], [address]  Phone: [phone]  Fax: [phone]    Patient: Jb Sandhu MRN: [id number]  SSN: [id number]    YOB: 1945  Age: 70 y.o. Sex: male       Return Appointment: 1 week with Liz Johnson MD    Instructions: Right Dorsal Foot, Right 3rd Plantar Toe  Cleanse with Normal Saline  Apply Dakin's solution 0.25%-apply to wound on gauze or packing strip, change daily. Cover with ABD/Gauze  Wrap with Rolled Gauze  Change Daily    DO NOT GET WOUND WET-PURCHASE CAST COVER AT [facility] OR [facility]    Should you experience increased redness, swelling, pain, foul odor, size of wound(s), or have a temperature over 101 degrees please contact the [address] [place] [address] Road at [phone] or if after hours contact your primary care physician or go to the hospital emergency department.     Signed By: Krysten Mena RN     May 24, 2021

## 2021-05-24 NOTE — WOUND CARE
[address] [place] [address], [address] Phone: [phone] Fax: [phone] Patient: Favian Corbin MRN: [id number]  SSN: [id number] YOB: 1945  Age: 70 y.o. Sex: male Return Appointment: 1 week with Hieu Contreras MD 
 
Instructions: Right Dorsal Foot, Right 3rd Plantar Toe Cleanse with Normal Saline Apply Dakin's solution 0.25%-apply to wound on gauze or packing strip, change daily. Cover with ABD/Gauze Wrap with Rolled Gauze Change Daily DO NOT GET WOUND WET-PURCHASE CAST COVER AT [facility] OR [facility] Should you experience increased redness, swelling, pain, foul odor, size of wound(s), or have a temperature over 101 degrees please contact the [address] [place] [address] Road at [phone] or if after hours contact your primary care physician or go to the hospital emergency department. Signed By: Erin Charles RN May 24, 2021

## 2021-05-24 NOTE — WOUND CARE
05/24/21 1342 Wound Foot Right;Dorsal #1 05/24/21 Date First Assessed: 05/24/21   Wound Approximate Age at First Assessment (Weeks): 52 weeks  Primary Wound Type: Diabetic Ulcer  Location: Foot  Wound Location Orientation: Right;Dorsal  Wound Description: #1  Date of First Observation: 05/24/21 Wound Image Wound Etiology Diabetic Reg Miller 2 Dressing Status Breakthrough drainage noted Cleansed Cleansed with saline Dressing/Treatment Gauze dressing/dressing sponge Offloading for Diabetic Foot Ulcers Post-op shoe Wound Length (cm) 3.2 cm Wound Width (cm) 3.4 cm Wound Depth (cm) 0.2 cm Wound Surface Area (cm^2) 10.88 cm^2 Wound Volume (cm^3) 2.18 cm^3 Wound Assessment Pink/red Drainage Amount Moderate Drainage Description Serosanguinous Wound Odor None Letha-Wound/Incision Assessment Edematous; Blanchable erythema Wound Thickness Description Full thickness Wound Toe (Comment  which one) Right;Plantar 3rd Toe 05/24/21 Date First Assessed: 05/24/21   Wound Approximate Age at First Assessment (Weeks): 1 weeks  Primary Wound Type: Diabetic Ulcer  Location: Toe (Comment  which one)  Wound Location Orientation: Right;Plantar  Wound Description: 3rd Toe  Date of First Ob. .. Wound Image Wound Etiology Diabetic Reg Miller 2 Cleansed Cleansed with saline Offloading for Diabetic Foot Ulcers Post-op shoe Wound Length (cm) 2.1 cm Wound Width (cm) 1.5 cm Wound Depth (cm) 0.2 cm Wound Surface Area (cm^2) 3.15 cm^2 Wound Volume (cm^3) 0.63 cm^3 Wound Assessment Pink/red Drainage Amount Moderate Drainage Description Serosanguinous Wound Odor None Letha-Wound/Incision Assessment Blanchable erythema;Edematous Wound Thickness Description Full thickness Patient takes Xarelto and ASA 81mg Daily

## 2021-05-25 NOTE — PROGRESS NOTES
[address], [address], [address] [place] [address] [address]  [phone]    Progress Notes   Tereso Hoyt       MRN: [id number]     : 1945     Age: 70 y.o. Subjective/HPI:   This patient is a 70 y.o. male seen and evaluated at the wound clinic for chronic right dorsal foot ulcer. The patient has been seen in the past by Dr. Abby Woods of plastic surgery. The patient is accompanied by his wife. They state that this right dorsal foot ulcer has been present for over a year. It was discovered that the patient had peripheral arterial disease and he underwent a right common femoral to popliteal bypass performed by Dr. Cesia Jean-Baptiste May 2020. He then underwent application of multiple skin substitutes, specifically puraply, performed at the vascular surgery office by Gabino Ventura standard nurse practitioner. Unfortunately, this did not resolve the wound. The patient then underwent a split thickness skin graft performed by Dr. Abby Woods on 2020. The patient states that this did not heal the wound as well. The patient has a history of a left high above-knee amputation and he is currently not using his prosthesis. He has a history of diabetes mellitus with his most recent A1c of 6.5%. He has been seen at the [facility] wound center as well. He has had multiple types of dressings. Most recently he developed a recurrent third toe open wound again. It is thought that this occurred due to the fact that he uses a manual wheelchair and self propels with his right foot. Said no fever or chills. Review of Systems  A comprehensive review of systems was negative except for that written in the HPI. Past Medical History:   Diagnosis Date    Adverse effect of anesthesia     REITERATED by patient 20- DO NOT LEAVE FLAT in PACU!  RAISE HEAD MINIMUM 30 degrees--- had to be transferred at ICU and use Bipap    Amputation of lower limb ([address]     left AKA    Anemia     Ankle ulcer ([facility] 2/22/2019    Anxiety     Arthritis     generalized    At risk for falls     CAD (coronary artery disease)     [facility]- CABG ~2007 MI-  [facility])- no coronary stents----     Enlarged prostate     Former cigarette smoker     2 ppd x 46 years - quit 2006    History of hepatitis 1952    age 13 pt reports/ bhargavi NAIR (hard of hearing)     hearing aids bilat    Hx of CABG 2007    Hypercholesteremia     Hypertension     managed well with meds    Hypothyroidism     managed well with Synthroid    Long term (current) use of systemic steroids     Prednisone    Obesity (BMI 30-39. 9)     BMI 31    PAD (peripheral artery disease) ([address]     Peripheral artery disease ([facility] 3/27/2019    Peripheral artery disease ([facility] 3/27/2019    RBBB     Toe ulcer ([facility] 1/17/2020    Type 2 diabetes mellitus (HCC)     type 2-- insulin-- sqbs am avg 80---reports A1C  7.2 (3/2019) has not had mor recent--   no hypo    Vitamin D deficiency     replace with PO vitamin D    Wound, open     right foot -- nonhealing      Past Surgical History:   Procedure Laterality Date    HX ABOVE KNEE AMPUTATION  11/2019    HX COLONOSCOPY      HX HEART CATHETERIZATION      HX HEMORRHOIDECTOMY      HX KNEE REPLACEMENT Left 1997~    HX KNEE REPLACEMENT Right 2000~    HI CABG, ARTERY-VEIN, FOUR  [place]    HI DEBRIDEMENT OPEN WOUND 20 SQ CM<  05/07/2020    RIGHT FOOT DEBRIDEMENT     VASCULAR SURGERY PROCEDURE UNLIST  03/27/2019    left common femoral to posterior tibial bypass with PTFE graft    VASCULAR SURGERY PROCEDURE UNLIST  10/24/2019    Ultrasound-guided access of the right common femoral artery, placement of catheter to the aorta for aortogram, Left lower extremity arteriogram with balloon angioplasty and stenting of the distal outflow with a 4 x 33 balloon.     VASCULAR SURGERY PROCEDURE UNLIST Left 11/01/2019    AKA    VASCULAR SURGERY PROCEDURE UNLIST  02/03/2020 Ultrasound-guided access of the left common femoral artery, placement of catheter for aortogram. Right lower extremity arteriograms.  VASCULAR SURGERY PROCEDURE UNLIST Right 2020    right common femoral bypass      Allergies   Allergen Reactions    Gabapentin Other (comments)     Severe pain in legs    Rosuvastatin Rash      Social History     Tobacco Use    Smoking status: Former Smoker     Packs/day: 2.00     Years: 50.00     Pack years: 100.00     Quit date: 2006     Years since quittin.4    Smokeless tobacco: Never Used    Tobacco comment: cigar   Substance Use Topics    Alcohol use: No      Social History     Social History Narrative    Retired . Lives with wife     Family History   Problem Relation Age of Onset    No Known Problems Mother     Lung Cancer Father     Diabetes Father     Heart Disease Father     No Known Problems Sister     Heart Disease Child       Cannot display prior to admission medications because the patient has not been admitted in this contact. Current Outpatient Medications   Medication Sig    rivaroxaban (Xarelto) 2.5 mg tablet Take 2.5 mg by mouth two (2) times daily (with meals). Per Dr Matthieu Chen will need to take for life  Indications: 20 8btls sample [email or web address], 20 6 btls samples at  lot#[id number]/    hydrALAZINE (APRESOLINE) 50 mg tablet Take 100 mg by mouth daily.  metoprolol tartrate (LOPRESSOR) 25 mg tablet Take 25 mg by mouth two (2) times a day.  nitroglycerin (NITROSTAT) 0.4 mg SL tablet 1 Tab by SubLINGual route every five (5) minutes as needed for Chest Pain. Up to 3 doses.  aspirin delayed-release 81 mg tablet Take 81 mg by mouth every morning. Indications: prevention of transient ischemic attack    ergocalciferol (ERGOCALCIFEROL) 50,000 unit capsule Take 50,000 Units by mouth every seven (7) days.  Pt takes on Saturday    tamsulosin (FLOMAX) 0.4 mg capsule Take 0.4 mg by mouth every morning.  levothyroxine (SYNTHROID) 75 mcg tablet Take 75 mcg by mouth Daily (before breakfast). Take morning of procedure.  lisinopril (PRINIVIL, ZESTRIL) 40 mg tablet Take 40 mg by mouth daily.  insulin NPH/insulin regular (NOVOLIN 70/30 U-100 INSULIN) 100 unit/mL (70-30) injection 55 Units by SubCUTAneous route two (2) times a day. No current facility-administered medications for this encounter. Objective:     Vitals:    05/24/21 1325 05/24/21 1328   BP:  (!) 168/81   Pulse:  (!) 58   Resp:  20   Temp:  (!) 96.4 °F (35.8 °C)   SpO2:  96%   Weight: 240 lb (108.9 kg)    Height: 6' 2\" (1.88 m)        Physical Exam:  BMI: 30.8  Ambulation: He is nonambulatory. He self propels a lightweight manual wheelchair that is without foot rest so that he can self propel with the right foot  Gen- the patient is well developed and in no acute distress  HEENT- no focal abnormalities of the scalp or face. Neck- no JVD or retractions  Lungs- normal respiratory effort. Cardiovascular:  Lower limb pulses: Right dorsalis pedis pulse is palpable today. .   Abd- soft and no masses evident. Ext- warm without cyanosis. There is right lower leg edema that appears not well controlled. Skin- no jaundice or rashes. Please see the specific measurements and descriptions of the wound(s) below. There is no evidence of periwound induration or warmth. No purulence or odor. The wound bed is located on the dorsal right foot and an area of scarred tissue with poor skin mobility  Neuro- alert and oriented x 3. No gross imotor deficits are present. Lower limb sensation isim paired with loss of protective sensation in the right foot. Psychological: Affect normal. Mood normal. Memory intact.       Wound Foot Right;Dorsal #1 05/24/21 (Active)   Wound Image   05/24/21 1342   Wound Etiology Diabetic Wilkins 2 05/24/21 1342   Dressing Status Breakthrough drainage noted 05/24/21 1342   Cleansed Cleansed with saline 05/24/21 1342   Dressing/Treatment Gauze dressing/dressing sponge 05/24/21 1342   Offloading for Diabetic Foot Ulcers Post-op shoe 05/24/21 1342   Wound Length (cm) 3.2 cm 05/24/21 1342   Wound Width (cm) 3.4 cm 05/24/21 1342   Wound Depth (cm) 0.2 cm 05/24/21 1342   Wound Surface Area (cm^2) 10.88 cm^2 05/24/21 1342   Wound Volume (cm^3) 2.18 cm^3 05/24/21 1342   Wound Assessment Pink/red 05/24/21 1342   Drainage Amount Moderate 05/24/21 1342   Drainage Description Serosanguinous 05/24/21 1342   Wound Odor None 05/24/21 1342   Letha-Wound/Incision Assessment Edematous; Blanchable erythema 05/24/21 1342   Wound Thickness Description Full thickness 05/24/21 1342   Number of days: 1       Wound Toe (Comment  which one) Right;Plantar 3rd Toe 05/24/21 (Active)   Wound Image   05/24/21 1342   Wound Etiology Diabetic Wilkins 2 05/24/21 1342   Cleansed Cleansed with saline 05/24/21 1342   Offloading for Diabetic Foot Ulcers Post-op shoe 05/24/21 1342   Wound Length (cm) 2.1 cm 05/24/21 1342   Wound Width (cm) 1.5 cm 05/24/21 1342   Wound Depth (cm) 0.2 cm 05/24/21 1342   Wound Surface Area (cm^2) 3.15 cm^2 05/24/21 1342   Wound Volume (cm^3) 0.63 cm^3 05/24/21 1342   Wound Assessment Pink/red 05/24/21 1342   Drainage Amount Moderate 05/24/21 1342   Drainage Description Serosanguinous 05/24/21 1342   Wound Odor None 05/24/21 1342   Letha-Wound/Incision Assessment Blanchable erythema;Edematous 05/24/21 1342   Wound Thickness Description Full thickness 05/24/21 1342   Number of days: 1        Data Review     All Micro Results     None        All Micro Results     None        Radiology  Assessment:     Patient Active Problem List   Diagnosis Code    CAD (coronary artery disease) I25.10    PAD [facility]peripheral [facility] disease) [facility] I73.9    HTN (hypertension) I10    DM (diabetes mellitus) ([facility] E11.9    Dyslipidemia E78.5    Hx of CABG Z95.1    Peripheral artery disease (HCC) I73.9    Severe obesity (HCC) E66.01    S/P bypass graft of extremity Z95.828    Bypass graft stenosis ([facility]) T82.858A    Ischemic leg pain M79.606, I99.8    Hypothyroidism E03.9    PVD (peripheral vascular disease) ([facility]) I73.9    Right foot ulcer ([facility] L97.519    Acute respiratory failure with hypoxia and hypercapnia ([facility]) J96.01, J96.02    Morbid obesity ([facility]) E66.01    Postprocedural hypotension I95.81    Suspected sleep apnea R29.818    Open wound of right foot S91.301A     Plan: Active Orders   Nursing    WOUND CARE, DRESSING CHANGE     Frequency: ONE TIME     Number of Occurrences: 1 Occurrences     Order Comments: Right Dorsal Foot, Right 3rd Plantar Toe  Cleanse with Normal Saline  Apply Dakin's solution 0.25%-apply to wound on gauze or packing strip, change daily. Cover with ABD/Gauze  Wrap with Rolled Gauze  Change Daily    DO NOT GET WOUND WET-PURCHASE CAST COVER AT [facility] OR [facility]         Follow-up Information     Follow up With Specialties Details Why Contact Info    [address] [place] [address] [place] In 1 week For wound re-check [address] [facility] [id number] [phone]        Presents with an open wound of the right dorsal foot of greater than 1 year duration that has failed split thickness skin grafting, collagen applications at the vascular office, with recent revascularization 1 year ago by Dr. Pamela Styles. He has a history of a left high above-knee amputation as well. Most recently, he has been just applying dry gauze to the wound beds. Our plan today will be to start daily Dakin's solution to both wound beds and wrap. We discussed protecting that right foot while propelling his wheelchair. Not see that he has had an MRI of his foot. I will recheck him in 1 week and assess how he responds to just a daily Dakin's dressing. We may consider MRI if no contraindications. TIME:  If total time for today's E/M service is used for level of service it is documented below.     This time includes physician non-face-to-face service time visit on the date of service and includes    Preparing to see the patient (eg, review of tests)  Obtaining and/or reviewing separately obtained history  Performing a medically necessary appropriate examination and/or evaluation  Counseling and educating the patient/family/caregiver  Ordering medications, tests, or procedures  Referring and communicating with other health care professionals as needed  Documenting clinical information in the electronic or other health record  Independently interpreting results (not reported separately) and communicating results to the patient/family/caregiver  Care coordination (not reported separately)    E/M time =    34      minutes. Dictated using voice recognition software. Proofread, but unrecognized errors may exist.       Thank you very much for the opportunity to participate in this patient's care.       Signed By: Eloy Yuen MD     May 25, 2021

## 2021-06-09 PROBLEM — M25.421 EFFUSION OF BURSA OF RIGHT ELBOW: Status: ACTIVE | Noted: 2021-06-09

## 2021-08-03 PROBLEM — E66.01 MORBID OBESITY (HCC): Status: RESOLVED | Noted: 2020-05-05 | Resolved: 2021-08-03

## 2021-08-03 PROBLEM — I73.9 PAD (PERIPHERAL ARTERY DISEASE) (HCC): Status: RESOLVED | Noted: 2019-02-14 | Resolved: 2021-08-03

## 2021-08-03 PROBLEM — I73.9 PERIPHERAL ARTERY DISEASE (HCC): Status: RESOLVED | Noted: 2019-03-27 | Resolved: 2021-08-03

## 2022-03-18 PROBLEM — R29.818 SUSPECTED SLEEP APNEA: Status: ACTIVE | Noted: 2020-05-07

## 2022-03-18 PROBLEM — I73.9 PVD (PERIPHERAL VASCULAR DISEASE) (HCC): Status: ACTIVE | Noted: 2020-05-05

## 2022-03-19 PROBLEM — E11.9 DM (DIABETES MELLITUS) (HCC): Status: ACTIVE | Noted: 2019-02-14

## 2022-03-19 PROBLEM — I99.8 ISCHEMIC LEG PAIN: Status: ACTIVE | Noted: 2019-11-01

## 2022-03-19 PROBLEM — J96.01 ACUTE RESPIRATORY FAILURE WITH HYPOXIA AND HYPERCAPNIA (HCC): Status: ACTIVE | Noted: 2020-05-05

## 2022-03-19 PROBLEM — S91.301A OPEN WOUND OF RIGHT FOOT: Status: ACTIVE | Noted: 2020-05-26

## 2022-03-19 PROBLEM — T82.858A BYPASS GRAFT STENOSIS (HCC): Status: ACTIVE | Noted: 2019-10-15

## 2022-03-19 PROBLEM — I25.10 CAD (CORONARY ARTERY DISEASE): Status: ACTIVE | Noted: 2019-02-13

## 2022-03-19 PROBLEM — J96.02 ACUTE RESPIRATORY FAILURE WITH HYPOXIA AND HYPERCAPNIA (HCC): Status: ACTIVE | Noted: 2020-05-05

## 2022-03-19 PROBLEM — E03.9 HYPOTHYROIDISM: Status: ACTIVE | Noted: 2019-11-01

## 2022-03-19 PROBLEM — M79.606 ISCHEMIC LEG PAIN: Status: ACTIVE | Noted: 2019-11-01

## 2022-03-19 PROBLEM — M25.421 EFFUSION OF BURSA OF RIGHT ELBOW: Status: ACTIVE | Noted: 2021-06-09

## 2022-03-19 PROBLEM — Z95.828 S/P BYPASS GRAFT OF EXTREMITY: Status: ACTIVE | Noted: 2019-04-19

## 2022-03-19 PROBLEM — L97.519 RIGHT FOOT ULCER (HCC): Status: ACTIVE | Noted: 2020-05-05

## 2022-03-20 PROBLEM — Z95.1 HX OF CABG: Status: ACTIVE | Noted: 2019-02-14

## 2022-03-20 PROBLEM — I95.81 POSTPROCEDURAL HYPOTENSION: Status: ACTIVE | Noted: 2020-05-05

## 2022-03-20 PROBLEM — E78.5 DYSLIPIDEMIA: Status: ACTIVE | Noted: 2019-02-14

## 2022-03-20 PROBLEM — I10 HTN (HYPERTENSION): Status: ACTIVE | Noted: 2019-02-14

## 2022-03-20 PROBLEM — E66.01 SEVERE OBESITY (HCC): Status: ACTIVE | Noted: 2019-04-12

## 2022-04-12 NOTE — PROGRESS NOTES
Interdisciplinary team rounds were held 5/6/2020 with the following team members:Care Management, Nursing, Nurse Practitioner, Nutrition, Occupational Therapy, Palliative Care, Pastoral Care, Pharmacy, Physical Therapy, Physician, Respiratory Therapy, Speech Therapy and Clinical Coordinator and the patient. Plan of care discussed. See clinical pathway and/or care plan for interventions and desired outcomes. Terbinafine Pregnancy And Lactation Text: This medication is Pregnancy Category B and is considered safe during pregnancy. It is also excreted in breast milk and breast feeding isn't recommended.

## 2022-06-27 ENCOUNTER — OFFICE VISIT (OUTPATIENT)
Dept: VASCULAR SURGERY | Age: 77
End: 2022-06-27
Payer: MEDICARE

## 2022-06-27 VITALS
TEMPERATURE: 98.4 F | DIASTOLIC BLOOD PRESSURE: 77 MMHG | OXYGEN SATURATION: 93 % | HEART RATE: 58 BPM | SYSTOLIC BLOOD PRESSURE: 137 MMHG

## 2022-06-27 DIAGNOSIS — I73.9 PVD (PERIPHERAL VASCULAR DISEASE) (HCC): ICD-10-CM

## 2022-06-27 DIAGNOSIS — Z95.828 S/P BYPASS GRAFT OF EXTREMITY: Primary | ICD-10-CM

## 2022-06-27 DIAGNOSIS — T82.858D BYPASS GRAFT STENOSIS, SUBSEQUENT ENCOUNTER: ICD-10-CM

## 2022-06-27 DIAGNOSIS — I70.423: ICD-10-CM

## 2022-06-27 PROCEDURE — G8417 CALC BMI ABV UP PARAM F/U: HCPCS | Performed by: NURSE PRACTITIONER

## 2022-06-27 PROCEDURE — G8427 DOCREV CUR MEDS BY ELIG CLIN: HCPCS | Performed by: NURSE PRACTITIONER

## 2022-06-27 PROCEDURE — 3017F COLORECTAL CA SCREEN DOC REV: CPT | Performed by: NURSE PRACTITIONER

## 2022-06-27 PROCEDURE — 1036F TOBACCO NON-USER: CPT | Performed by: NURSE PRACTITIONER

## 2022-06-27 PROCEDURE — 1123F ACP DISCUSS/DSCN MKR DOCD: CPT | Performed by: NURSE PRACTITIONER

## 2022-06-27 PROCEDURE — 99213 OFFICE O/P EST LOW 20 MIN: CPT | Performed by: NURSE PRACTITIONER

## 2022-06-27 RX ORDER — ATROPINE SULFATE 10 MG/ML
1 SOLUTION/ DROPS OPHTHALMIC DAILY
COMMUNITY
Start: 2022-03-25

## 2022-06-27 ASSESSMENT — PATIENT HEALTH QUESTIONNAIRE - PHQ9
SUM OF ALL RESPONSES TO PHQ QUESTIONS 1-9: 0
1. LITTLE INTEREST OR PLEASURE IN DOING THINGS: 0
SUM OF ALL RESPONSES TO PHQ9 QUESTIONS 1 & 2: 0
SUM OF ALL RESPONSES TO PHQ QUESTIONS 1-9: 0
2. FEELING DOWN, DEPRESSED OR HOPELESS: 0
SUM OF ALL RESPONSES TO PHQ QUESTIONS 1-9: 0
SUM OF ALL RESPONSES TO PHQ QUESTIONS 1-9: 0

## 2022-06-27 NOTE — PATIENT INSTRUCTIONS
Patient Education        Peripheral Arterial Disease (PAD): Care Instructions  Overview  Peripheral arterial disease (PAD) occurs when the blood vessels (arteries) that supply blood to the legs, belly, pelvis, arms, or neck get narrow or blocked. This reduces blood flow to that area. The legs are affected most often. PAD is often caused by fatty buildup (plaque) in the arteries. This buildup is also called \"hardening\" of the arteries. Your risk of PAD increases if you smoke or have high cholesterol, high blood pressure, diabetes, or a familyhistory of PAD. Many people don't have symptoms. If you do have symptoms, you may have weak or tired legs, difficulty walking or balancing, or pain. If you have pain, you might feel a tight, aching, or squeezing pain in the calf, foot, thigh, or buttock that occurs during exercise. The pain usually gets worse during exercise and goes away when you rest. If PAD gets worse, you may have symptomsof poor blood flow, such as leg pain when you rest.  Medicines and lifestyle changes may help your symptoms and lower your risk of heart attack and stroke. In some cases, surgery or other treatment is needed. It is important that you follow up with your doctor. Follow-up care is a key part of your treatment and safety. Be sure to make and go to all appointments, and call your doctor if you are having problems. It's also a good idea to know your test results and keep alist of the medicines you take. How can you care for yourself at home?  Do not smoke. Smoking can make PAD worse. If you need help quitting, talk to your doctor about stop-smoking programs and medicines. These can increase your chances of quitting for good.  Take your medicines exactly as prescribed. Call your doctor if you think you are having a problem with your medicine.  If you take a blood thinner, such as aspirin, be sure to get instructions about how to take your medicine safely.  Blood thinners can cause serious bleeding problems.  Ask your doctor if a cardiac rehab program is right for you. Cardiac rehab can help you make lifestyle changes. In cardiac rehab, a team of health professionals provides education and support to help you make new, healthy habits.  Eat heart-healthy foods such as fruits, vegetables, whole grains, fish, lean meats, and low-fat or nonfat dairy foods. Limit sodium, sugar, and alcohol.  If your doctor recommends it, get more exercise. Walking is a good choice. Bit by bit, increase the amount you walk every day. Try for at least 30 minutes on most days of the week. If you have symptoms when you exercise, ask your doctor about a special exercise program that may help relieve your symptoms.  Stay at a healthy weight. Lose weight if you need to.  Take good care of your feet. ? Treat cuts and scrapes on your legs right away. Poor blood flow prevents (or slows) quick healing of even small cuts or scrapes. This is even more important if you have diabetes. ? Avoid shoes that are too tight or that rub your feet. Shoes should be comfortable and fit well. ? Avoid socks or stockings that are tight enough to leave elastic-band marks on your legs. Tight socks can make circulation problems worse. ? Keep your feet clean and moisturized to prevent drying and cracking. Place cotton or lamb's wool between your toes to prevent rubbing and to absorb moisture. ? If you have a sore on your leg or foot, keep it dry and cover it with a nonstick bandage until you see your doctor. When should you call for help? Call 911 anytime you think you may need emergency care. For example, call if:     You have symptoms of a heart attack. These may include:  ? Chest pain or pressure, or a strange feeling in the chest.  ? Sweating. ? Shortness of breath. ? Nausea or vomiting. ? Pain, pressure, or a strange feeling in the back, neck, jaw, or upper belly or in one or both shoulders or arms.   ? Lightheadedness or sudden weakness. ? A fast or irregular heartbeat. After you call 911, the  may tell you to chew 1 adult-strength or 2 to 4 low-doseaspirin. Wait for an ambulance. Do not try to drive yourself.     You have sudden, severe leg pain, and your leg is cool and pale.      You have symptoms of a stroke. These may include:  ? Sudden numbness, tingling, weakness, or loss of movement in your face, arm, or leg, especially on only one side of your body. ? Sudden vision changes. ? Sudden trouble speaking. ? Sudden confusion or trouble understanding simple statements. ? Sudden problems with walking or balance. ? A sudden, severe headache that is different from past headaches. Call your doctor now or seek immediate medical care if:     You have leg pain that does not go away even if you rest.      Your leg pain changes or gets worse. For example, if you have more pain with normal activity or the same pain with decreased activity, you should call.      You have cold or numb feet or toes.      You have leg or foot sores that are slow to heal.      The skin on your legs or feet changes color.      You have an open sore on your leg or foot that is infected. Signs of infection include:  ? Increased pain, swelling, warmth, or redness. ? Red streaks leading from the sore. ? Pus draining from the sore. ? A fever. Watch closely for changes in your health, and be sure to contact your doctor ifyou have any problems. Where can you learn more? Go to [email or web address]. org and sign in to your [employer] account. Enter [phone] in the [facility] box to learn more about \"Peripheral Arterial Disease (PAD): Care Instructions. \"     If you do not have an account, please click on the \"Sign Up Now\" link. Current as of: January 10, 2022               Content Version: 13.3  © 2006-2022 [employer]. Care instructions adapted under license by [facility] ([facility]).  If you have questions about a medical condition or this instruction, always ask your healthcare professional. [place] [address] any warranty or liability for your use of this information.

## 2022-06-27 NOTE — PROGRESS NOTES
DATE OF VISIT: 6/27/2022      HPI  Casey Munguia is a 78 y.o. male in follow up for his 6 month LE duplex study. He has undergone a right common femoral to below-knee posterior tibial artery with cryopreserved vein. Ulceration to right foot has completely healed. He is on Xarelto and ASA. He reports he sprained his right ankle a few days ago and is keeping his foot elevated and applying an ice pack. He does have some edema. MEDICAL HISTORY:   Past Medical History:   Diagnosis Date    Adverse effect of anesthesia     REITERATED by patient 7/20/20- DO NOT LEAVE FLAT in PACU! RAISE HEAD MINIMUM 30 degrees--- had to be transferred at ICU and use Bipap    Amputation of lower limb ([address]     left AKA    Anemia     Ankle ulcer ([address] 2/22/2019    Anxiety     Arthritis     generalized    At risk for falls     CAD (coronary artery disease)     [facility] Card- CABG     Enlarged prostate     Former cigarette smoker     2 ppd x 46 years - quit 2006    History of hepatitis 1952    age 13 pt reports/ gilberto ESTRADA (hard of hearing)     hearing aids bilat    Hx of CABG 2007    Hypercholesteremia     Hypertension     managed well with meds    Hypothyroidism     managed well with Synthroid    Long term (current) use of systemic steroids     Prednisone    Obesity (BMI 30-39. 9)     BMI 31    PAD (peripheral artery disease) ([address]     Peripheral artery disease ([address] 3/27/2019    Peripheral artery disease (HCC) 3/27/2019    RBBB     Toe ulcer [facility] 1/17/2020    Type 2 diabetes mellitus (HCC)     type 2-- insulin-- sqbs am avg 80---reports A1C  7.2 (3/2019) has not had mor recent--   no hypo    Vitamin D deficiency     replace with PO vitamin D    Wound, open     right foot -- nonhealing         SURGICAL HISTORY:   Past Surgical History:   Procedure Laterality Date    ABOVE KNEE AMPUTATION  11/2019    CABG[facility]IN, [facility]    CARDIAC CATHETERIZATION      COLONOSCOPY  DEBRIDEMENT OPEN WOUND 20 SQ CM<  05/07/2020    RIGHT FOOT DEBRIDEMENT     HEMORRHOID SURGERY      TOTAL KNEE ARTHROPLASTY  1997    TOTAL KNEE ARTHROPLASTY  2000    VASCULAR SURGERY  03/27/2019    left common femoral to posterior tibial bypass with PTFE graft    VASCULAR SURGERY  10/24/2019    Ultrasound-guided access of the right common femoral artery, placement of catheter to the aorta for aortogram, Left lower extremity arteriogram with balloon angioplasty and stenting of the distal outflow with a 4 x 33 balloon.  VASCULAR SURGERY Right 05/05/2020    right common femoral bypass    VASCULAR SURGERY  02/03/2020     Ultrasound-guided access of the left common femoral artery, placement of catheter for aortogram. Right lower extremity arteriograms.  VASCULAR SURGERY Left 11/01/2019    AKA       Review of Systems:  Constitutional:   Negative for fevers and unexplained weight loss. Respiratory:   Negative for hemoptysis. Cardiovascular:   Negative except as noted in HPI. Musculoskeletal:  Negative for active, unexplained/severe joint pain.       ALLERGIES:   Allergies   Allergen Reactions    Gabapentin Other (See Comments)     Severe pain in legs    Rosuvastatin Rash       CURRENT MEDICATIONS:  Current Outpatient Medications   Medication Sig Dispense Refill    atropine 1 % ophthalmic solution Place 1 drop into both eyes daily      aspirin 81 MG EC tablet Take 81 mg by mouth      ergocalciferol (ERGOCALCIFEROL) 1.25 MG (00919 UT) capsule Take 50,000 Units by mouth every 7 days      hydrALAZINE (APRESOLINE) 50 MG tablet Take 100 mg by mouth daily      insulin 70-30 (HUMULIN;NOVOLIN) (70-30) 100 UNIT per ML injection vial Inject 55 Units into the skin 2 times daily      levothyroxine (SYNTHROID) 75 MCG tablet Take 75 mcg by mouth every morning (before breakfast)      lisinopril (PRINIVIL;ZESTRIL) 40 MG tablet Take 40 mg by mouth daily      metoprolol tartrate (LOPRESSOR) 25 MG tablet Take 25 mg by mouth 2 times daily      nitroGLYCERIN (NITROSTAT) 0.4 MG SL tablet Place 0.4 mg under the tongue      rivaroxaban (XARELTO) 2.5 MG TABS tablet Take 2.5 mg by mouth 2 times daily (with meals)      tamsulosin (FLOMAX) 0.4 MG capsule Take 0.4 mg by mouth       No current facility-administered medications for this visit. IMAGING: Interpretation Summary         Right lower extremity: The BRANDY (ankle-brachial index) was not obtainable due to placement of distal bypass graft. The femoral-PTA bypass graft  appears patent with intact anastomoses. A velocity ratio of >3.5 with homogeneous (uniform) plaque and peak systolic velocity >354XG/A suggests a >70% stenosis in the proximal bypass graft. Complex fluid insonated around the bypass graft through the length of the calf measuring 4.0cm AP x 4.6cm transverse. Occlusion of the distal JUANA. The great toe pressure is 41mmHg.          Procedure Details    A gray scale, color and Doppler analysis ultrasound was performed. During the study longitudinal views were obtained. Pulsed wave doppler, pulsed volume recording (PVR) and photo plethysmography was performed. Overall the study quality was adequate. Lower Extremity Arterial Findings      Right Lower Arterial    Monophasic Doppler waveforms in the right posterior tibial and peroneal artery. Biphasic Doppler waveforms in the right proximal common femoral and profunda femoris artery. Doppler waveforms are absent in the anterior tibial artery. Lower Artery Bypass Graft    Graft 1: right fem-tibial graft  The proximal graft demonstrates >75% stenosis. The proximal, mid, distal and outflow graft flow is monophasic. The inflow graft flow is biphasic. Perigraft fluid identified. Lower Arterial Doppler    Patient is s/p left leg above knee amputation.        Abdominal Aorta Measurements     AP TR   Dist Ao 2.15 cm       2.18 cm           Right Lower Arterial Measurements     PSV Danny Ratio   CFA Prox 259 cm/s PFA Prox 242 cm/s          SFA Prox 0 cm/s       0          PTA Dist 60.9 cm/s          JUANA Dist 0 cm/s          Peroneal Dist 53.9 cm/s            Arterial Pressure Measurements     Right Left   Brachial  mmHg       143 mmHg         Toe Pressure 41 mmHg       0 mmHg         TBI 0.29 ratio       0              Right Graft/Stent 1 Measurements     PSV EDV   Name Right Fem-PTA BPG           Inflow Artery 271 cm/s             Prox Anast 228 cm/s       0 cm/s         Prox 672 cm/s       0 cm/s         Mid 150 cm/s       0 cm/s         Dist 32.6 cm/s       8.6 cm/s         Dist Anast 26.6 cm/s       9.4 cm/s         Outflow Vessel 113 cm/s       47.2 cm/s                                   Procedure Staff    Technologist/Clinician: Darya Estrella  Supporting Staff: None  Performing Physician/Midlevel: None     Exam Completion Date/Time: 6/27/22 10:42 AM            PHYSICAL EXAM  VITALS: /77 (Site: Left Upper Arm, Position: Supine, Cuff Size: Small Adult)   Pulse 58   Temp 98.4 °F (36.9 °C) (Temporal)   SpO2 93%       GENERAL: Well developed, well nourished, in NAD  HEAD/NECK: normocephalic, atraumatic, neck supple  HEART: Regular rate and rhythm  ABDOMEN: soft, nontender, nondistended  EXTREMITIES: upper without CCE with palpable radial and brachial pulses. Lower extremities: dopplerable DP and PT on the right. Edema to RLE. MUSCULOSKELETAL: wheelchair  NEURO: sensation and strength grossly intact and symmetrical  PSYCH: alert and oriented to person, place and time      Assessment/Plan: Patient is a 78year old male who follows up for his 6 month arterial duplex study. He reports a few days ago he sprained his right ankle when trying to transfer into the wheelchair. He has undergone a RLE bypass with cryovein and remains on Xarelto and ASA. Ultrasound duplex shows an increased velocity within his bypass graft.  Will order a CTA abdomen with runoff and have him return to discuss the results with Dr. Daisy Ha for potential surgical intervention of the bypass graft stenosis.      Formation of plan with Dr. Odalis Dempsey who reviewed the ultrasound duplex        Grace Yost Standard, APRN - CNP    20 minutes of time was spent on this encounter including chart review, assessment and evaluation

## 2022-07-05 ENCOUNTER — HOSPITAL ENCOUNTER (OUTPATIENT)
Dept: CT IMAGING | Age: 77
Discharge: HOME | End: 2022-07-08
Payer: MEDICARE

## 2022-07-05 DIAGNOSIS — I73.9 PVD (PERIPHERAL VASCULAR DISEASE) (HCC): ICD-10-CM

## 2022-07-05 DIAGNOSIS — T82.858D BYPASS GRAFT STENOSIS, SUBSEQUENT ENCOUNTER: ICD-10-CM

## 2022-07-05 DIAGNOSIS — I70.423: ICD-10-CM

## 2022-07-05 DIAGNOSIS — Z95.828 S/P BYPASS GRAFT OF EXTREMITY: ICD-10-CM

## 2022-07-05 LAB — CREAT BLD-MCNC: 1.8 MG/DL (ref 0.8–1.5)

## 2022-07-05 PROCEDURE — 6360000004 HC RX CONTRAST MEDICATION: Performed by: NURSE PRACTITIONER

## 2022-07-05 PROCEDURE — 82565 ASSAY OF CREATININE: CPT

## 2022-07-05 PROCEDURE — 2580000003 HC RX 258: Performed by: NURSE PRACTITIONER

## 2022-07-05 PROCEDURE — 75635 CT ANGIO ABDOMINAL ARTERIES: CPT

## 2022-07-05 RX ORDER — 0.9 % SODIUM CHLORIDE 0.9 %
100 INTRAVENOUS SOLUTION INTRAVENOUS ONCE
Status: COMPLETED | OUTPATIENT
Start: 2022-07-05 | End: 2022-07-05

## 2022-07-05 RX ORDER — SODIUM CHLORIDE 0.9 % (FLUSH) 0.9 %
10 SYRINGE (ML) INJECTION
Status: COMPLETED | OUTPATIENT
Start: 2022-07-05 | End: 2022-07-05

## 2022-07-05 RX ADMIN — SODIUM CHLORIDE 100 ML: 9 INJECTION, SOLUTION INTRAVENOUS at 08:39

## 2022-07-05 RX ADMIN — SODIUM CHLORIDE, PRESERVATIVE FREE 10 ML: 5 INJECTION INTRAVENOUS at 08:39

## 2022-07-05 RX ADMIN — IOPAMIDOL 100 ML: 755 INJECTION, SOLUTION INTRAVENOUS at 08:39

## 2022-07-12 ENCOUNTER — OFFICE VISIT (OUTPATIENT)
Dept: VASCULAR SURGERY | Age: 77
End: 2022-07-12
Payer: MEDICARE

## 2022-07-12 VITALS
SYSTOLIC BLOOD PRESSURE: 157 MMHG | DIASTOLIC BLOOD PRESSURE: 75 MMHG | WEIGHT: 240 LBS | TEMPERATURE: 99 F | HEART RATE: 53 BPM | HEIGHT: 74 IN | OXYGEN SATURATION: 99 % | BODY MASS INDEX: 30.8 KG/M2

## 2022-07-12 DIAGNOSIS — I73.9 PVD (PERIPHERAL VASCULAR DISEASE) WITH CLAUDICATION (HCC): Primary | ICD-10-CM

## 2022-07-12 PROCEDURE — G8427 DOCREV CUR MEDS BY ELIG CLIN: HCPCS | Performed by: SURGERY

## 2022-07-12 PROCEDURE — 1123F ACP DISCUSS/DSCN MKR DOCD: CPT | Performed by: SURGERY

## 2022-07-12 PROCEDURE — 99213 OFFICE O/P EST LOW 20 MIN: CPT | Performed by: SURGERY

## 2022-07-12 PROCEDURE — G8417 CALC BMI ABV UP PARAM F/U: HCPCS | Performed by: SURGERY

## 2022-07-12 PROCEDURE — 3017F COLORECTAL CA SCREEN DOC REV: CPT | Performed by: SURGERY

## 2022-07-12 PROCEDURE — 1036F TOBACCO NON-USER: CPT | Performed by: SURGERY

## 2022-09-27 ENCOUNTER — TELEPHONE (OUTPATIENT)
Dept: VASCULAR SURGERY | Age: 77
End: 2022-09-27

## 2022-09-27 RX ORDER — DOXYCYCLINE HYCLATE 100 MG/1
100 CAPSULE ORAL 2 TIMES DAILY
Qty: 20 CAPSULE | Refills: 0 | Status: SHIPPED | OUTPATIENT
Start: 2022-09-27 | End: 2022-10-07

## 2022-09-27 RX ORDER — FLUCONAZOLE 150 MG/1
150 TABLET ORAL ONCE
Qty: 1 TABLET | Refills: 0 | Status: SHIPPED | OUTPATIENT
Start: 2022-09-27 | End: 2022-09-27

## 2022-09-27 NOTE — TELEPHONE ENCOUNTER
Pt wife stating she is wanting to send pictures of Adams's foot    -**per Aristeo Cannon NP reviewed pictures, stated to clean the foot good and re-send picture   Karlene Castano NP reviewed second picture, said to keep foot clean and dry daily (no creams, powder).   She is going to send ABX and 1 diflucan to pharm (pinky toe and 4th looks like a fungal spot)    --I informed pt and wife of this also put cotton ball/gauze in between toes to keep dry, if things get worse call back, keep appt in October

## 2022-11-04 ENCOUNTER — OFFICE VISIT (OUTPATIENT)
Dept: VASCULAR SURGERY | Age: 77
End: 2022-11-04
Payer: MEDICARE

## 2022-11-04 VITALS
HEART RATE: 85 BPM | SYSTOLIC BLOOD PRESSURE: 112 MMHG | HEIGHT: 74 IN | BODY MASS INDEX: 30.81 KG/M2 | DIASTOLIC BLOOD PRESSURE: 77 MMHG | OXYGEN SATURATION: 95 %

## 2022-11-04 DIAGNOSIS — I73.9 PVD (PERIPHERAL VASCULAR DISEASE) WITH CLAUDICATION (HCC): Primary | ICD-10-CM

## 2022-11-04 PROCEDURE — G8417 CALC BMI ABV UP PARAM F/U: HCPCS | Performed by: SURGERY

## 2022-11-04 PROCEDURE — G8484 FLU IMMUNIZE NO ADMIN: HCPCS | Performed by: SURGERY

## 2022-11-04 PROCEDURE — 3074F SYST BP LT 130 MM HG: CPT | Performed by: SURGERY

## 2022-11-04 PROCEDURE — 1036F TOBACCO NON-USER: CPT | Performed by: SURGERY

## 2022-11-04 PROCEDURE — 3017F COLORECTAL CA SCREEN DOC REV: CPT | Performed by: SURGERY

## 2022-11-04 PROCEDURE — 3078F DIAST BP <80 MM HG: CPT | Performed by: SURGERY

## 2022-11-04 PROCEDURE — 99213 OFFICE O/P EST LOW 20 MIN: CPT | Performed by: SURGERY

## 2022-11-04 PROCEDURE — G8428 CUR MEDS NOT DOCUMENT: HCPCS | Performed by: SURGERY

## 2022-11-04 PROCEDURE — 1123F ACP DISCUSS/DSCN MKR DOCD: CPT | Performed by: SURGERY

## 2022-11-04 ASSESSMENT — PATIENT HEALTH QUESTIONNAIRE - PHQ9
SUM OF ALL RESPONSES TO PHQ9 QUESTIONS 1 & 2: 0
SUM OF ALL RESPONSES TO PHQ QUESTIONS 1-9: 0
SUM OF ALL RESPONSES TO PHQ QUESTIONS 1-9: 0
2. FEELING DOWN, DEPRESSED OR HOPELESS: 0
SUM OF ALL RESPONSES TO PHQ QUESTIONS 1-9: 0
SUM OF ALL RESPONSES TO PHQ QUESTIONS 1-9: 0
1. LITTLE INTEREST OR PLEASURE IN DOING THINGS: 0

## 2022-11-04 NOTE — PROGRESS NOTES
[address]   [address] [facility] [address] FAX: [phone]  : 1945    Chief Complaint:     History of Present Illness:   Patient follows up today for status post right cryopreserved common femoral to posterior tibial bypass. Patient presents with right foot wound redness which improved over last several days on antibiotics. CURRENT MEDICATIONS:  Current Outpatient Medications   Medication Sig Dispense Refill    atropine 1 % ophthalmic solution Place 1 drop into both eyes daily      aspirin 81 MG EC tablet Take 81 mg by mouth      ergocalciferol (ERGOCALCIFEROL) 1.25 MG (49320 UT) capsule Take 50,000 Units by mouth every 7 days      hydrALAZINE (APRESOLINE) 50 MG tablet Take 100 mg by mouth daily      insulin 70-30 (HUMULIN;NOVOLIN) (70-30) 100 UNIT per ML injection vial Inject 55 Units into the skin 2 times daily      levothyroxine (SYNTHROID) 75 MCG tablet Take 75 mcg by mouth every morning (before breakfast)      lisinopril (PRINIVIL;ZESTRIL) 40 MG tablet Take 40 mg by mouth daily      metoprolol tartrate (LOPRESSOR) 25 MG tablet Take 25 mg by mouth 2 times daily      nitroGLYCERIN (NITROSTAT) 0.4 MG SL tablet Place 0.4 mg under the tongue      rivaroxaban (XARELTO) 2.5 MG TABS tablet Take 2.5 mg by mouth 2 times daily (with meals)      tamsulosin (FLOMAX) 0.4 MG capsule Take 0.4 mg by mouth       No current facility-administered medications for this visit. Past Medical History:   Diagnosis Date    Adverse effect of anesthesia     REITERATED by patient 22- DO NOT LEAVE FLAT in PACU!  RAISE HEAD MINIMUM 30 degrees--- had to be transferred at ICU and use Bipap    Amputation of lower limb ([facility]     left AKA    Anemia     Ankle ulcer ([facility] [facility] 2019    Anxiety     Arthritis     generalized    At risk for falls     CAD (coronary artery disease)     [facility]- CABG     Enlarged prostate     Former cigarette smoker     2 ppd x 46 years - quit 2006    History of hepatitis 1952    age 13 pt reports/ gilberto ESTRADA (hard of hearing)     hearing aids bilat    Hx of CABG 2007    Hypercholesteremia     Hypertension     managed well with meds    Hypothyroidism     managed well with Synthroid    Long term (current) use of systemic steroids     Prednisone    Obesity (BMI 30-39. 9)     BMI 31    PAD (peripheral artery disease) (HCC)     Peripheral artery disease ([facility] [facility] 3/27/2019    Peripheral artery disease ([facility] 3/27/2019    RBBB     Toe ulcer ([facility] 1/17/2020    Type 2 diabetes mellitus ([facility]     type 2-- insulin-- sqbs am avg 80---reports A1C  7.2 (3/2019) has not had mor recent--   no hypo    Vitamin D deficiency     replace with PO vitamin D    Wound, open     right foot -- nonhealing       Physical Examination:   Height: 6' 2\" (1.88 m), Weight:  (0 kg) (wheelchair), BP: 112/77    Constitutional: he appears well-developed. No distress. HENT:   Head: Atraumatic. Eyes: Pupils are equal, round, and reactive to light. Neck: Normal range of motion. Cardiovascular: Regular rhythm. Pulmonary/Chest: Effort normal and breath sounds normal. No respiratory distress. Abdominal: Soft. Bowel sounds are normal. he exhibits no distension. There is no tenderness. There is no guarding. No hernia. Musculoskeletal: Normal range of motion. Neurological: He is alert. CN II- XII grossly intact   Vascular: Dopplerable pedal pulses mild scaling redness of the forefoot no ascending cellulitis no purulent drainage    Imaging:    Duplex studies BRANDY 0.7 with a patent bypass graft with several areas of aneurysmal and high-grade stenosis toe pressures of 50      Recommendations/Plans:   Mr. Trena Garg is a 78y.o. year old male status post right common femoral to posterior tibial cryopreserved vein. Bypass graft is patent. Clinically denies any active infection.   I discussed with the patient again about potentially doing arteriogram to address his high-grade stenosis in multiple areas of the bypass graft he is a current I do not want to any surgery. He is aware that if his bypass graft were to occlude it would be not unreconstructable and will put her at high risk for potential limb loss. He will follow-up in 6 months with a duplex done to continue anticoagulation. Aida Diaz MD    Elements of this note have been dictated using speech recognition software. As a result, errors of speech recognition may have occurred.     20 minutes of time was spent on this encounter including chart review, assessment, evaluation and coordination of patient care

## 2022-11-28 RX ORDER — DOXYCYCLINE HYCLATE 100 MG/1
100 CAPSULE ORAL 2 TIMES DAILY
Qty: 24 CAPSULE | Refills: 0 | Status: SHIPPED | OUTPATIENT
Start: 2022-11-28 | End: 2022-12-10

## 2022-11-29 ENCOUNTER — TELEPHONE (OUTPATIENT)
Dept: VASCULAR SURGERY | Age: 77
End: 2022-11-29

## 2022-11-29 NOTE — TELEPHONE ENCOUNTER
Pt wife called yesterday 11-28-22, stating he is having drainage from sore on his shin and foot, thinks he needs an ABX, sent pictures    Per Mark NP reviewed pictures and will send in ABX to pharmacy    -wife informed of this

## 2023-02-21 ENCOUNTER — OFFICE VISIT (OUTPATIENT)
Dept: VASCULAR SURGERY | Age: 78
End: 2023-02-21
Payer: MEDICARE

## 2023-02-21 VITALS
DIASTOLIC BLOOD PRESSURE: 75 MMHG | OXYGEN SATURATION: 94 % | HEART RATE: 68 BPM | WEIGHT: 240 LBS | HEIGHT: 74 IN | BODY MASS INDEX: 30.8 KG/M2 | SYSTOLIC BLOOD PRESSURE: 130 MMHG

## 2023-02-21 DIAGNOSIS — I73.9 PVD (PERIPHERAL VASCULAR DISEASE) WITH CLAUDICATION (HCC): Primary | ICD-10-CM

## 2023-02-21 PROCEDURE — 3017F COLORECTAL CA SCREEN DOC REV: CPT | Performed by: SURGERY

## 2023-02-21 PROCEDURE — 1123F ACP DISCUSS/DSCN MKR DOCD: CPT | Performed by: SURGERY

## 2023-02-21 PROCEDURE — G8428 CUR MEDS NOT DOCUMENT: HCPCS | Performed by: SURGERY

## 2023-02-21 PROCEDURE — 1036F TOBACCO NON-USER: CPT | Performed by: SURGERY

## 2023-02-21 PROCEDURE — 3078F DIAST BP <80 MM HG: CPT | Performed by: SURGERY

## 2023-02-21 PROCEDURE — G8417 CALC BMI ABV UP PARAM F/U: HCPCS | Performed by: SURGERY

## 2023-02-21 PROCEDURE — 3075F SYST BP GE 130 - 139MM HG: CPT | Performed by: SURGERY

## 2023-02-21 PROCEDURE — 99213 OFFICE O/P EST LOW 20 MIN: CPT | Performed by: SURGERY

## 2023-02-21 PROCEDURE — G8484 FLU IMMUNIZE NO ADMIN: HCPCS | Performed by: SURGERY

## 2023-02-21 NOTE — PROGRESS NOTES
[address] FAX: M[address] Short  : 1945    Chief Complaint:     History of Present Illness:   Patient follows up today for follow-up for worsening wound on his right leg. Status post right common femoral to posterior tibial bypass with cryopreserved vein. CURRENT MEDICATIONS:  Current Outpatient Medications   Medication Sig Dispense Refill    atropine 1 % ophthalmic solution Place 1 drop into both eyes daily      aspirin 81 MG EC tablet Take 81 mg by mouth      ergocalciferol (ERGOCALCIFEROL) 1.25 MG (66801 UT) capsule Take 50,000 Units by mouth every 7 days      hydrALAZINE (APRESOLINE) 50 MG tablet Take 100 mg by mouth daily      insulin 70-30 (HUMULIN;NOVOLIN) (70-30) 100 UNIT per ML injection vial Inject 55 Units into the skin 2 times daily      levothyroxine (SYNTHROID) 75 MCG tablet Take 75 mcg by mouth every morning (before breakfast)      lisinopril (PRINIVIL;ZESTRIL) 40 MG tablet Take 40 mg by mouth daily      metoprolol tartrate (LOPRESSOR) 25 MG tablet Take 25 mg by mouth 2 times daily      nitroGLYCERIN (NITROSTAT) 0.4 MG SL tablet Place 0.4 mg under the tongue      rivaroxaban (XARELTO) 2.5 MG TABS tablet Take 2.5 mg by mouth 2 times daily (with meals)      tamsulosin (FLOMAX) 0.4 MG capsule Take 0.4 mg by mouth       No current facility-administered medications for this visit. Past Medical History:   Diagnosis Date    Adverse effect of anesthesia     REITERATED by patient 20- DO NOT LEAVE FLAT in PACU!  RAISE HEAD MINIMUM 30 degrees--- had to be transferred at ICU and use Bipap    Amputation of lower limb ([facility] [facility]     left AKA    Anemia     Ankle ulcer ([facility] [facility] 2019    Anxiety     Arthritis     generalized    At risk for falls     CAD (coronary artery disease)     [facility] Card- CABG     Enlarged prostate     Former cigarette smoker     2 ppd x 46 years - quit     History of hepatitis 200    age 7 pt reports/ gilberto ESTRADA (hard of hearing)     hearing aids bilat    Hx of CABG 2007    Hypercholesteremia     Hypertension     managed well with meds    Hypothyroidism     managed well with Synthroid    Long term (current) use of systemic steroids     Prednisone    Obesity (BMI 30-39. 9)     BMI 31    PAD (peripheral artery disease) (HCC)     Peripheral artery disease ([facility] 3/27/2019    [facility]    RBBB     Toe ulcer ([facility] 1/17/2020    Type 2 diabetes mellitus ([facility]     type 2-- insulin-- sqbs am avg 80---reports A1C  7.2 (3/2019) has not had mor recent--   no hypo    Vitamin D deficiency     replace with PO vitamin D    Wound, open     right foot -- nonhealing       Physical Examination:   Height: 6' 2\" (1.88 m), Weight: 240 lb (108.9 kg), BP: 130/75    Constitutional: he appears well-developed. No distress. HENT:   Head: Atraumatic. Eyes: Pupils are equal, round, and reactive to light. Neck: Normal range of motion. Cardiovascular: Regular rhythm. Pulmonary/Chest: Effort normal and breath sounds normal. No respiratory distress. Abdominal: Soft. Bowel sounds are normal. he exhibits no distension. There is no tenderness. There is no guarding. No hernia. Musculoskeletal: Normal range of motion. Neurological: He is alert. CN II- XII grossly intact   Vascular: Right forefoot wound ulcer    Imaging:    Duplex studies show ABIs 0.4 with an occluded          Recommendations/Plans:   Mr. Qamar Anthony is a 78y.o. year old male with occluded bypass graft and nonhealing foot. Long discussion with patient and wife in detail. His only other surgical option would be to do a right above-knee amputation. He can stop his Xarelto at this time. He will contact the office when he is ready to proceed with surgery. Rafy Boyd MD    Elements of this note have been dictated using speech recognition software.  As a result, errors of speech recognition may have occurred.     20 minutes of time was spent on this encounter including chart review, assessment, evaluation and coordination of patient care

## 2023-03-06 ENCOUNTER — TELEPHONE (OUTPATIENT)
Dept: VASCULAR SURGERY | Age: 78
End: 2023-03-06

## 2023-03-06 DIAGNOSIS — I73.9 PVD (PERIPHERAL VASCULAR DISEASE) (HCC): Primary | ICD-10-CM

## 2023-03-06 RX ORDER — OXYCODONE HYDROCHLORIDE AND ACETAMINOPHEN 5; 325 MG/1; MG/1
1 TABLET ORAL EVERY 4 HOURS PRN
Qty: 30 TABLET | Refills: 0 | Status: SHIPPED | OUTPATIENT
Start: 2023-03-06 | End: 2023-03-11

## 2023-03-06 NOTE — TELEPHONE ENCOUNTER
Patient's wife, Shirlyn Primrose, called earlier this morning asking if pain medication could be ordered for patient's leg pain. Per NP, 5 tables of pain medication called in for patient. Left message on voicemail informing that order had been called in and that since there was only 5 tablets, patient should alternate with Tylenol and Ibuprofen. Informed that our office would not be ordering any additional refills - they would need to contact patient's PCP or Physical Medicine for pain management. Informed that if they are interested in pursuing the surgical option with Dr Dae Mariee, they should contact our office.

## 2023-03-27 ENCOUNTER — TELEPHONE (OUTPATIENT)
Dept: VASCULAR SURGERY | Age: 78
End: 2023-03-27

## 2023-03-27 NOTE — TELEPHONE ENCOUNTER
Patient's spouse called to let office know that patient's foot is worse. There is a smell and more pain. Per Dr Thomas Melendez' last office notes, only other surgical option would be to do a right above-knee amputation. Patient spouse and patient will talk about it tonight and call our office tomorrow if they want to proceed with surgery.

## 2023-03-28 NOTE — TELEPHONE ENCOUNTER
Reached out to patient's spouse regarding previous day's phone conversation. She states that patient has not decided about the surgery. Informed her to have him call our office when he is ready.

## 2023-04-17 ENCOUNTER — HOSPITAL ENCOUNTER (OUTPATIENT)
Dept: PREADMISSION TESTING | Age: 78
Discharge: HOME | End: 2023-04-20
Payer: MEDICARE

## 2023-04-17 VITALS
BODY MASS INDEX: 30.81 KG/M2 | SYSTOLIC BLOOD PRESSURE: 118 MMHG | OXYGEN SATURATION: 97 % | HEIGHT: 74 IN | DIASTOLIC BLOOD PRESSURE: 70 MMHG | HEART RATE: 53 BPM | TEMPERATURE: 97.5 F

## 2023-04-17 LAB
ANION GAP SERPL CALC-SCNC: 2 MMOL/L (ref 2–11)
BUN SERPL-MCNC: 48 MG/DL (ref 8–23)
CALCIUM SERPL-MCNC: 9.9 MG/DL (ref 8.3–10.4)
CHLORIDE SERPL-SCNC: 101 MMOL/L (ref 101–110)
CO2 SERPL-SCNC: 25 MMOL/L (ref 21–32)
CREAT SERPL-MCNC: 1.75 MG/DL (ref 0.8–1.5)
EKG ATRIAL RATE: 60 BPM
EKG DIAGNOSIS: NORMAL
EKG P AXIS: 81 DEGREES
EKG P-R INTERVAL: 184 MS
EKG Q-T INTERVAL: 444 MS
EKG QRS DURATION: 156 MS
EKG QTC CALCULATION (BAZETT): 444 MS
EKG R AXIS: 49 DEGREES
EKG T AXIS: 59 DEGREES
EKG VENTRICULAR RATE: 60 BPM
ERYTHROCYTE [DISTWIDTH] IN BLOOD BY AUTOMATED COUNT: 15.3 % (ref 11.9–14.6)
GLUCOSE SERPL-MCNC: 242 MG/DL (ref 65–100)
HCT VFR BLD AUTO: 37.6 % (ref 41.1–50.3)
HGB BLD-MCNC: 11.8 G/DL (ref 13.6–17.2)
MCH RBC QN AUTO: 24.8 PG (ref 26.1–32.9)
MCHC RBC AUTO-ENTMCNC: 31.4 G/DL (ref 31.4–35)
MCV RBC AUTO: 79 FL (ref 82–102)
NRBC # BLD: 0 K/UL (ref 0–0.2)
PLATELET # BLD AUTO: 334 K/UL (ref 150–450)
PMV BLD AUTO: 9.5 FL (ref 9.4–12.3)
POTASSIUM SERPL-SCNC: 4.7 MMOL/L (ref 3.5–5.1)
RBC # BLD AUTO: 4.76 M/UL (ref 4.23–5.6)
SODIUM SERPL-SCNC: 128 MMOL/L (ref 133–143)
WBC # BLD AUTO: 7 K/UL (ref 4.3–11.1)

## 2023-04-17 PROCEDURE — 85027 COMPLETE CBC AUTOMATED: CPT

## 2023-04-17 PROCEDURE — 80048 BASIC METABOLIC PNL TOTAL CA: CPT

## 2023-04-17 PROCEDURE — 93005 ELECTROCARDIOGRAM TRACING: CPT | Performed by: ANESTHESIOLOGY

## 2023-04-17 ASSESSMENT — PAIN DESCRIPTION - FREQUENCY: FREQUENCY: CONTINUOUS

## 2023-04-17 ASSESSMENT — PAIN DESCRIPTION - LOCATION: LOCATION: FOOT

## 2023-04-17 ASSESSMENT — PAIN SCALES - GENERAL: PAINLEVEL_OUTOF10: 7

## 2023-04-17 ASSESSMENT — PAIN DESCRIPTION - PAIN TYPE: TYPE: ACUTE PAIN

## 2023-04-17 ASSESSMENT — PAIN DESCRIPTION - ORIENTATION: ORIENTATION: RIGHT

## 2023-04-17 NOTE — PERIOP NOTE
PLEASE CONTINUE TAKING ALL PRESCRIPTION MEDICATIONS UP TO THE DAY OF SURGERY UNLESS OTHERWISE DIRECTED BELOW. Take ONLY the following medications on the day of surgery:    Aspirin 81 mg  Hydralazine  Levothyroxine  metoprolol   Tamsulosin    BRING NITROGLYCERIN   Pt and wife verbalizes understanding to take Novolin 80% day prior to surgery  = 4/19/23 a.m. dose = 44 units and p.m. dose 52 units and on day of surgery 4/20/23 if BS is <120 NO INSULIN, if BS is > 120 take 28 units      Hold the following medications as specified:      DISCONTINUE all vitamins and supplements and Non-Steriodal Anti-Inflammatory (NSAIDS) such as Advil and Aleve IMMEDIATELY     Comments       On the day before surgery please take Tylenol (Acetaminophen) 1000mg in the morning and then again before bed OR you may substitute for Tylenol 650 mg morning, noon, and evening. Please do not bring home medications with you on the day of surgery unless otherwise directed by your nurse. If you are instructed to bring home medications, please give them to your nurse as they will be administered by the nursing staff. If you have any questions, please call [facility] [phone] or [facility] [phone]. A copy of this note was provided to the patient for reference.
call from the pre-op nurse by 5 pm on the business day prior to the scheduled procedure. If you have not spoken with a nurse, please check your voicemail. If you have not received an arrival time by 5 pm, please call [phone]. Patient teach back successful and patient demonstrates knowledge of instruction.

## 2023-04-19 DIAGNOSIS — I73.9 PVD (PERIPHERAL VASCULAR DISEASE) (HCC): Primary | ICD-10-CM

## 2023-04-19 NOTE — PROGRESS NOTES
Initially scheduled for right above-knee amputation. Preop call me with concerns about hyponatremia and cardiac status. Initially I thought the patient's wife and canceled the surgery however she called back concerned about patient's right foot. We will place patient back on the schedule for tomorrow for possible guillotine amputation versus above-knee amputation, if the right foot looks actively septic possible source of his electrolyte abnormalities.     Morro Herrera

## 2023-04-20 ENCOUNTER — HOSPITAL ENCOUNTER (INPATIENT)
Age: 78
LOS: 7 days | Discharge: HOME | DRG: 240 | End: 2023-04-27
Attending: SURGERY | Admitting: SURGERY
Payer: MEDICARE

## 2023-04-20 DIAGNOSIS — Z89.612 HX OF AKA (ABOVE KNEE AMPUTATION), LEFT (HCC): Primary | ICD-10-CM

## 2023-04-20 PROBLEM — I73.9 PAD (PERIPHERAL ARTERY DISEASE) (HCC): Status: ACTIVE | Noted: 2023-04-20

## 2023-04-20 LAB
ALBUMIN SERPL-MCNC: 2.2 G/DL (ref 3.2–4.6)
ALBUMIN/GLOB SERPL: 0.4 (ref 0.4–1.6)
ALP SERPL-CCNC: 77 U/L (ref 50–136)
ALT SERPL-CCNC: 25 U/L (ref 12–65)
ANION GAP SERPL CALC-SCNC: 6 MMOL/L (ref 2–11)
AST SERPL-CCNC: 19 U/L (ref 15–37)
BILIRUB SERPL-MCNC: 0.4 MG/DL (ref 0.2–1.1)
BUN SERPL-MCNC: 38 MG/DL (ref 8–23)
CALCIUM SERPL-MCNC: 9.5 MG/DL (ref 8.3–10.4)
CHLORIDE SERPL-SCNC: 110 MMOL/L (ref 101–110)
CO2 SERPL-SCNC: 18 MMOL/L (ref 21–32)
CREAT SERPL-MCNC: 1.6 MG/DL (ref 0.8–1.5)
GLOBULIN SER CALC-MCNC: 5 G/DL (ref 2.8–4.5)
GLUCOSE BLD STRIP.AUTO-MCNC: 121 MG/DL (ref 65–100)
GLUCOSE BLD STRIP.AUTO-MCNC: 191 MG/DL (ref 65–100)
GLUCOSE BLD STRIP.AUTO-MCNC: 261 MG/DL (ref 65–100)
GLUCOSE BLD STRIP.AUTO-MCNC: 281 MG/DL (ref 65–100)
GLUCOSE SERPL-MCNC: 231 MG/DL (ref 65–100)
OSMOLALITY SERPL: 303 MOSM/KG H2O (ref 280–301)
POTASSIUM SERPL-SCNC: 4.6 MMOL/L (ref 3.5–5.1)
PROT SERPL-MCNC: 7.2 G/DL (ref 6.3–8.2)
SERVICE CMNT-IMP: ABNORMAL
SODIUM SERPL-SCNC: 134 MMOL/L (ref 133–143)
URATE SERPL-MCNC: 7.8 MG/DL (ref 2.6–6)

## 2023-04-20 PROCEDURE — 2580000003 HC RX 258: Performed by: NURSE PRACTITIONER

## 2023-04-20 PROCEDURE — 86850 RBC ANTIBODY SCREEN: CPT

## 2023-04-20 PROCEDURE — 6370000000 HC RX 637 (ALT 250 FOR IP): Performed by: NURSE PRACTITIONER

## 2023-04-20 PROCEDURE — 1100000003 HC PRIVATE W/ TELEMETRY

## 2023-04-20 PROCEDURE — 84550 ASSAY OF BLOOD/URIC ACID: CPT

## 2023-04-20 PROCEDURE — 2580000003 HC RX 258: Performed by: SURGERY

## 2023-04-20 PROCEDURE — 6360000002 HC RX W HCPCS: Performed by: NURSE PRACTITIONER

## 2023-04-20 PROCEDURE — 86901 BLOOD TYPING SEROLOGIC RH(D): CPT

## 2023-04-20 PROCEDURE — 1100000000 HC RM PRIVATE

## 2023-04-20 PROCEDURE — 2580000003 HC RX 258: Performed by: ANESTHESIOLOGY

## 2023-04-20 PROCEDURE — 36415 COLL VENOUS BLD VENIPUNCTURE: CPT

## 2023-04-20 PROCEDURE — 83930 ASSAY OF BLOOD OSMOLALITY: CPT

## 2023-04-20 PROCEDURE — 80053 COMPREHEN METABOLIC PANEL: CPT

## 2023-04-20 PROCEDURE — 6360000002 HC RX W HCPCS: Performed by: SURGERY

## 2023-04-20 PROCEDURE — 86900 BLOOD TYPING SEROLOGIC ABO: CPT

## 2023-04-20 PROCEDURE — 82962 GLUCOSE BLOOD TEST: CPT

## 2023-04-20 RX ORDER — INSULIN LISPRO 100 [IU]/ML
0-4 INJECTION, SOLUTION INTRAVENOUS; SUBCUTANEOUS NIGHTLY
Status: DISCONTINUED | OUTPATIENT
Start: 2023-04-20 | End: 2023-04-27 | Stop reason: HOSPADM

## 2023-04-20 RX ORDER — ACETAMINOPHEN 500 MG
1000 TABLET ORAL ONCE
Status: DISCONTINUED | OUTPATIENT
Start: 2023-04-20 | End: 2023-04-20 | Stop reason: HOSPADM

## 2023-04-20 RX ORDER — POLYETHYLENE GLYCOL 3350 17 G/17G
17 POWDER, FOR SOLUTION ORAL DAILY PRN
Status: DISCONTINUED | OUTPATIENT
Start: 2023-04-20 | End: 2023-04-27 | Stop reason: HOSPADM

## 2023-04-20 RX ORDER — OXYCODONE HYDROCHLORIDE AND ACETAMINOPHEN 5; 325 MG/1; MG/1
1 TABLET ORAL EVERY 4 HOURS PRN
Status: DISCONTINUED | OUTPATIENT
Start: 2023-04-20 | End: 2023-04-27 | Stop reason: HOSPADM

## 2023-04-20 RX ORDER — TAMSULOSIN HYDROCHLORIDE 0.4 MG/1
0.4 CAPSULE ORAL DAILY
Status: DISCONTINUED | OUTPATIENT
Start: 2023-04-20 | End: 2023-04-27 | Stop reason: HOSPADM

## 2023-04-20 RX ORDER — LIDOCAINE HYDROCHLORIDE 10 MG/ML
1 INJECTION, SOLUTION INFILTRATION; PERINEURAL
Status: DISCONTINUED | OUTPATIENT
Start: 2023-04-20 | End: 2023-04-20 | Stop reason: HOSPADM

## 2023-04-20 RX ORDER — SODIUM CHLORIDE 0.9 % (FLUSH) 0.9 %
5-40 SYRINGE (ML) INJECTION PRN
Status: DISCONTINUED | OUTPATIENT
Start: 2023-04-20 | End: 2023-04-24 | Stop reason: HOSPADM

## 2023-04-20 RX ORDER — LEVOTHYROXINE SODIUM 0.07 MG/1
75 TABLET ORAL
Status: DISCONTINUED | OUTPATIENT
Start: 2023-04-21 | End: 2023-04-27 | Stop reason: HOSPADM

## 2023-04-20 RX ORDER — MORPHINE SULFATE 2 MG/ML
2 INJECTION, SOLUTION INTRAMUSCULAR; INTRAVENOUS EVERY 4 HOURS PRN
Status: DISCONTINUED | OUTPATIENT
Start: 2023-04-20 | End: 2023-04-27 | Stop reason: HOSPADM

## 2023-04-20 RX ORDER — SODIUM CHLORIDE 0.9 % (FLUSH) 0.9 %
5-40 SYRINGE (ML) INJECTION PRN
Status: DISCONTINUED | OUTPATIENT
Start: 2023-04-20 | End: 2023-04-27 | Stop reason: HOSPADM

## 2023-04-20 RX ORDER — ACETAMINOPHEN 325 MG/1
650 TABLET ORAL EVERY 4 HOURS PRN
Status: DISCONTINUED | OUTPATIENT
Start: 2023-04-20 | End: 2023-04-27 | Stop reason: HOSPADM

## 2023-04-20 RX ORDER — SODIUM CHLORIDE 9 MG/ML
INJECTION, SOLUTION INTRAVENOUS PRN
Status: DISCONTINUED | OUTPATIENT
Start: 2023-04-20 | End: 2023-04-27 | Stop reason: HOSPADM

## 2023-04-20 RX ORDER — HYDROMORPHONE HYDROCHLORIDE 2 MG/ML
0.25 INJECTION, SOLUTION INTRAMUSCULAR; INTRAVENOUS; SUBCUTANEOUS EVERY 5 MIN PRN
Status: DISCONTINUED | OUTPATIENT
Start: 2023-04-20 | End: 2023-04-24 | Stop reason: HOSPADM

## 2023-04-20 RX ORDER — SODIUM CHLORIDE, SODIUM LACTATE, POTASSIUM CHLORIDE, CALCIUM CHLORIDE 600; 310; 30; 20 MG/100ML; MG/100ML; MG/100ML; MG/100ML
INJECTION, SOLUTION INTRAVENOUS CONTINUOUS
Status: DISCONTINUED | OUTPATIENT
Start: 2023-04-20 | End: 2023-04-24 | Stop reason: HOSPADM

## 2023-04-20 RX ORDER — ASPIRIN 81 MG/1
81 TABLET ORAL DAILY
Status: DISCONTINUED | OUTPATIENT
Start: 2023-04-21 | End: 2023-04-27 | Stop reason: HOSPADM

## 2023-04-20 RX ORDER — NITROGLYCERIN 0.4 MG/1
0.4 TABLET SUBLINGUAL EVERY 5 MIN PRN
Status: DISCONTINUED | OUTPATIENT
Start: 2023-04-20 | End: 2023-04-27 | Stop reason: HOSPADM

## 2023-04-20 RX ORDER — ONDANSETRON 4 MG/1
4 TABLET, ORALLY DISINTEGRATING ORAL EVERY 8 HOURS PRN
Status: DISCONTINUED | OUTPATIENT
Start: 2023-04-20 | End: 2023-04-27 | Stop reason: HOSPADM

## 2023-04-20 RX ORDER — ONDANSETRON 2 MG/ML
4 INJECTION INTRAMUSCULAR; INTRAVENOUS
Status: ACTIVE | OUTPATIENT
Start: 2023-04-20 | End: 2023-04-21

## 2023-04-20 RX ORDER — HEPARIN SODIUM 5000 [USP'U]/ML
5000 INJECTION, SOLUTION INTRAVENOUS; SUBCUTANEOUS EVERY 8 HOURS SCHEDULED
Status: DISCONTINUED | OUTPATIENT
Start: 2023-04-20 | End: 2023-04-27 | Stop reason: HOSPADM

## 2023-04-20 RX ORDER — HALOPERIDOL 5 MG/ML
1 INJECTION INTRAMUSCULAR
Status: ACTIVE | OUTPATIENT
Start: 2023-04-20 | End: 2023-04-21

## 2023-04-20 RX ORDER — SODIUM CHLORIDE 0.9 % (FLUSH) 0.9 %
5-40 SYRINGE (ML) INJECTION EVERY 12 HOURS SCHEDULED
Status: DISCONTINUED | OUTPATIENT
Start: 2023-04-20 | End: 2023-04-27 | Stop reason: HOSPADM

## 2023-04-20 RX ORDER — SODIUM CHLORIDE 0.9 % (FLUSH) 0.9 %
5-40 SYRINGE (ML) INJECTION EVERY 12 HOURS SCHEDULED
Status: DISCONTINUED | OUTPATIENT
Start: 2023-04-20 | End: 2023-04-20 | Stop reason: HOSPADM

## 2023-04-20 RX ORDER — SODIUM CHLORIDE 0.9 % (FLUSH) 0.9 %
5-40 SYRINGE (ML) INJECTION EVERY 12 HOURS SCHEDULED
Status: DISCONTINUED | OUTPATIENT
Start: 2023-04-20 | End: 2023-04-24 | Stop reason: HOSPADM

## 2023-04-20 RX ORDER — INSULIN LISPRO 100 [IU]/ML
0-8 INJECTION, SOLUTION INTRAVENOUS; SUBCUTANEOUS
Status: DISCONTINUED | OUTPATIENT
Start: 2023-04-20 | End: 2023-04-27 | Stop reason: HOSPADM

## 2023-04-20 RX ORDER — MAGNESIUM HYDROXIDE/ALUMINUM HYDROXICE/SIMETHICONE 120; 1200; 1200 MG/30ML; MG/30ML; MG/30ML
30 SUSPENSION ORAL EVERY 6 HOURS PRN
Status: DISCONTINUED | OUTPATIENT
Start: 2023-04-20 | End: 2023-04-27 | Stop reason: HOSPADM

## 2023-04-20 RX ORDER — SODIUM CHLORIDE, SODIUM LACTATE, POTASSIUM CHLORIDE, CALCIUM CHLORIDE 600; 310; 30; 20 MG/100ML; MG/100ML; MG/100ML; MG/100ML
INJECTION, SOLUTION INTRAVENOUS CONTINUOUS
Status: DISCONTINUED | OUTPATIENT
Start: 2023-04-20 | End: 2023-04-22

## 2023-04-20 RX ORDER — SODIUM CHLORIDE 9 MG/ML
INJECTION, SOLUTION INTRAVENOUS PRN
Status: DISCONTINUED | OUTPATIENT
Start: 2023-04-20 | End: 2023-04-24 | Stop reason: HOSPADM

## 2023-04-20 RX ORDER — OXYCODONE HYDROCHLORIDE 5 MG/1
5 TABLET ORAL
Status: ACTIVE | OUTPATIENT
Start: 2023-04-20 | End: 2023-04-21

## 2023-04-20 RX ORDER — ONDANSETRON 2 MG/ML
4 INJECTION INTRAMUSCULAR; INTRAVENOUS EVERY 6 HOURS PRN
Status: DISCONTINUED | OUTPATIENT
Start: 2023-04-20 | End: 2023-04-27 | Stop reason: HOSPADM

## 2023-04-20 RX ORDER — SODIUM CHLORIDE 0.9 % (FLUSH) 0.9 %
5-40 SYRINGE (ML) INJECTION PRN
Status: DISCONTINUED | OUTPATIENT
Start: 2023-04-20 | End: 2023-04-20 | Stop reason: HOSPADM

## 2023-04-20 RX ORDER — SODIUM CHLORIDE 9 MG/ML
INJECTION, SOLUTION INTRAVENOUS PRN
Status: DISCONTINUED | OUTPATIENT
Start: 2023-04-20 | End: 2023-04-20 | Stop reason: HOSPADM

## 2023-04-20 RX ORDER — MIDAZOLAM HYDROCHLORIDE 2 MG/2ML
2 INJECTION, SOLUTION INTRAMUSCULAR; INTRAVENOUS
Status: DISCONTINUED | OUTPATIENT
Start: 2023-04-20 | End: 2023-04-20 | Stop reason: HOSPADM

## 2023-04-20 RX ORDER — FENTANYL CITRATE 50 UG/ML
100 INJECTION, SOLUTION INTRAMUSCULAR; INTRAVENOUS
Status: DISCONTINUED | OUTPATIENT
Start: 2023-04-20 | End: 2023-04-20 | Stop reason: HOSPADM

## 2023-04-20 RX ORDER — DEXTROSE MONOHYDRATE 100 MG/ML
INJECTION, SOLUTION INTRAVENOUS CONTINUOUS PRN
Status: DISCONTINUED | OUTPATIENT
Start: 2023-04-20 | End: 2023-04-24 | Stop reason: HOSPADM

## 2023-04-20 RX ADMIN — SODIUM CHLORIDE, POTASSIUM CHLORIDE, SODIUM LACTATE AND CALCIUM CHLORIDE: 600; 310; 30; 20 INJECTION, SOLUTION INTRAVENOUS at 17:47

## 2023-04-20 RX ADMIN — SODIUM CHLORIDE, POTASSIUM CHLORIDE, SODIUM LACTATE AND CALCIUM CHLORIDE: 600; 310; 30; 20 INJECTION, SOLUTION INTRAVENOUS at 11:35

## 2023-04-20 RX ADMIN — SODIUM CHLORIDE, PRESERVATIVE FREE 10 ML: 5 INJECTION INTRAVENOUS at 20:14

## 2023-04-20 RX ADMIN — Medication 2500 MG: at 13:39

## 2023-04-20 RX ADMIN — TAMSULOSIN HYDROCHLORIDE 0.4 MG: 0.4 CAPSULE ORAL at 17:48

## 2023-04-20 RX ADMIN — HEPARIN SODIUM 5000 UNITS: 5000 INJECTION INTRAVENOUS; SUBCUTANEOUS at 13:57

## 2023-04-20 RX ADMIN — INSULIN LISPRO 4 UNITS: 100 INJECTION, SOLUTION INTRAVENOUS; SUBCUTANEOUS at 17:48

## 2023-04-20 RX ADMIN — OXYCODONE AND ACETAMINOPHEN 1 TABLET: 5; 325 TABLET ORAL at 20:30

## 2023-04-20 RX ADMIN — HEPARIN SODIUM 5000 UNITS: 5000 INJECTION INTRAVENOUS; SUBCUTANEOUS at 20:29

## 2023-04-20 RX ADMIN — PIPERACILLIN AND TAZOBACTAM 4500 MG: 4; .5 INJECTION, POWDER, FOR SOLUTION INTRAVENOUS at 20:14

## 2023-04-20 ASSESSMENT — PAIN DESCRIPTION - DESCRIPTORS
DESCRIPTORS: ACHING;THROBBING
DESCRIPTORS: ACHING
DESCRIPTORS: SHOOTING;SHARP

## 2023-04-20 ASSESSMENT — PAIN - FUNCTIONAL ASSESSMENT: PAIN_FUNCTIONAL_ASSESSMENT: 0-10

## 2023-04-20 ASSESSMENT — PAIN DESCRIPTION - LOCATION
LOCATION: FOOT
LOCATION: FOOT

## 2023-04-20 ASSESSMENT — PAIN DESCRIPTION - ORIENTATION
ORIENTATION: RIGHT
ORIENTATION: RIGHT

## 2023-04-20 ASSESSMENT — PAIN SCALES - GENERAL
PAINLEVEL_OUTOF10: 4
PAINLEVEL_OUTOF10: 3
PAINLEVEL_OUTOF10: 3

## 2023-04-21 ENCOUNTER — PREP FOR PROCEDURE (OUTPATIENT)
Dept: VASCULAR SURGERY | Age: 78
End: 2023-04-21

## 2023-04-21 PROBLEM — I25.10 CAD (CORONARY ARTERY DISEASE): Status: ACTIVE | Noted: 2019-02-13

## 2023-04-21 PROBLEM — D50.9 MICROCYTIC ANEMIA: Status: ACTIVE | Noted: 2023-04-21

## 2023-04-21 PROBLEM — E78.5 DYSLIPIDEMIA: Status: ACTIVE | Noted: 2019-02-14

## 2023-04-21 PROBLEM — I10 HTN (HYPERTENSION): Status: ACTIVE | Noted: 2019-02-14

## 2023-04-21 PROBLEM — Z95.1 HX OF CABG: Status: ACTIVE | Noted: 2019-02-14

## 2023-04-21 PROBLEM — E11.9 DM (DIABETES MELLITUS) (HCC): Status: ACTIVE | Noted: 2019-02-14

## 2023-04-21 PROBLEM — E03.9 HYPOTHYROIDISM: Status: ACTIVE | Noted: 2019-11-01

## 2023-04-21 PROBLEM — S91.301A OPEN WOUND OF RIGHT FOOT: Status: ACTIVE | Noted: 2020-05-26

## 2023-04-21 PROBLEM — N40.0 BPH (BENIGN PROSTATIC HYPERPLASIA): Status: ACTIVE | Noted: 2023-04-21

## 2023-04-21 PROBLEM — N18.30 CKD (CHRONIC KIDNEY DISEASE), STAGE III (HCC): Status: ACTIVE | Noted: 2023-04-21

## 2023-04-21 PROBLEM — I45.10 RBBB: Status: ACTIVE | Noted: 2023-04-21

## 2023-04-21 PROBLEM — Z89.612 HX OF AKA (ABOVE KNEE AMPUTATION), LEFT (HCC): Status: ACTIVE | Noted: 2023-04-21

## 2023-04-21 LAB
ANION GAP SERPL CALC-SCNC: 5 MMOL/L (ref 2–11)
BASOPHILS # BLD: 0 K/UL (ref 0–0.2)
BASOPHILS NFR BLD: 1 % (ref 0–2)
BUN SERPL-MCNC: 31 MG/DL (ref 8–23)
CALCIUM SERPL-MCNC: 10 MG/DL (ref 8.3–10.4)
CHLORIDE SERPL-SCNC: 107 MMOL/L (ref 101–110)
CO2 SERPL-SCNC: 20 MMOL/L (ref 21–32)
CREAT SERPL-MCNC: 1.6 MG/DL (ref 0.8–1.5)
DIFFERENTIAL METHOD BLD: ABNORMAL
EOSINOPHIL # BLD: 0.1 K/UL (ref 0–0.8)
EOSINOPHIL NFR BLD: 1 % (ref 0.5–7.8)
ERYTHROCYTE [DISTWIDTH] IN BLOOD BY AUTOMATED COUNT: 15.6 % (ref 11.9–14.6)
GLUCOSE BLD STRIP.AUTO-MCNC: 270 MG/DL (ref 65–100)
GLUCOSE BLD STRIP.AUTO-MCNC: 276 MG/DL (ref 65–100)
GLUCOSE BLD STRIP.AUTO-MCNC: 303 MG/DL (ref 65–100)
GLUCOSE BLD STRIP.AUTO-MCNC: 337 MG/DL (ref 65–100)
GLUCOSE SERPL-MCNC: 266 MG/DL (ref 65–100)
HCT VFR BLD AUTO: 33.5 % (ref 41.1–50.3)
HGB BLD-MCNC: 10.5 G/DL (ref 13.6–17.2)
IMM GRANULOCYTES # BLD AUTO: 0 K/UL (ref 0–0.5)
IMM GRANULOCYTES NFR BLD AUTO: 0 % (ref 0–5)
LYMPHOCYTES # BLD: 1.1 K/UL (ref 0.5–4.6)
LYMPHOCYTES NFR BLD: 17 % (ref 13–44)
MCH RBC QN AUTO: 25.1 PG (ref 26.1–32.9)
MCHC RBC AUTO-ENTMCNC: 31.3 G/DL (ref 31.4–35)
MCV RBC AUTO: 80 FL (ref 82–102)
MONOCYTES # BLD: 0.7 K/UL (ref 0.1–1.3)
MONOCYTES NFR BLD: 11 % (ref 4–12)
NEUTS SEG # BLD: 4.6 K/UL (ref 1.7–8.2)
NEUTS SEG NFR BLD: 70 % (ref 43–78)
NRBC # BLD: 0 K/UL (ref 0–0.2)
PLATELET # BLD AUTO: 257 K/UL (ref 150–450)
PMV BLD AUTO: 9.7 FL (ref 9.4–12.3)
POTASSIUM SERPL-SCNC: 4.9 MMOL/L (ref 3.5–5.1)
RBC # BLD AUTO: 4.19 M/UL (ref 4.23–5.6)
SERVICE CMNT-IMP: ABNORMAL
SODIUM SERPL-SCNC: 132 MMOL/L (ref 133–143)
WBC # BLD AUTO: 6.6 K/UL (ref 4.3–11.1)

## 2023-04-21 PROCEDURE — 6360000002 HC RX W HCPCS: Performed by: SURGERY

## 2023-04-21 PROCEDURE — 2580000003 HC RX 258: Performed by: NURSE PRACTITIONER

## 2023-04-21 PROCEDURE — 6370000000 HC RX 637 (ALT 250 FOR IP): Performed by: INTERNAL MEDICINE

## 2023-04-21 PROCEDURE — 36415 COLL VENOUS BLD VENIPUNCTURE: CPT

## 2023-04-21 PROCEDURE — 2580000003 HC RX 258: Performed by: ANESTHESIOLOGY

## 2023-04-21 PROCEDURE — 6360000002 HC RX W HCPCS: Performed by: NURSE PRACTITIONER

## 2023-04-21 PROCEDURE — 6370000000 HC RX 637 (ALT 250 FOR IP): Performed by: NURSE PRACTITIONER

## 2023-04-21 PROCEDURE — 2580000003 HC RX 258: Performed by: SURGERY

## 2023-04-21 PROCEDURE — 2500000003 HC RX 250 WO HCPCS: Performed by: INTERNAL MEDICINE

## 2023-04-21 PROCEDURE — 99232 SBSQ HOSP IP/OBS MODERATE 35: CPT | Performed by: INTERNAL MEDICINE

## 2023-04-21 PROCEDURE — 80048 BASIC METABOLIC PNL TOTAL CA: CPT

## 2023-04-21 PROCEDURE — 85025 COMPLETE CBC W/AUTO DIFF WBC: CPT

## 2023-04-21 PROCEDURE — 1100000003 HC PRIVATE W/ TELEMETRY

## 2023-04-21 PROCEDURE — 82962 GLUCOSE BLOOD TEST: CPT

## 2023-04-21 RX ORDER — INSULIN GLARGINE 100 [IU]/ML
25 INJECTION, SOLUTION SUBCUTANEOUS 2 TIMES DAILY
Status: DISCONTINUED | OUTPATIENT
Start: 2023-04-21 | End: 2023-04-22

## 2023-04-21 RX ORDER — HYDRALAZINE HYDROCHLORIDE 50 MG/1
100 TABLET, FILM COATED ORAL 2 TIMES DAILY
Status: DISCONTINUED | OUTPATIENT
Start: 2023-04-21 | End: 2023-04-27 | Stop reason: HOSPADM

## 2023-04-21 RX ORDER — LISINOPRIL 20 MG/1
40 TABLET ORAL DAILY
Status: DISCONTINUED | OUTPATIENT
Start: 2023-04-21 | End: 2023-04-27 | Stop reason: HOSPADM

## 2023-04-21 RX ORDER — LISINOPRIL 20 MG/1
40 TABLET ORAL DAILY
Status: DISCONTINUED | OUTPATIENT
Start: 2023-04-21 | End: 2023-04-21

## 2023-04-21 RX ADMIN — METOPROLOL TARTRATE 12.5 MG: 25 TABLET, FILM COATED ORAL at 12:58

## 2023-04-21 RX ADMIN — HEPARIN SODIUM 5000 UNITS: 5000 INJECTION INTRAVENOUS; SUBCUTANEOUS at 21:26

## 2023-04-21 RX ADMIN — INSULIN LISPRO 6 UNITS: 100 INJECTION, SOLUTION INTRAVENOUS; SUBCUTANEOUS at 12:37

## 2023-04-21 RX ADMIN — LEVOTHYROXINE SODIUM 75 MCG: 75 TABLET ORAL at 06:32

## 2023-04-21 RX ADMIN — SODIUM CHLORIDE, PRESERVATIVE FREE 10 ML: 5 INJECTION INTRAVENOUS at 21:26

## 2023-04-21 RX ADMIN — PIPERACILLIN AND TAZOBACTAM 3375 MG: 3; .375 INJECTION, POWDER, LYOPHILIZED, FOR SOLUTION INTRAVENOUS at 19:15

## 2023-04-21 RX ADMIN — VANCOMYCIN HYDROCHLORIDE 1250 MG: 10 INJECTION, POWDER, LYOPHILIZED, FOR SOLUTION INTRAVENOUS at 15:38

## 2023-04-21 RX ADMIN — INSULIN LISPRO 4 UNITS: 100 INJECTION, SOLUTION INTRAVENOUS; SUBCUTANEOUS at 21:24

## 2023-04-21 RX ADMIN — HYDRALAZINE HYDROCHLORIDE 100 MG: 50 TABLET, FILM COATED ORAL at 12:57

## 2023-04-21 RX ADMIN — ASPIRIN 81 MG: 81 TABLET ORAL at 08:17

## 2023-04-21 RX ADMIN — SODIUM CHLORIDE, PRESERVATIVE FREE 10 ML: 5 INJECTION INTRAVENOUS at 08:29

## 2023-04-21 RX ADMIN — INSULIN LISPRO 4 UNITS: 100 INJECTION, SOLUTION INTRAVENOUS; SUBCUTANEOUS at 08:24

## 2023-04-21 RX ADMIN — SODIUM CHLORIDE, PRESERVATIVE FREE 10 ML: 5 INJECTION INTRAVENOUS at 08:30

## 2023-04-21 RX ADMIN — PIPERACILLIN AND TAZOBACTAM 3375 MG: 3; .375 INJECTION, POWDER, LYOPHILIZED, FOR SOLUTION INTRAVENOUS at 10:52

## 2023-04-21 RX ADMIN — LISINOPRIL 40 MG: 20 TABLET ORAL at 12:58

## 2023-04-21 RX ADMIN — INSULIN GLARGINE 25 UNITS: 100 INJECTION, SOLUTION SUBCUTANEOUS at 21:24

## 2023-04-21 RX ADMIN — HYDRALAZINE HYDROCHLORIDE 100 MG: 50 TABLET, FILM COATED ORAL at 21:26

## 2023-04-21 RX ADMIN — PIPERACILLIN AND TAZOBACTAM 3375 MG: 3; .375 INJECTION, POWDER, LYOPHILIZED, FOR SOLUTION INTRAVENOUS at 03:50

## 2023-04-21 RX ADMIN — TUBERCULIN PURIFIED PROTEIN DERIVATIVE 5 UNITS: 5 INJECTION, SOLUTION INTRADERMAL at 23:54

## 2023-04-21 RX ADMIN — INSULIN HUMAN 15 UNITS: 100 INJECTION, SUSPENSION SUBCUTANEOUS at 12:58

## 2023-04-21 RX ADMIN — HEPARIN SODIUM 5000 UNITS: 5000 INJECTION INTRAVENOUS; SUBCUTANEOUS at 06:32

## 2023-04-21 RX ADMIN — SODIUM CHLORIDE, POTASSIUM CHLORIDE, SODIUM LACTATE AND CALCIUM CHLORIDE: 600; 310; 30; 20 INJECTION, SOLUTION INTRAVENOUS at 10:50

## 2023-04-21 RX ADMIN — TAMSULOSIN HYDROCHLORIDE 0.4 MG: 0.4 CAPSULE ORAL at 08:18

## 2023-04-21 RX ADMIN — HEPARIN SODIUM 5000 UNITS: 5000 INJECTION INTRAVENOUS; SUBCUTANEOUS at 14:30

## 2023-04-21 RX ADMIN — METOPROLOL TARTRATE 12.5 MG: 25 TABLET, FILM COATED ORAL at 21:26

## 2023-04-21 RX ADMIN — INSULIN LISPRO 4 UNITS: 100 INJECTION, SOLUTION INTRAVENOUS; SUBCUTANEOUS at 17:14

## 2023-04-22 LAB
ANION GAP SERPL CALC-SCNC: 5 MMOL/L (ref 2–11)
BASOPHILS # BLD: 0 K/UL (ref 0–0.2)
BASOPHILS NFR BLD: 0 % (ref 0–2)
BUN SERPL-MCNC: 28 MG/DL (ref 8–23)
CALCIUM SERPL-MCNC: 9.4 MG/DL (ref 8.3–10.4)
CHLORIDE SERPL-SCNC: 106 MMOL/L (ref 101–110)
CO2 SERPL-SCNC: 21 MMOL/L (ref 21–32)
CREAT SERPL-MCNC: 1.5 MG/DL (ref 0.8–1.5)
DIFFERENTIAL METHOD BLD: ABNORMAL
EOSINOPHIL # BLD: 0.1 K/UL (ref 0–0.8)
EOSINOPHIL NFR BLD: 2 % (ref 0.5–7.8)
ERYTHROCYTE [DISTWIDTH] IN BLOOD BY AUTOMATED COUNT: 15.7 % (ref 11.9–14.6)
FERRITIN SERPL-MCNC: 553 NG/ML (ref 8–388)
GLUCOSE BLD STRIP.AUTO-MCNC: 205 MG/DL (ref 65–100)
GLUCOSE BLD STRIP.AUTO-MCNC: 214 MG/DL (ref 65–100)
GLUCOSE BLD STRIP.AUTO-MCNC: 229 MG/DL (ref 65–100)
GLUCOSE BLD STRIP.AUTO-MCNC: 283 MG/DL (ref 65–100)
GLUCOSE SERPL-MCNC: 207 MG/DL (ref 65–100)
HCT VFR BLD AUTO: 30.3 % (ref 41.1–50.3)
HGB BLD-MCNC: 9.7 G/DL (ref 13.6–17.2)
IMM GRANULOCYTES # BLD AUTO: 0 K/UL (ref 0–0.5)
IMM GRANULOCYTES NFR BLD AUTO: 1 % (ref 0–5)
IRON SATN MFR SERPL: 18 %
IRON SERPL-MCNC: 22 UG/DL (ref 35–150)
LYMPHOCYTES # BLD: 1.1 K/UL (ref 0.5–4.6)
LYMPHOCYTES NFR BLD: 18 % (ref 13–44)
MCH RBC QN AUTO: 25.3 PG (ref 26.1–32.9)
MCHC RBC AUTO-ENTMCNC: 32 G/DL (ref 31.4–35)
MCV RBC AUTO: 78.9 FL (ref 82–102)
MM INDURATION, POC: 0 MM (ref 0–5)
MONOCYTES # BLD: 0.6 K/UL (ref 0.1–1.3)
MONOCYTES NFR BLD: 11 % (ref 4–12)
NEUTS SEG # BLD: 4 K/UL (ref 1.7–8.2)
NEUTS SEG NFR BLD: 68 % (ref 43–78)
NRBC # BLD: 0 K/UL (ref 0–0.2)
PLATELET # BLD AUTO: 251 K/UL (ref 150–450)
PMV BLD AUTO: 9.6 FL (ref 9.4–12.3)
POTASSIUM SERPL-SCNC: 4.6 MMOL/L (ref 3.5–5.1)
PPD, POC: NEGATIVE
RBC # BLD AUTO: 3.84 M/UL (ref 4.23–5.6)
SERVICE CMNT-IMP: ABNORMAL
SODIUM SERPL-SCNC: 132 MMOL/L (ref 133–143)
TIBC SERPL-MCNC: 123 UG/DL (ref 250–450)
TRANSFERRIN SERPL-MCNC: 113 MG/DL (ref 202–364)
TSH, 3RD GENERATION: 2.05 UIU/ML (ref 0.36–3.74)
VANCOMYCIN SERPL-MCNC: 13.1 UG/ML
WBC # BLD AUTO: 5.8 K/UL (ref 4.3–11.1)

## 2023-04-22 PROCEDURE — 84466 ASSAY OF TRANSFERRIN: CPT

## 2023-04-22 PROCEDURE — 6370000000 HC RX 637 (ALT 250 FOR IP): Performed by: INTERNAL MEDICINE

## 2023-04-22 PROCEDURE — 82728 ASSAY OF FERRITIN: CPT

## 2023-04-22 PROCEDURE — 83540 ASSAY OF IRON: CPT

## 2023-04-22 PROCEDURE — 85025 COMPLETE CBC W/AUTO DIFF WBC: CPT

## 2023-04-22 PROCEDURE — 36415 COLL VENOUS BLD VENIPUNCTURE: CPT

## 2023-04-22 PROCEDURE — 6370000000 HC RX 637 (ALT 250 FOR IP): Performed by: NURSE PRACTITIONER

## 2023-04-22 PROCEDURE — 6360000002 HC RX W HCPCS: Performed by: NURSE PRACTITIONER

## 2023-04-22 PROCEDURE — 82962 GLUCOSE BLOOD TEST: CPT

## 2023-04-22 PROCEDURE — 2580000003 HC RX 258: Performed by: NURSE PRACTITIONER

## 2023-04-22 PROCEDURE — 84443 ASSAY THYROID STIM HORMONE: CPT

## 2023-04-22 PROCEDURE — 6360000002 HC RX W HCPCS: Performed by: SURGERY

## 2023-04-22 PROCEDURE — 80202 ASSAY OF VANCOMYCIN: CPT

## 2023-04-22 PROCEDURE — 80048 BASIC METABOLIC PNL TOTAL CA: CPT

## 2023-04-22 PROCEDURE — 2580000003 HC RX 258: Performed by: ANESTHESIOLOGY

## 2023-04-22 PROCEDURE — 2580000003 HC RX 258: Performed by: SURGERY

## 2023-04-22 PROCEDURE — 1100000003 HC PRIVATE W/ TELEMETRY

## 2023-04-22 RX ORDER — INSULIN GLARGINE 100 [IU]/ML
28 INJECTION, SOLUTION SUBCUTANEOUS 2 TIMES DAILY
Status: DISCONTINUED | OUTPATIENT
Start: 2023-04-22 | End: 2023-04-25

## 2023-04-22 RX ADMIN — METOPROLOL TARTRATE 12.5 MG: 25 TABLET, FILM COATED ORAL at 08:48

## 2023-04-22 RX ADMIN — HYDRALAZINE HYDROCHLORIDE 100 MG: 50 TABLET, FILM COATED ORAL at 08:56

## 2023-04-22 RX ADMIN — HEPARIN SODIUM 5000 UNITS: 5000 INJECTION INTRAVENOUS; SUBCUTANEOUS at 05:16

## 2023-04-22 RX ADMIN — METOPROLOL TARTRATE 12.5 MG: 25 TABLET, FILM COATED ORAL at 22:14

## 2023-04-22 RX ADMIN — INSULIN GLARGINE 28 UNITS: 100 INJECTION, SOLUTION SUBCUTANEOUS at 22:14

## 2023-04-22 RX ADMIN — SODIUM CHLORIDE, PRESERVATIVE FREE 10 ML: 5 INJECTION INTRAVENOUS at 09:29

## 2023-04-22 RX ADMIN — INSULIN GLARGINE 25 UNITS: 100 INJECTION, SOLUTION SUBCUTANEOUS at 08:52

## 2023-04-22 RX ADMIN — PIPERACILLIN AND TAZOBACTAM 3375 MG: 3; .375 INJECTION, POWDER, LYOPHILIZED, FOR SOLUTION INTRAVENOUS at 11:30

## 2023-04-22 RX ADMIN — HEPARIN SODIUM 5000 UNITS: 5000 INJECTION INTRAVENOUS; SUBCUTANEOUS at 14:19

## 2023-04-22 RX ADMIN — HEPARIN SODIUM 5000 UNITS: 5000 INJECTION INTRAVENOUS; SUBCUTANEOUS at 22:14

## 2023-04-22 RX ADMIN — HYDRALAZINE HYDROCHLORIDE 100 MG: 50 TABLET, FILM COATED ORAL at 22:14

## 2023-04-22 RX ADMIN — INSULIN LISPRO 4 UNITS: 100 INJECTION, SOLUTION INTRAVENOUS; SUBCUTANEOUS at 11:44

## 2023-04-22 RX ADMIN — TAMSULOSIN HYDROCHLORIDE 0.4 MG: 0.4 CAPSULE ORAL at 08:49

## 2023-04-22 RX ADMIN — VANCOMYCIN HYDROCHLORIDE 1250 MG: 10 INJECTION, POWDER, LYOPHILIZED, FOR SOLUTION INTRAVENOUS at 14:19

## 2023-04-22 RX ADMIN — PIPERACILLIN AND TAZOBACTAM 3375 MG: 3; .375 INJECTION, POWDER, LYOPHILIZED, FOR SOLUTION INTRAVENOUS at 19:53

## 2023-04-22 RX ADMIN — INSULIN LISPRO 2 UNITS: 100 INJECTION, SOLUTION INTRAVENOUS; SUBCUTANEOUS at 08:52

## 2023-04-22 RX ADMIN — SODIUM CHLORIDE, PRESERVATIVE FREE 10 ML: 5 INJECTION INTRAVENOUS at 22:14

## 2023-04-22 RX ADMIN — PIPERACILLIN AND TAZOBACTAM 3375 MG: 3; .375 INJECTION, POWDER, LYOPHILIZED, FOR SOLUTION INTRAVENOUS at 03:44

## 2023-04-22 RX ADMIN — LISINOPRIL 40 MG: 20 TABLET ORAL at 08:49

## 2023-04-22 RX ADMIN — ASPIRIN 81 MG: 81 TABLET ORAL at 08:48

## 2023-04-22 RX ADMIN — LEVOTHYROXINE SODIUM 75 MCG: 75 TABLET ORAL at 05:16

## 2023-04-22 RX ADMIN — MORPHINE SULFATE 2 MG: 2 INJECTION, SOLUTION INTRAMUSCULAR; INTRAVENOUS at 14:26

## 2023-04-22 RX ADMIN — SODIUM CHLORIDE, PRESERVATIVE FREE 10 ML: 5 INJECTION INTRAVENOUS at 22:21

## 2023-04-22 RX ADMIN — INSULIN LISPRO 2 UNITS: 100 INJECTION, SOLUTION INTRAVENOUS; SUBCUTANEOUS at 17:25

## 2023-04-22 ASSESSMENT — PAIN DESCRIPTION - LOCATION: LOCATION: FOOT

## 2023-04-22 ASSESSMENT — PAIN SCALES - GENERAL
PAINLEVEL_OUTOF10: 0
PAINLEVEL_OUTOF10: 8

## 2023-04-22 ASSESSMENT — PAIN DESCRIPTION - ORIENTATION: ORIENTATION: RIGHT

## 2023-04-23 ENCOUNTER — ANESTHESIA EVENT (OUTPATIENT)
Dept: SURGERY | Age: 78
DRG: 240 | End: 2023-04-23
Payer: MEDICARE

## 2023-04-23 LAB
ABO + RH BLD: NORMAL
ANION GAP SERPL CALC-SCNC: 5 MMOL/L (ref 2–11)
BASOPHILS # BLD: 0 K/UL (ref 0–0.2)
BASOPHILS NFR BLD: 0 % (ref 0–2)
BLOOD GROUP ANTIBODIES SERPL: NORMAL
BUN SERPL-MCNC: 27 MG/DL (ref 8–23)
CALCIUM SERPL-MCNC: 9.7 MG/DL (ref 8.3–10.4)
CHLORIDE SERPL-SCNC: 105 MMOL/L (ref 101–110)
CO2 SERPL-SCNC: 22 MMOL/L (ref 21–32)
CREAT SERPL-MCNC: 1.6 MG/DL (ref 0.8–1.5)
DIFFERENTIAL METHOD BLD: ABNORMAL
EOSINOPHIL # BLD: 0.1 K/UL (ref 0–0.8)
EOSINOPHIL NFR BLD: 1 % (ref 0.5–7.8)
ERYTHROCYTE [DISTWIDTH] IN BLOOD BY AUTOMATED COUNT: 15.5 % (ref 11.9–14.6)
GLUCOSE BLD STRIP.AUTO-MCNC: 176 MG/DL (ref 65–100)
GLUCOSE BLD STRIP.AUTO-MCNC: 188 MG/DL (ref 65–100)
GLUCOSE BLD STRIP.AUTO-MCNC: 211 MG/DL (ref 65–100)
GLUCOSE BLD STRIP.AUTO-MCNC: 222 MG/DL (ref 65–100)
GLUCOSE SERPL-MCNC: 184 MG/DL (ref 65–100)
HCT VFR BLD AUTO: 33.1 % (ref 41.1–50.3)
HGB BLD-MCNC: 10.4 G/DL (ref 13.6–17.2)
IMM GRANULOCYTES # BLD AUTO: 0 K/UL (ref 0–0.5)
IMM GRANULOCYTES NFR BLD AUTO: 1 % (ref 0–5)
LYMPHOCYTES # BLD: 1.1 K/UL (ref 0.5–4.6)
LYMPHOCYTES NFR BLD: 17 % (ref 13–44)
MCH RBC QN AUTO: 25.6 PG (ref 26.1–32.9)
MCHC RBC AUTO-ENTMCNC: 31.4 G/DL (ref 31.4–35)
MCV RBC AUTO: 81.3 FL (ref 82–102)
MM INDURATION, POC: 0 MM (ref 0–5)
MONOCYTES # BLD: 0.6 K/UL (ref 0.1–1.3)
MONOCYTES NFR BLD: 10 % (ref 4–12)
NEUTS SEG # BLD: 4.4 K/UL (ref 1.7–8.2)
NEUTS SEG NFR BLD: 71 % (ref 43–78)
NRBC # BLD: 0 K/UL (ref 0–0.2)
PLATELET # BLD AUTO: 261 K/UL (ref 150–450)
PMV BLD AUTO: 9.8 FL (ref 9.4–12.3)
POTASSIUM SERPL-SCNC: 4.5 MMOL/L (ref 3.5–5.1)
PPD, POC: NEGATIVE
RBC # BLD AUTO: 4.07 M/UL (ref 4.23–5.6)
SERVICE CMNT-IMP: ABNORMAL
SODIUM SERPL-SCNC: 132 MMOL/L (ref 133–143)
SPECIMEN EXP DATE BLD: NORMAL
WBC # BLD AUTO: 6.3 K/UL (ref 4.3–11.1)

## 2023-04-23 PROCEDURE — 6370000000 HC RX 637 (ALT 250 FOR IP): Performed by: NURSE PRACTITIONER

## 2023-04-23 PROCEDURE — 1100000003 HC PRIVATE W/ TELEMETRY

## 2023-04-23 PROCEDURE — 85025 COMPLETE CBC W/AUTO DIFF WBC: CPT

## 2023-04-23 PROCEDURE — 86901 BLOOD TYPING SEROLOGIC RH(D): CPT

## 2023-04-23 PROCEDURE — 86923 COMPATIBILITY TEST ELECTRIC: CPT

## 2023-04-23 PROCEDURE — 36415 COLL VENOUS BLD VENIPUNCTURE: CPT

## 2023-04-23 PROCEDURE — 80048 BASIC METABOLIC PNL TOTAL CA: CPT

## 2023-04-23 PROCEDURE — 6360000002 HC RX W HCPCS: Performed by: NURSE PRACTITIONER

## 2023-04-23 PROCEDURE — 6370000000 HC RX 637 (ALT 250 FOR IP): Performed by: INTERNAL MEDICINE

## 2023-04-23 PROCEDURE — 2580000003 HC RX 258: Performed by: SURGERY

## 2023-04-23 PROCEDURE — 2580000003 HC RX 258: Performed by: NURSE PRACTITIONER

## 2023-04-23 PROCEDURE — 86850 RBC ANTIBODY SCREEN: CPT

## 2023-04-23 PROCEDURE — 86900 BLOOD TYPING SEROLOGIC ABO: CPT

## 2023-04-23 PROCEDURE — 2580000003 HC RX 258: Performed by: ANESTHESIOLOGY

## 2023-04-23 PROCEDURE — 6360000002 HC RX W HCPCS: Performed by: SURGERY

## 2023-04-23 PROCEDURE — 82962 GLUCOSE BLOOD TEST: CPT

## 2023-04-23 RX ADMIN — HYDRALAZINE HYDROCHLORIDE 100 MG: 50 TABLET, FILM COATED ORAL at 09:16

## 2023-04-23 RX ADMIN — VANCOMYCIN HYDROCHLORIDE 1250 MG: 10 INJECTION, POWDER, LYOPHILIZED, FOR SOLUTION INTRAVENOUS at 14:09

## 2023-04-23 RX ADMIN — SODIUM CHLORIDE, PRESERVATIVE FREE 10 ML: 5 INJECTION INTRAVENOUS at 09:15

## 2023-04-23 RX ADMIN — LISINOPRIL 40 MG: 20 TABLET ORAL at 09:15

## 2023-04-23 RX ADMIN — PIPERACILLIN AND TAZOBACTAM 3375 MG: 3; .375 INJECTION, POWDER, LYOPHILIZED, FOR SOLUTION INTRAVENOUS at 19:26

## 2023-04-23 RX ADMIN — SODIUM CHLORIDE, PRESERVATIVE FREE 10 ML: 5 INJECTION INTRAVENOUS at 21:11

## 2023-04-23 RX ADMIN — INSULIN LISPRO 2 UNITS: 100 INJECTION, SOLUTION INTRAVENOUS; SUBCUTANEOUS at 11:33

## 2023-04-23 RX ADMIN — HEPARIN SODIUM 5000 UNITS: 5000 INJECTION INTRAVENOUS; SUBCUTANEOUS at 14:29

## 2023-04-23 RX ADMIN — METOPROLOL TARTRATE 12.5 MG: 25 TABLET, FILM COATED ORAL at 09:16

## 2023-04-23 RX ADMIN — PIPERACILLIN AND TAZOBACTAM 3375 MG: 3; .375 INJECTION, POWDER, LYOPHILIZED, FOR SOLUTION INTRAVENOUS at 02:42

## 2023-04-23 RX ADMIN — HEPARIN SODIUM 5000 UNITS: 5000 INJECTION INTRAVENOUS; SUBCUTANEOUS at 21:08

## 2023-04-23 RX ADMIN — PIPERACILLIN AND TAZOBACTAM 3375 MG: 3; .375 INJECTION, POWDER, LYOPHILIZED, FOR SOLUTION INTRAVENOUS at 11:25

## 2023-04-23 RX ADMIN — TAMSULOSIN HYDROCHLORIDE 0.4 MG: 0.4 CAPSULE ORAL at 09:15

## 2023-04-23 RX ADMIN — METOPROLOL TARTRATE 12.5 MG: 25 TABLET, FILM COATED ORAL at 21:08

## 2023-04-23 RX ADMIN — HEPARIN SODIUM 5000 UNITS: 5000 INJECTION INTRAVENOUS; SUBCUTANEOUS at 05:37

## 2023-04-23 RX ADMIN — HYDRALAZINE HYDROCHLORIDE 100 MG: 50 TABLET, FILM COATED ORAL at 21:08

## 2023-04-23 RX ADMIN — INSULIN GLARGINE 28 UNITS: 100 INJECTION, SOLUTION SUBCUTANEOUS at 09:14

## 2023-04-23 RX ADMIN — LEVOTHYROXINE SODIUM 75 MCG: 75 TABLET ORAL at 05:37

## 2023-04-23 RX ADMIN — SODIUM CHLORIDE, PRESERVATIVE FREE 10 ML: 5 INJECTION INTRAVENOUS at 09:16

## 2023-04-23 RX ADMIN — ASPIRIN 81 MG: 81 TABLET ORAL at 09:16

## 2023-04-23 RX ADMIN — ONDANSETRON 4 MG: 4 TABLET, ORALLY DISINTEGRATING ORAL at 14:13

## 2023-04-23 ASSESSMENT — PAIN SCALES - GENERAL: PAINLEVEL_OUTOF10: 0

## 2023-04-24 ENCOUNTER — ANESTHESIA (OUTPATIENT)
Dept: SURGERY | Age: 78
DRG: 240 | End: 2023-04-24
Payer: MEDICARE

## 2023-04-24 PROBLEM — I73.9 PVD (PERIPHERAL VASCULAR DISEASE) WITH CLAUDICATION (HCC): Status: ACTIVE | Noted: 2020-05-05

## 2023-04-24 LAB
ANION GAP SERPL CALC-SCNC: 5 MMOL/L (ref 2–11)
BASOPHILS # BLD: 0 K/UL (ref 0–0.2)
BASOPHILS NFR BLD: 0 % (ref 0–2)
BUN SERPL-MCNC: 27 MG/DL (ref 8–23)
CALCIUM SERPL-MCNC: 9.8 MG/DL (ref 8.3–10.4)
CHLORIDE SERPL-SCNC: 105 MMOL/L (ref 101–110)
CO2 SERPL-SCNC: 21 MMOL/L (ref 21–32)
CREAT SERPL-MCNC: 1.7 MG/DL (ref 0.8–1.5)
DIFFERENTIAL METHOD BLD: ABNORMAL
EOSINOPHIL # BLD: 0.1 K/UL (ref 0–0.8)
EOSINOPHIL NFR BLD: 2 % (ref 0.5–7.8)
ERYTHROCYTE [DISTWIDTH] IN BLOOD BY AUTOMATED COUNT: 15.5 % (ref 11.9–14.6)
ERYTHROCYTE [DISTWIDTH] IN BLOOD BY AUTOMATED COUNT: 15.7 % (ref 11.9–14.6)
GLUCOSE BLD STRIP.AUTO-MCNC: 169 MG/DL (ref 65–100)
GLUCOSE BLD STRIP.AUTO-MCNC: 217 MG/DL (ref 65–100)
GLUCOSE BLD STRIP.AUTO-MCNC: 233 MG/DL (ref 65–100)
GLUCOSE BLD STRIP.AUTO-MCNC: 239 MG/DL (ref 65–100)
GLUCOSE SERPL-MCNC: 173 MG/DL (ref 65–100)
HCT VFR BLD AUTO: 25.6 % (ref 41.1–50.3)
HCT VFR BLD AUTO: 32.6 % (ref 41.1–50.3)
HGB BLD-MCNC: 10.3 G/DL (ref 13.6–17.2)
HGB BLD-MCNC: 7.9 G/DL (ref 13.6–17.2)
IMM GRANULOCYTES # BLD AUTO: 0 K/UL (ref 0–0.5)
IMM GRANULOCYTES NFR BLD AUTO: 0 % (ref 0–5)
LYMPHOCYTES # BLD: 1 K/UL (ref 0.5–4.6)
LYMPHOCYTES NFR BLD: 14 % (ref 13–44)
MCH RBC QN AUTO: 25.3 PG (ref 26.1–32.9)
MCH RBC QN AUTO: 25.8 PG (ref 26.1–32.9)
MCHC RBC AUTO-ENTMCNC: 30.9 G/DL (ref 31.4–35)
MCHC RBC AUTO-ENTMCNC: 31.6 G/DL (ref 31.4–35)
MCV RBC AUTO: 81.7 FL (ref 82–102)
MCV RBC AUTO: 82.1 FL (ref 82–102)
MONOCYTES # BLD: 0.6 K/UL (ref 0.1–1.3)
MONOCYTES NFR BLD: 10 % (ref 4–12)
NEUTS SEG # BLD: 5 K/UL (ref 1.7–8.2)
NEUTS SEG NFR BLD: 74 % (ref 43–78)
NRBC # BLD: 0 K/UL (ref 0–0.2)
NRBC # BLD: 0 K/UL (ref 0–0.2)
PLATELET # BLD AUTO: 268 K/UL (ref 150–450)
PLATELET # BLD AUTO: 292 K/UL (ref 150–450)
PMV BLD AUTO: 10.1 FL (ref 9.4–12.3)
PMV BLD AUTO: 9.8 FL (ref 9.4–12.3)
POTASSIUM SERPL-SCNC: 4.4 MMOL/L (ref 3.5–5.1)
RBC # BLD AUTO: 3.12 M/UL (ref 4.23–5.6)
RBC # BLD AUTO: 3.99 M/UL (ref 4.23–5.6)
SERVICE CMNT-IMP: ABNORMAL
SODIUM SERPL-SCNC: 131 MMOL/L (ref 133–143)
WBC # BLD AUTO: 6.7 K/UL (ref 4.3–11.1)
WBC # BLD AUTO: 8.2 K/UL (ref 4.3–11.1)

## 2023-04-24 PROCEDURE — 3600000003 HC SURGERY LEVEL 3 BASE: Performed by: SURGERY

## 2023-04-24 PROCEDURE — 7100000000 HC PACU RECOVERY - FIRST 15 MIN: Performed by: SURGERY

## 2023-04-24 PROCEDURE — 36415 COLL VENOUS BLD VENIPUNCTURE: CPT

## 2023-04-24 PROCEDURE — 82962 GLUCOSE BLOOD TEST: CPT

## 2023-04-24 PROCEDURE — 3700000001 HC ADD 15 MINUTES (ANESTHESIA): Performed by: SURGERY

## 2023-04-24 PROCEDURE — 2580000003 HC RX 258: Performed by: SURGERY

## 2023-04-24 PROCEDURE — 2500000003 HC RX 250 WO HCPCS: Performed by: NURSE ANESTHETIST, CERTIFIED REGISTERED

## 2023-04-24 PROCEDURE — 6360000002 HC RX W HCPCS: Performed by: ANESTHESIOLOGY

## 2023-04-24 PROCEDURE — 2580000003 HC RX 258: Performed by: INTERNAL MEDICINE

## 2023-04-24 PROCEDURE — 6370000000 HC RX 637 (ALT 250 FOR IP): Performed by: SURGERY

## 2023-04-24 PROCEDURE — 85025 COMPLETE CBC W/AUTO DIFF WBC: CPT

## 2023-04-24 PROCEDURE — 6370000000 HC RX 637 (ALT 250 FOR IP): Performed by: ANESTHESIOLOGY

## 2023-04-24 PROCEDURE — 2580000003 HC RX 258: Performed by: ANESTHESIOLOGY

## 2023-04-24 PROCEDURE — 80048 BASIC METABOLIC PNL TOTAL CA: CPT

## 2023-04-24 PROCEDURE — 6370000000 HC RX 637 (ALT 250 FOR IP): Performed by: INTERNAL MEDICINE

## 2023-04-24 PROCEDURE — 7100000001 HC PACU RECOVERY - ADDTL 15 MIN: Performed by: SURGERY

## 2023-04-24 PROCEDURE — 3600000013 HC SURGERY LEVEL 3 ADDTL 15MIN: Performed by: SURGERY

## 2023-04-24 PROCEDURE — 6360000002 HC RX W HCPCS: Performed by: SURGERY

## 2023-04-24 PROCEDURE — 6360000002 HC RX W HCPCS: Performed by: NURSE ANESTHETIST, CERTIFIED REGISTERED

## 2023-04-24 PROCEDURE — 2709999900 HC NON-CHARGEABLE SUPPLY: Performed by: SURGERY

## 2023-04-24 PROCEDURE — 64445 NJX AA&/STRD SCIATIC NRV IMG: CPT | Performed by: ANESTHESIOLOGY

## 2023-04-24 PROCEDURE — A4217 STERILE WATER/SALINE, 500 ML: HCPCS | Performed by: SURGERY

## 2023-04-24 PROCEDURE — 3700000000 HC ANESTHESIA ATTENDED CARE: Performed by: SURGERY

## 2023-04-24 PROCEDURE — 85027 COMPLETE CBC AUTOMATED: CPT

## 2023-04-24 PROCEDURE — 88307 TISSUE EXAM BY PATHOLOGIST: CPT

## 2023-04-24 PROCEDURE — 1100000003 HC PRIVATE W/ TELEMETRY

## 2023-04-24 PROCEDURE — 0Y6C0Z3 DETACHMENT AT RIGHT UPPER LEG, LOW, OPEN APPROACH: ICD-10-PCS | Performed by: SURGERY

## 2023-04-24 PROCEDURE — 64447 NJX AA&/STRD FEMORAL NRV IMG: CPT | Performed by: ANESTHESIOLOGY

## 2023-04-24 PROCEDURE — 2580000003 HC RX 258: Performed by: NURSE ANESTHETIST, CERTIFIED REGISTERED

## 2023-04-24 PROCEDURE — 6370000000 HC RX 637 (ALT 250 FOR IP): Performed by: NURSE PRACTITIONER

## 2023-04-24 PROCEDURE — 2580000003 HC RX 258: Performed by: NURSE PRACTITIONER

## 2023-04-24 RX ORDER — SODIUM CHLORIDE, SODIUM LACTATE, POTASSIUM CHLORIDE, CALCIUM CHLORIDE 600; 310; 30; 20 MG/100ML; MG/100ML; MG/100ML; MG/100ML
INJECTION, SOLUTION INTRAVENOUS CONTINUOUS
Status: DISCONTINUED | OUTPATIENT
Start: 2023-04-24 | End: 2023-04-24

## 2023-04-24 RX ORDER — DEXTROSE MONOHYDRATE 100 MG/ML
INJECTION, SOLUTION INTRAVENOUS CONTINUOUS PRN
Status: DISCONTINUED | OUTPATIENT
Start: 2023-04-24 | End: 2023-04-27 | Stop reason: HOSPADM

## 2023-04-24 RX ORDER — VASOPRESSIN 20 U/ML
INJECTION PARENTERAL PRN
Status: DISCONTINUED | OUTPATIENT
Start: 2023-04-24 | End: 2023-04-24 | Stop reason: SDUPTHER

## 2023-04-24 RX ORDER — ONDANSETRON 2 MG/ML
INJECTION INTRAMUSCULAR; INTRAVENOUS PRN
Status: DISCONTINUED | OUTPATIENT
Start: 2023-04-24 | End: 2023-04-24 | Stop reason: SDUPTHER

## 2023-04-24 RX ORDER — EPHEDRINE SULFATE/0.9% NACL/PF 50 MG/5 ML
SYRINGE (ML) INTRAVENOUS PRN
Status: DISCONTINUED | OUTPATIENT
Start: 2023-04-24 | End: 2023-04-24 | Stop reason: SDUPTHER

## 2023-04-24 RX ORDER — LIDOCAINE HYDROCHLORIDE 10 MG/ML
1 INJECTION, SOLUTION INFILTRATION; PERINEURAL
Status: DISCONTINUED | OUTPATIENT
Start: 2023-04-24 | End: 2023-04-24 | Stop reason: HOSPADM

## 2023-04-24 RX ORDER — KETAMINE HYDROCHLORIDE 50 MG/ML
INJECTION, SOLUTION, CONCENTRATE INTRAMUSCULAR; INTRAVENOUS PRN
Status: DISCONTINUED | OUTPATIENT
Start: 2023-04-24 | End: 2023-04-24 | Stop reason: SDUPTHER

## 2023-04-24 RX ORDER — FENTANYL CITRATE 50 UG/ML
100 INJECTION, SOLUTION INTRAMUSCULAR; INTRAVENOUS
Status: COMPLETED | OUTPATIENT
Start: 2023-04-24 | End: 2023-04-24

## 2023-04-24 RX ORDER — SODIUM CHLORIDE 9 MG/ML
INJECTION, SOLUTION INTRAVENOUS PRN
Status: DISCONTINUED | OUTPATIENT
Start: 2023-04-24 | End: 2023-04-24 | Stop reason: HOSPADM

## 2023-04-24 RX ORDER — PROPOFOL 10 MG/ML
INJECTION, EMULSION INTRAVENOUS PRN
Status: DISCONTINUED | OUTPATIENT
Start: 2023-04-24 | End: 2023-04-24 | Stop reason: SDUPTHER

## 2023-04-24 RX ORDER — HYDROMORPHONE HYDROCHLORIDE 2 MG/ML
0.5 INJECTION, SOLUTION INTRAMUSCULAR; INTRAVENOUS; SUBCUTANEOUS EVERY 5 MIN PRN
Status: DISCONTINUED | OUTPATIENT
Start: 2023-04-24 | End: 2023-04-24 | Stop reason: HOSPADM

## 2023-04-24 RX ORDER — SUCCINYLCHOLINE CHLORIDE 20 MG/ML
INJECTION INTRAMUSCULAR; INTRAVENOUS PRN
Status: DISCONTINUED | OUTPATIENT
Start: 2023-04-24 | End: 2023-04-24 | Stop reason: SDUPTHER

## 2023-04-24 RX ORDER — SODIUM CHLORIDE 0.9 % (FLUSH) 0.9 %
5-40 SYRINGE (ML) INJECTION PRN
Status: DISCONTINUED | OUTPATIENT
Start: 2023-04-24 | End: 2023-04-24 | Stop reason: HOSPADM

## 2023-04-24 RX ORDER — ACETAMINOPHEN 500 MG
1000 TABLET ORAL ONCE
Status: COMPLETED | OUTPATIENT
Start: 2023-04-24 | End: 2023-04-24

## 2023-04-24 RX ORDER — SODIUM CHLORIDE 0.9 % (FLUSH) 0.9 %
5-40 SYRINGE (ML) INJECTION EVERY 12 HOURS SCHEDULED
Status: DISCONTINUED | OUTPATIENT
Start: 2023-04-24 | End: 2023-04-24 | Stop reason: HOSPADM

## 2023-04-24 RX ORDER — ONDANSETRON 2 MG/ML
4 INJECTION INTRAMUSCULAR; INTRAVENOUS
Status: DISCONTINUED | OUTPATIENT
Start: 2023-04-24 | End: 2023-04-24 | Stop reason: HOSPADM

## 2023-04-24 RX ORDER — LIDOCAINE HYDROCHLORIDE 20 MG/ML
INJECTION, SOLUTION EPIDURAL; INFILTRATION; INTRACAUDAL; PERINEURAL PRN
Status: DISCONTINUED | OUTPATIENT
Start: 2023-04-24 | End: 2023-04-24 | Stop reason: SDUPTHER

## 2023-04-24 RX ORDER — SODIUM CHLORIDE, SODIUM LACTATE, POTASSIUM CHLORIDE, CALCIUM CHLORIDE 600; 310; 30; 20 MG/100ML; MG/100ML; MG/100ML; MG/100ML
INJECTION, SOLUTION INTRAVENOUS ONCE
Status: COMPLETED | OUTPATIENT
Start: 2023-04-24 | End: 2023-04-24

## 2023-04-24 RX ORDER — SODIUM CHLORIDE, SODIUM LACTATE, POTASSIUM CHLORIDE, CALCIUM CHLORIDE 600; 310; 30; 20 MG/100ML; MG/100ML; MG/100ML; MG/100ML
INJECTION, SOLUTION INTRAVENOUS CONTINUOUS
Status: DISCONTINUED | OUTPATIENT
Start: 2023-04-24 | End: 2023-04-24 | Stop reason: HOSPADM

## 2023-04-24 RX ORDER — SODIUM CHLORIDE 9 MG/ML
INJECTION, SOLUTION INTRAVENOUS CONTINUOUS
Status: ACTIVE | OUTPATIENT
Start: 2023-04-24 | End: 2023-04-25

## 2023-04-24 RX ORDER — OXYCODONE HYDROCHLORIDE 5 MG/1
5 TABLET ORAL
Status: DISCONTINUED | OUTPATIENT
Start: 2023-04-24 | End: 2023-04-24 | Stop reason: HOSPADM

## 2023-04-24 RX ORDER — PROCHLORPERAZINE EDISYLATE 5 MG/ML
5 INJECTION INTRAMUSCULAR; INTRAVENOUS
Status: DISCONTINUED | OUTPATIENT
Start: 2023-04-24 | End: 2023-04-24 | Stop reason: HOSPADM

## 2023-04-24 RX ORDER — MIDAZOLAM HYDROCHLORIDE 2 MG/2ML
2 INJECTION, SOLUTION INTRAMUSCULAR; INTRAVENOUS
Status: COMPLETED | OUTPATIENT
Start: 2023-04-24 | End: 2023-04-24

## 2023-04-24 RX ADMIN — PHENYLEPHRINE HYDROCHLORIDE 150 MCG: 10 INJECTION INTRAVENOUS at 10:32

## 2023-04-24 RX ADMIN — SODIUM CHLORIDE, POTASSIUM CHLORIDE, SODIUM LACTATE AND CALCIUM CHLORIDE: 600; 310; 30; 20 INJECTION, SOLUTION INTRAVENOUS at 08:48

## 2023-04-24 RX ADMIN — HEPARIN SODIUM 5000 UNITS: 5000 INJECTION INTRAVENOUS; SUBCUTANEOUS at 15:06

## 2023-04-24 RX ADMIN — PHENYLEPHRINE HYDROCHLORIDE 150 MCG: 10 INJECTION INTRAVENOUS at 10:49

## 2023-04-24 RX ADMIN — FENTANYL CITRATE 50 MCG: 50 INJECTION, SOLUTION INTRAMUSCULAR; INTRAVENOUS at 09:25

## 2023-04-24 RX ADMIN — SODIUM CHLORIDE: 9 INJECTION, SOLUTION INTRAVENOUS at 15:20

## 2023-04-24 RX ADMIN — SODIUM CHLORIDE, SODIUM LACTATE, POTASSIUM CHLORIDE, AND CALCIUM CHLORIDE: 600; 310; 30; 20 INJECTION, SOLUTION INTRAVENOUS at 13:15

## 2023-04-24 RX ADMIN — ASPIRIN 81 MG: 81 TABLET ORAL at 07:50

## 2023-04-24 RX ADMIN — INSULIN LISPRO 2 UNITS: 100 INJECTION, SOLUTION INTRAVENOUS; SUBCUTANEOUS at 15:24

## 2023-04-24 RX ADMIN — SODIUM CHLORIDE, PRESERVATIVE FREE 10 ML: 5 INJECTION INTRAVENOUS at 21:18

## 2023-04-24 RX ADMIN — KETAMINE HYDROCHLORIDE 20 MG: 50 INJECTION, SOLUTION INTRAMUSCULAR; INTRAVENOUS at 10:56

## 2023-04-24 RX ADMIN — Medication 10 MG: at 10:30

## 2023-04-24 RX ADMIN — PHENYLEPHRINE HYDROCHLORIDE 150 MCG: 10 INJECTION INTRAVENOUS at 10:48

## 2023-04-24 RX ADMIN — ACETAMINOPHEN 1000 MG: 500 TABLET, FILM COATED ORAL at 08:49

## 2023-04-24 RX ADMIN — Medication 2000 MG: at 10:38

## 2023-04-24 RX ADMIN — Medication 10 MG: at 10:34

## 2023-04-24 RX ADMIN — HYDRALAZINE HYDROCHLORIDE 100 MG: 50 TABLET, FILM COATED ORAL at 07:50

## 2023-04-24 RX ADMIN — Medication 10 MG: at 10:32

## 2023-04-24 RX ADMIN — PHENYLEPHRINE HYDROCHLORIDE 100 MCG: 10 INJECTION INTRAVENOUS at 10:31

## 2023-04-24 RX ADMIN — VASOPRESSIN 1 UNITS: 20 INJECTION INTRAVENOUS at 10:53

## 2023-04-24 RX ADMIN — METOPROLOL TARTRATE 12.5 MG: 25 TABLET, FILM COATED ORAL at 21:15

## 2023-04-24 RX ADMIN — PHENYLEPHRINE HYDROCHLORIDE 150 MCG: 10 INJECTION INTRAVENOUS at 10:39

## 2023-04-24 RX ADMIN — KETAMINE HYDROCHLORIDE 20 MG: 50 INJECTION, SOLUTION INTRAMUSCULAR; INTRAVENOUS at 10:35

## 2023-04-24 RX ADMIN — VANCOMYCIN HYDROCHLORIDE 1250 MG: 10 INJECTION, POWDER, LYOPHILIZED, FOR SOLUTION INTRAVENOUS at 17:39

## 2023-04-24 RX ADMIN — POLYETHYLENE GLYCOL 3350 17 G: 17 POWDER, FOR SOLUTION ORAL at 22:11

## 2023-04-24 RX ADMIN — VASOPRESSIN 1 UNITS: 20 INJECTION INTRAVENOUS at 11:00

## 2023-04-24 RX ADMIN — PHENYLEPHRINE HYDROCHLORIDE 100 MCG: 10 INJECTION INTRAVENOUS at 10:30

## 2023-04-24 RX ADMIN — PHENYLEPHRINE HYDROCHLORIDE 150 MCG: 10 INJECTION INTRAVENOUS at 10:45

## 2023-04-24 RX ADMIN — INSULIN GLARGINE 28 UNITS: 100 INJECTION, SOLUTION SUBCUTANEOUS at 21:17

## 2023-04-24 RX ADMIN — SODIUM CHLORIDE, PRESERVATIVE FREE 10 ML: 5 INJECTION INTRAVENOUS at 07:58

## 2023-04-24 RX ADMIN — HEPARIN SODIUM 5000 UNITS: 5000 INJECTION INTRAVENOUS; SUBCUTANEOUS at 21:15

## 2023-04-24 RX ADMIN — PIPERACILLIN AND TAZOBACTAM 3375 MG: 3; .375 INJECTION, POWDER, LYOPHILIZED, FOR SOLUTION INTRAVENOUS at 23:09

## 2023-04-24 RX ADMIN — METOPROLOL TARTRATE 12.5 MG: 25 TABLET, FILM COATED ORAL at 07:49

## 2023-04-24 RX ADMIN — MIDAZOLAM 1 MG: 1 INJECTION INTRAMUSCULAR; INTRAVENOUS at 09:25

## 2023-04-24 RX ADMIN — PROPOFOL 160 MG: 10 INJECTION, EMULSION INTRAVENOUS at 10:23

## 2023-04-24 RX ADMIN — PHENYLEPHRINE HYDROCHLORIDE 150 MCG: 10 INJECTION INTRAVENOUS at 10:33

## 2023-04-24 RX ADMIN — LISINOPRIL 40 MG: 20 TABLET ORAL at 07:50

## 2023-04-24 RX ADMIN — TAMSULOSIN HYDROCHLORIDE 0.4 MG: 0.4 CAPSULE ORAL at 07:49

## 2023-04-24 RX ADMIN — ONDANSETRON 4 MG: 2 INJECTION INTRAMUSCULAR; INTRAVENOUS at 10:33

## 2023-04-24 RX ADMIN — SODIUM CHLORIDE, PRESERVATIVE FREE 10 ML: 5 INJECTION INTRAVENOUS at 07:59

## 2023-04-24 RX ADMIN — VASOPRESSIN 1 UNITS: 20 INJECTION INTRAVENOUS at 11:02

## 2023-04-24 RX ADMIN — LIDOCAINE HYDROCHLORIDE 70 MG: 20 INJECTION, SOLUTION EPIDURAL; INFILTRATION; INTRACAUDAL; PERINEURAL at 10:23

## 2023-04-24 RX ADMIN — PIPERACILLIN AND TAZOBACTAM 3375 MG: 3; .375 INJECTION, POWDER, LYOPHILIZED, FOR SOLUTION INTRAVENOUS at 15:08

## 2023-04-24 RX ADMIN — Medication 140 MG: at 10:24

## 2023-04-24 RX ADMIN — PHENYLEPHRINE HYDROCHLORIDE 150 MCG: 10 INJECTION INTRAVENOUS at 10:47

## 2023-04-24 RX ADMIN — ALUMINUM HYDROXIDE, MAGNESIUM HYDROXIDE, AND SIMETHICONE 30 ML: 200; 200; 20 SUSPENSION ORAL at 22:11

## 2023-04-24 RX ADMIN — PIPERACILLIN AND TAZOBACTAM 3375 MG: 3; .375 INJECTION, POWDER, LYOPHILIZED, FOR SOLUTION INTRAVENOUS at 02:47

## 2023-04-24 ASSESSMENT — PAIN - FUNCTIONAL ASSESSMENT: PAIN_FUNCTIONAL_ASSESSMENT: 0-10

## 2023-04-24 ASSESSMENT — PAIN SCALES - GENERAL
PAINLEVEL_OUTOF10: 0
PAINLEVEL_OUTOF10: 0

## 2023-04-24 NOTE — BRIEF OP NOTE
[address]   [address] [facility]. [address] FAX: [phone]    Brief Op Note Template Note    Pre-Op Diagnosis: PVD (peripheral vascular disease) [facility] [I73.9]    Post-Op Diagnosis:  * No post-op diagnosis entered *    Procedures: Procedure(s) with comments:  RIGHT LEG AMPUTATION ABOVE KNEE - [address] TO FOLLOW MAKAYLA (ANGIOGRAM)    Surgeon: Zachary Arevalo MD    Assistants: Surgeon(s):  Ignacia Carrasquillo MD      Anesthesia:  General     Findings: Right AKA    Tourniquet Time:  * No tourniquets in log *    Estimated Blood Loss:               Specimens:            Implants:  * No implants in log *    Complications: None               Signed: Zachary Arevalo MD      Elements of this note have been dictated using speech recognition software. As a result, errors of speech recognition may have occurred.

## 2023-04-24 NOTE — ANESTHESIA PROCEDURE NOTES
Airway  Date/Time: 4/24/2023 10:27 AM  Urgency: elective    Airway not difficult    General Information and Staff    Patient location during procedure: OR  Resident/CRNA: YOMAIRA Schmitt - CRNA  Performed: resident/CRNA     Indications and Patient Condition  Indications for airway management: anesthesia  Spontaneous Ventilation: absent  Sedation level: deep  Preoxygenated: yes  Patient position: sniffing  MILS not maintained throughout  Mask difficulty assessment: vent by bag mask + OA or adjuvant +/- NMBA    Final Airway Details  Final airway type: endotracheal airway      Successful airway: ETT  Cuffed: yes   Successful intubation technique: direct laryngoscopy  Facilitating devices/methods: intubating stylet  Endotracheal tube insertion site: oral  Blade: Huy  Blade size: #4  ETT size (mm): 7.5  Cormack-Lehane Classification: grade I - full view of glottis  Placement verified by: chest auscultation and capnometry   Measured from: lips  Number of attempts at approach: 1  Ventilation between attempts: bag mask and 2 hand mask  Number of other approaches attempted: 0    Additional Comments  2 hand mask with oral airway.   DL by Florentino Castano Paramedic student  no

## 2023-04-24 NOTE — ANESTHESIA POSTPROCEDURE EVALUATION
Department of Anesthesiology  Postprocedure Note    Patient: Gabino Hess  MRN: [id number]  YOB: 1945  Date of evaluation: 4/24/2023      Procedure Summary     Date: 04/24/23 Room / Location: [facility] MAIN OR  / [facility] MAIN OR    Anesthesia Start: 1008 Anesthesia Stop: 1133    Procedure: RIGHT LEG AMPUTATION ABOVE KNEE (Right: Leg Upper) Diagnosis:       PVD (peripheral vascular disease) ([facility] [address]      ([facility]per[facility] [I73.9])    Providers: Anna Deshpande MD Responsible Provider: Eric Rodriges MD    Anesthesia Type: General ASA Status: 3          Anesthesia Type: General    Jaz Phase I: Jaz Score: 9    Jaz Phase II:        Anesthesia Post Evaluation    Patient location during evaluation: PACU  Patient participation: complete - patient participated  Level of consciousness: awake and alert  Airway patency: patent  Nausea & Vomiting: no nausea and no vomiting  Complications: no  Cardiovascular status: hemodynamically stable  Respiratory status: acceptable, nonlabored ventilation and spontaneous ventilation  Hydration status: euvolemic  Comments: BP (!) 86/52   Pulse 59   Temp 97.9 °F (36.6 °C) (Temporal)   Resp 12   Ht 6' 2\" (1.88 m)   Wt 269 lb 10 oz (122.3 kg)   SpO2 96%   BMI 34.62 kg/m²     BP improved after fluid bolus. CBC pending. Safe for transfer to floor.     Multimodal analgesia pain management approach

## 2023-04-24 NOTE — OP NOTE
[facility]  OPERATIVE REPORT    Name:  Quinton Bower  MR#:  [id number]  :  1945  ACCOUNT #:  [de-identified]  DATE OF SERVICE:  2023    CLINICAL SERVICE:  Vascular Surgery. PREOPERATIVE DIAGNOSIS:  Nonhealing right foot wound. POSTOPERATIVE DIAGNOSIS:  Nonhealing right foot wound. PROCEDURE PERFORMED:  Right above knee amputation. SURGEON:  Madison Carter MD    ASSISTANT:      ANESTHESIA:  General.    COMPLICATIONS:  None. SPECIMENS REMOVED:  None. IMPLANTS:      ESTIMATED BLOOD LOSS:  .    PROCEDURE IN DETAIL:  After getting informed consent, the patient was brought to the operating room. Anesthesia was then induced. Preoperative antibiotics were given before skin incision. The patient's right leg was then prepped and draped in normal sterile fashion. [place] incision was made with a long anterior and posterior flap. We incised the skin with the 15-blade. Electrocautery was then used to cut the skin and the muscle throughout the compartments. On the medial aspect, I was able to identify the occluded SFA and the vein which we got control proximally and distally and then suture ligated with 2-0 silk pop-offs and 3-0 Prolene suture. Once I got around the femur circumferentially, I used an electric saw to remove the femur with an anterior bevel. We completed amputation with amputation knife. I then irrigated out the wound with antibiotic solution and closed the fascia with 2-0 Vicryl and staples for the skin. The patient was extubated and taken to the PACU in stable condition.       Mercy Stephenson MD      [id number]  D:  2023 12:03  T:  2023 14:42  JOB #:  [id number]

## 2023-04-24 NOTE — CARE COORDINATION
Met with wife and daughter at bedside. Per daughter they would now like a referral sent to [facility]. Referral sent. Awaiting determination.

## 2023-04-24 NOTE — PLAN OF CARE
Problem: Pain  Goal: Verbalizes/displays adequate comfort level or baseline comfort level  Outcome: Progressing     Problem: Safety - Adult  Goal: Free from fall injury  Outcome: Progressing     Problem: Skin/Tissue Integrity  Goal: Absence of new skin breakdown  Description: 1. Monitor for areas of redness and/or skin breakdown  2. Assess vascular access sites hourly  3. Every 4-6 hours minimum:  Change oxygen saturation probe site  4. Every 4-6 hours:  If on nasal continuous positive airway pressure, respiratory therapy assess nares and determine need for appliance change or resting period.   Outcome: Not Progressing

## 2023-04-24 NOTE — ANESTHESIA PROCEDURE NOTES
Peripheral Block    Patient location during procedure: pre-op  Reason for block: post-op pain management and at surgeon's request  Start time: 4/24/2023 9:35 AM  End time: 4/24/2023 9:38 AM  Staffing  Performed: anesthesiologist   Anesthesiologist: Joan Galeano MD  Preanesthetic Checklist  Completed: patient identified, IV checked, site marked, risks and benefits discussed, surgical/procedural consents, equipment checked, pre-op evaluation, timeout performed, anesthesia consent given, oxygen available and monitors applied/VS acknowledged  Peripheral Block   Patient position: supine  Prep: ChloraPrep  Provider prep: mask and sterile gloves  Patient monitoring: cardiac monitor, continuous pulse ox, frequent blood pressure checks, IV access, oxygen and responsive to questions  Block type: Femoral  Femoral crease  Laterality: right  Injection technique: single-shot  Guidance: ultrasound guided  Local infiltration: lidocaine  Infiltration strength: 1 %  Local infiltration: lidocaine  Dose: 3 mL    Needle   Needle type: insulated echogenic nerve stimulator needle   Needle gauge: 20 G  Needle localization: ultrasound guidance  Needle length: 10 cm  Assessment   Injection assessment: negative aspiration for heme, no paresthesia on injection, no intravascular symptoms and local visualized surrounding nerve on ultrasound  Slow fractionated injection: yes  Hemodynamics: stable  Real-time US image taken/store: yes  Outcomes: patient tolerated procedure well    Additional Notes  Risks/benefits/alternatives discussed including damage to nerve or muscle. Needle inserted and placed in close proximity to the nerve under real time ultrasound guidance. Ultrasound was used to visualize the spread of local anesthetic in close proximity to the nerve being blocked. The nerve appeared anatomically normal and there were no abnormal findings. A permanent ultrasound image is stored in the chart.     Medications Administered  ropivacaine

## 2023-04-24 NOTE — ANESTHESIA PROCEDURE NOTES
Peripheral Block    Patient location during procedure: pre-op  Reason for block: post-op pain management and at surgeon's request  Start time: 4/24/2023 9:25 AM  End time: 4/24/2023 9:33 AM  Staffing  Performed: anesthesiologist   Anesthesiologist: Noa Gao MD  Preanesthetic Checklist  Completed: patient identified, IV checked, site marked, risks and benefits discussed, surgical/procedural consents, equipment checked, pre-op evaluation, timeout performed, anesthesia consent given, oxygen available and monitors applied/VS acknowledged  Peripheral Block   Patient position: supine  Prep: ChloraPrep  Provider prep: mask and sterile gloves  Patient monitoring: cardiac monitor, continuous pulse ox, oxygen, IV access, frequent blood pressure checks and responsive to questions  Block type: Sciatic  Infragluteal  Laterality: right  Injection technique: single-shot  Guidance: nerve stimulator and ultrasound guided  Local infiltration: lidocaine  Infiltration strength: 1 %  Local infiltration: lidocaine  Dose: 3 mL    Needle   Needle type: insulated echogenic nerve stimulator needle   Needle gauge: 20 G  Needle localization: nerve stimulator and ultrasound guidance (minimal motor response at >0.4 mA)  Needle length: 10 cm  Assessment   Injection assessment: negative aspiration for heme, no paresthesia on injection, local visualized surrounding nerve on ultrasound and no intravascular symptoms  Slow fractionated injection: yes  Hemodynamics: stable  Real-time US image taken/store: yes  Outcomes: patient tolerated procedure well    Additional Notes  Risks/benefits/alternatives discussed including damage to nerve or muscle. Needle inserted and placed in close proximity to the nerve under real time ultrasound guidance. Ultrasound was used to visualize the spread of local anesthetic in close proximity to the nerve being blocked. The nerve appeared anatomically normal and there were no abnormal findings.  A permanent

## 2023-04-25 LAB
GLUCOSE BLD STRIP.AUTO-MCNC: 159 MG/DL (ref 65–100)
GLUCOSE BLD STRIP.AUTO-MCNC: 171 MG/DL (ref 65–100)
GLUCOSE BLD STRIP.AUTO-MCNC: 214 MG/DL (ref 65–100)
GLUCOSE BLD STRIP.AUTO-MCNC: 219 MG/DL (ref 65–100)
SERVICE CMNT-IMP: ABNORMAL

## 2023-04-25 PROCEDURE — 97161 PT EVAL LOW COMPLEX 20 MIN: CPT

## 2023-04-25 PROCEDURE — 82962 GLUCOSE BLOOD TEST: CPT

## 2023-04-25 PROCEDURE — 2580000003 HC RX 258: Performed by: SURGERY

## 2023-04-25 PROCEDURE — 6370000000 HC RX 637 (ALT 250 FOR IP): Performed by: INTERNAL MEDICINE

## 2023-04-25 PROCEDURE — 6360000002 HC RX W HCPCS: Performed by: SURGERY

## 2023-04-25 PROCEDURE — 6370000000 HC RX 637 (ALT 250 FOR IP): Performed by: SURGERY

## 2023-04-25 PROCEDURE — 1100000003 HC PRIVATE W/ TELEMETRY

## 2023-04-25 PROCEDURE — 97530 THERAPEUTIC ACTIVITIES: CPT

## 2023-04-25 PROCEDURE — 1100000000 HC RM PRIVATE

## 2023-04-25 RX ORDER — INSULIN GLARGINE 100 [IU]/ML
31 INJECTION, SOLUTION SUBCUTANEOUS 2 TIMES DAILY
Status: DISCONTINUED | OUTPATIENT
Start: 2023-04-25 | End: 2023-04-27 | Stop reason: HOSPADM

## 2023-04-25 RX ADMIN — MORPHINE SULFATE 2 MG: 2 INJECTION, SOLUTION INTRAMUSCULAR; INTRAVENOUS at 22:20

## 2023-04-25 RX ADMIN — INSULIN LISPRO 2 UNITS: 100 INJECTION, SOLUTION INTRAVENOUS; SUBCUTANEOUS at 09:44

## 2023-04-25 RX ADMIN — SODIUM CHLORIDE, PRESERVATIVE FREE 10 ML: 5 INJECTION INTRAVENOUS at 09:43

## 2023-04-25 RX ADMIN — HEPARIN SODIUM 5000 UNITS: 5000 INJECTION INTRAVENOUS; SUBCUTANEOUS at 13:31

## 2023-04-25 RX ADMIN — ASPIRIN 81 MG: 81 TABLET ORAL at 09:42

## 2023-04-25 RX ADMIN — SODIUM CHLORIDE, PRESERVATIVE FREE 10 ML: 5 INJECTION INTRAVENOUS at 22:20

## 2023-04-25 RX ADMIN — HEPARIN SODIUM 5000 UNITS: 5000 INJECTION INTRAVENOUS; SUBCUTANEOUS at 22:18

## 2023-04-25 RX ADMIN — MORPHINE SULFATE 2 MG: 2 INJECTION, SOLUTION INTRAMUSCULAR; INTRAVENOUS at 13:39

## 2023-04-25 RX ADMIN — TAMSULOSIN HYDROCHLORIDE 0.4 MG: 0.4 CAPSULE ORAL at 09:42

## 2023-04-25 RX ADMIN — LEVOTHYROXINE SODIUM 75 MCG: 75 TABLET ORAL at 05:26

## 2023-04-25 RX ADMIN — VANCOMYCIN HYDROCHLORIDE 1250 MG: 10 INJECTION, POWDER, LYOPHILIZED, FOR SOLUTION INTRAVENOUS at 17:26

## 2023-04-25 RX ADMIN — HEPARIN SODIUM 5000 UNITS: 5000 INJECTION INTRAVENOUS; SUBCUTANEOUS at 05:27

## 2023-04-25 RX ADMIN — OXYCODONE AND ACETAMINOPHEN 1 TABLET: 5; 325 TABLET ORAL at 06:31

## 2023-04-25 RX ADMIN — PIPERACILLIN AND TAZOBACTAM 3375 MG: 3; .375 INJECTION, POWDER, LYOPHILIZED, FOR SOLUTION INTRAVENOUS at 06:24

## 2023-04-25 RX ADMIN — INSULIN LISPRO 2 UNITS: 100 INJECTION, SOLUTION INTRAVENOUS; SUBCUTANEOUS at 13:32

## 2023-04-25 RX ADMIN — PIPERACILLIN AND TAZOBACTAM 3375 MG: 3; .375 INJECTION, POWDER, LYOPHILIZED, FOR SOLUTION INTRAVENOUS at 22:19

## 2023-04-25 RX ADMIN — INSULIN GLARGINE 28 UNITS: 100 INJECTION, SOLUTION SUBCUTANEOUS at 09:44

## 2023-04-25 RX ADMIN — INSULIN GLARGINE 31 UNITS: 100 INJECTION, SOLUTION SUBCUTANEOUS at 22:22

## 2023-04-25 ASSESSMENT — PAIN SCALES - GENERAL
PAINLEVEL_OUTOF10: 0
PAINLEVEL_OUTOF10: 10
PAINLEVEL_OUTOF10: 8

## 2023-04-25 ASSESSMENT — PAIN - FUNCTIONAL ASSESSMENT
PAIN_FUNCTIONAL_ASSESSMENT: PREVENTS OR INTERFERES SOME ACTIVE ACTIVITIES AND ADLS
PAIN_FUNCTIONAL_ASSESSMENT: PREVENTS OR INTERFERES SOME ACTIVE ACTIVITIES AND ADLS

## 2023-04-25 ASSESSMENT — PAIN DESCRIPTION - ORIENTATION
ORIENTATION: RIGHT

## 2023-04-25 ASSESSMENT — PAIN DESCRIPTION - LOCATION
LOCATION: LEG

## 2023-04-25 ASSESSMENT — PAIN DESCRIPTION - DESCRIPTORS
DESCRIPTORS: ACHING;DISCOMFORT
DESCRIPTORS: ACHING;DISCOMFORT

## 2023-04-25 NOTE — CARE COORDINATION
Received call from Michelle Franco, rehab liaison for [facility]. Patient is approved to go to [facility] when medically ready.

## 2023-04-26 LAB
ANION GAP SERPL CALC-SCNC: 2 MMOL/L (ref 2–11)
BUN SERPL-MCNC: 27 MG/DL (ref 8–23)
CALCIUM SERPL-MCNC: 9.4 MG/DL (ref 8.3–10.4)
CHLORIDE SERPL-SCNC: 107 MMOL/L (ref 101–110)
CO2 SERPL-SCNC: 24 MMOL/L (ref 21–32)
CREAT SERPL-MCNC: 1.4 MG/DL (ref 0.8–1.5)
ERYTHROCYTE [DISTWIDTH] IN BLOOD BY AUTOMATED COUNT: 15.6 % (ref 11.9–14.6)
GLUCOSE BLD STRIP.AUTO-MCNC: 146 MG/DL (ref 65–100)
GLUCOSE BLD STRIP.AUTO-MCNC: 181 MG/DL (ref 65–100)
GLUCOSE BLD STRIP.AUTO-MCNC: 197 MG/DL (ref 65–100)
GLUCOSE BLD STRIP.AUTO-MCNC: 198 MG/DL (ref 65–100)
GLUCOSE SERPL-MCNC: 130 MG/DL (ref 65–100)
HCT VFR BLD AUTO: 24 % (ref 41.1–50.3)
HCT VFR BLD AUTO: 26.3 % (ref 41.1–50.3)
HGB BLD-MCNC: 7.4 G/DL (ref 13.6–17.2)
HGB BLD-MCNC: 8.3 G/DL (ref 13.6–17.2)
HISTORY CHECK: NORMAL
MCH RBC QN AUTO: 25.4 PG (ref 26.1–32.9)
MCHC RBC AUTO-ENTMCNC: 30.8 G/DL (ref 31.4–35)
MCV RBC AUTO: 82.5 FL (ref 82–102)
NRBC # BLD: 0 K/UL (ref 0–0.2)
PLATELET # BLD AUTO: 243 K/UL (ref 150–450)
PMV BLD AUTO: 10.6 FL (ref 9.4–12.3)
POTASSIUM SERPL-SCNC: 4.5 MMOL/L (ref 3.5–5.1)
RBC # BLD AUTO: 2.91 M/UL (ref 4.23–5.6)
SERVICE CMNT-IMP: ABNORMAL
SODIUM SERPL-SCNC: 133 MMOL/L (ref 133–143)
WBC # BLD AUTO: 4.2 K/UL (ref 4.3–11.1)

## 2023-04-26 PROCEDURE — 80048 BASIC METABOLIC PNL TOTAL CA: CPT

## 2023-04-26 PROCEDURE — 2580000003 HC RX 258: Performed by: SURGERY

## 2023-04-26 PROCEDURE — 6370000000 HC RX 637 (ALT 250 FOR IP): Performed by: SURGERY

## 2023-04-26 PROCEDURE — 86850 RBC ANTIBODY SCREEN: CPT

## 2023-04-26 PROCEDURE — 86901 BLOOD TYPING SEROLOGIC RH(D): CPT

## 2023-04-26 PROCEDURE — 2700000000 HC OXYGEN THERAPY PER DAY

## 2023-04-26 PROCEDURE — 1100000000 HC RM PRIVATE

## 2023-04-26 PROCEDURE — 6360000002 HC RX W HCPCS: Performed by: SURGERY

## 2023-04-26 PROCEDURE — 97530 THERAPEUTIC ACTIVITIES: CPT

## 2023-04-26 PROCEDURE — 86900 BLOOD TYPING SEROLOGIC ABO: CPT

## 2023-04-26 PROCEDURE — 6370000000 HC RX 637 (ALT 250 FOR IP): Performed by: INTERNAL MEDICINE

## 2023-04-26 PROCEDURE — 85014 HEMATOCRIT: CPT

## 2023-04-26 PROCEDURE — 30233N1 TRANSFUSION OF NONAUTOLOGOUS RED BLOOD CELLS INTO PERIPHERAL VEIN, PERCUTANEOUS APPROACH: ICD-10-PCS | Performed by: HOSPITALIST

## 2023-04-26 PROCEDURE — P9016 RBC LEUKOCYTES REDUCED: HCPCS

## 2023-04-26 PROCEDURE — 85018 HEMOGLOBIN: CPT

## 2023-04-26 PROCEDURE — 36430 TRANSFUSION BLD/BLD COMPNT: CPT

## 2023-04-26 PROCEDURE — 82962 GLUCOSE BLOOD TEST: CPT

## 2023-04-26 PROCEDURE — 85027 COMPLETE CBC AUTOMATED: CPT

## 2023-04-26 PROCEDURE — 36415 COLL VENOUS BLD VENIPUNCTURE: CPT

## 2023-04-26 RX ORDER — OXYCODONE HYDROCHLORIDE AND ACETAMINOPHEN 5; 325 MG/1; MG/1
1 TABLET ORAL EVERY 4 HOURS PRN
Qty: 30 TABLET | Refills: 0 | Status: SHIPPED | OUTPATIENT
Start: 2023-04-26 | End: 2023-05-01

## 2023-04-26 RX ORDER — SODIUM CHLORIDE 9 MG/ML
INJECTION, SOLUTION INTRAVENOUS PRN
Status: DISCONTINUED | OUTPATIENT
Start: 2023-04-26 | End: 2023-04-27 | Stop reason: HOSPADM

## 2023-04-26 RX ADMIN — LEVOTHYROXINE SODIUM 75 MCG: 75 TABLET ORAL at 05:37

## 2023-04-26 RX ADMIN — SODIUM CHLORIDE, PRESERVATIVE FREE 10 ML: 5 INJECTION INTRAVENOUS at 09:18

## 2023-04-26 RX ADMIN — VANCOMYCIN HYDROCHLORIDE 1250 MG: 10 INJECTION, POWDER, LYOPHILIZED, FOR SOLUTION INTRAVENOUS at 17:27

## 2023-04-26 RX ADMIN — HEPARIN SODIUM 5000 UNITS: 5000 INJECTION INTRAVENOUS; SUBCUTANEOUS at 05:37

## 2023-04-26 RX ADMIN — PIPERACILLIN AND TAZOBACTAM 3375 MG: 3; .375 INJECTION, POWDER, LYOPHILIZED, FOR SOLUTION INTRAVENOUS at 22:30

## 2023-04-26 RX ADMIN — HEPARIN SODIUM 5000 UNITS: 5000 INJECTION INTRAVENOUS; SUBCUTANEOUS at 15:22

## 2023-04-26 RX ADMIN — HEPARIN SODIUM 5000 UNITS: 5000 INJECTION INTRAVENOUS; SUBCUTANEOUS at 21:33

## 2023-04-26 RX ADMIN — INSULIN GLARGINE 31 UNITS: 100 INJECTION, SOLUTION SUBCUTANEOUS at 20:43

## 2023-04-26 RX ADMIN — INSULIN GLARGINE 31 UNITS: 100 INJECTION, SOLUTION SUBCUTANEOUS at 09:20

## 2023-04-26 RX ADMIN — ASPIRIN 81 MG: 81 TABLET ORAL at 09:18

## 2023-04-26 RX ADMIN — PIPERACILLIN AND TAZOBACTAM 3375 MG: 3; .375 INJECTION, POWDER, LYOPHILIZED, FOR SOLUTION INTRAVENOUS at 05:37

## 2023-04-26 RX ADMIN — SODIUM CHLORIDE, PRESERVATIVE FREE 10 ML: 5 INJECTION INTRAVENOUS at 20:28

## 2023-04-26 RX ADMIN — TAMSULOSIN HYDROCHLORIDE 0.4 MG: 0.4 CAPSULE ORAL at 09:18

## 2023-04-26 RX ADMIN — PIPERACILLIN AND TAZOBACTAM 3375 MG: 3; .375 INJECTION, POWDER, LYOPHILIZED, FOR SOLUTION INTRAVENOUS at 15:28

## 2023-04-26 ASSESSMENT — PAIN SCALES - GENERAL
PAINLEVEL_OUTOF10: 2
PAINLEVEL_OUTOF10: 6

## 2023-04-26 NOTE — DISCHARGE SUMMARY
[address] FAX: [phone]        Physician Discharge Summary     Patient: Aliya Gonzalez MRN: [id number]  SSN: [id number]    YOB: 1945  Age: 78 y.o. Sex: male       Admit Date: 4/20/2023    Discharge Date: 4/26/2023      Admitting Physician: Sav Xiong MD     Discharge Physician: YOMAIRA Sunshine - CNP    Admission Diagnoses: PVD (peripheral vascular disease) ([facility] [address] [I73.9]  PAD (peripheral artery disease) ([facility] [I73.9]    Discharge Diagnoses: Same       Procedures for this admission: Procedure(s):  RIGHT LEG AMPUTATION ABOVE KNEE    Discharged Condition: stable    Hospital Course: Patient is a 31-year-old male who developed a right foot infection. He was originally scheduled for a right above-the-knee amputation last week during his previous admission he is noted to be hyponatremic with a sodium of 128. Anesthesia canceled the procedure. Given the infection he was admitted to the hospital for IV antibiotics and work-up from cardiology and nephrology. He was pending stable by anesthesia to undergo the right above-the-knee amputation on 4-. He tolerated the surgical procedure and was deemed stable for discharge on 4-. He did have 1 unit PRBC transfused on 4/26/23. Will dc to rehab today    Consults: Cardiology, Hospitalist, and Nephrology    Significant Diagnostic Studies: labs and microbiology    Treatments: IV hydration, antibiotics: vancomycin and Zosyn, analgesia: acetaminophen and Morphine, anticoagulation: ASA and heparin, insulin: Humalog and Lantus, therapies: PT, OT, RN, SW, and [facility], and surgery: See above    Discharge Exam: Physical Exam:    Constitutional: he appears well-developed. No distress. HENT:   Head: Atraumatic. Eyes: Pupils are equal, round, and reactive to light. Neck: Normal range of motion. Cardiovascular: Regular rhythm.     Pulmonary/Chest: Effort normal

## 2023-04-26 NOTE — CONSENT
Informed Consent for Blood Component Transfusion Note    I have discussed with the spouse the rationale for blood component transfusion; its benefits in treating or preventing fatigue, organ damage, or death; and its risk which includes mild transfusion reactions, rare risk of blood borne infection, or more serious but rare reactions. I have discussed the alternatives to transfusion, including the risk and consequences of not receiving transfusion. The spouse had an opportunity to ask questions and had agreed to proceed with transfusion of blood components.     Electronically signed by YOMAIRA Sunshine CNP on 4/26/23 at 11:20 AM ROSST

## 2023-04-26 NOTE — CARE COORDINATION
Per Wilene Baldy, NP they are cancelling patient's discharge today. Patient needs blood transfusion today and patient's wife is not comfortable with him leaving today. CM will continue to follow.

## 2023-04-26 NOTE — CARE COORDINATION
Pt is for discharge today to [facility] [address] as planned. Transport via [facility] around 1130am.  Packet prepared to go with pt to facility. Spoke with Vijay Montes in admissions by phone with update on anticipated transport time.

## 2023-04-26 NOTE — WOUND CARE
Patient seen for shallow pink ulceration to right buttock and a split in gluteal cleft fold. Patient stated they hurt when foam dressing is not in place. Recommend continued foam dressing every 3 days. Wounds are moisture and friction related. No pressure area noted. Wound team will monitor.

## 2023-04-27 VITALS
TEMPERATURE: 97.5 F | HEIGHT: 74 IN | WEIGHT: 252.43 LBS | DIASTOLIC BLOOD PRESSURE: 77 MMHG | RESPIRATION RATE: 20 BRPM | SYSTOLIC BLOOD PRESSURE: 142 MMHG | HEART RATE: 77 BPM | BODY MASS INDEX: 32.4 KG/M2 | OXYGEN SATURATION: 94 %

## 2023-04-27 LAB
ABO + RH BLD: NORMAL
ABO + RH BLD: NORMAL
ANION GAP SERPL CALC-SCNC: 2 MMOL/L (ref 2–11)
BLD PROD TYP BPU: NORMAL
BLOOD BANK DISPENSE STATUS: NORMAL
BLOOD GROUP ANTIBODIES SERPL: NORMAL
BLOOD GROUP ANTIBODIES SERPL: NORMAL
BPU ID: NORMAL
BUN SERPL-MCNC: 25 MG/DL (ref 8–23)
CALCIUM SERPL-MCNC: 9.8 MG/DL (ref 8.3–10.4)
CHLORIDE SERPL-SCNC: 106 MMOL/L (ref 101–110)
CO2 SERPL-SCNC: 25 MMOL/L (ref 21–32)
CREAT SERPL-MCNC: 1.4 MG/DL (ref 0.8–1.5)
CROSSMATCH RESULT: NORMAL
ERYTHROCYTE [DISTWIDTH] IN BLOOD BY AUTOMATED COUNT: 15.4 % (ref 11.9–14.6)
GLUCOSE BLD STRIP.AUTO-MCNC: 129 MG/DL (ref 65–100)
GLUCOSE BLD STRIP.AUTO-MCNC: 240 MG/DL (ref 65–100)
GLUCOSE SERPL-MCNC: 126 MG/DL (ref 65–100)
HCT VFR BLD AUTO: 26 % (ref 41.1–50.3)
HGB BLD-MCNC: 8.2 G/DL (ref 13.6–17.2)
MCH RBC QN AUTO: 25.6 PG (ref 26.1–32.9)
MCHC RBC AUTO-ENTMCNC: 31.5 G/DL (ref 31.4–35)
MCV RBC AUTO: 81.3 FL (ref 82–102)
NRBC # BLD: 0 K/UL (ref 0–0.2)
PLATELET # BLD AUTO: 251 K/UL (ref 150–450)
PMV BLD AUTO: 10.4 FL (ref 9.4–12.3)
POTASSIUM SERPL-SCNC: 4.4 MMOL/L (ref 3.5–5.1)
RBC # BLD AUTO: 3.2 M/UL (ref 4.23–5.6)
SERVICE CMNT-IMP: ABNORMAL
SERVICE CMNT-IMP: ABNORMAL
SODIUM SERPL-SCNC: 133 MMOL/L (ref 133–143)
SPECIMEN EXP DATE BLD: NORMAL
SPECIMEN EXP DATE BLD: NORMAL
UNIT DIVISION: 0
WBC # BLD AUTO: 4.7 K/UL (ref 4.3–11.1)

## 2023-04-27 PROCEDURE — 6360000002 HC RX W HCPCS: Performed by: SURGERY

## 2023-04-27 PROCEDURE — 6370000000 HC RX 637 (ALT 250 FOR IP): Performed by: INTERNAL MEDICINE

## 2023-04-27 PROCEDURE — 2580000003 HC RX 258: Performed by: SURGERY

## 2023-04-27 PROCEDURE — 82962 GLUCOSE BLOOD TEST: CPT

## 2023-04-27 PROCEDURE — 6370000000 HC RX 637 (ALT 250 FOR IP): Performed by: SURGERY

## 2023-04-27 PROCEDURE — 80048 BASIC METABOLIC PNL TOTAL CA: CPT

## 2023-04-27 PROCEDURE — 85027 COMPLETE CBC AUTOMATED: CPT

## 2023-04-27 PROCEDURE — 36415 COLL VENOUS BLD VENIPUNCTURE: CPT

## 2023-04-27 RX ADMIN — INSULIN GLARGINE 31 UNITS: 100 INJECTION, SOLUTION SUBCUTANEOUS at 08:31

## 2023-04-27 RX ADMIN — PIPERACILLIN AND TAZOBACTAM 3375 MG: 3; .375 INJECTION, POWDER, LYOPHILIZED, FOR SOLUTION INTRAVENOUS at 06:25

## 2023-04-27 RX ADMIN — ASPIRIN 81 MG: 81 TABLET ORAL at 08:25

## 2023-04-27 RX ADMIN — INSULIN LISPRO 2 UNITS: 100 INJECTION, SOLUTION INTRAVENOUS; SUBCUTANEOUS at 11:33

## 2023-04-27 RX ADMIN — LEVOTHYROXINE SODIUM 75 MCG: 75 TABLET ORAL at 04:33

## 2023-04-27 RX ADMIN — SODIUM CHLORIDE, PRESERVATIVE FREE 10 ML: 5 INJECTION INTRAVENOUS at 08:26

## 2023-04-27 RX ADMIN — TAMSULOSIN HYDROCHLORIDE 0.4 MG: 0.4 CAPSULE ORAL at 08:25

## 2023-04-27 RX ADMIN — HEPARIN SODIUM 5000 UNITS: 5000 INJECTION INTRAVENOUS; SUBCUTANEOUS at 05:34

## 2023-04-27 ASSESSMENT — PAIN SCALES - GENERAL
PAINLEVEL_OUTOF10: 3
PAINLEVEL_OUTOF10: 2

## 2023-04-27 NOTE — PROGRESS NOTES
ACUTE PHYSICAL THERAPY GOALS:   (Developed with and agreed upon by patient and/or caregiver.)    (1.) Manny Reed  will move from supine to sit and sit to supine , scoot up and down, and roll side to side with INDEPENDENT within 7 treatment day(s). (2.) Manny Reed will transfer from bed to chair and chair to bed with SUPERVISION using the least restrictive device within 7 treatment day(s). (3.) Manny Reed will perform therapeutic exercises x 20 min for HEP with SUPERVISION to improve strength, endurance, and functional mobility within 7 treatment day(s). PHYSICAL THERAPY Initial Assessment, Daily Note, and PM  (Link to Caseload Tracking: PT Visit Days : 1  Acknowledge Orders  Time In/Out  PT Charge Capture  Rehab Caseload Tracker    Manny Reed is a 78 y.o. male   PRIMARY DIAGNOSIS: PAD (peripheral artery disease) ([facility]  PVD (peripheral vascular disease) ([facility] [I73.9]  PAD (peripheral artery disease) ([facility] [I73.9]  Procedure(s) (LRB):  RIGHT LEG AMPUTATION ABOVE KNEE (Right)  1 Day Post-Op  Reason for Referral: Generalized Muscle Weakness (M62.81)  Difficulty in walking, Not elsewhere classified (R26.2)  Inpatient: Payor: MEDICARE / Plan: MEDICARE PART A AND B / Product Type: *No Product type* /     ASSESSMENT:     REHAB RECOMMENDATIONS:   Recommendation to date pending progress:  Setting:  Inpatient Rehab Facility    Equipment:    To Be Determined     ASSESSMENT:   Mr. Chantale Nieves is a 78year old male who presents with new R AKA, pt with PMHx of L AKA, does not have prosthesis with him in room. Pt reports living with his wife in 1 level home with ramp entrance. Pt reports having both power and manual chair, states that he uses [facility] in home most of time because of its smaller size and ability to get around home in it.  This date pt performs mobility including long sitting in bed with min assist and rolling in bed with min rolling to L and max going to R, but was unable to achieve full
ACUTE PHYSICAL THERAPY GOALS:   (Developed with and agreed upon by patient and/or caregiver.)  (1.) Ivory Syed  will move from supine to sit and sit to supine , scoot up and down, and roll side to side with INDEPENDENT within 7 treatment day(s). (2.) Ivory Syed will transfer from bed to chair and chair to bed with SUPERVISION using the least restrictive device within 7 treatment day(s). (3.) Ivory Syed will perform therapeutic exercises x 20 min for HEP with SUPERVISION to improve strength, endurance, and functional mobility within 7 treatment day(s). PHYSICAL THERAPY: Daily Note PM   (Link to Caseload Tracking: PT Visit Days : 2  Time In/Out PT Charge Capture  Rehab Caseload Tracker  Orders    Ivory Syed is a 78 y.o. male   PRIMARY DIAGNOSIS: PAD (peripheral artery disease) ([facility] [address] [facility]  PVD (peripheral vascular disease) ([facility] [address] [facility] [I73.9]  PAD (peripheral artery disease) ([facility]) [I73.9]  Procedure(s) (LRB):  RIGHT LEG AMPUTATION ABOVE KNEE (Right)  2 Days Post-Op  Inpatient: Payor: MEDICARE / Plan: MEDICARE PART A AND B / Product Type: *No Product type* /     ASSESSMENT:     REHAB RECOMMENDATIONS:   Recommendation to date pending progress:  Setting:  Inpatient Rehab Facility    Equipment:    To Be Determined     ASSESSMENT:  Mr. Ruperto George presents supine receiving blood, checked with RN prior to session that it was ok to work with pt, RN said it was ok. Pt reporting not feeling well today, willing to work with PT, wife came into room about half way during session. Spoke with wife about goals of therapy. Pt also now with noted sore on sacrum, spoke with pt about importance of rolling to get pressure off of area so it can heal, will need to heal before prior to using Sliding board for his transfers. Pt requires max assist to elevate trunk from sitting to long sitting, pt able to maintain balance in long sitting approx 7 mins using BUEs on bed railing, this is a big improvement from yesterday.
Attempted to call for report to [facility], no answer.  Will attempt to call again
Attempted to call report to [facility], no answer.
Attempted to call report to [facility] ([phone]), line was busy.
Hourly rounds in progress. Alert, oriented x 4; forgetful at times during night. Right stump incision open to air is dry and approximated. No prn needed this shift. Denies needs or pain at this time. Bed in low locked position. Call light within reach. Bed alarm on. Will continue to monitor and give report to oncoming day shift nurse.
Physician Progress Note      PATIENT:               Constantin Castaneda  [facility] #:                  [id number]  :                       1945  ADMIT DATE:       2023 10:35 AM  DISCH DATE:  Lyndsey Alvarado  PROVIDER #:        Koffi Valentino MD          QUERY TEXT:    Patient admitted with peripheral artery disease. Noted documentation of Acute   Kidney Injury. In order to support the diagnosis of AMALIA, please include   additional clinical indicators in your documentation. ? Or please document if   the diagnosis of AMALIA has been ruled out after further study. The medical record reflects the following:  Risk Factors: Hypertension, CKD3b, dm type 2, PVD. Clinical Indicators: Per Internal medicine consult note \"progression of stage   III disease versus AMALIA-creatinine okay 1.6-monitor baseline creatinine appears   to be possibly around 1.5 from -nephrology following. \"  Per nephrology consult \"AMALIA vs progression of CKD (may be stage 3b now), cr   1. 6. \"  Per internal medicine  progress note \"increased creatinine noted-baseline   creatinine 1.5-1.7 today and on high-risk antibiotic vancomycin. \"  Per nephrology ,  notes \"AMALIA- prerenal 2/2 hypotension holding   antihypertensives. \"  Treatment: Nephrology consult, IV fluids, lab workup    Defined by Kidney Disease Improving Global Outcomes (KDIGO) clinical practice   guideline for acute kidney injury:  -Increase in SCr by greater than or equal to 0.3 mg/dl within 48 hours; or  -Increase or decrease in SCr to greater than or equal to 1.5 times baseline,   which is known or presumed to have occurred within the prior 7 days; or  -Urine volume < 0.5ml/kg/h for 6 hours. Thank you,  Jazmin Osei RN, BSN, JAMEE Hairston. [email or web address]  . Options provided:  -- Acute kidney injury evidenced by, Please specify.   -- Acute kidney injury ruled out after study  -- Other - I will add my own diagnosis  -- Disagree - Not applicable / Not valid  -- Disagree - Clinically
Physician Progress Note      PATIENT:               Landon Pan  [facility] #:                  [id number]  :                       1945  ADMIT DATE:       2023 10:35 AM  DISCH DATE:  Jaqueline Mazariegos  PROVIDER #:        Austin Espinosa NP          QUERY TEXT:    Pt admitted for right AKA. Pt noted to have a drop in hemoglobin. If possible,   please document in the progress notes and discharge summary if you are   evaluating and/or treating any of the following: The medical record reflects the following:  Risk Factors: 78 y.o. male with history of coronary artery disease with remote   CABG, hypertension hyperlipidemia peripheral vascular disease with prior to   AKA, diabetes mellitus requiring insulin, right bundle branch block, and   presented with hyponatremia and has known chronic kidney disease stage III  Clinical Indicators: Hemoglobin: 10.5, 9.7, 10.4, 10.3, 7.9, 7.4   Right above knee amputation. Treatment: Serial labs, PRBC's    Thank you,  Tahir Novoa RN, BSN, JAMEE Hairston. [email or web address]  . Options provided:  -- Acute blood loss anemia  -- Postoperative acute blood loss anemia  -- Anemia of chronic disease due to CKD  -- Other - I will add my own diagnosis  -- Disagree - Not applicable / Not valid  -- Disagree - Clinically unable to determine / Unknown  -- Refer to Clinical Documentation Reviewer    PROVIDER RESPONSE TEXT:    This patient has anemia of chronic disease due to CKD.     Query created by: Jocelyne Armijo on 2023 6:01 PM      Electronically signed by:  Kimberly Hill NP 2023 8:31 AM
Place a call to answering service for right AKA wound orders. Waiting on response.
Pt resting with eyes closed, NAD. Pt underwent right AKA today. No wound care orders for new stump. Some bleeding visible on ACE wrap. Patient medicated PER MAR. Hourly rounds completed. Bed in L/L position, call light and personal items within reach. Will give bedside report to oncoming nurse.
Raymond Members Short  Admission Date: 4/20/2023         [address] Nephrology Progress Note: 4/26/2023  HPI: Patient is a 78 y.o male with PMH of CKD 3a, HTN, IIDM, CAD/CABG, PVD   Admitted by Dr aMry Vega for R. Leg amputation because of foot gangrene, infection and occlusion of arterial graft. Follow-up for: Hyponatremia     The patient's chart is reviewed and the patient is discussed with the staff. Subjective:   Pt seen and examined in room reports RLE amputation pain better controlled, good uop.      ROS:  Gen - no fever, no chills  CV - no chest pain, no palpitation  Lung - no shortness of breath, no cough  Abd - no tenderness, no nausea/vomiting, no diarrhea  Ext - no edema    Current Facility-Administered Medications   Medication Dose Route Frequency    magic (miracle) mouthwash with nystatin  10 mL Swish & Spit 4x Daily PRN    insulin glargine (LANTUS) injection vial 31 Units  31 Units SubCUTAneous BID    glucose chewable tablet 16 g  4 tablet Oral PRN    dextrose bolus 10% 125 mL  125 mL IntraVENous PRN    Or    dextrose bolus 10% 250 mL  250 mL IntraVENous PRN    glucagon (rDNA) injection 1 mg  1 mg SubCUTAneous PRN    dextrose 10 % infusion   IntraVENous Continuous PRN    [Held by provider] hydrALAZINE (APRESOLINE) tablet 100 mg  100 mg Oral BID    [Held by provider] metoprolol tartrate (LOPRESSOR) tablet 12.5 mg  12.5 mg Oral BID    [Held by provider] lisinopril (PRINIVIL;ZESTRIL) tablet 40 mg  40 mg Oral Daily    sodium chloride flush 0.9 % injection 5-40 mL  5-40 mL IntraVENous 2 times per day    sodium chloride flush 0.9 % injection 5-40 mL  5-40 mL IntraVENous PRN    0.9 % sodium chloride infusion   IntraVENous PRN    ondansetron (ZOFRAN-ODT) disintegrating tablet 4 mg  4 mg Oral Q8H PRN    Or    ondansetron (ZOFRAN) injection 4 mg  4 mg IntraVENous Q6H PRN    polyethylene glycol (GLYCOLAX) packet 17 g  17 g Oral Daily PRN    acetaminophen (TYLENOL) tablet 650 mg  650 mg Oral Q4H
[facility] Hospitalist Consult   Admit Date:  2023 10:35 AM   Name:  Brianne Patten   Age:  78 y.o. Sex:  male  :  1945   MRN:  [id number]   Room:  [facility]/    Presenting/Chief Complaint: No chief complaint on file. Reason(s) for Admission: PVD (peripheral vascular disease) ([facility] [I73.9]  PAD (peripheral artery disease) ([facility] [I73.9]     Hospitalists consulted by Vega Pitt MD for: Medical management    History of Presenting Illness:     Copied from consultation HPI :  Rosanna Mcclendon is a 78 y.o. male with history of coronary artery disease with remote CABG, hypertension hyperlipidemia peripheral vascular disease with prior to AKA, diabetes mellitus requiring insulin, right bundle branch block, and presented with hyponatremia and has known chronic kidney disease stage III with baseline creatinine estimated to be around 1. 5? Presented with increased serum creatinine progression of chronic disease versus AMALIA unclear. He has been seen by nephrology regarding serum creatinine and hyponatremia, and cardiology for surgical clearance in anticipation need for planned right lower extremity above-knee amputation. He presented with significant right foot gangrene. Known peripheral vascular disease. Vascular surgery attending service. Medical services has been asked to follow patient medical management of multiple medical comorbidities including uncontrolled hypertension, uncontrolled diabetes at present time. Subjective : 23  Patient still has some pain in his right AKA stump. No fever no chills. No chest pain or shortness of breath. No nausea no vomiting. Assessment & Plan:     Principal Problem: This is a 51-year-old male with:      PAD (peripheral artery disease) ([facility]? Foot gangrene right  -Status post right above-the-knee amputation by vascular surgery postop day 2.     Additional problems/history:    Microcytic anemia-check iron studies-supplement if
[facility] Hospitalist Consult   Admit Date:  2023 10:35 AM   Name:  Darius Patten   Age:  78 y.o. Sex:  male  :  1945   MRN:  [id number]   Room:  [facility]/    Presenting/Chief Complaint: No chief complaint on file. Reason(s) for Admission: PVD (peripheral vascular disease) ([facility] [I73.9]  PAD (peripheral artery disease) ([facility] [I73.9]     Hospitalists consulted by Kassy Valles MD for: Medical management    History of Presenting Illness:     Copied from consultation HPI :  Mak Driver is a 78 y.o. male with history of coronary artery disease with remote CABG, hypertension hyperlipidemia peripheral vascular disease with prior to AKA, diabetes mellitus requiring insulin, right bundle branch block, and presented with hyponatremia and has known chronic kidney disease stage III with baseline creatinine estimated to be around 1. 5? Presented with increased serum creatinine progression of chronic disease versus AMALIA unclear. He has been seen by nephrology regarding serum creatinine and hyponatremia, and cardiology for surgical clearance in anticipation need for planned right lower extremity above-knee amputation. He presented with significant right foot gangrene. Known peripheral vascular disease. Vascular surgery attending service. Medical services has been asked to follow patient medical management of multiple medical comorbidities including uncontrolled hypertension, uncontrolled diabetes at present time. Events of last 24 hours/summary:23  Complaining of oropharyngeal discomfort. Said his \"special mouthwash\". Offered him trial of Magic mouthwash. There is no obvious thrush or significant pharyngeal erythema. Complaining of pain in right foot-phantom limb pain postop day #1 right AKA. Intolerant of gabapentin listed as significant allergy        Assessment & Plan:     Principal Problem:    PAD (peripheral artery disease) ([facility]? Foot gangrene right  Plan:  For planned
[facility] Hospitalist Consult   Admit Date:  2023 10:35 AM   Name:  Ly Patten   Age:  78 y.o. Sex:  male  :  1945   MRN:  [id number]   Room:  [facility]/    Presenting/Chief Complaint: No chief complaint on file. Reason(s) for Admission: PVD (peripheral vascular disease) ([facility] [facility] [I73.9]  PAD (peripheral artery disease) ([facility] [I73.9]     Hospitalists consulted by No att. providers found for: Medical management    History of Presenting Illness:     Copied from consultation HPI :  Rita Toussaint is a 78 y.o. male with history of coronary artery disease with remote CABG, hypertension hyperlipidemia peripheral vascular disease with prior to AKA, diabetes mellitus requiring insulin, right bundle branch block, and presented with hyponatremia and has known chronic kidney disease stage III with baseline creatinine estimated to be around 1. 5? Presented with increased serum creatinine progression of chronic disease versus AMALIA unclear. He has been seen by nephrology regarding serum creatinine and hyponatremia, and cardiology for surgical clearance in anticipation need for planned right lower extremity above-knee amputation. He presented with significant right foot gangrene. Known peripheral vascular disease. Vascular surgery attending service. Medical services has been asked to follow patient medical management of multiple medical comorbidities including uncontrolled hypertension, uncontrolled diabetes at present time. Subjective : 23  Patient doing well this morning. No fever no chills. No chest pain or shortness of breath. No nausea no vomiting. Hemoglobin stable at 8.2 after unit of packed red blood cells yesterday. Assessment & Plan:     Principal Problem: This is a 73-year-old male with:      PAD (peripheral artery disease) ([facility]? Foot gangrene right  -Status post right above-the-knee amputation by vascular surgery postop day 3.     Additional
[address]   [address] [facility] FAX: [phone]         VASCULAR SURGERY FLOOR PROGRESS NOTE    Admit Date: 2023  POD: Day of Surgery    Procedure:  Procedure(s):  RIGHT LEG AMPUTATION ABOVE KNEE    Subjective:     Patient has no new complaints. Objective:     Vitals:  Blood pressure (!) 111/59, pulse 60, temperature 97.7 °F (36.5 °C), temperature source Oral, resp. rate 18, height 6' 2\" (1.88 m), weight 269 lb 10 oz (122.3 kg), SpO2 96 %. Temp (24hrs), Av.9 °F (36.6 °C), Min:97.7 °F (36.5 °C), Max:98.2 °F (36.8 °C)      Intake / Output:  No intake or output data in the 24 hours ending 23 0654    Physical Exam:    Constitutional: he appears well-developed. No distress. HENT:   Head: Atraumatic. Eyes: Pupils are equal, round, and reactive to light. Neck: Normal range of motion. Cardiovascular: Regular rhythm. Pulmonary/Chest: Effort normal and breath sounds normal. No respiratory distress. Abdominal: Soft. Bowel sounds are normal. he exhibits no distension. There is no tenderness. There is no guarding. No hernia. Musculoskeletal: Normal range of motion. Neurological: He is alert.  CN II- XII grossly intact  Vascular: Necrotic not unreconstructable right foot    Labs:   Recent Labs     23  0626 23  0756   HGB 9.7* 10.4*   WBC 5.8 6.3   K 4.6 4.5       Data Review     Assessment:     Patient Active Problem List    Diagnosis Date Noted    CAD (coronary artery disease) 2019    CKD (chronic kidney disease), stage III ([facility] [address] [facility] 2023    Microcytic anemia 2023    BPH (benign prostatic hyperplasia) 2023    Hx of AKA (above knee amputation), left ([facility] [address] [facility] 2023    RBBB 2023    PAD (peripheral artery disease) ([facility] [address] [facility] 2023    Effusion of bursa of right elbow 2021    Open wound of right foot 2020    Suspected sleep apnea 2020    PVD (peripheral vascular disease) ([facility] [address] [facility] 2020    Right
[address]   [address] [facility]. [address] FAX: [phone]         VASCULAR SURGERY FLOOR PROGRESS NOTE    Admit Date: 2023  POD: 1 Day Post-Op    Procedure:  Procedure(s):  RIGHT LEG AMPUTATION ABOVE KNEE    Subjective:     Patient has no new complaints. Objective:     Vitals:  Blood pressure 98/63, pulse 70, temperature 98.2 °F (36.8 °C), temperature source Oral, resp. rate 16, height 6' 2\" (1.88 m), weight 252 lb 6.8 oz (114.5 kg), SpO2 90 %. Temp (24hrs), Av.1 °F (36.7 °C), Min:97.3 °F (36.3 °C), Max:98.4 °F (36.9 °C)      Intake / Output:    Intake/Output Summary (Last 24 hours) at 2023 0705  Last data filed at 2023 0530  Gross per 24 hour   Intake 1373 ml   Output 570 ml   Net 803 ml       Physical Exam:    Constitutional: he appears well-developed. No distress. HENT:   Head: Atraumatic. Eyes: Pupils are equal, round, and reactive to light. Neck: Normal range of motion. Cardiovascular: Regular rhythm. Pulmonary/Chest: Effort normal and breath sounds normal. No respiratory distress. Abdominal: Soft. Bowel sounds are normal. he exhibits no distension. There is no tenderness. There is no guarding. No hernia. Musculoskeletal: Normal range of motion. Neurological: He is alert.  CN II- XII grossly intact  Vascular: Postop dressing in place    Labs:   Recent Labs     23  0756 23  0702 23  1336   HGB 10.4* 10.3* 7.9*   WBC 6.3 6.7 8.2   K 4.5 4.4  --        Data Review     Assessment:     Patient Active Problem List    Diagnosis Date Noted    CAD (coronary artery disease) 2019    CKD (chronic kidney disease), stage III ([facility] 2023    Microcytic anemia 2023    BPH (benign prostatic hyperplasia) 2023    Hx of AKA (above knee amputation), left ([facility] 2023    RBBB 2023    PAD (peripheral artery disease) ([facility] 2023    Effusion of bursa of right elbow 2021    Open wound of right foot
[address]   [address] [facility] FAX: [phone]         VASCULAR SURGERY FLOOR PROGRESS NOTE    Admit Date: 2023  POD: 3 Days Post-Op    Procedure:  Procedure(s):  RIGHT LEG AMPUTATION ABOVE KNEE    Subjective:     Patient has no new complaints. Objective:     Vitals:  Blood pressure 136/67, pulse 52, temperature 98.4 °F (36.9 °C), temperature source Oral, resp. rate 18, height 6' 2\" (1.88 m), weight 252 lb 6.8 oz (114.5 kg), SpO2 96 %. Temp (24hrs), Av °F (36.7 °C), Min:97.7 °F (36.5 °C), Max:98.6 °F (37 °C)      Intake / Output:    Intake/Output Summary (Last 24 hours) at 2023 0655  Last data filed at 2023 0347  Gross per 24 hour   Intake 746.25 ml   Output 950 ml   Net -203.75 ml       Physical Exam:    Constitutional: he appears well-developed. No distress. HENT:   Head: Atraumatic. Eyes: Pupils are equal, round, and reactive to light. Neck: Normal range of motion. Cardiovascular: Regular rhythm. Pulmonary/Chest: Effort normal and breath sounds normal. No respiratory distress. Abdominal: Soft. Bowel sounds are normal. he exhibits no distension. There is no tenderness. There is no guarding. No hernia. Musculoskeletal: Normal range of motion. Neurological: He is alert.  CN II- XII grossly intact  Vascular: Incision intact    Labs:   Recent Labs     23  0706 23  1632 23  0538   HGB 7.4* 8.3* 8.2*   WBC 4.2*  --  4.7   K 4.5  --  4.4       Data Review     Assessment:     Patient Active Problem List    Diagnosis Date Noted    CAD (coronary artery disease) 2019    CKD (chronic kidney disease), stage III ([facility] 2023    Microcytic anemia 2023    BPH (benign prostatic hyperplasia) 2023    Hx of AKA (above knee amputation), left ([facility] 2023    RBBB 2023    PAD (peripheral artery disease) ([facility] [facility] 2023    Effusion of bursa of right elbow 2021    Open wound of right foot
[address]   [address] [facility]. [address] FAX: [phone]         VASCULAR SURGERY FLOOR PROGRESS NOTE    Admit Date: 2023  POD: 2 Days Post-Op    Procedure:  Procedure(s):  RIGHT LEG AMPUTATION ABOVE KNEE    Subjective:     Patient has no new complaints. Objective:     Vitals:  Blood pressure (!) 142/76, pulse 88, temperature 98.4 °F (36.9 °C), resp. rate 20, height 6' 2\" (1.88 m), weight 252 lb 6.8 oz (114.5 kg), SpO2 93 %. Temp (24hrs), Av.1 °F (36.7 °C), Min:97.5 °F (36.4 °C), Max:98.4 °F (36.9 °C)      Intake / Output:    Intake/Output Summary (Last 24 hours) at 2023 0657  Last data filed at 2023 0601  Gross per 24 hour   Intake 0 ml   Output 925 ml   Net -925 ml       Physical Exam:    Constitutional: he appears well-developed. No distress. HENT:   Head: Atraumatic. Eyes: Pupils are equal, round, and reactive to light. Neck: Normal range of motion. Cardiovascular: Regular rhythm. Pulmonary/Chest: Effort normal and breath sounds normal. No respiratory distress. Abdominal: Soft. Bowel sounds are normal. he exhibits no distension. There is no tenderness. There is no guarding. No hernia. Musculoskeletal: Normal range of motion. Neurological: He is alert.  CN II- XII grossly intact  Vascular: Incision intact    Labs:   Recent Labs     23  0756 23  0702 23  1336   HGB 10.4* 10.3* 7.9*   WBC 6.3 6.7 8.2   K 4.5 4.4  --        Data Review     Assessment:     Patient Active Problem List    Diagnosis Date Noted    CAD (coronary artery disease) 2019    CKD (chronic kidney disease), stage III ([facility] [address] [facility] 2023    Microcytic anemia 2023    BPH (benign prostatic hyperplasia) 2023    Hx of AKA (above knee amputation), left ([facility] [facility] [address] 20[facility]    RBBB 2023    PAD (peripheral artery disease) ([facility] [address] 2023    Effusion of bursa of right elbow 2021    Open wound of right foot 2020    Suspected sleep
TRANSFER - OUT REPORT:    Verbal report given to Eamon Johnson on Werner Kiss  being transferred to [facility] for routine progression of patient care       Report consisted of patient's Situation, Background, Assessment and   Recommendations(SBAR). Information from the following report(s) Nurse Handoff Report was reviewed with the receiving nurse. [place] Assessment: No data recorded  Lines:       Opportunity for questions and clarification was provided.       Report called in at [phone]
VANCO DAILY FOLLOW UP NOTE  [facility] Pharmacokinetic Monitoring Service - Vancomycin    Consulting Provider: YOMAIRA Bacon  Indication: SSTI - Gangrene   Target Concentration: Goal AUC/NIALL 400-600 mg*hr/L  Day of Therapy: 7 of 7  Additional Antimicrobials: pip/tazo    Patient eligible for piperacillin-tazobactam to cefepime auto-substitution per P&T approved protocol? NO    Pertinent Laboratory Values: Wt Readings from Last 1 Encounters:   04/25/23 252 lb 6.8 oz (114.5 kg)     Temp Readings from Last 1 Encounters:   04/26/23 97.7 °F (36.5 °C)     Recent Labs     04/24/23  0702 04/24/23  1336 04/26/23  0706   BUN 27*  --  27*   CREATININE 1.70*  --  1.40   WBC 6.7 8.2 4.2*     Estimated Creatinine Clearance: 61 mL/min (based on SCr of 1.4 mg/dL). Lab Results   Component Value Date/Time    VANCORANDOM 13.1 04/22/2023 06:26 AM       MRSA Nasal Swab: N/A. Non-respiratory infection    Assessment:  Date/Time Dose Concentration AUC   4/22 0626 1250 mg q24h 13.1 417   Note: Serum concentrations collected for AUC dosing may appear elevated if collected in close proximity to the dose administered, this is not necessarily an indication of toxicity    Plan:  Current dosing regimen is therapeutic  Continue current dose  Repeat vancomycin concentrations will be ordered as clinically appropriate   Pharmacy will continue to monitor patient and adjust therapy as indicated    Thank you for the consult,  Jazlyn Longoria.  [facility]
VANCO DAILY FOLLOW UP NOTE  [address] [facility] Pharmacokinetic Monitoring Service - Vancomycin    Consulting Provider: YOMAIRA Cavazos  Indication: SSTI - Gangrene   Target Concentration: Goal AUC/NIALL 400-600 mg*hr/L  Day of Therapy: 5 of 7  Additional Antimicrobials: pip/tazo    Patient eligible for piperacillin-tazobactam to cefepime auto-substitution per P&T approved protocol? NO    Pertinent Laboratory Values: Wt Readings from Last 1 Encounters:   04/22/23 269 lb 10 oz (122.3 kg)     Temp Readings from Last 1 Encounters:   04/24/23 98.1 °F (36.7 °C) (Axillary)     Recent Labs     04/22/23  0626 04/23/23  0756 04/24/23  0702 04/24/23  1336   BUN 28* 27* 27*  --    CREATININE 1.50 1.60* 1.70*  --    WBC 5.8 6.3 6.7 8.2     Estimated Creatinine Clearance: 52 mL/min (A) (based on SCr of 1.7 mg/dL (H)). Lab Results   Component Value Date/Time    VANCORANDOM 13.1 04/22/2023 06:26 AM       MRSA Nasal Swab: N/A. Non-respiratory infection    Assessment:  Date/Time Dose Concentration AUC   4/22 0626 1250 mg q24h 13.1 417   Note: Serum concentrations collected for AUC dosing may appear elevated if collected in close proximity to the dose administered, this is not necessarily an indication of toxicity    Plan:  Current dosing regimen is therapeutic  Continue current dose  Repeat vancomycin concentrations will be ordered as clinically appropriate   Pharmacy will continue to monitor patient and adjust therapy as indicated    Thank you for the consult,  Gabi Weaver.  [facility]
VANCO DAILY FOLLOW UP NOTE  [address] [facility] Pharmacokinetic Monitoring Service - Vancomycin    Consulting Provider: YOMAIRA Amezcua  Indication: SSTI - Gangrene   Target Concentration: Goal AUC/NIALL 400-600 mg*hr/L  Day of Therapy: 6 of 7  Additional Antimicrobials: pip/tazo    Patient eligible for piperacillin-tazobactam to cefepime auto-substitution per P&T approved protocol? NO    Pertinent Laboratory Values: Wt Readings from Last 1 Encounters:   04/25/23 252 lb 6.8 oz (114.5 kg)     Temp Readings from Last 1 Encounters:   04/25/23 98.4 °F (36.9 °C) (Axillary)     Recent Labs     04/23/23  0756 04/24/23  0702 04/24/23  1336   BUN 27* 27*  --    CREATININE 1.60* 1.70*  --    WBC 6.3 6.7 8.2     Estimated Creatinine Clearance: 51 mL/min (A) (based on SCr of 1.7 mg/dL (H)). Lab Results   Component Value Date/Time    VANCORANDOM 13.1 04/22/2023 06:26 AM       MRSA Nasal Swab: N/A. Non-respiratory infection    Assessment:  Date/Time Dose Concentration AUC   4/22 0626 1250 mg q24h 13.1 417   Note: Serum concentrations collected for AUC dosing may appear elevated if collected in close proximity to the dose administered, this is not necessarily an indication of toxicity    Plan:  Current dosing regimen is therapeutic  Continue current dose  Repeat vancomycin concentrations will be ordered as clinically appropriate   Pharmacy will continue to monitor patient and adjust therapy as indicated    Thank you for the consult,  [place]ake Martel.  [facility]
Will cancel discharge. Will also transfuse 1 unit PRBC.     Jl Call NP
aluminum & magnesium hydroxide-simethicone (MAALOX) 200-200-20 MG/5ML suspension 30 mL  30 mL Oral Q6H PRN    heparin (porcine) injection 5,000 Units  5,000 Units SubCUTAneous 3 times per day    morphine injection 2 mg  2 mg IntraVENous Q4H PRN    oxyCODONE-acetaminophen (PERCOCET) 5-325 MG per tablet 1 tablet  1 tablet Oral Q4H PRN    vancomycin (VANCOCIN) 1250 mg in sodium chloride 0.9% 250 mL IVPB  1,250 mg IntraVENous Q24H    aspirin EC tablet 81 mg  81 mg Oral Daily    levothyroxine (SYNTHROID) tablet 75 mcg  75 mcg Oral QAM AC    tamsulosin (FLOMAX) capsule 0.4 mg  0.4 mg Oral Daily    nitroGLYCERIN (NITROSTAT) SL tablet 0.4 mg  0.4 mg SubLINGual Q5 Min PRN    insulin lispro (HUMALOG) injection vial 0-8 Units  0-8 Units SubCUTAneous TID WC    insulin lispro (HUMALOG) injection vial 0-4 Units  0-4 Units SubCUTAneous Nightly    piperacillin-tazobactam (ZOSYN) 3,375 mg in sodium chloride 0.9 % 50 mL IVPB (mini-bag)  3,375 mg IntraVENous Q8H         Objective:     Vitals:    04/24/23 2250 04/25/23 0423 04/25/23 0445 04/25/23 0727   BP: 105/62 98/63  (!) 109/54   Pulse: 62 70  61   Resp: 16 16  20   Temp: 98.2 °F (36.8 °C) 98.2 °F (36.8 °C)  97.5 °F (36.4 °C)   TempSrc: Oral Oral  Oral   SpO2: 93% 90%  93%   Weight:   252 lb 6.8 oz (114.5 kg)    Height:         Intake and Output:   04/23 1901 - 04/25 0700  In: 0363 [P.O.:266; I.V.:1107]  Out: 570 [Urine:450]  No intake/output data recorded. Physical Exam:   Constitutional:  the patient is well developed and in no acute distress, alert and oriented  HEENT:  Sclera clear, pupils equal, oral mucosa moist  Lungs: clear bilaterally   Cardiovascular:  Regular rate, S1, S2, no Rub   Abd/GI: soft and non-tender; with positive bowel sounds. Ext: There is no lower leg edema. Bilat AKA most recent right amputation this admission dressing in place  Skin:  no jaundice or rashes  Neuro: no gross neuro deficits   Psychiatric: Calm.        LAB  Recent Labs     04/23/23  0756
Result Value Ref Range    POC Glucose 169 (H) 65 - 100 mg/dL    Performed by: Michelle    POCT Glucose    Collection Time: 04/24/23 11:51 AM   Result Value Ref Range    POC Glucose 217 (H) 65 - 100 mg/dL    Performed by: Naomy Lynn    CBC    Collection Time: 04/24/23  1:36 PM   Result Value Ref Range    WBC 8.2 4.3 - 11.1 K/uL    RBC 3.12 (L) 4.23 - 5.6 M/uL    Hemoglobin 7.9 (L) 13.6 - 17.2 g/dL    Hematocrit 25.6 (L) 41.1 - 50.3 %    MCV 82.1 82 - 102 FL    MCH 25.3 (L) 26.1 - 32.9 PG    MCHC 30.9 (L) 31.4 - 35.0 g/dL    RDW 15.7 (H) 11.9 - 14.6 %    Platelets 705 801 - 841 K/uL    MPV 9.8 9.4 - 12.3 FL    nRBC 0.00 0.0 - 0.2 K/uL       I have personally reviewed imaging studies showing:  No results found. Echocardiogram:  No results found for this or any previous visit. Signed:  Violet Ontiveros DO    Part of this note may have been written by using a voice dictation software. The note has been proof read but may still contain some grammatical/other typographical errors.

## 2023-04-27 NOTE — CARE COORDINATION
"Patient Name: Hector Kellogg  MRN: [id number]    INFORMED CONSENT: The procedure, risks, benefits and options were discussed with patient. There are no contraindications to the procedure. The patient expressed understanding and agreed to proceed. The personnel performing the procedure was discussed. I verify that I personally obtained Hector's consent prior to the start of the procedure and the signed consent can be found on the patient's chart.    Procedure Date: 08/05/2020    Anesthesia: Topical    Pre Procedure diagnosis: Lumbar spondylosis [M47.816]  1. Spondylosis without myelopathy    2. Spondylosis of lumbosacral region, unspecified spinal osteoarthritis complication status    3. Osteoarthritis of spine      Post-Procedure diagnosis: SAME      Sedation: None    PROCEDURE: Right L4/5 and L5/S1 FACET JOINT INJECTION      DESCRIPTION OF PROCEDURE:The patient was brought to the procedure room.  After performing time out. IV access was obtained prior to the procedure. The patient was positioned prone on the fluoroscopy table. Continuous hemodynamic monitoring was initiated including blood pressure, EKG, and pulse oximetry. The area of the lumbar spine was prepped with chlorhexidine and draped into a sterile field.Fluoroscopy was used to identify the location of Right L4/5 and L5/S1   joints respectivly. Skin anesthesia was achieved using 3 cc of Lidocaine 1% over the injection sites. A 25 gauge, 3 1/2" spinal needle was slowly inserted at each joint using APand oblique fluoroscopic imaging. Negative aspiration for blood or CSF was confirmed. 0.5 cc of Omnipaque  dye was inserted to confirm placement A combination of 2 ml bupivacaine 0.25% and 10 mg decadron was injected at all joints. The needles were removed and bleeding was nil. A sterile dressing was applied. No specimens collected. Hector was taken back to the recovery room for further observation.     Blood Loss: Nill  Specimen: None    Ramiro Calvillo, " Pt is for discharge today to [facility] [address] as planned. Transport via [place] around 1330. Packet prepared to go with pt to facility. Spoke with Cleve Bey, Admission Liaison by phone with update on anticipated transport time. Wife is also aware and to meet patient at facility. Report can be called to [phone] and patient will go to room 155. MD

## 2023-05-12 ENCOUNTER — CARE COORDINATION (OUTPATIENT)
Dept: CARE COORDINATION | Facility: CLINIC | Age: 78
End: 2023-05-12

## 2023-05-12 DIAGNOSIS — I73.9 PAD (PERIPHERAL ARTERY DISEASE) (HCC): Primary | ICD-10-CM

## 2023-05-12 DIAGNOSIS — Z89.619 S/P LOWER LIMB AMPUTATION (HCC): ICD-10-CM

## 2023-05-12 NOTE — CARE COORDINATION
FRANCOIS HOLLINGSWORTH received referral for transportation issues. FRANCOIS HOLLINGSWORTH reached out to pt-no answer-VM full. Will try again next week.

## 2023-05-15 ENCOUNTER — CARE COORDINATION (OUTPATIENT)
Dept: CARE COORDINATION | Facility: CLINIC | Age: 78
End: 2023-05-15

## 2023-05-15 ENCOUNTER — TELEPHONE (OUTPATIENT)
Dept: VASCULAR SURGERY | Age: 78
End: 2023-05-15

## 2023-05-15 NOTE — TELEPHONE ENCOUNTER
Meenakshi Fontenot w/[facility] [phone]   Wanting to know if we will be signing [facility] orders, PT,OT, also take staples    -informed this is ok and we will sign orders

## 2023-05-15 NOTE — CARE COORDINATION
FRANCOIS HOLLINGSWORTH attempted to reach pt once again regarding transportation referral.  No answer-VM full. FRANCOIS HOLLINGSWORTH will try again later this week.

## 2023-05-17 ENCOUNTER — TELEPHONE (OUTPATIENT)
Dept: VASCULAR SURGERY | Age: 78
End: 2023-05-17

## 2023-05-17 NOTE — TELEPHONE ENCOUNTER
Maria L Vargas w/[facility] [phone]  FYI she took part of staples out, used stri-strips some looked dehisced which she left those staples in -will take out next week.   He is transferring fine    -noted to [facility] NP

## 2023-05-19 ENCOUNTER — TELEPHONE (OUTPATIENT)
Dept: VASCULAR SURGERY | Age: 78
End: 2023-05-19

## 2023-05-19 NOTE — TELEPHONE ENCOUNTER
Neel Harding w/[employer] [phone] , stating due to load volume she probably wont be able to get to him for about 3 weeks    -I informed her he is in desperate need for a     -noted to Mark NORMAN

## 2023-05-23 ENCOUNTER — CARE COORDINATION (OUTPATIENT)
Dept: CARE COORDINATION | Facility: CLINIC | Age: 78
End: 2023-05-23

## 2023-05-23 NOTE — CARE COORDINATION
FRANCOIS HOLLINGSWORTH left VM with Misael with [employer], to inquire about [facility]. Awaiting response .

## 2023-05-24 ENCOUNTER — TELEPHONE (OUTPATIENT)
Dept: VASCULAR SURGERY | Age: 78
End: 2023-05-24

## 2023-05-24 NOTE — TELEPHONE ENCOUNTER
Josie Garcia w/[facility] [phone]  Stating she took rest of staples out today, wound does have a bit of drainage, no streaking or odor. She is sending a picture.     **per Mark NP after reviewing picture -- it looks fine keep clean and dry, cover w/ gauze if any drainage    --informed Josie Garcia of this

## 2023-05-25 ENCOUNTER — CARE COORDINATION (OUTPATIENT)
Dept: CARE COORDINATION | Facility: CLINIC | Age: 78
End: 2023-05-25

## 2023-05-26 ENCOUNTER — TELEPHONE (OUTPATIENT)
Dept: VASCULAR SURGERY | Age: 78
End: 2023-05-26

## 2023-05-26 NOTE — TELEPHONE ENCOUNTER
5-24-23 Girish Paredes w/[facility]  [phone]  [facility] nurse line she did a one time eval for Social Work

## 2023-06-02 ENCOUNTER — TELEPHONE (OUTPATIENT)
Dept: VASCULAR SURGERY | Age: 78
End: 2023-06-02

## 2023-06-02 NOTE — TELEPHONE ENCOUNTER
Occupational Therapist from [facility] called to inform our office that patient had a fall earlier in the week. Patient was transferring from wheelchair to bed. EMT was called to assist him in getting up - no injury.

## 2023-06-07 ENCOUNTER — CARE COORDINATION (OUTPATIENT)
Dept: CARE COORDINATION | Facility: CLINIC | Age: 78
End: 2023-06-07

## 2023-07-13 NOTE — PROGRESS NOTES
Hospitalist Note     Admit Date:  10/31/2019 10:34 PM   Name:  Ramy Sandhu   Age:  69 y.o.  :  1945   MRN:  [id number]   PCP:  Charmaine Alvarado MD  Treatment Team: Attending Provider: Sravani Woods MD; Consulting Provider: Nyla Rachel MD; Utilization Review: Kassidy Odonnell RN    HPI/Subjective:       Mr. Braeden Couch is a 70 yo male with PMH of PAD on xarelto, DM2, CAD s/p CABG, HTN, obesity admitted with painful LLE and need for AKA. He has been followed by vascular surgery and s/p 10-24-19 LLE arteriogram/ tibial stent/ femoral PTA. He has been seen by Dr. Romie Ovalles and agreeable for left AKA done 19.    Discharge plans pending to rehab      19 wife not present, wants to eat breakfast, feels constipated, surgery went ok and no current pain, no dyspnea or cough      Objective:     Patient Vitals for the past 24 hrs:   Temp Pulse Resp BP SpO2   19 0729 97.7 °F (36.5 °C) (!) 47 19 131/71 93 %   19 0352 98.6 °F (37 °C) (!) 51 18 125/69 94 %   19 2335 98.2 °F (36.8 °C) (!) 54 19 119/73 98 %   19 2012 97.9 °F (36.6 °C) (!) 57 20 128/77 94 %   19 1559 98.6 °F (37 °C) 67 19 131/67 95 %   19 1314 -- 75 -- 167/70 93 %   19 1309 -- 78 -- 142/83 93 %   19 1305 -- 70 -- 138/65 93 %   19 1300 -- 70 -- 151/69 93 %   19 1255 -- 71 -- 145/65 95 %   19 1250 -- 75 -- 115/59 99 %   19 1245 97.8 °F (36.6 °C) 73 18 145/65 95 %   19 1239 -- 73 18 141/64 95 %   19 1234 -- 68 18 141/60 94 %   19 1229 -- 69 16 155/72 95 %   19 1224 -- 73 16 138/67 94 %   19 1219 -- 76 16 138/67 94 %   19 1214 -- 77 14 141/69 94 %   19 1209 -- 82 14 129/65 94 %   19 1204 -- 71 12 117/59 93 %   19 1201 98.5 °F (36.9 °C) 73 12 114/60 94 %   19 1200 -- 76 -- 114/60 90 %   19 1032 -- 62 20 122/67 94 %   19 1017 -- 61 17 128/59 93 %   19 0959 -- 70 18 114/84 95 %   19 0942 -- 63 15 130/66 96 %   11/01/19 0927 -- 65 15 138/65 95 %   11/01/19 0912 -- 66 22 142/64 94 %   11/01/19 0907 -- 66 15 148/66 95 %   11/01/19 0903 -- 69 14 159/74 96 %   11/01/19 0858 -- 77 28 120/56 95 %   11/01/19 0853 -- 65 14 120/59 97 %     Oxygen Therapy  O2 Sat (%): 93 % (11/02/19 0729)  Pulse via Oximetry: 75 beats per minute (11/01/19 1314)  O2 Device: Nasal cannula (11/01/19 1245)  O2 Flow Rate (L/min): 4 l/min (11/01/19 1245)    Estimated body mass index is 38.52 kg/m² as calculated from the following:    Height as of this encounter: 6' 2\" (1.88 m). Weight as of this encounter: 136.1 kg (300 lb). Intake/Output Summary (Last 24 hours) at 11/2/2019 0852  Last data filed at 11/2/2019 8561  Gross per 24 hour   Intake 2005 ml   Output 650 ml   Net 1355 ml       *Note that automatically entered I/Os may not be accurate; dependent on patient compliance with collection and accurate  by [employer]. General:    Well nourished. Alert. No distress   CV:   RRR. III/VI CONRAD LUSB, no edema  Lungs:   CTAB. No wheezing, rhonchi, or rales. anterior  Abdomen:   Soft, nontender, nondistended. Present BS  Extremities: Warm and dry.   Left AKA bandaged   Neuro:  No gross focal deficits    Data Review:  I have reviewed all labs, meds, and studies from the last 24 hours:    Recent Results (from the past 24 hour(s))   GLUCOSE, POC    Collection Time: 11/01/19 12:35 PM   Result Value Ref Range    Glucose (POC) 173 (H) 65 - 100 mg/dL   GLUCOSE, POC    Collection Time: 11/01/19  4:15 PM   Result Value Ref Range    Glucose (POC) 263 (H) 65 - 100 mg/dL   GLUCOSE, POC    Collection Time: 11/01/19  9:56 PM   Result Value Ref Range    Glucose (POC) 300 (H) 65 - 100 mg/dL   PLEASE READ & DOCUMENT PPD TEST IN 24 HRS    Collection Time: 11/02/19 12:00 AM   Result Value Ref Range    PPD Negative Negative    mm Induration 0 0 - 5 mm   GLUCOSE, POC    Collection Time: 11/02/19  5:26 AM   Result Value Ref Range Glucose (POC) 214 (H) 65 - 100 mg/dL   CBC WITH AUTOMATED DIFF    Collection Time: 11/02/19  6:21 AM   Result Value Ref Range    WBC 6.3 4.3 - 11.1 K/uL    RBC 4.21 (L) 4.23 - 5.6 M/uL    HGB 10.9 (L) 13.6 - 17.2 g/dL    HCT 34.9 (L) 41.1 - 50.3 %    MCV 82.9 79.6 - 97.8 FL    MCH 25.9 (L) 26.1 - 32.9 PG    MCHC 31.2 (L) 31.4 - 35.0 g/dL    RDW 17.2 (H) 11.9 - 14.6 %    PLATELET 893 997 - 059 K/uL    MPV 10.4 9.4 - 12.3 FL    ABSOLUTE NRBC 0.00 0.0 - 0.2 K/uL    NEUTROPHILS 74 43 - 78 %    LYMPHOCYTES 12 (L) 13 - 44 %    MONOCYTES 13 (H) 4.0 - 12.0 %    EOSINOPHILS 0 (L) 0.5 - 7.8 %    BASOPHILS 0 0.0 - 2.0 %    IMMATURE GRANULOCYTES 1 0.0 - 5.0 %    ABS. NEUTROPHILS 4.6 1.7 - 8.2 K/UL    ABS. LYMPHOCYTES 0.8 0.5 - 4.6 K/UL    ABS. MONOCYTES 0.8 0.1 - 1.3 K/UL    ABS. EOSINOPHILS 0.0 0.0 - 0.8 K/UL    ABS. BASOPHILS 0.0 0.0 - 0.2 K/UL    ABS. IMM. GRANS. 0.1 0.0 - 0.5 K/UL    DF AUTOMATED     METABOLIC PANEL, BASIC    Collection Time: 11/02/19  6:21 AM   Result Value Ref Range    Sodium 138 136 - 145 mmol/L    Potassium 4.7 3.5 - 5.1 mmol/L    Chloride 106 98 - 107 mmol/L    CO2 26 21 - 32 mmol/L    Anion gap 6 (L) 7 - 16 mmol/L    Glucose 205 (H) 65 - 100 mg/dL    BUN 42 (H) 8 - 23 MG/DL    Creatinine 1.65 (H) 0.8 - 1.5 MG/DL    GFR est AA 53 (L) >60 ml/min/1.73m2    GFR est non-AA 44 (L) >60 ml/min/1.73m2    Calcium 9.1 8.3 - 10.4 MG/DL   BILIRUBIN, FRACTIONATED    Collection Time: 11/02/19  6:21 AM   Result Value Ref Range    Bilirubin, total 0.6 0.2 - 1.1 MG/DL    Bilirubin, direct 0.2 <0.4 MG/DL    Bilirubin, indirect 0.4 0.0 - 1.1 MG/DL   HEPATIC FUNCTION PANEL    Collection Time: 11/02/19  6:21 AM   Result Value Ref Range    Protein, total 7.2 6.3 - 8.2 g/dL    Albumin 2.3 (L) 3.2 - 4.6 g/dL    Globulin 4.9 (H) 2.3 - 3.5 g/dL    A-G Ratio 0.5 (L) 1.2 - 3.5      Bilirubin, total 0.7 0.2 - 1.1 MG/DL    Bilirubin, direct 0.2 <0.4 MG/DL    Alk.  phosphatase 64 50 - 136 U/L    AST (SGOT) 35 15 - 37 U/L    ALT (SGPT) 19 12 - 65 U/L        All Micro Results     None          No results found for this visit on 10/31/19. Current Meds:  Current Facility-Administered Medications   Medication Dose Route Frequency    insulin NPH (NOVOLIN N, HUMULIN N) injection 42 Units  42 Units SubCUTAneous BID    hydrALAZINE (APRESOLINE) tablet 50 mg  50 mg Oral BID    aspirin delayed-release tablet 81 mg  81 mg Oral DAILY    levothyroxine (SYNTHROID) tablet 75 mcg  75 mcg Oral ACB    lisinopril (PRINIVIL, ZESTRIL) tablet 40 mg  40 mg Oral DAILY    metoprolol tartrate (LOPRESSOR) tablet 25 mg  25 mg Oral BID    oxyCODONE-acetaminophen (PERCOCET) 5-325 mg per tablet 2 Tab  2 Tab Oral Q4H PRN    nitroglycerin (NITROSTAT) tablet 0.4 mg  0.4 mg SubLINGual Q5MIN PRN    tamsulosin (FLOMAX) capsule 0.4 mg  0.4 mg Oral DAILY    dextrose 40% (GLUTOSE) oral gel 1 Tube  15 g Oral PRN    glucagon (GLUCAGEN) injection 1 mg  1 mg IntraMUSCular PRN    dextrose (D50W) injection syrg 12.5-25 g  25-50 mL IntraVENous PRN    insulin lispro (HUMALOG) injection   SubCUTAneous AC&HS    sodium chloride (NS) flush 5-40 mL  5-40 mL IntraVENous Q8H    sodium chloride (NS) flush 5-40 mL  5-40 mL IntraVENous PRN    acetaminophen (TYLENOL) tablet 650 mg  650 mg Oral Q4H PRN    HYDROmorphone (PF) (DILAUDID) injection 1 mg  1 mg IntraVENous Q3H PRN    naloxone (NARCAN) injection 0.4 mg  0.4 mg IntraVENous PRN    diphenhydrAMINE (BENADRYL) injection 12.5 mg  12.5 mg IntraVENous Q4H PRN    ondansetron (ZOFRAN) injection 4 mg  4 mg IntraVENous Q4H PRN    heparin (porcine) injection 5,000 Units  5,000 Units SubCUTAneous Q8H    bisacodyl (DULCOLAX) tablet 5 mg  5 mg Oral DAILY PRN    nystatin (MYCOSTATIN) 100,000 unit/gram powder   Topical BID    0.9% sodium chloride infusion  75 mL/hr IntraVENous CONTINUOUS       Other Studies (last 24 hours):  Xr Femur Lt 2 V    Result Date: 11/1/2019  LEFT FEMUR 2 view(s).  HISTORY: Above-knee amputation, preop assessment. TECHNIQUE: AP and lateral views. COMPARISON: None. FINDINGS: There is a total knee prosthesis. The femoral component does not extend above the femoral condyles. There is no central medullary stem. There is a small stem on the tibial component. There are vascular clips medially. Dense arterial calcifications. IMPRESSION: Status post knee arthroplasty. There is no femoral stem.        Assessment and Plan:     Hospital Problems as of 11/2/2019 Date Reviewed: 10/15/2019          Codes Class Noted - Resolved POA    CAD (coronary artery disease) ICD-10-CM: I25.10  ICD-9-CM: 414.00  2/13/2019 - Present Yes        * (Principal) Ischemic leg pain ICD-10-CM: I99.8  ICD-9-CM: 459.9  11/1/2019 - Present Yes        Hypothyroidism ICD-10-CM: E03.9  ICD-9-CM: 244.9  11/1/2019 - Present Yes    Overview Signed 11/1/2019 12:10 AM by Isabela Britt MD     managed well with Synthroid             S/P bypass graft of extremity ICD-10-CM: Z95.828  ICD-9-CM: V45.89  4/19/2019 - Present Yes    Overview Signed 4/19/2019  9:38 AM by Sharon Steinberg NP     3/27/19 (Dr. Lurdes Jaffe) Left common femoral to posterior tibial bypass with PTFE graft                 Severe obesity ([facility] [address] ICD-10-CM: E66.01  ICD-9-CM: 278.01  4/12/2019 - Present Yes        [facility] ICD-10-CM: I73.9  ICD-9-CM: 443.9  3/27/2019 - Present Yes        HTN (hypertension) ICD-10-CM: I10  ICD-9-CM: 401.9  2/14/2019 - Present Yes        DM (diabetes mellitus) ([facility] [address] ICD-10-CM: E11.9  ICD-9-CM: 250.00  2/14/2019 - Present Yes        Hx of CABG ICD-10-CM: Z95.1  ICD-9-CM: V45.81  2/14/2019 - Present Yes              Plan:  · Severe ischemic LLE PAD: s/p left AKA , stopped ancef, xarelto held  · HTN: continued hydralazine, lisinopril, metoprolol  · DM2: continued SSI and increase NPH  · CAD: continued asa  · CKD3: stable, followup BMP daily   · Elevated LFTS: followup labs improved    DC planning/Dispo:  PPD, will need rehab      Diet: DIET REGULAR  DIET REGULAR  DVT ppx:  heparin    Signed:  Cristiane Flor MD [facility] Instructions: The HEMIGARD strips are to remain completely dry for at least 5-7 days.

## 2023-09-03 ENCOUNTER — APPOINTMENT (OUTPATIENT)
Dept: GENERAL RADIOLOGY | Age: 78
DRG: 175 | End: 2023-09-03
Payer: MEDICARE

## 2023-09-03 ENCOUNTER — HOSPITAL ENCOUNTER (INPATIENT)
Age: 78
LOS: 2 days | Discharge: HOME | DRG: 175 | End: 2023-09-05
Attending: EMERGENCY MEDICINE | Admitting: INTERNAL MEDICINE
Payer: MEDICARE

## 2023-09-03 DIAGNOSIS — I21.4 NSTEMI (NON-ST ELEVATED MYOCARDIAL INFARCTION) (HCC): Primary | ICD-10-CM

## 2023-09-03 DIAGNOSIS — R07.9 ACUTE CHEST PAIN: ICD-10-CM

## 2023-09-03 LAB
ALBUMIN SERPL-MCNC: 2.6 G/DL (ref 3.2–4.6)
ALBUMIN/GLOB SERPL: 0.5 (ref 0.4–1.6)
ALP SERPL-CCNC: 71 U/L (ref 50–136)
ALT SERPL-CCNC: 17 U/L (ref 12–65)
ANION GAP SERPL CALC-SCNC: 7 MMOL/L (ref 2–11)
APTT PPP: 32.7 SEC (ref 24.5–34.2)
AST SERPL-CCNC: 30 U/L (ref 15–37)
BASOPHILS # BLD: 0 K/UL (ref 0–0.2)
BASOPHILS NFR BLD: 1 % (ref 0–2)
BILIRUB SERPL-MCNC: 1 MG/DL (ref 0.2–1.1)
BUN SERPL-MCNC: 34 MG/DL (ref 8–23)
CALCIUM SERPL-MCNC: 9.3 MG/DL (ref 8.3–10.4)
CHLORIDE SERPL-SCNC: 104 MMOL/L (ref 101–110)
CO2 SERPL-SCNC: 22 MMOL/L (ref 21–32)
CREAT SERPL-MCNC: 1.8 MG/DL (ref 0.8–1.5)
D DIMER PPP FEU-MCNC: 3.69 UG/ML(FEU)
DIFFERENTIAL METHOD BLD: ABNORMAL
EOSINOPHIL # BLD: 0 K/UL (ref 0–0.8)
EOSINOPHIL NFR BLD: 1 % (ref 0.5–7.8)
ERYTHROCYTE [DISTWIDTH] IN BLOOD BY AUTOMATED COUNT: 15.6 % (ref 11.9–14.6)
GLOBULIN SER CALC-MCNC: 5.3 G/DL (ref 2.8–4.5)
GLUCOSE BLD STRIP.AUTO-MCNC: 281 MG/DL (ref 65–100)
GLUCOSE SERPL-MCNC: 316 MG/DL (ref 65–100)
HCT VFR BLD AUTO: 36 % (ref 41.1–50.3)
HGB BLD-MCNC: 11.5 G/DL (ref 13.6–17.2)
IMM GRANULOCYTES # BLD AUTO: 0 K/UL (ref 0–0.5)
IMM GRANULOCYTES NFR BLD AUTO: 0 % (ref 0–5)
INR PPP: 1.1
LIPASE SERPL-CCNC: 51 U/L (ref 73–393)
LYMPHOCYTES # BLD: 1.1 K/UL (ref 0.5–4.6)
LYMPHOCYTES NFR BLD: 25 % (ref 13–44)
MCH RBC QN AUTO: 25 PG (ref 26.1–32.9)
MCHC RBC AUTO-ENTMCNC: 31.9 G/DL (ref 31.4–35)
MCV RBC AUTO: 78.3 FL (ref 82–102)
MONOCYTES # BLD: 0.9 K/UL (ref 0.1–1.3)
MONOCYTES NFR BLD: 21 % (ref 4–12)
NEUTS SEG # BLD: 2.3 K/UL (ref 1.7–8.2)
NEUTS SEG NFR BLD: 52 % (ref 43–78)
NRBC # BLD: 0 K/UL (ref 0–0.2)
PLATELET # BLD AUTO: 217 K/UL (ref 150–450)
PMV BLD AUTO: 9.9 FL (ref 9.4–12.3)
POTASSIUM SERPL-SCNC: 4.5 MMOL/L (ref 3.5–5.1)
PROT SERPL-MCNC: 7.9 G/DL (ref 6.3–8.2)
PROTHROMBIN TIME: 15.1 SEC (ref 12.6–14.3)
RBC # BLD AUTO: 4.6 M/UL (ref 4.23–5.6)
SERVICE CMNT-IMP: ABNORMAL
SODIUM SERPL-SCNC: 133 MMOL/L (ref 133–143)
TROPONIN I SERPL HS-MCNC: 3592 PG/ML (ref 0–14)
TROPONIN I SERPL HS-MCNC: 7293 PG/ML (ref 0–14)
TROPONIN I SERPL HS-MCNC: 9343.3 PG/ML (ref 0–14)
UFH PPP CHRO-ACNC: <0.1 IU/ML (ref 0.3–0.7)
WBC # BLD AUTO: 4.4 K/UL (ref 4.3–11.1)

## 2023-09-03 PROCEDURE — 85610 PROTHROMBIN TIME: CPT

## 2023-09-03 PROCEDURE — 80053 COMPREHEN METABOLIC PANEL: CPT

## 2023-09-03 PROCEDURE — 6370000000 HC RX 637 (ALT 250 FOR IP): Performed by: EMERGENCY MEDICINE

## 2023-09-03 PROCEDURE — 71045 X-RAY EXAM CHEST 1 VIEW: CPT

## 2023-09-03 PROCEDURE — 85730 THROMBOPLASTIN TIME PARTIAL: CPT

## 2023-09-03 PROCEDURE — 85520 HEPARIN ASSAY: CPT

## 2023-09-03 PROCEDURE — 99223 1ST HOSP IP/OBS HIGH 75: CPT | Performed by: INTERNAL MEDICINE

## 2023-09-03 PROCEDURE — 2580000003 HC RX 258: Performed by: NURSE PRACTITIONER

## 2023-09-03 PROCEDURE — 6370000000 HC RX 637 (ALT 250 FOR IP): Performed by: NURSE PRACTITIONER

## 2023-09-03 PROCEDURE — 93005 ELECTROCARDIOGRAM TRACING: CPT | Performed by: EMERGENCY MEDICINE

## 2023-09-03 PROCEDURE — 85379 FIBRIN DEGRADATION QUANT: CPT

## 2023-09-03 PROCEDURE — 99285 EMERGENCY DEPT VISIT HI MDM: CPT

## 2023-09-03 PROCEDURE — 36415 COLL VENOUS BLD VENIPUNCTURE: CPT

## 2023-09-03 PROCEDURE — 82962 GLUCOSE BLOOD TEST: CPT

## 2023-09-03 PROCEDURE — 84484 ASSAY OF TROPONIN QUANT: CPT

## 2023-09-03 PROCEDURE — 6360000002 HC RX W HCPCS: Performed by: EMERGENCY MEDICINE

## 2023-09-03 PROCEDURE — 85025 COMPLETE CBC W/AUTO DIFF WBC: CPT

## 2023-09-03 PROCEDURE — 2060000000 HC ICU INTERMEDIATE R&B

## 2023-09-03 PROCEDURE — 83690 ASSAY OF LIPASE: CPT

## 2023-09-03 PROCEDURE — 2500000003 HC RX 250 WO HCPCS: Performed by: EMERGENCY MEDICINE

## 2023-09-03 RX ORDER — HEPARIN SODIUM 10000 [USP'U]/100ML
5-30 INJECTION, SOLUTION INTRAVENOUS CONTINUOUS
Status: DISCONTINUED | OUTPATIENT
Start: 2023-09-03 | End: 2023-09-04

## 2023-09-03 RX ORDER — SODIUM CHLORIDE 9 MG/ML
INJECTION, SOLUTION INTRAVENOUS CONTINUOUS
Status: DISCONTINUED | OUTPATIENT
Start: 2023-09-03 | End: 2023-09-03

## 2023-09-03 RX ORDER — DEXTROSE MONOHYDRATE 100 MG/ML
INJECTION, SOLUTION INTRAVENOUS CONTINUOUS PRN
Status: DISCONTINUED | OUTPATIENT
Start: 2023-09-03 | End: 2023-09-05 | Stop reason: HOSPADM

## 2023-09-03 RX ORDER — TAMSULOSIN HYDROCHLORIDE 0.4 MG/1
0.4 CAPSULE ORAL DAILY
Status: DISCONTINUED | OUTPATIENT
Start: 2023-09-04 | End: 2023-09-05 | Stop reason: HOSPADM

## 2023-09-03 RX ORDER — HEPARIN SODIUM 1000 [USP'U]/ML
2000 INJECTION, SOLUTION INTRAVENOUS; SUBCUTANEOUS PRN
Status: DISCONTINUED | OUTPATIENT
Start: 2023-09-03 | End: 2023-09-04

## 2023-09-03 RX ORDER — SODIUM CHLORIDE 9 MG/ML
INJECTION, SOLUTION INTRAVENOUS CONTINUOUS
Status: DISCONTINUED | OUTPATIENT
Start: 2023-09-03 | End: 2023-09-04

## 2023-09-03 RX ORDER — NYSTATIN 100000 U/G
CREAM TOPICAL 2 TIMES DAILY
Status: DISCONTINUED | OUTPATIENT
Start: 2023-09-03 | End: 2023-09-05 | Stop reason: HOSPADM

## 2023-09-03 RX ORDER — INSULIN LISPRO 100 [IU]/ML
0-4 INJECTION, SOLUTION INTRAVENOUS; SUBCUTANEOUS
Status: DISCONTINUED | OUTPATIENT
Start: 2023-09-04 | End: 2023-09-05 | Stop reason: HOSPADM

## 2023-09-03 RX ORDER — ONDANSETRON 4 MG/1
4 TABLET, ORALLY DISINTEGRATING ORAL EVERY 8 HOURS PRN
Status: DISCONTINUED | OUTPATIENT
Start: 2023-09-03 | End: 2023-09-05 | Stop reason: HOSPADM

## 2023-09-03 RX ORDER — ASPIRIN 81 MG/1
324 TABLET, CHEWABLE ORAL
Status: COMPLETED | OUTPATIENT
Start: 2023-09-03 | End: 2023-09-03

## 2023-09-03 RX ORDER — INSULIN LISPRO 100 [IU]/ML
0-4 INJECTION, SOLUTION INTRAVENOUS; SUBCUTANEOUS NIGHTLY
Status: DISCONTINUED | OUTPATIENT
Start: 2023-09-03 | End: 2023-09-05 | Stop reason: HOSPADM

## 2023-09-03 RX ORDER — HEPARIN SODIUM 1000 [USP'U]/ML
4000 INJECTION, SOLUTION INTRAVENOUS; SUBCUTANEOUS PRN
Status: DISCONTINUED | OUTPATIENT
Start: 2023-09-03 | End: 2023-09-04

## 2023-09-03 RX ORDER — ONDANSETRON 2 MG/ML
4 INJECTION INTRAMUSCULAR; INTRAVENOUS EVERY 6 HOURS PRN
Status: DISCONTINUED | OUTPATIENT
Start: 2023-09-03 | End: 2023-09-05 | Stop reason: HOSPADM

## 2023-09-03 RX ORDER — SODIUM CHLORIDE 0.9 % (FLUSH) 0.9 %
5-40 SYRINGE (ML) INJECTION EVERY 12 HOURS SCHEDULED
Status: DISCONTINUED | OUTPATIENT
Start: 2023-09-03 | End: 2023-09-05 | Stop reason: HOSPADM

## 2023-09-03 RX ORDER — LEVOTHYROXINE SODIUM 0.07 MG/1
75 TABLET ORAL
Status: DISCONTINUED | OUTPATIENT
Start: 2023-09-04 | End: 2023-09-05 | Stop reason: HOSPADM

## 2023-09-03 RX ORDER — SODIUM CHLORIDE 9 MG/ML
INJECTION, SOLUTION INTRAVENOUS PRN
Status: DISCONTINUED | OUTPATIENT
Start: 2023-09-03 | End: 2023-09-05 | Stop reason: HOSPADM

## 2023-09-03 RX ORDER — NITROGLYCERIN 0.4 MG/1
0.4 TABLET SUBLINGUAL EVERY 5 MIN PRN
Status: DISCONTINUED | OUTPATIENT
Start: 2023-09-03 | End: 2023-09-03

## 2023-09-03 RX ORDER — INSULIN LISPRO 100 [IU]/ML
12 INJECTION, SOLUTION INTRAVENOUS; SUBCUTANEOUS
Status: DISCONTINUED | OUTPATIENT
Start: 2023-09-04 | End: 2023-09-05 | Stop reason: HOSPADM

## 2023-09-03 RX ORDER — ASPIRIN 81 MG/1
81 TABLET ORAL DAILY
Status: DISCONTINUED | OUTPATIENT
Start: 2023-09-04 | End: 2023-09-05 | Stop reason: HOSPADM

## 2023-09-03 RX ORDER — SODIUM CHLORIDE 0.9 % (FLUSH) 0.9 %
5-40 SYRINGE (ML) INJECTION PRN
Status: DISCONTINUED | OUTPATIENT
Start: 2023-09-03 | End: 2023-09-05 | Stop reason: HOSPADM

## 2023-09-03 RX ORDER — ACETAMINOPHEN 325 MG/1
650 TABLET ORAL EVERY 6 HOURS PRN
Status: DISCONTINUED | OUTPATIENT
Start: 2023-09-03 | End: 2023-09-05 | Stop reason: HOSPADM

## 2023-09-03 RX ORDER — HEPARIN SODIUM 1000 [USP'U]/ML
4000 INJECTION, SOLUTION INTRAVENOUS; SUBCUTANEOUS ONCE
Status: COMPLETED | OUTPATIENT
Start: 2023-09-03 | End: 2023-09-03

## 2023-09-03 RX ORDER — MAGNESIUM HYDROXIDE/ALUMINUM HYDROXICE/SIMETHICONE 120; 1200; 1200 MG/30ML; MG/30ML; MG/30ML
30 SUSPENSION ORAL
Status: COMPLETED | OUTPATIENT
Start: 2023-09-03 | End: 2023-09-03

## 2023-09-03 RX ORDER — INSULIN GLARGINE 100 [IU]/ML
30 INJECTION, SOLUTION SUBCUTANEOUS 2 TIMES DAILY
Status: DISCONTINUED | OUTPATIENT
Start: 2023-09-03 | End: 2023-09-05 | Stop reason: HOSPADM

## 2023-09-03 RX ORDER — POLYETHYLENE GLYCOL 3350 17 G/17G
17 POWDER, FOR SOLUTION ORAL DAILY PRN
Status: DISCONTINUED | OUTPATIENT
Start: 2023-09-03 | End: 2023-09-05 | Stop reason: HOSPADM

## 2023-09-03 RX ADMIN — SODIUM CHLORIDE, PRESERVATIVE FREE 10 ML: 5 INJECTION INTRAVENOUS at 21:05

## 2023-09-03 RX ADMIN — HEPARIN SODIUM 8 UNITS/KG/HR: 10000 INJECTION, SOLUTION INTRAVENOUS at 17:37

## 2023-09-03 RX ADMIN — ALUMINUM HYDROXIDE, MAGNESIUM HYDROXIDE, AND SIMETHICONE 30 ML: 200; 200; 20 SUSPENSION ORAL at 15:51

## 2023-09-03 RX ADMIN — SODIUM CHLORIDE: 9 INJECTION, SOLUTION INTRAVENOUS at 19:33

## 2023-09-03 RX ADMIN — NYSTATIN: 100000 CREAM TOPICAL at 21:04

## 2023-09-03 RX ADMIN — INSULIN GLARGINE 30 UNITS: 100 INJECTION, SOLUTION SUBCUTANEOUS at 21:04

## 2023-09-03 RX ADMIN — NITROGLYCERIN 2 INCH: 20 OINTMENT TOPICAL at 17:35

## 2023-09-03 RX ADMIN — HEPARIN SODIUM 4000 UNITS: 1000 INJECTION INTRAVENOUS; SUBCUTANEOUS at 17:30

## 2023-09-03 RX ADMIN — ASPIRIN 81 MG 324 MG: 81 TABLET ORAL at 17:34

## 2023-09-03 ASSESSMENT — PAIN SCALES - GENERAL
PAINLEVEL_OUTOF10: 1
PAINLEVEL_OUTOF10: 6

## 2023-09-03 ASSESSMENT — ENCOUNTER SYMPTOMS
COLOR CHANGE: 0
WHEEZING: 0
COUGH: 1
SHORTNESS OF BREATH: 1
ABDOMINAL PAIN: 0
VOMITING: 0
STRIDOR: 0

## 2023-09-03 ASSESSMENT — LIFESTYLE VARIABLES
HOW OFTEN DO YOU HAVE A DRINK CONTAINING ALCOHOL: NEVER
HOW MANY STANDARD DRINKS CONTAINING ALCOHOL DO YOU HAVE ON A TYPICAL DAY: PATIENT DOES NOT DRINK

## 2023-09-03 ASSESSMENT — PAIN DESCRIPTION - FREQUENCY: FREQUENCY: CONTINUOUS

## 2023-09-03 ASSESSMENT — PAIN DESCRIPTION - LOCATION
LOCATION: CHEST
LOCATION: CHEST

## 2023-09-03 ASSESSMENT — PAIN DESCRIPTION - PAIN TYPE: TYPE: ACUTE PAIN

## 2023-09-03 ASSESSMENT — PAIN - FUNCTIONAL ASSESSMENT: PAIN_FUNCTIONAL_ASSESSMENT: 0-10

## 2023-09-03 ASSESSMENT — PAIN DESCRIPTION - DESCRIPTORS
DESCRIPTORS: ACHING
DESCRIPTORS: TIGHTNESS

## 2023-09-03 NOTE — ED NOTES
TRANSFER - OUT REPORT:    Verbal report given to SAYRA Bardales on Ying Sexton  being transferred to [phone] for routine progression of patient care       Report consisted of patient's Situation, Background, Assessment and   Recommendations(SBAR). Information from the following report(s) ED Encounter Summary was reviewed with the receiving nurse. [place] Assessment:    Presents to emergency department  because of falls (Syncope, seizure, or loss of consciousness): No  Age > 70: Yes  Altered Mental Status, Intoxication with alcohol or substance confusion (Disorientation, impaired judgment, poor safety awaremess, or inability to follow instructions): No  Impaired Mobility: Ambulates or transfers with assistive devices or assistance; Unable to ambulate or transer.: Yes  Nursing Judgement: Yes          Lines:   Peripheral IV 09/03/23 Left Antecubital (Active)   Site Assessment Clean, dry & intact 09/03/23 1557   Line Status Blood return noted 09/03/23 1557   Phlebitis Assessment No symptoms 09/03/23 1557   Infiltration Assessment 0 09/03/23 1557       Peripheral IV 09/03/23 Right Antecubital (Active)        Opportunity for questions and clarification was provided.       Patient transported with:  Registered Nurse           Cathy Patel RN  09/03/23 8665

## 2023-09-03 NOTE — ED TRIAGE NOTES
Pt reports chest pain since this morning, tightness, with dyspnea. Per pt new wheelchair is new and has been having trouble getting comfortable in it.  Hx bypass and double amputee d/t poor circulation per family  A&Ox4

## 2023-09-03 NOTE — PROGRESS NOTES
TRANSFER - IN REPORT:    Verbal report received from [address], RN, who got report from ED nurse, on Sri Palmer  being received from ED for routine progression of patient care      Report consisted of patient's Situation, Background, Assessment and   Recommendations(SBAR). Information from the following report(s) Nurse Handoff Report, MAR, and Recent Results was reviewed with the receiving nurse. Opportunity for questions and clarification was provided. Assessment completed upon patient's arrival to unit and care assumed. Patient arrived to room 301 with family. Admission vital signs and weight, telemetry monitor verified, and admission assessment completed. Patient oriented to call light and instructed to call for any assistance.

## 2023-09-03 NOTE — H&P
[facility] CARDIOLOGY  [address] [address] [facility]  PHONE: [phone]        23      NAME:  Lio Sampson  : 3086  MRN: [id number]      SUBJECTIVE:   Lio Sampson is a 70 y.o. male seen for a consultation visit regarding the following:     Chief Complaint   Patient presents with    Chest Pain    Shortness of Breath            HPI:    60-year-old gentleman with bilateral AKA's presents with retrosternal chest burning. He is also had some associated shortness of breath. Began at 6:00 this morning lasted several hours and resolved spontaneously. There were no aggravating or alleviating factors. Pain radiated down both arms. He has remote history of a CABG 13 to 15 years ago. D-dimer is elevated and troponin is approximately 3000    Hospital Problems             Last Modified POA    * (Principal) NSTEMI (non-ST elevated myocardial infarction) ([address]) 9/3/2023 Yes     Allergies   Allergen Reactions    Erythromycin Estolate [Erythromycin] Swelling    Gabapentin Other (See Comments)     Severe pain in legs    Norvasc [Amlodipine]      Other reaction(s): Chest pain    Rosuvastatin Rash     Past Medical History:   Diagnosis Date    Adverse effect of anesthesia     REITERATED by patient 20 again 23- DO NOT LEAVE FLAT in PACU!  RAISE HEAD MINIMUM 30 degrees--- had to be transferred at ICU and use Bipap    Amputation of lower limb ([address])     left AKA    Anemia     Ankle ulcer ([address]) 2019    Anxiety     Arthritis     generalized    At risk for falls     CAD (coronary artery disease)     [facility] Card- CABG - no stents    Enlarged prostate     Former cigarette smoker     2 ppd x 46 years - quit 2006    History of hepatitis 195    age 13 pt reports/ gilberto ESTRADA (hard of hearing)     hearing aids bilat    Hx of CABG 2007    Hypercholesteremia     Hypertension     managed well with meds    Hypothyroidism     managed well with Synthroid    Long

## 2023-09-03 NOTE — FLOWSHEET NOTE
09/03/23 1930   Dual Clinician Skin Assessment   Dual Skin Assessment (4 Eyes) X   Second Clinical  (First and Last Name) Miguel Zamora RN   Pressure Injury Documentation Pressure Injury Noted-See Wound LDA to Document   Skin Integumentary    Skin Integumentary (WDL) X   Skin Color Red   Skin Condition/Temp Warm;Dry;Fragile   Skin Integrity Redness;Scars (comment); Tear   Location sacral and groin open skin tears; sacrum and groin legs; bilateral AKA     Patient's skin is clean, dry, and intact. Sacrum red with open skin tears. Allevyn and barrier cream placed. Bilateral groin open skin tears. Bilateral AKAs. Scattered scars. No other abnormalities noted.

## 2023-09-04 ENCOUNTER — APPOINTMENT (OUTPATIENT)
Dept: CT IMAGING | Age: 78
DRG: 175 | End: 2023-09-04
Payer: MEDICARE

## 2023-09-04 ENCOUNTER — APPOINTMENT (OUTPATIENT)
Dept: NON INVASIVE DIAGNOSTICS | Age: 78
DRG: 175 | End: 2023-09-04
Payer: MEDICARE

## 2023-09-04 PROBLEM — R07.9 ACUTE CHEST PAIN: Status: ACTIVE | Noted: 2023-09-04

## 2023-09-04 PROBLEM — I26.99 ACUTE PULMONARY EMBOLISM WITHOUT ACUTE COR PULMONALE (HCC): Status: ACTIVE | Noted: 2023-09-04

## 2023-09-04 LAB
ANION GAP SERPL CALC-SCNC: 7 MMOL/L (ref 2–11)
BUN SERPL-MCNC: 34 MG/DL (ref 8–23)
CALCIUM SERPL-MCNC: 9.1 MG/DL (ref 8.3–10.4)
CHLORIDE SERPL-SCNC: 109 MMOL/L (ref 101–110)
CHOLEST SERPL-MCNC: 97 MG/DL
CO2 SERPL-SCNC: 22 MMOL/L (ref 21–32)
CREAT SERPL-MCNC: 1.6 MG/DL (ref 0.8–1.5)
ECHO AO ASC DIAM: 3.6 CM
ECHO AO ROOT DIAM: 3.8 CM
ECHO AV AREA PEAK VELOCITY: 1.9 CM2
ECHO AV AREA VTI: 1.9 CM2
ECHO AV MEAN GRADIENT: 14 MMHG
ECHO AV MEAN VELOCITY: 1.8 M/S
ECHO AV PEAK GRADIENT: 26 MMHG
ECHO AV PEAK VELOCITY: 2.5 M/S
ECHO AV VELOCITY RATIO: 0.48
ECHO AV VTI: 41.1 CM
ECHO EST RA PRESSURE: 15 MMHG
ECHO IVC PROX: 2.5 CM
ECHO LA AREA 2C: 21.4 CM2
ECHO LA AREA 4C: 25.1 CM2
ECHO LA DIAMETER: 5.3 CM
ECHO LA MAJOR AXIS: 7.3 CM
ECHO LA MINOR AXIS: 6.1 CM
ECHO LA TO AORTIC ROOT RATIO: 1.39
ECHO LA VOL 2C: 61 ML (ref 18–58)
ECHO LA VOL 4C: 70 ML (ref 18–58)
ECHO LA VOL BP: 72 ML (ref 18–58)
ECHO LV E' LATERAL VELOCITY: 8 CM/S
ECHO LV E' SEPTAL VELOCITY: 5 CM/S
ECHO LV EDV A2C: 119 ML
ECHO LV EDV A4C: 209 ML
ECHO LV EJECTION FRACTION A2C: 53 %
ECHO LV EJECTION FRACTION A4C: 51 %
ECHO LV EJECTION FRACTION BIPLANE: 52 % (ref 55–100)
ECHO LV ESV A2C: 57 ML
ECHO LV ESV A4C: 102 ML
ECHO LV FRACTIONAL SHORTENING: 21 % (ref 28–44)
ECHO LV INTERNAL DIMENSION DIASTOLIC: 6.1 CM (ref 4.2–5.9)
ECHO LV INTERNAL DIMENSION SYSTOLIC: 4.8 CM
ECHO LV IVSD: 1.3 CM (ref 0.6–1)
ECHO LV MASS 2D: 378.7 G (ref 88–224)
ECHO LV POSTERIOR WALL DIASTOLIC: 1.4 CM (ref 0.6–1)
ECHO LV RELATIVE WALL THICKNESS RATIO: 0.46
ECHO LVOT AREA: 4.2 CM2
ECHO LVOT AV VTI INDEX: 0.47
ECHO LVOT DIAM: 2.3 CM
ECHO LVOT MEAN GRADIENT: 3 MMHG
ECHO LVOT PEAK GRADIENT: 5 MMHG
ECHO LVOT PEAK VELOCITY: 1.2 M/S
ECHO LVOT SV: 79.7 ML
ECHO LVOT VTI: 19.2 CM
ECHO MV A VELOCITY: 0.86 M/S
ECHO MV E DECELERATION TIME (DT): 325 MS
ECHO MV E VELOCITY: 0.48 M/S
ECHO MV E/A RATIO: 0.56
ECHO MV E/E' LATERAL: 6
ECHO MV E/E' RATIO (AVERAGED): 7.8
ECHO MV E/E' SEPTAL: 9.6
ECHO PV ACCELERATION TIME (AT): 85 MS
ECHO PV MAX VELOCITY: 1 M/S
ECHO PV PEAK GRADIENT: 4 MMHG
ECHO RIGHT VENTRICULAR SYSTOLIC PRESSURE (RVSP): 54 MMHG
ECHO RV BASAL DIMENSION: 4.3 CM
ECHO RV FREE WALL PEAK S': 9 CM/S
ECHO RV INTERNAL DIMENSION: 4.2 CM
ECHO RV TAPSE: 1.5 CM (ref 1.7–?)
ECHO TV REGURGITANT MAX VELOCITY: 3.11 M/S
ECHO TV REGURGITANT PEAK GRADIENT: 39 MMHG
EKG ATRIAL RATE: 92 BPM
EKG DIAGNOSIS: NORMAL
EKG P AXIS: 81 DEGREES
EKG P-R INTERVAL: 178 MS
EKG Q-T INTERVAL: 404 MS
EKG QRS DURATION: 160 MS
EKG QTC CALCULATION (BAZETT): 499 MS
EKG R AXIS: 40 DEGREES
EKG T AXIS: -48 DEGREES
EKG VENTRICULAR RATE: 92 BPM
ERYTHROCYTE [DISTWIDTH] IN BLOOD BY AUTOMATED COUNT: 15.6 % (ref 11.9–14.6)
GLUCOSE BLD STRIP.AUTO-MCNC: 132 MG/DL (ref 65–100)
GLUCOSE BLD STRIP.AUTO-MCNC: 140 MG/DL (ref 65–100)
GLUCOSE BLD STRIP.AUTO-MCNC: 184 MG/DL (ref 65–100)
GLUCOSE BLD STRIP.AUTO-MCNC: 285 MG/DL (ref 65–100)
GLUCOSE SERPL-MCNC: 190 MG/DL (ref 65–100)
HCT VFR BLD AUTO: 32.3 % (ref 41.1–50.3)
HDLC SERPL-MCNC: 23 MG/DL (ref 40–60)
HDLC SERPL: 4.2
HGB BLD-MCNC: 10.2 G/DL (ref 13.6–17.2)
LDLC SERPL CALC-MCNC: 50.2 MG/DL
MCH RBC QN AUTO: 24.9 PG (ref 26.1–32.9)
MCHC RBC AUTO-ENTMCNC: 31.6 G/DL (ref 31.4–35)
MCV RBC AUTO: 78.8 FL (ref 82–102)
NRBC # BLD: 0 K/UL (ref 0–0.2)
PLATELET # BLD AUTO: 198 K/UL (ref 150–450)
PMV BLD AUTO: 9.8 FL (ref 9.4–12.3)
POTASSIUM SERPL-SCNC: 4.5 MMOL/L (ref 3.5–5.1)
RBC # BLD AUTO: 4.1 M/UL (ref 4.23–5.6)
SERVICE CMNT-IMP: ABNORMAL
SODIUM SERPL-SCNC: 138 MMOL/L (ref 133–143)
TRIGL SERPL-MCNC: 119 MG/DL (ref 35–150)
TROPONIN I SERPL HS-MCNC: ABNORMAL PG/ML (ref 0–14)
UFH PPP CHRO-ACNC: <0.1 IU/ML (ref 0.3–0.7)
UFH PPP CHRO-ACNC: <0.1 IU/ML (ref 0.3–0.7)
VLDLC SERPL CALC-MCNC: 23.8 MG/DL (ref 6–23)
WBC # BLD AUTO: 4.1 K/UL (ref 4.3–11.1)

## 2023-09-04 PROCEDURE — 6360000004 HC RX CONTRAST MEDICATION: Performed by: INTERNAL MEDICINE

## 2023-09-04 PROCEDURE — 82962 GLUCOSE BLOOD TEST: CPT

## 2023-09-04 PROCEDURE — 93010 ELECTROCARDIOGRAM REPORT: CPT | Performed by: INTERNAL MEDICINE

## 2023-09-04 PROCEDURE — 85520 HEPARIN ASSAY: CPT

## 2023-09-04 PROCEDURE — 2700000000 HC OXYGEN THERAPY PER DAY

## 2023-09-04 PROCEDURE — C8929 TTE W OR WO FOL WCON,DOPPLER: HCPCS

## 2023-09-04 PROCEDURE — 2580000003 HC RX 258: Performed by: NURSE PRACTITIONER

## 2023-09-04 PROCEDURE — 2580000003 HC RX 258: Performed by: INTERNAL MEDICINE

## 2023-09-04 PROCEDURE — 6370000000 HC RX 637 (ALT 250 FOR IP): Performed by: INTERNAL MEDICINE

## 2023-09-04 PROCEDURE — 6360000004 HC RX CONTRAST MEDICATION: Performed by: NURSE PRACTITIONER

## 2023-09-04 PROCEDURE — 80061 LIPID PANEL: CPT

## 2023-09-04 PROCEDURE — 93306 TTE W/DOPPLER COMPLETE: CPT | Performed by: INTERNAL MEDICINE

## 2023-09-04 PROCEDURE — 6360000002 HC RX W HCPCS: Performed by: NURSE PRACTITIONER

## 2023-09-04 PROCEDURE — 94760 N-INVAS EAR/PLS OXIMETRY 1: CPT

## 2023-09-04 PROCEDURE — 2060000000 HC ICU INTERMEDIATE R&B

## 2023-09-04 PROCEDURE — 71260 CT THORAX DX C+: CPT

## 2023-09-04 PROCEDURE — 6370000000 HC RX 637 (ALT 250 FOR IP): Performed by: NURSE PRACTITIONER

## 2023-09-04 PROCEDURE — 36415 COLL VENOUS BLD VENIPUNCTURE: CPT

## 2023-09-04 PROCEDURE — 99232 SBSQ HOSP IP/OBS MODERATE 35: CPT | Performed by: INTERNAL MEDICINE

## 2023-09-04 PROCEDURE — 99223 1ST HOSP IP/OBS HIGH 75: CPT | Performed by: INTERNAL MEDICINE

## 2023-09-04 PROCEDURE — 80048 BASIC METABOLIC PNL TOTAL CA: CPT

## 2023-09-04 PROCEDURE — 85027 COMPLETE CBC AUTOMATED: CPT

## 2023-09-04 RX ADMIN — APIXABAN 10 MG: 5 TABLET, FILM COATED ORAL at 11:47

## 2023-09-04 RX ADMIN — ASPIRIN 81 MG: 81 TABLET ORAL at 08:51

## 2023-09-04 RX ADMIN — INSULIN GLARGINE 30 UNITS: 100 INJECTION, SOLUTION SUBCUTANEOUS at 20:32

## 2023-09-04 RX ADMIN — NITROGLYCERIN 0.5 INCH: 20 OINTMENT TOPICAL at 00:09

## 2023-09-04 RX ADMIN — NITROGLYCERIN 0.5 INCH: 20 OINTMENT TOPICAL at 16:59

## 2023-09-04 RX ADMIN — INSULIN LISPRO 12 UNITS: 100 INJECTION, SOLUTION INTRAVENOUS; SUBCUTANEOUS at 16:58

## 2023-09-04 RX ADMIN — TAMSULOSIN HYDROCHLORIDE 0.4 MG: 0.4 CAPSULE ORAL at 08:51

## 2023-09-04 RX ADMIN — IOPAMIDOL 75 ML: 755 INJECTION, SOLUTION INTRAVENOUS at 08:14

## 2023-09-04 RX ADMIN — ACETAMINOPHEN 650 MG: 325 TABLET ORAL at 16:58

## 2023-09-04 RX ADMIN — NYSTATIN: 100000 CREAM TOPICAL at 20:33

## 2023-09-04 RX ADMIN — HEPARIN SODIUM 4000 UNITS: 1000 INJECTION INTRAVENOUS; SUBCUTANEOUS at 08:46

## 2023-09-04 RX ADMIN — INSULIN LISPRO 12 UNITS: 100 INJECTION, SOLUTION INTRAVENOUS; SUBCUTANEOUS at 11:49

## 2023-09-04 RX ADMIN — INSULIN LISPRO 2 UNITS: 100 INJECTION, SOLUTION INTRAVENOUS; SUBCUTANEOUS at 11:49

## 2023-09-04 RX ADMIN — NITROGLYCERIN 0.5 INCH: 20 OINTMENT TOPICAL at 05:24

## 2023-09-04 RX ADMIN — NYSTATIN: 100000 CREAM TOPICAL at 08:54

## 2023-09-04 RX ADMIN — SODIUM CHLORIDE, PRESERVATIVE FREE 10 ML: 5 INJECTION INTRAVENOUS at 08:54

## 2023-09-04 RX ADMIN — INSULIN LISPRO 12 UNITS: 100 INJECTION, SOLUTION INTRAVENOUS; SUBCUTANEOUS at 08:52

## 2023-09-04 RX ADMIN — APIXABAN 10 MG: 5 TABLET, FILM COATED ORAL at 20:33

## 2023-09-04 RX ADMIN — HEPARIN SODIUM 4000 UNITS: 1000 INJECTION INTRAVENOUS; SUBCUTANEOUS at 01:01

## 2023-09-04 RX ADMIN — INSULIN GLARGINE 30 UNITS: 100 INJECTION, SOLUTION SUBCUTANEOUS at 08:53

## 2023-09-04 RX ADMIN — SODIUM CHLORIDE, PRESERVATIVE FREE 0.3 ML: 5 INJECTION INTRAVENOUS at 08:57

## 2023-09-04 RX ADMIN — NITROGLYCERIN 0.5 INCH: 20 OINTMENT TOPICAL at 11:48

## 2023-09-04 RX ADMIN — SODIUM CHLORIDE: 9 INJECTION, SOLUTION INTRAVENOUS at 05:23

## 2023-09-04 RX ADMIN — SODIUM CHLORIDE, PRESERVATIVE FREE 10 ML: 5 INJECTION INTRAVENOUS at 20:33

## 2023-09-04 RX ADMIN — LEVOTHYROXINE SODIUM 75 MCG: 0.07 TABLET ORAL at 05:24

## 2023-09-04 ASSESSMENT — PAIN SCALES - GENERAL
PAINLEVEL_OUTOF10: 0
PAINLEVEL_OUTOF10: 0
PAINLEVEL_OUTOF10: 3

## 2023-09-04 ASSESSMENT — PAIN DESCRIPTION - DESCRIPTORS: DESCRIPTORS: ACHING

## 2023-09-04 ASSESSMENT — PAIN DESCRIPTION - ORIENTATION: ORIENTATION: RIGHT;LEFT

## 2023-09-04 ASSESSMENT — PAIN DESCRIPTION - PAIN TYPE: TYPE: ACUTE PAIN

## 2023-09-04 ASSESSMENT — PAIN DESCRIPTION - ONSET: ONSET: GRADUAL

## 2023-09-04 ASSESSMENT — PAIN DESCRIPTION - LOCATION: LOCATION: ARM

## 2023-09-04 ASSESSMENT — PAIN - FUNCTIONAL ASSESSMENT: PAIN_FUNCTIONAL_ASSESSMENT: ACTIVITIES ARE NOT PREVENTED

## 2023-09-04 NOTE — PLAN OF CARE
Problem: Discharge Planning  Goal: Discharge to home or other facility with appropriate resources  Outcome: Progressing     Problem: Safety - Adult  Goal: Free from fall injury  Outcome: Progressing  Flowsheets  Taken 9/4/2023 1337  Free From Fall Injury: Instruct family/caregiver on patient safety  Taken 9/4/2023 0826  Free From Fall Injury: Instruct family/caregiver on patient safety     Problem: Skin/Tissue Integrity  Goal: Absence of new skin breakdown  Description: 1. Monitor for areas of redness and/or skin breakdown  2. Assess vascular access sites hourly  3. Every 4-6 hours minimum:  Change oxygen saturation probe site  4. Every 4-6 hours:  If on nasal continuous positive airway pressure, respiratory therapy assess nares and determine need for appliance change or resting period.   Outcome: Progressing

## 2023-09-04 NOTE — CARE COORDINATION
Pt presented with substernal chest burning. Associated sx included SOB. PTA, pt lives with his spouse in a one story private residence with a ramped entrance. He is WC bound, has R AKA. Needs assistance with ADLs, spouse and family assist as needed. On 2L supplemental. Was at [facility] in April. PCP established. [place] verified and able to afford home meds. Will remain available for potential d/c needs. 1403-Primary nurse reported that pts spouse was requesting transportation home at discharge, tentatively planned for Tuesday pending stability. Discussed with spouse and placed Ambo under [address]. 09/04/23 0979   Service Assessment   Patient Orientation Alert and Oriented   Cognition Alert   History Provided By Spouse   Primary Caregiver Family   Support Systems Spouse/Significant Other;Children;Family Members;[employer];Friends/Neighbors   PCP Verified by CM Yes   Prior Functional Level Assistance with the following:;Bathing;Dressing; Toileting   Current Functional Level Assistance with the following:;Bathing;Dressing; Toileting   Can patient return to prior living arrangement Yes   Ability to make needs known: Good   Family able to assist with home care needs: Yes   Would you like for me to discuss the discharge plan with any other family members/significant others, and if so, who? No   Financial Resources Medicare   Social/Functional History   Lives With Spouse   Home Layout One level   Home Access Ramped entrance   [address] chair   [address]  Help From Family   ADL Assistance Needs assistance   Ambulation Assistance Needs assistance   Transfer Assistance Needs assistance   Active  No   [address] Discharge   [address]  ([address] [facility]) [address] Provided?  No   Mode of Transport at Discharge Other (see comment)  (Family)   Confirm

## 2023-09-04 NOTE — CONSULTS
PULMONARY/CRITICAL CARE CONSULT NOTE           9/4/2023    Brandon Luke Short                        Date of Admission:  9/3/2023    The patient's chart is reviewed and the patient is discussed with the staff. Subjective:     Patient is a 70 y.o.  male seen and evaluated at the request of Dr. Moody Cockayne. He has h/o bilateral AKA's presents with retrosternal chest burning. He is also had some associated shortness of breath. Began at 6:00 this yesterday AM lasted several hours and resolved spontaneously. There were no aggravating or alleviating factors. Pain radiated down both arms. He has remote history of a CABG 13 to 15 years ago. D-dimer and troponin is elevated. He had a AKA right leg 5 months ago, but no more recent surgery, trauma or travel. He is immobile. He feels much better since admission, but still on 2L NC. No fevers, chills, night sweats. Review of Systems: Comprehensive ROS negative except in HPI    Current Outpatient Medications   Medication Instructions    aspirin 81 mg, Oral    insulin 70-30 (HUMULIN;NOVOLIN) (70-30) 100 UNIT per ML injection vial 55-65 Units, SubCUTAneous, 2 TIMES DAILY, 31 units in the morning and 35 units at night    levothyroxine (SYNTHROID) 75 mcg, Oral, DAILY BEFORE BREAKFAST    nitroGLYCERIN (NITROSTAT) 0.4 mg, SubLINGual    tamsulosin (FLOMAX) 0.4 mg, Oral      Past Medical History:   Diagnosis Date    Adverse effect of anesthesia     REITERATED by patient 7/20/20 again 4/17/23- DO NOT LEAVE FLAT in PACU!  RAISE HEAD MINIMUM 30 degrees--- had to be transferred at ICU and use Bipap    Amputation of lower limb ([address])     left AKA    Anemia     Ankle ulcer ([address]) 2/22/2019    Anxiety     Arthritis     generalized    At risk for falls     CAD (coronary artery disease) 2007    [facility] Card- CABG - no stents    Enlarged prostate     Former cigarette smoker     2 ppd x 46 years - quit 2006    History of hepatitis 1952    age 13 pt reports/ gilberto [facility]YALA [facility]

## 2023-09-04 NOTE — PROGRESS NOTES
Call received from the Radiology MD to report patient's CT chest scan revealed Extensive Bilateral Pulmonary Emboli. \"Lots of clots\"   Doesn't Reveal a \"saddle PE\" but he said it's just under there. Called Yani Collins with cardiology to let her know. Patient is on a heparin gtt already. Will notifiy Primary Rns Beka and Keely.

## 2023-09-05 VITALS
TEMPERATURE: 99.1 F | RESPIRATION RATE: 20 BRPM | WEIGHT: 247.2 LBS | OXYGEN SATURATION: 94 % | SYSTOLIC BLOOD PRESSURE: 138 MMHG | HEART RATE: 75 BPM | BODY MASS INDEX: 31.74 KG/M2 | DIASTOLIC BLOOD PRESSURE: 89 MMHG

## 2023-09-05 PROBLEM — I48.91 ATRIAL FIBRILLATION (HCC): Status: ACTIVE | Noted: 2023-09-05

## 2023-09-05 LAB
ANION GAP SERPL CALC-SCNC: 10 MMOL/L (ref 2–11)
BUN SERPL-MCNC: 31 MG/DL (ref 8–23)
CALCIUM SERPL-MCNC: 9.4 MG/DL (ref 8.3–10.4)
CHLORIDE SERPL-SCNC: 108 MMOL/L (ref 101–110)
CO2 SERPL-SCNC: 19 MMOL/L (ref 21–32)
CREAT SERPL-MCNC: 1.5 MG/DL (ref 0.8–1.5)
EKG DIAGNOSIS: NORMAL
EKG Q-T INTERVAL: 356 MS
EKG QRS DURATION: 152 MS
EKG QTC CALCULATION (BAZETT): 494 MS
EKG R AXIS: 38 DEGREES
EKG T AXIS: -46 DEGREES
EKG VENTRICULAR RATE: 116 BPM
ERYTHROCYTE [DISTWIDTH] IN BLOOD BY AUTOMATED COUNT: 15.6 % (ref 11.9–14.6)
GLUCOSE BLD STRIP.AUTO-MCNC: 142 MG/DL (ref 65–100)
GLUCOSE BLD STRIP.AUTO-MCNC: 168 MG/DL (ref 65–100)
GLUCOSE SERPL-MCNC: 141 MG/DL (ref 65–100)
HCT VFR BLD AUTO: 33.9 % (ref 41.1–50.3)
HGB BLD-MCNC: 10.5 G/DL (ref 13.6–17.2)
MCH RBC QN AUTO: 24.4 PG (ref 26.1–32.9)
MCHC RBC AUTO-ENTMCNC: 31 G/DL (ref 31.4–35)
MCV RBC AUTO: 78.8 FL (ref 82–102)
NRBC # BLD: 0 K/UL (ref 0–0.2)
PLATELET # BLD AUTO: 205 K/UL (ref 150–450)
PMV BLD AUTO: 10 FL (ref 9.4–12.3)
POTASSIUM SERPL-SCNC: 4.4 MMOL/L (ref 3.5–5.1)
RBC # BLD AUTO: 4.3 M/UL (ref 4.23–5.6)
SERVICE CMNT-IMP: ABNORMAL
SERVICE CMNT-IMP: ABNORMAL
SODIUM SERPL-SCNC: 137 MMOL/L (ref 133–143)
WBC # BLD AUTO: 4.5 K/UL (ref 4.3–11.1)

## 2023-09-05 PROCEDURE — 6370000000 HC RX 637 (ALT 250 FOR IP): Performed by: INTERNAL MEDICINE

## 2023-09-05 PROCEDURE — 93010 ELECTROCARDIOGRAM REPORT: CPT | Performed by: INTERNAL MEDICINE

## 2023-09-05 PROCEDURE — 80048 BASIC METABOLIC PNL TOTAL CA: CPT

## 2023-09-05 PROCEDURE — 36415 COLL VENOUS BLD VENIPUNCTURE: CPT

## 2023-09-05 PROCEDURE — 85027 COMPLETE CBC AUTOMATED: CPT

## 2023-09-05 PROCEDURE — 2500000003 HC RX 250 WO HCPCS: Performed by: NURSE PRACTITIONER

## 2023-09-05 PROCEDURE — 97535 SELF CARE MNGMENT TRAINING: CPT

## 2023-09-05 PROCEDURE — 97161 PT EVAL LOW COMPLEX 20 MIN: CPT

## 2023-09-05 PROCEDURE — 2580000003 HC RX 258: Performed by: NURSE PRACTITIONER

## 2023-09-05 PROCEDURE — 93005 ELECTROCARDIOGRAM TRACING: CPT | Performed by: INTERNAL MEDICINE

## 2023-09-05 PROCEDURE — 82962 GLUCOSE BLOOD TEST: CPT

## 2023-09-05 PROCEDURE — 6370000000 HC RX 637 (ALT 250 FOR IP): Performed by: NURSE PRACTITIONER

## 2023-09-05 PROCEDURE — 97530 THERAPEUTIC ACTIVITIES: CPT

## 2023-09-05 PROCEDURE — 97165 OT EVAL LOW COMPLEX 30 MIN: CPT

## 2023-09-05 PROCEDURE — 99238 HOSP IP/OBS DSCHRG MGMT 30/<: CPT | Performed by: INTERNAL MEDICINE

## 2023-09-05 PROCEDURE — 97112 NEUROMUSCULAR REEDUCATION: CPT

## 2023-09-05 PROCEDURE — 99232 SBSQ HOSP IP/OBS MODERATE 35: CPT | Performed by: INTERNAL MEDICINE

## 2023-09-05 RX ORDER — METOPROLOL SUCCINATE 25 MG/1
25 TABLET, EXTENDED RELEASE ORAL DAILY
Status: DISCONTINUED | OUTPATIENT
Start: 2023-09-05 | End: 2023-09-05 | Stop reason: HOSPADM

## 2023-09-05 RX ORDER — NYSTATIN 100000 U/G
CREAM TOPICAL
Qty: 1 EACH | Refills: 2 | Status: SHIPPED | OUTPATIENT
Start: 2023-09-05

## 2023-09-05 RX ORDER — ASPIRIN 81 MG/1
81 TABLET ORAL DAILY
Qty: 30 TABLET | Refills: 3 | Status: SHIPPED | OUTPATIENT
Start: 2023-09-06

## 2023-09-05 RX ORDER — METOPROLOL SUCCINATE 25 MG/1
25 TABLET, EXTENDED RELEASE ORAL DAILY
Qty: 30 TABLET | Refills: 3 | Status: SHIPPED | OUTPATIENT
Start: 2023-09-06

## 2023-09-05 RX ADMIN — TAMSULOSIN HYDROCHLORIDE 0.4 MG: 0.4 CAPSULE ORAL at 08:08

## 2023-09-05 RX ADMIN — SODIUM CHLORIDE, PRESERVATIVE FREE 10 ML: 5 INJECTION INTRAVENOUS at 08:11

## 2023-09-05 RX ADMIN — INSULIN GLARGINE 30 UNITS: 100 INJECTION, SOLUTION SUBCUTANEOUS at 08:09

## 2023-09-05 RX ADMIN — NITROGLYCERIN 0.5 INCH: 20 OINTMENT TOPICAL at 11:54

## 2023-09-05 RX ADMIN — INSULIN LISPRO 12 UNITS: 100 INJECTION, SOLUTION INTRAVENOUS; SUBCUTANEOUS at 11:54

## 2023-09-05 RX ADMIN — LEVOTHYROXINE SODIUM 75 MCG: 0.07 TABLET ORAL at 04:18

## 2023-09-05 RX ADMIN — METOPROLOL SUCCINATE 25 MG: 25 TABLET, FILM COATED, EXTENDED RELEASE ORAL at 09:20

## 2023-09-05 RX ADMIN — INSULIN LISPRO 12 UNITS: 100 INJECTION, SOLUTION INTRAVENOUS; SUBCUTANEOUS at 08:09

## 2023-09-05 RX ADMIN — SODIUM CHLORIDE 5 MG/HR: 900 INJECTION, SOLUTION INTRAVENOUS at 01:44

## 2023-09-05 RX ADMIN — APIXABAN 10 MG: 5 TABLET, FILM COATED ORAL at 08:09

## 2023-09-05 RX ADMIN — SODIUM CHLORIDE 2.5 MG/HR: 900 INJECTION, SOLUTION INTRAVENOUS at 04:18

## 2023-09-05 RX ADMIN — ASPIRIN 81 MG: 81 TABLET ORAL at 08:08

## 2023-09-05 RX ADMIN — NYSTATIN: 100000 CREAM TOPICAL at 08:10

## 2023-09-05 ASSESSMENT — PAIN SCALES - GENERAL: PAINLEVEL_OUTOF10: 0

## 2023-09-05 NOTE — PLAN OF CARE
Problem: Discharge Planning  Goal: Discharge to home or other facility with appropriate resources  Outcome: Adequate for Discharge  Flowsheets  Taken 9/5/2023 1155 by Ann Arredondo RN  Discharge to home or other facility with appropriate resources:   Arrange for needed discharge resources and transportation as appropriate   Identify barriers to discharge with patient and caregiver   Identify discharge learning needs (meds, wound care, etc)   Refer to discharge planning if patient needs post-hospital services based on physician order or complex needs related to functional status, cognitive ability or social support system  Taken 9/5/2023 0756 by Ann Arredondo RN  Discharge to home or other facility with appropriate resources:   Identify barriers to discharge with patient and caregiver   Arrange for needed discharge resources and transportation as appropriate   Identify discharge learning needs (meds, wound care, etc)   Refer to discharge planning if patient needs post-hospital services based on physician order or complex needs related to functional status, cognitive ability or social support system     Problem: Safety - Adult  Goal: Free from fall injury  Outcome: Adequate for Discharge  Flowsheets (Taken 9/5/2023 0756 by Ruiz Wolf RN)  Free From Fall Injury: Instruct family/caregiver on patient safety     Problem: Skin/Tissue Integrity  Goal: Absence of new skin breakdown  Description: 1. Monitor for areas of redness and/or skin breakdown  2. Assess vascular access sites hourly  3. Every 4-6 hours minimum:  Change oxygen saturation probe site  4. Every 4-6 hours:  If on nasal continuous positive airway pressure, respiratory therapy assess nares and determine need for appliance change or resting period.   Outcome: Adequate for Discharge     Problem: Pain  Goal: Verbalizes/displays adequate comfort level or baseline comfort level  Outcome: Adequate for Discharge     Problem: Chronic Conditions and

## 2023-09-05 NOTE — PROGRESS NOTES
Alerted by monitor room patient's -130s and flipped into A.fib. Rates 100-130, /97. EKG completed and in patient's chart. Notified Xavier Weems NP. NP gave telephone orders for Cardizem gtt starting at 5 mg/hr. 12 Spoke with Dafne Machado in pharmacy about allergy/contraindication alert that popped up when ordering Cardizem gtt. Pharmacist said it was okay to order drip. 0144 Cardizem gtt started. Instructed patient to call for any chest pain and/or worsening SOB.     0230 Patient in sinus rhythm. HR 80, /71. Cardizem gtt infusing at 5 mg/hr.

## 2023-09-05 NOTE — PROGRESS NOTES
ACUTE OCCUPATIONAL THERAPY GOALS:   (Developed with and agreed upon by patient and/or caregiver.)  1. Patient will complete total body bathing and dressing with MOD I and adaptive equipment as needed. 2. Patient will complete toileting with MOD I.   3. Patient will tolerate 30 minutes of OT treatment with 1-2 rest breaks to increase activity tolerance for ADLs. 4. Patient will complete functional transfers with SBA and adaptive equipment as needed. 5. Patient will complete functional activity while seated edge of bed with MOD I and adaptive equipment as needed. Timeframe: 7 visits       OCCUPATIONAL THERAPY Initial Assessment, Daily Note, and AM       OT Visit Days: 1   Acknowledge Orders  Time  OT Charge Capture  Rehab Caseload Tracker      Rogelio Gibbons is a 70 y.o. male   PRIMARY DIAGNOSIS: <principal problem not specified>  Acute chest pain [R07.9]  NSTEMI (non-ST elevated myocardial infarction) ([address]) [I21.4]       Reason for Referral: Generalized Muscle Weakness (M62.81)  Inpatient: Payor: MEDICARE / Plan: MEDICARE PART A AND B / Product Type: *No Product type* /     ASSESSMENT:     REHAB RECOMMENDATIONS:   Recommendation to date pending progress:  Setting:  No further OT needs at d/c    Equipment:    To Be Determined     ASSESSMENT:  Mr. Richard Salinas presents with deficits in overall strength, activity tolerance, activities of daily living performance, and functional mobility. Presents for NSTEMI; hx of b/l AKAs. Today, BUE are generally decreased but WFL. Bed mobility limited today d/t nature of bed (large hole in the middle) with patient requiring increased assistance to transfer OOB. Mod A x 2 to slide to recliner chair utilizing drop arm on recliner. Pt demonstrating good strength in BUE to assist with scooting as well as good dynamic sitting balance during transfer. Upon sitting upright, pt performed self-grooming tasks, including washing face and brushing teeth, with set-up.  Overall Tested    PLAN:   FREQUENCY/DURATION   OT Plan of Care: 3 times/week for duration of hospital stay or until stated goals are met, whichever comes first.    PROBLEM LIST:   (Skilled intervention is medically necessary to address:)  Decreased ADL/Functional Activities  Decreased Activity Tolerance  Decreased AROM/PROM  Decreased Balance  Decreased Gait Ability  Decreased Safety Awareness  Decreased Strength  Decreased Transfer Abilities  Increased Pain   INTERVENTIONS PLANNED:  (Benefits and precautions of occupational therapy have been discussed with the patient.)  Self Care Training  Therapeutic Activity  Therapeutic Exercise/HEP  Neuromuscular Re-education  Manual Therapy  Education         TREATMENT:     EVALUATION: LOW COMPLEXITY: (Untimed Charge)    TREATMENT:   Co-Treatment PT/OT necessary due to patient's decreased overall endurance/tolerance levels, as well as need for high level skilled assistance to complete functional transfers/mobility and functional tasks  Neuromuscular Re-education (13 Minutes): Patient participated in neuromuscular re-education including functional reaching, weight shifting, postural training, midline training, and sitting balance activity   with minimal verbal cues to improve sitting balance, posture, coordination, static balance, and dynamic balance in order to prepare for functional task, prepare for seated ADLs, prepare for standing ADLs, prepare for functional transfer, prepare for discharge home , and prepare for self care. .   Self Care (10 minutes): Patient participated in upper body dressing and grooming ADLs in unsupported sitting with minimal verbal cueing to increase independence, increase activity tolerance, and increase safety awareness. Patient also participated in functional mobility, functional transfer, and energy conservation training to increase independence, decrease assistance required, increase activity tolerance, and increase safety awareness.      TREATMENT

## 2023-09-05 NOTE — DISCHARGE SUMMARY
burning pain that radiated down both arms & associated SOB that began at 6:00 that morning, lasted several hours and resolved spontaneously. Placed on 2 L NC Oxygen. There were no aggravating or alleviating factors. D-dimer was elevated and troponin is approximately 3000. Placed on heparin gtt. CT PE protocol revealed extensive central bilateral pulmonary emboli. Pulmonary consulted & recommends at least of 3 months treatment with Eliquis. Patient switched from heparin to Eliquis 10 mg bid. Patient will continue Eliquis 10 mg bid for 7 days and then switch to Eliquis 5 mg bid after that. CT Chest PE w/ Contrast:  IMPRESSION:  1. Extensive bilateral acute pulmonary emboli involving the right main pulmonary  artery and all lobar pulmonary arteries. Clot burden is considered very large. No definite right heart strain. ECHO (TTE) COMPLETE (CONTRAST/BUBBLE/3D PRN) 09/04/2023  9:43 AM (Final)    Interpretation Summary    Left Ventricle: Normal left ventricular systolic function with a visually estimated EF of 50 - 55%. Left ventricle size is normal. Normal wall thickness. Normal wall motion. Normal diastolic function. Left Atrium: Left atrium is moderately dilated. Aortic Valve: Valve structure is normal. Mildly calcified cusp. Mild stenosis of the aortic valve. AV mean gradient is 14 mmHg. AV peak gradient is 26 mmHg. AV peak velocity is 2.5 m/s. AV area by continuity VTI is 1.9 cm2. Mitral Valve: Mildly calcified leaflet, at the posterior leaflet. Tricuspid Valve: Mild regurgitation. The estimated RVSP is 39 mmHg. Addendum by: Jim Rollins MD on 9/4/2023 10:10 AM    Signed by: Jim Rollins MD on 9/4/2023  9:43 AM      Patient experienced episode Afib RVR with rate in the 100's-130's overnight, but is already anticoagulated for PE & was rate controlled with Cardizem drip. Patient converted back to sinus rhythm. Cardizem drip stopped & patient rate controlled with Toprol XL.  The block  Possible Inferior infarct (cited on or before 03-SEP-2023)  Non-specific ST-t wave changes  Abnormal ECG  When compared with ECG of 03-SEP-2023 14:46,  Significant changes have occurred  Confirmed by ST ADITI REBOLLEDO MD (), CHRISTINE ROBLES ([id number]) on 9/5/2023 7:54:47 AM     CBC    Collection Time: 09/05/23  2:57 AM   Result Value Ref Range    WBC 4.5 4.3 - 11.1 K/uL    RBC 4.30 4.23 - 5.6 M/uL    Hemoglobin 10.5 (L) 13.6 - 17.2 g/dL    Hematocrit 33.9 (L) 41.1 - 50.3 %    MCV 78.8 (L) 82 - 102 FL    MCH 24.4 (L) 26.1 - 32.9 PG    MCHC 31.0 (L) 31.4 - 35.0 g/dL    RDW 15.6 (H) 11.9 - 14.6 %    Platelets 830 632 - 362 K/uL    MPV 10.0 9.4 - 12.3 FL    nRBC 0.00 0.0 - 0.2 K/uL   Basic Metabolic Panel w/ Reflex to MG    Collection Time: 09/05/23  2:57 AM   Result Value Ref Range    Sodium 137 133 - 143 mmol/L    Potassium 4.4 3.5 - 5.1 mmol/L    Chloride 108 101 - 110 mmol/L    CO2 19 (L) 21 - 32 mmol/L    Anion Gap 10 2 - 11 mmol/L    Glucose 141 (H) 65 - 100 mg/dL    BUN 31 (H) 8 - 23 MG/DL    Creatinine 1.50 0.8 - 1.5 MG/DL    Est, Glom Filt Rate 48 (L) >60 ml/min/1.73m2    Calcium 9.4 8.3 - 10.4 MG/DL   POCT Glucose    Collection Time: 09/05/23  7:10 AM   Result Value Ref Range    POC Glucose 168 (H) 65 - 100 mg/dL    Performed by: Wolf    POCT Glucose    Collection Time: 09/05/23 11:31 AM   Result Value Ref Range    POC Glucose 142 (H) 65 - 100 mg/dL    Performed by: Frank Dolan          Patient Instructions:  Go to [facility] on 10/2/2023 for a hospital follow-up appointment with Dr. Suraj Pickett at 1:15 PM.  Current Discharge Medication List        START taking these medications    Details   !! apixaban (ELIQUIS) 5 MG TABS tablet Take 2 tablets by mouth 2 times daily for 11 doses  Qty: 22 tablet, Refills: 0  Start date: 9/5/2023, End date: 9/11/2023      !! apixaban (ELIQUIS) 5 MG TABS tablet Take 1 tablet by mouth 2 times daily Take your 10 mg tablet twice a day from discharge through

## 2023-09-05 NOTE — PROGRESS NOTES
Edelmira Hampton Short  Admission Date: 9/3/2023         Daily Progress Note: 9/5/2023    The patient's chart is reviewed and the patient is discussed with the staff. Background: Patient is a 70 y.o.  male seen and evaluated at the request of Dr. Karen Venegas. He has h/o bilateral AKA's presents with retrosternal chest burning. He is also had some associated shortness of breath. Began at 6:00 this yesterday AM lasted several hours and resolved spontaneously. There were no aggravating or alleviating factors. Pain radiated down both arms. He has remote history of a CABG 13 to 15 years ago. D-dimer and troponin is elevated. He had a AKA right leg 5 months ago, but no more recent surgery, trauma or travel. He is immobile. He feels much better since admission, but still on 2L NC. No fevers, chills, night sweats. Subjective:     Pt denies any further chest discomfort, no SOB. O2 on 2L 96%;  removed with sats 93-95%. Currently in Afib on cardizem gtt with HR 70's.   He is eager to go home    Current Facility-Administered Medications   Medication Dose Route Frequency    dilTIAZem 100 mg in sodium chloride 0.9 % 100 mL infusion ([facility])  2.5-15 mg/hr IntraVENous Continuous    apixaban (ELIQUIS) tablet 10 mg  10 mg Oral BID    Followed by    Magalis Cordero ON 9/11/2023] apixaban (ELIQUIS) tablet 5 mg  5 mg Oral BID    aspirin EC tablet 81 mg  81 mg Oral Daily    insulin lispro (HUMALOG) injection vial 0-4 Units  0-4 Units SubCUTAneous TID WC    insulin lispro (HUMALOG) injection vial 0-4 Units  0-4 Units SubCUTAneous Nightly    tamsulosin (FLOMAX) capsule 0.4 mg  0.4 mg Oral Daily    levothyroxine (SYNTHROID) tablet 75 mcg  75 mcg Oral QAM AC    sodium chloride flush 0.9 % injection 5-40 mL  5-40 mL IntraVENous 2 times per day    sodium chloride flush 0.9 % injection 5-40 mL  5-40 mL IntraVENous PRN    0.9 % sodium chloride infusion   IntraVENous PRN    ondansetron (ZOFRAN-ODT) disintegrating tablet 4 all lobar pulmonary arteries. Clot burden is considered very large. No definite right heart strain. LAB:  Recent Labs     09/03/23  1530 09/03/23 1732 09/04/23 0718 09/05/23 0257   WBC 4.4  --  4.1* 4.5   HGB 11.5*  --  10.2* 10.5*   HCT 36.0*  --  32.3* 33.9*     --  198 205   INR  --  1.1  --   --      Recent Labs     09/03/23  1530 09/04/23 0718 09/05/23  0257    138 137   K 4.5 4.5 4.4    109 108   CO2 22 22 19*   BUN 34* 34* 31*   CREATININE 1.80* 1.60* 1.50   BILITOT 1.0  --   --    AST 30  --   --    ALT 17  --   --    ALKPHOS 71  --   --      Recent Labs     09/03/23 1732 09/03/23 2004 09/03/23  2324   TROPHS 7,293.0* 9,343.3* 11,188.9*     Recent Labs     09/03/23 1530 09/04/23 0718 09/05/23 0257   GLUCOSE 316* 190* 141*      Microbiology:   No results for input(s): CULTURE in the last 72 hours. ECHO: 09/03/23    ECHO (TTE) COMPLETE (CONTRAST/BUBBLE/3D PRN) 09/04/2023  9:43 AM (Final)    Interpretation Summary    Left Ventricle: Normal left ventricular systolic function with a visually estimated EF of 50 - 55%. Left ventricle size is normal. Normal wall thickness. Normal wall motion. Normal diastolic function. Left Atrium: Left atrium is moderately dilated. Aortic Valve: Valve structure is normal. Mildly calcified cusp. Mild stenosis of the aortic valve. AV mean gradient is 14 mmHg. AV peak gradient is 26 mmHg. AV peak velocity is 2.5 m/s. AV area by continuity VTI is 1.9 cm2. Mitral Valve: Mildly calcified leaflet, at the posterior leaflet. Tricuspid Valve: Mild regurgitation. The estimated RVSP is 39 mmHg. Addendum by: Mihir Rose MD on 9/4/2023 10:10 AM    Signed by: Mihir Rose MD on 9/4/2023  9:43 AM    Assessment and Plan:  (Medical Decision Making)   Impression: 69 yo male with recent AKA, hx of Afib admitted with acute chest pain and CT PE + for bilateral acute pulmonary emboli.       Active Problems:    Acute pulmonary embolism without

## 2023-09-05 NOTE — CARE COORDINATION
Discharge order is in. Pt is discharging home today in stable condition. Pt on RA, RT reported: RA sat at rest is 95% / HR 81. PT/OT consulted and recommended no further skilled therapy was needed post discharge. [employer] [facility] arranged for 1500. Pt made aware. No other discharge needs identified. Tx goals met. 09/05/23 1312   Services At/After Discharge   Transition of Care Consult (CM Consult) Discharge [facility] Discharge None    Resource Information Provided? No   Mode of Transport at Discharge [address] Time of Discharge 1500   Confirm Follow Up Transport Family   Condition of Participation: Discharge Planning   The Patient and/or Patient Representative was provided with a Choice of Provider? Patient   The Patient and/Or Patient Representative agree with the Discharge Plan? Yes   Freedom of Choice list was provided with basic dialogue that supports the patient's individualized plan of care/goals, treatment preferences, and shares the quality data associated with the providers?   Yes

## 2023-09-05 NOTE — PROGRESS NOTES
ACUTE PHYSICAL THERAPY GOALS:   (Developed with and agreed upon by patient and/or caregiver. )  LTG:  (1.)Mr. Patten will move from supine to sit and sit to supine , scoot up and down, and roll side to side in bed with INDEPENDENT within 7 treatment day(s). (2.)Mr. Patten will transfer from bed to chair and chair to bed with MODIFIED INDEPENDENCE using the least restrictive device within 7 treatment day(s). (3.)Mr. Patten will perform static sitting balance at EOB x 10 min with SBA within 7 treatment days for increased balance and overall functional mobility.   ________________________________________________________________________________________________     PHYSICAL THERAPY Initial Assessment and AM  (Link to Caseload Tracking: PT Visit Days : 1  Acknowledge Orders  Time In/Out  PT Charge Capture  Rehab Caseload Tracker    Rogelio Gibbons is a 70 y.o. male   PRIMARY DIAGNOSIS: <principal problem not specified>  Acute chest pain [R07.9]  NSTEMI (non-ST elevated myocardial infarction) ([address]) [I21.4]       Reason for Referral: Generalized Muscle Weakness (M62.81)  Other abnormalities of gait and mobility (R26.89)  Inpatient: Payor: Hans Coad / Plan: MEDICARE PART A AND B / Product Type: *No Product type* /     ASSESSMENT:     REHAB RECOMMENDATIONS:   Recommendation to date pending progress:  Setting:  No further skilled physical therapy after discharge from hospital    Equipment:    None     ASSESSMENT:  Mr. Richard Salinas presents to hospital on 9/3/23 with  burning in chest, SOB and mild cough, elevated troponin, + PEs. Pt with hx of B AKA, R AKA 5 months ago. Pt is w/c bound, has been using manual w/c, has electric w/c as well. Pt lives with spouse, 1 story home with ramp to enter. Pt required MIN A to scoot from bed to drop arm chair today, partly due to dip in bed, additional time for mobility but able to perform task well. Pt with overall fair+ to good sitting balance with mobility.  Pt most likely close to patient's decreased overall endurance/tolerance levels, as well as need for high level skilled assistance to complete functional transfers/mobility and functional tasks  Therapeutic Activity (23 Minutes): Therapeutic activity included Rolling, Supine to Sit, Scooting, Transfer Training, and Sitting balance  to improve functional Activity tolerance, Balance, Coordination, and Mobility.     TREATMENT GRID:  N/A    AFTER TREATMENT PRECAUTIONS: Bed/Chair Locked, Call light within reach, Chair, Needs within reach, and RN notified    INTERDISCIPLINARY COLLABORATION:  RN/ PCT, PT/ PTA, and OT/ [facility]    EDUCATION: Education Given To: Patient  Education Provided: Role of Therapy;Plan of Care  Education Method: Verbal  Barriers to Learning: None  Education Outcome: Verbalized understanding    TIME IN/OUT:  Time In: 1012  Time Out: Kenji  Minutes: [address], PT

## 2023-09-05 NOTE — WOUND CARE
Patient seen for left panus wound. Measured 1.5x1x0.1cm. is from moisture friction. Patient has 3 small dry pink nearly healed wounds on low buttocks that nursing staff placed foam dressing on this am. Recommend continued use of foam to all sites. Discussed with patient and he is in agreement. Discussed home dressing options with patient. Answered all questions. Will follow loosely. Please call if needed.

## 2023-09-07 ENCOUNTER — TELEPHONE (OUTPATIENT)
Dept: INTERNAL MEDICINE CLINIC | Facility: CLINIC | Age: 78
End: 2023-09-07

## 2023-09-07 NOTE — TELEPHONE ENCOUNTER
Spoke with patient's wife regarding follow-up care. He goes to the [facility] and has already requested a follow-up appointment and approval for transportation to go to his specialist appointments. They have not heard back from the [facility] as yet and requested any assistance in communicating with the [facility]. I spoke with triage nurse at the [facility] in [facility] and she has promised to send a message to the [address] [place] [address] office regarding patient's needs.

## 2023-09-12 NOTE — PROGRESS NOTES
Physician Progress Note      PATIENT:               Tiarra Valerio  [facility] #:                  [id number]  :                       1945  ADMIT DATE:       9/3/2023 3:10 PM  [address] [facility] DATE:        2023 2:59 PM  RESPONDING  PROVIDER #:        Nicci Mckeon DO          QUERY TEXT:    Patient admitted with chest pain. Noted documentation of NSTEMI on H&P and   DCS. Patient was found to have extensive bilateral pulmonary emboli. If   possible, please document in the progress notes and discharge summary if you   are evaluating and/or treating any of the following: The medical record reflects the following:  Risk Factors: hx CAD, CABG  Clinical Indicators: Troponins 3592, 7293, 9343, 11, 188. Treatment: Heparin gtt. Eliquis. Options provided:  -- NSTEMI confirmed  -- NSTEMI ruled out  -- Other - I will add my own diagnosis  -- Disagree - Not applicable / Not valid  -- Disagree - Clinically unable to determine / Unknown  -- Refer to Clinical Documentation Reviewer    PROVIDER RESPONSE TEXT:    NSTEMI ruled out.     Query created by: Felicia Grimm on 2023 3:37 PM      Electronically signed by:  Nicci Mckeon DO 2023 9:01 AM

## 2023-09-22 ENCOUNTER — OFFICE VISIT (OUTPATIENT)
Dept: PULMONOLOGY | Age: 78
End: 2023-09-22
Payer: OTHER GOVERNMENT

## 2023-09-22 VITALS
SYSTOLIC BLOOD PRESSURE: 126 MMHG | DIASTOLIC BLOOD PRESSURE: 76 MMHG | WEIGHT: 247 LBS | TEMPERATURE: 97.6 F | OXYGEN SATURATION: 95 % | HEART RATE: 65 BPM | BODY MASS INDEX: 31.71 KG/M2 | RESPIRATION RATE: 15 BRPM

## 2023-09-22 DIAGNOSIS — Z87.891 PERSONAL HISTORY OF SMOKING: ICD-10-CM

## 2023-09-22 DIAGNOSIS — Z99.3 DEPENDENT FOR WHEELCHAIR MOBILITY: ICD-10-CM

## 2023-09-22 DIAGNOSIS — I26.99 BILATERAL PULMONARY EMBOLISM (HCC): Primary | ICD-10-CM

## 2023-09-22 PROBLEM — L97.519 RIGHT FOOT ULCER (HCC): Status: RESOLVED | Noted: 2020-05-05 | Resolved: 2023-09-22

## 2023-09-22 PROBLEM — J96.01 ACUTE RESPIRATORY FAILURE WITH HYPOXIA AND HYPERCAPNIA (HCC): Status: RESOLVED | Noted: 2020-05-05 | Resolved: 2023-09-22

## 2023-09-22 PROBLEM — M79.606 ISCHEMIC LEG PAIN: Status: RESOLVED | Noted: 2019-11-01 | Resolved: 2023-09-22

## 2023-09-22 PROBLEM — J96.02 ACUTE RESPIRATORY FAILURE WITH HYPOXIA AND HYPERCAPNIA (HCC): Status: RESOLVED | Noted: 2020-05-05 | Resolved: 2023-09-22

## 2023-09-22 PROBLEM — I99.8 ISCHEMIC LEG PAIN: Status: RESOLVED | Noted: 2019-11-01 | Resolved: 2023-09-22

## 2023-09-22 PROBLEM — S91.301A OPEN WOUND OF RIGHT FOOT: Status: RESOLVED | Noted: 2020-05-26 | Resolved: 2023-09-22

## 2023-09-22 PROCEDURE — 99214 OFFICE O/P EST MOD 30 MIN: CPT | Performed by: STUDENT IN AN ORGANIZED HEALTH CARE EDUCATION/TRAINING PROGRAM

## 2023-09-22 PROCEDURE — 3074F SYST BP LT 130 MM HG: CPT | Performed by: STUDENT IN AN ORGANIZED HEALTH CARE EDUCATION/TRAINING PROGRAM

## 2023-09-22 PROCEDURE — 1123F ACP DISCUSS/DSCN MKR DOCD: CPT | Performed by: STUDENT IN AN ORGANIZED HEALTH CARE EDUCATION/TRAINING PROGRAM

## 2023-09-22 PROCEDURE — 3078F DIAST BP <80 MM HG: CPT | Performed by: STUDENT IN AN ORGANIZED HEALTH CARE EDUCATION/TRAINING PROGRAM

## 2023-09-22 RX ORDER — ERGOCALCIFEROL 1.25 MG/1
CAPSULE ORAL
COMMUNITY
Start: 2022-10-26

## 2023-09-22 RX ORDER — LISINOPRIL 40 MG/1
40 TABLET ORAL
COMMUNITY
Start: 2022-10-26

## 2023-09-22 NOTE — PROGRESS NOTES
acute  pulmonary emboli extending into the left upper lobar and left lower lobar  pulmonary arteries and into the more peripheral left lower lobe segmental  pulmonary arteries. Clot burden is considered very large. No definite right  heart strain. No pleural or pericardial effusion. No mediastinal or axillary  adenopathy. Mild dependent atelectasis is present. Otherwise the lung parenchyma  is clear. There is no pneumothorax. Limited evaluation of the upper abdomen is unremarkable. No aggressive bone lesions identified. Impression  1. Extensive bilateral acute pulmonary emboli involving the right main pulmonary  artery and all lobar pulmonary arteries. Clot burden is considered very large. No definite right heart strain. [facility]  The critical results contained in this report were communicated to [facility] the unit  charge nurse and Ms. Walton Borne the covering provider by Dr. Trenton Miranda on  9/4/2023 at 8:25 AM. Critical results were communicated as outlined in Section  II.C.2.a.i of the ACR Practice Guideline for Communication of Diagnostic Imaging  Findings. Nuclear Medicine: No results found for this or any previous visit from the past 3650 days. PFTs:        No data to display              No results found for this or any previous visit. No results found for this or any previous visit. FeNO: No results found for this or any previous visit. FeNO and Likelihood of Eosinophilic Asthma   Unlikely Intermediate Likely   <25 ppb 25-50 ppb >50ppb     Exercise Oximetry:    Echo:   ECHO (TTE) COMPLETE (CONTRAST/BUBBLE/3D PRN) 09/04/2023    Interpretation Summary    Left Ventricle: Normal left ventricular systolic function with a visually estimated EF of 50 - 55%. Left ventricle size is normal. Normal wall thickness. Normal wall motion. Normal diastolic function. Left Atrium: Left atrium is moderately dilated. Aortic Valve: Valve structure is normal. Mildly calcified cusp.  Mild stenosis of the aortic

## 2023-10-02 ENCOUNTER — OFFICE VISIT (OUTPATIENT)
Age: 78
End: 2023-10-02
Payer: OTHER GOVERNMENT

## 2023-10-02 VITALS
DIASTOLIC BLOOD PRESSURE: 90 MMHG | HEART RATE: 48 BPM | WEIGHT: 247 LBS | SYSTOLIC BLOOD PRESSURE: 166 MMHG | BODY MASS INDEX: 31.71 KG/M2

## 2023-10-02 DIAGNOSIS — E78.5 DYSLIPIDEMIA: ICD-10-CM

## 2023-10-02 DIAGNOSIS — I25.10 CORONARY ARTERY DISEASE INVOLVING NATIVE CORONARY ARTERY OF NATIVE HEART WITHOUT ANGINA PECTORIS: Primary | ICD-10-CM

## 2023-10-02 DIAGNOSIS — N18.32 STAGE 3B CHRONIC KIDNEY DISEASE (HCC): ICD-10-CM

## 2023-10-02 DIAGNOSIS — I73.9 PAD (PERIPHERAL ARTERY DISEASE) (HCC): ICD-10-CM

## 2023-10-02 DIAGNOSIS — Z95.1 S/P CABG (CORONARY ARTERY BYPASS GRAFT): ICD-10-CM

## 2023-10-02 PROCEDURE — 1036F TOBACCO NON-USER: CPT | Performed by: INTERNAL MEDICINE

## 2023-10-02 PROCEDURE — G8484 FLU IMMUNIZE NO ADMIN: HCPCS | Performed by: INTERNAL MEDICINE

## 2023-10-02 PROCEDURE — 3079F DIAST BP 80-89 MM HG: CPT | Performed by: INTERNAL MEDICINE

## 2023-10-02 PROCEDURE — 1123F ACP DISCUSS/DSCN MKR DOCD: CPT | Performed by: INTERNAL MEDICINE

## 2023-10-02 PROCEDURE — G8427 DOCREV CUR MEDS BY ELIG CLIN: HCPCS | Performed by: INTERNAL MEDICINE

## 2023-10-02 PROCEDURE — 3077F SYST BP >= 140 MM HG: CPT | Performed by: INTERNAL MEDICINE

## 2023-10-02 PROCEDURE — 99214 OFFICE O/P EST MOD 30 MIN: CPT | Performed by: INTERNAL MEDICINE

## 2023-10-02 PROCEDURE — G8417 CALC BMI ABV UP PARAM F/U: HCPCS | Performed by: INTERNAL MEDICINE

## 2023-10-02 PROCEDURE — 1111F DSCHRG MED/CURRENT MED MERGE: CPT | Performed by: INTERNAL MEDICINE

## 2023-10-02 NOTE — PROGRESS NOTES
[address]  PHONE: [phone]    SUBJECTIVE: Christal Patten ([birth year]) is a 70 y.o. male seen for a follow up visit regarding the following:   Specialty Problems          Cardiology Problems    CAD (coronary artery disease)        NSTEMI (non-ST elevated myocardial infarction) ([address])        Acute pulmonary embolism without acute cor pulmonale (HCC)        HTN (hypertension)        Bypass graft stenosis (HCC)        PVD (peripheral vascular disease) with claudication (HCC)        PAD (peripheral artery disease) ([address])        RBBB        Acute chest pain        Atrial fibrillation [facility])         Hospital Course:   79-year-old gentleman with 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 Placed on 2 L NC Oxygen. There were no aggravating or alleviating factors. D-dimer was elevated and troponin is approximately 3000. Placed on heparin gtt. CT PE protocol revealed extensive central bilateral pulmonary emboli. Pulmonary consulted & recommends at least of 3 months treatment with Eliquis. Patient switched from heparin to Eliquis 10 mg bid. Patient will continue Eliquis 10 mg bid for 7 days and then switch to Eliquis 5 mg bid after that. CT Chest PE w/ Contrast:  IMPRESSION:  1. Extensive bilateral acute pulmonary emboli involving the right main pulmonary  artery and all lobar pulmonary arteries. Clot burden is considered very large. No definite right heart strain.     Patient endorses a statin intolerance but looking at his chart in September 4 of this year he had an LDL of 50 total cholesterol of 97 and an HDL of 23    Past Medical History, Past Surgical History, Family history, Social History, and

## 2023-11-05 NOTE — PROGRESS NOTES
Diluadid 1mg IV given for 7/10 left leg pain. Reason for Disposition  • SEVERE itching (i.e., interferes with sleep, normal activities or school)    Answer Assessment - Initial Assessment Questions  1. APPEARANCE of RASH: "Describe the rash." (e.g., spots, blisters, raised areas, skin peeling, scaly)     Blisters  and flat rash        2. LOCATION: "Where is the rash located?"      Arms, neck, thigh, lower back    3. COLOR: "What color is the rash?" (Note: It is difficult to assess rash color in people with darker-colored skin. When this situation occurs, simply ask the caller to describe what they see.)      Reddened     4. ONSET: "When did the rash begin?"      2 days ago    5 FEVER: "Do you have a fever?" If Yes, ask: "What is your temperature, how was it measured, and when did it start?"  No    6. ITCHING: "Does the rash itch?" If Yes, ask: "How bad is the itch?" (Scale 1-10; or mild, moderate, severe)  Yes severe itching    7 CAUSE: "What do you think is causing the rash?"      Epoxy poisoning    9. MEDICATION FACTORS: "Have you started any new medications within the last 2 weeks?" (e.g., antibiotics)       Clindamycin 10/18    10. OTHER SYMPTOMS: "Do you have any other symptoms?" (e.g., dizziness, headache, sore throat, joint pain)    Dizziness and spaces out at times.     Protocols used: Rash or Redness - Widespread-ADULT-

## (undated) DEVICE — COVER LT HNDL FOR OR SURG LAMP

## (undated) DEVICE — Device

## (undated) DEVICE — LARGE (ORANGE) FOR GRAFTS UP TO 8 MM, 12" / 30.5 CM (1/PKG): Brand: SCANLAN® VASCULAR TUNNELER SHEATHS AND BULLET TIPS

## (undated) DEVICE — 3M™ IOBAN™ 2 ANTIMICROBIAL INCISE DRAPE 6650EZ: Brand: IOBAN™ 2

## (undated) DEVICE — DISH PTRI STRL --

## (undated) DEVICE — GOWN,PREVENTION PLUS,2XL,ST,22/CS: Brand: MEDLINE

## (undated) DEVICE — CURITY NON-ADHERENT STRIPS: Brand: CURITY

## (undated) DEVICE — SUTURE VCRL SZ 3-0 L27IN ABSRB UD L26MM SH 1/2 CIR J416H

## (undated) DEVICE — STRYKER PERFORMANCE SERIES SAGITTAL BLADE: Brand: STRYKER PERFORMANCE SERIES

## (undated) DEVICE — SYR 10ML LUER LOK 1/5ML GRAD --

## (undated) DEVICE — (D)PREP SKN CHLRAPRP APPL 26ML -- CONVERT TO ITEM 371833

## (undated) DEVICE — AMD ANTIMICROBIAL BANDAGE ROLL,6 PLY: Brand: KERLIX

## (undated) DEVICE — X-RAY SPONGES,12 PLY: Brand: DERMACEA

## (undated) DEVICE — SUTURE PERMAHAND SZ 3-0 L18IN NONABSORBABLE BLK SILK BRAID A184H

## (undated) DEVICE — BANDAGE COBAN 4 IN COMPR W4INXL5YD FOAM COHESIVE QUIK STK SELF ADH SFT

## (undated) DEVICE — TRAY CATH 16F URIN MTR LTX -- CONVERT TO ITEM 363111

## (undated) DEVICE — REM POLYHESIVE ADULT PATIENT RETURN ELECTRODE: Brand: VALLEYLAB

## (undated) DEVICE — SUTURE VCRL SZ 2-0 L36IN ABSRB UD L36MM CT-1 1/2 CIR J945H

## (undated) DEVICE — SLIM BODY SKIN STAPLER: Brand: APPOSE ULC

## (undated) DEVICE — SUTURE PROL SZ 5-0 L24IN NONABSORBABLE BLU L13MM C-1 3/8 M8725

## (undated) DEVICE — MAJOR VASCULAR: Brand: MEDLINE INDUSTRIES, INC.

## (undated) DEVICE — INTENDED FOR TISSUE SEPARATION, AND OTHER PROCEDURES THAT REQUIRE A SHARP SURGICAL BLADE TO PUNCTURE OR CUT.: Brand: BARD-PARKER ®  SAFETY SCALPED

## (undated) DEVICE — SUTURE PROL SZ 6-0 L24IN NONABSORBABLE BLU BV-1 L9.3MM 3/8 M8805

## (undated) DEVICE — GEL US 20GM NONIRRITATING OVERWRAPPED FILE PCH TRNSMIT

## (undated) DEVICE — GOWN,REINFORCED,POLY,AURORA,XXLARGE,STR: Brand: MEDLINE

## (undated) DEVICE — BLADE OPHTH MINIATURE CORNEAL STR DEL SHRP BEAV

## (undated) DEVICE — TRAY PREP DRY W/ PREM GLV 2 APPL 6 SPNG 2 UNDPD 1 OVERWRAP

## (undated) DEVICE — APPLIER CLP L9.375IN APER 2.1MM CLS L3.8MM 20 SM TI CLP

## (undated) DEVICE — BLADE SURG NO15 S STL STR DISP GLASSVAN

## (undated) DEVICE — T-DRAPE,EXTREMITY,STERILE: Brand: MEDLINE

## (undated) DEVICE — SPONGE LAP 18X18IN STRL -- 5/PK

## (undated) DEVICE — APPLIER CLP LIG SM TI PREM SURGCLP SUPER INTLOK 20 DISP

## (undated) DEVICE — DRESSING GZ PETRO ST OVERWRAP 5X9IN XRFRM CURAD

## (undated) DEVICE — SUTURE PERMAHAND SZ 2-0 L18IN NONABSORBABLE BLK L26MM SH C012D

## (undated) DEVICE — SUTURE ETHLN SZ 4-0 L18IN NONABSORBABLE BLK L19MM PS-2 3/8 1667H

## (undated) DEVICE — SMALL (GREEN) FOR GRAFTS UP TO 8 MM, 20 1/2" / 52 CM (1/PKG): Brand: SCANLAN® VASCULAR TUNNELER SHEATHS AND BULLET TIPS

## (undated) DEVICE — SOLUTION IV 1000ML 0.9% SOD CHL

## (undated) DEVICE — ELECTRODE PT RET AD L9FT HI MOIST COND ADH HYDRGEL CORDED

## (undated) DEVICE — TAPE UMB 1/8X30IN MP COT WHT --

## (undated) DEVICE — SHEET, DRAPE, SPLIT, STERILE: Brand: MEDLINE

## (undated) DEVICE — SUTURE VCRL SZ 2-0 L27IN ABSRB UD L36MM CT-1 1/2 CIR JJ42G

## (undated) DEVICE — SUTURE PERMAHAND SZ 2-0 L12X18IN NONABSORBABLE BLK SILK A185H

## (undated) DEVICE — 2000CC GUARDIAN II: Brand: GUARDIAN

## (undated) DEVICE — LARGE (BLUE) FOR GRAFTS UP TO 10 MM, 20 1/2" / 52 CM (1/PKG): Brand: SCANLAN® VASCULAR TUNNELER SHEATHS AND BULLET TIPS

## (undated) DEVICE — VASCULAR AMP, I&D: Brand: MEDLINE INDUSTRIES, INC.

## (undated) DEVICE — MINOR SPLIT GENERAL: Brand: MEDLINE INDUSTRIES, INC.

## (undated) DEVICE — CARDINAL HEALTH FLEXIBLE LIGHT HANDLE COVER: Brand: CARDINAL HEALTH

## (undated) DEVICE — PAD THRM L UNIV NONSLIP HYDRGEL RECT PROVIDE OPTIMAL ENERGY

## (undated) DEVICE — UNIVERSAL DRAPES: Brand: MEDLINE INDUSTRIES, INC.

## (undated) DEVICE — SUTURE MCRYL SZ 4-0 L27IN ABSRB UD L19MM PS-2 1/2 CIR PRIM Y426H

## (undated) DEVICE — 3M™ STERI-DRAPE™ ISOLATION BAG, 10 PER CARTON / 4 CARTONS PER CASE, 1003: Brand: 3M™ STERI-DRAPE™

## (undated) DEVICE — 3M™ TEGADERM™ TRANSPARENT FILM DRESSING FRAME STYLE, 1626W, 4 IN X 4-3/4 IN (10 CM X 12 CM), 50/CT 4CT/CASE: Brand: 3M™ TEGADERM™

## (undated) DEVICE — AGENT HEMSTAT W4XL4IN OXIDIZED REGENERATED CELOS ABSRB SFT

## (undated) DEVICE — STOCKINETTE: Brand: DEROYAL

## (undated) DEVICE — ABSORBENT, WATERPROOF, BACTERIA PROOF FILM DRESSING: Brand: OPSITE POST OP 9.5X8.5CM CTN 20

## (undated) DEVICE — STOCKINETTE,IMPERVIOUS,12X48,STERILE: Brand: MEDLINE

## (undated) DEVICE — BANDAGE,GAUZE,BULKEE II,4.5"X4.1YD,STRL: Brand: MEDLINE

## (undated) DEVICE — GLOVE SURG SZ 7.5 L11.73IN FNGR THK9.8MIL STRW LTX POLYMER

## (undated) DEVICE — INTENDED TO BE USED TO OCCLUDE, RETRACT AND IDENTIFY ARTERIES, VEINS, TENDONS AND NERVES IN SURGICAL PROCEDURES: Brand: STERION®  VESSEL LOOP

## (undated) DEVICE — SUT SLK 4-0 18IN TIE MP BLK --

## (undated) DEVICE — SUTURE PROL SZ 3-0 L36IN NONABSORBABLE BLU L26MM SH 1/2 CIR 8522H

## (undated) DEVICE — TUBING, SUCTION, 1/4" X 10', STRAIGHT: Brand: MEDLINE

## (undated) DEVICE — DRAPE TWL SURG 16X26IN BLU ORB04] ALLCARE INC]

## (undated) DEVICE — DRESSING,GAUZE,XEROFORM,CURAD,1"X8",ST: Brand: CURAD

## (undated) DEVICE — INTENDED FOR TISSUE SEPARATION, AND OTHER PROCEDURES THAT REQUIRE A SHARP SURGICAL BLADE TO PUNCTURE OR CUT.: Brand: BARD-PARKER ® STAINLESS STEEL BLADES

## (undated) DEVICE — SOLUTION SCRB 1% POVIDONE IOD BRN ANTIMIC SKIN CLN

## (undated) DEVICE — DRSG VAC ASST CLSR GRNUFM MED -- 18X12.5X3.2CM

## (undated) DEVICE — CANISTER SUC GEL INFOVAC 500ML --

## (undated) DEVICE — SKIN MARKER,REGULAR TIP WITH RULER AND LABELS: Brand: DEVON

## (undated) DEVICE — MARKER SKIN GENTIAN VLT FN TIP W/ 6IN RUL L RESVR MED GRD

## (undated) DEVICE — SPONGE LAPAROTOMY W18XL18IN WHITE STRUNG RADIOPAQUE STERILE

## (undated) DEVICE — BASIC SINGLE BASIN-LF: Brand: MEDLINE INDUSTRIES, INC.

## (undated) DEVICE — DRAPE,TOP,102X53,STERILE: Brand: MEDLINE

## (undated) DEVICE — APPLICATOR MEDICATED 26 CC SOLUTION HI LT ORNG CHLORAPREP

## (undated) DEVICE — NEEDLE HYPO 18GA L1.5IN PNK S STL HUB POLYPR SHLD REG BVL

## (undated) DEVICE — DRESSING NEG PRSS M 18X12.5X3.3CM POLYUR FOR WND THER VAC

## (undated) DEVICE — SUTURE ABSORBABLE BRAIDED 2-0 CT-1 27 IN UD VICRYL J259H

## (undated) DEVICE — AMD ANTIMICROBIAL SUPER SPONGES,MEDIUM: Brand: KERLIX

## (undated) DEVICE — STOCKINETTE TUBE 6X48 -- MEDICHOICE

## (undated) DEVICE — MEDI-VAC NON-CONDUCTIVE SUCTION TUBING: Brand: CARDINAL HEALTH

## (undated) DEVICE — DERMABOND SKIN ADH 0.7ML -- DERMABOND ADVANCED 12/BX

## (undated) DEVICE — SMALL BOWL SET-LF: Brand: MEDLINE INDUSTRIES, INC.

## (undated) DEVICE — 3M™ IOBAN™ 2 ANTIMICROBIAL INCISE DRAPE 6651EZ: Brand: IOBAN™ 2

## (undated) DEVICE — KIT NEG PRSS FOR NONLIN OR UPTO 90CM LIN INCIS PREVENA +

## (undated) DEVICE — ELECTRODE NDL 2.8IN COAT VALLEYLAB

## (undated) DEVICE — SUTURE VCRL SZ 2-0 L27IN ABSRB UD L26MM CT-2 1/2 CIR J269H

## (undated) DEVICE — BNDG,ELSTC,MATRIX,STRL,4"X5YD,LF,HOOK&LP: Brand: MEDLINE

## (undated) DEVICE — CANISTER WND VAC ASST CLSR --

## (undated) DEVICE — NEEDLE HYPO 25GA L1.5IN BLU POLYPR HUB S STL REG BVL STR

## (undated) DEVICE — SURGICAL PROCEDURE PACK BASIC ST FRANCIS

## (undated) DEVICE — SPONGE GZ W4XL4IN COT 12 PLY TYP VII WVN C FLD DSGN

## (undated) DEVICE — WIPE INSTR HIGH ABSORBENT FAST WICKING LINT FREE COUNT 20

## (undated) DEVICE — GAUZE,SPONGE,8"X4",12PLY,XRAY,STRL,LF: Brand: MEDLINE

## (undated) DEVICE — PAD,ABDOMINAL,5"X9",ST,LF,25/BX: Brand: MEDLINE INDUSTRIES, INC.

## (undated) DEVICE — STERILE HOOK LOCK LATEX FREE ELASTIC BANDAGE 4INX5YD: Brand: HOOK LOCK™

## (undated) DEVICE — SUT ETHLN 4-0 18IN PS2 BLK --

## (undated) DEVICE — BLADE ASSEMB CLP HAIR FINE --

## (undated) DEVICE — BUTTON SWITCH PENCIL BLADE ELECTRODE, HOLSTER: Brand: EDGE

## (undated) DEVICE — SUTURE VCRL SZ 3-0 L27IN ABSRB UD L19MM PS-2 3/8 CIR PRIM J427H

## (undated) DEVICE — GAUZE,SPONGE,4"X4",16PLY,STRL,LF,10/TRAY: Brand: MEDLINE

## (undated) DEVICE — SUTURE ETHLN SZ 2-0 L18IN NONABSORBABLE BLK L26MM PS 3/8 585H

## (undated) DEVICE — GOWN,REINF,POLY,ECL,PP SLV,XL: Brand: MEDLINE

## (undated) DEVICE — SUTURE PERMAHAND SZ 3-0 L18IN NONABSORBABLE BLK L26MM SH C013D